# Patient Record
Sex: MALE | Employment: UNEMPLOYED | URBAN - METROPOLITAN AREA
[De-identification: names, ages, dates, MRNs, and addresses within clinical notes are randomized per-mention and may not be internally consistent; named-entity substitution may affect disease eponyms.]

---

## 2019-04-09 ENCOUNTER — HOSPITAL ENCOUNTER (INPATIENT)
Facility: HOSPITAL | Age: 24
LOS: 62 days | DRG: 885 | End: 2019-06-10
Attending: PSYCHIATRY & NEUROLOGY | Admitting: PSYCHIATRY & NEUROLOGY
Payer: MEDICARE

## 2019-04-09 DIAGNOSIS — Z72.0 TOBACCO USE: ICD-10-CM

## 2019-04-09 DIAGNOSIS — F25.0 SCHIZOAFFECTIVE DISORDER, BIPOLAR TYPE (HCC): Primary | Chronic | ICD-10-CM

## 2019-04-09 DIAGNOSIS — F20.1 DISORGANIZED SCHIZOPHRENIA (HCC): ICD-10-CM

## 2019-04-09 DIAGNOSIS — Z87.898 HISTORY OF SEIZURE: ICD-10-CM

## 2019-04-09 DIAGNOSIS — F09 COGNITIVE DISORDER: ICD-10-CM

## 2019-04-09 DIAGNOSIS — J45.909 UNCOMPLICATED ASTHMA, UNSPECIFIED ASTHMA SEVERITY, UNSPECIFIED WHETHER PERSISTENT: ICD-10-CM

## 2019-04-09 DIAGNOSIS — Z00.8 MEDICAL CLEARANCE FOR PSYCHIATRIC ADMISSION: Primary | ICD-10-CM

## 2019-04-09 DIAGNOSIS — I10 HYPERTENSION, UNSPECIFIED TYPE: ICD-10-CM

## 2019-04-09 DIAGNOSIS — K08.89 TOOTH PAIN: ICD-10-CM

## 2019-04-09 DIAGNOSIS — G93.9 BRAIN LESION: ICD-10-CM

## 2019-04-09 DIAGNOSIS — R52 PAIN: ICD-10-CM

## 2019-04-09 DIAGNOSIS — T78.40XA ALLERGY: ICD-10-CM

## 2019-04-09 DIAGNOSIS — G47.00 INSOMNIA, UNSPECIFIED TYPE: ICD-10-CM

## 2019-04-09 RX ORDER — HALOPERIDOL 5 MG/ML
5 INJECTION INTRAMUSCULAR EVERY 6 HOURS PRN
Status: CANCELLED | OUTPATIENT
Start: 2019-04-09

## 2019-04-09 RX ORDER — BENZTROPINE MESYLATE 1 MG/ML
1 INJECTION INTRAMUSCULAR; INTRAVENOUS EVERY 6 HOURS PRN
Status: DISCONTINUED | OUTPATIENT
Start: 2019-04-09 | End: 2019-06-10 | Stop reason: HOSPADM

## 2019-04-09 RX ORDER — LORAZEPAM 1 MG/1
1 TABLET ORAL EVERY 8 HOURS PRN
Status: DISCONTINUED | OUTPATIENT
Start: 2019-04-09 | End: 2019-06-10 | Stop reason: HOSPADM

## 2019-04-09 RX ORDER — TRAZODONE HYDROCHLORIDE 50 MG/1
50 TABLET ORAL
Status: CANCELLED | OUTPATIENT
Start: 2019-04-09

## 2019-04-09 RX ORDER — TRAZODONE HYDROCHLORIDE 50 MG/1
50 TABLET ORAL
Status: DISCONTINUED | OUTPATIENT
Start: 2019-04-09 | End: 2019-04-22

## 2019-04-09 RX ORDER — BENZTROPINE MESYLATE 1 MG/ML
1 INJECTION INTRAMUSCULAR; INTRAVENOUS
Status: CANCELLED | OUTPATIENT
Start: 2019-04-09

## 2019-04-09 RX ORDER — OLANZAPINE 10 MG/1
10 TABLET ORAL
Status: DISCONTINUED | OUTPATIENT
Start: 2019-04-09 | End: 2019-06-10 | Stop reason: HOSPADM

## 2019-04-09 RX ORDER — IBUPROFEN 600 MG/1
600 TABLET ORAL EVERY 8 HOURS PRN
Status: CANCELLED | OUTPATIENT
Start: 2019-04-09

## 2019-04-09 RX ORDER — HYDROXYZINE HYDROCHLORIDE 25 MG/1
25 TABLET, FILM COATED ORAL EVERY 8 HOURS PRN
COMMUNITY
Start: 2019-04-04 | End: 2019-06-10 | Stop reason: HOSPADM

## 2019-04-09 RX ORDER — RISPERIDONE 1 MG/1
1 TABLET, ORALLY DISINTEGRATING ORAL
Status: DISCONTINUED | OUTPATIENT
Start: 2019-04-09 | End: 2019-06-10 | Stop reason: HOSPADM

## 2019-04-09 RX ORDER — HYDROXYZINE 50 MG/1
50 TABLET, FILM COATED ORAL EVERY 4 HOURS PRN
Status: CANCELLED | OUTPATIENT
Start: 2019-04-09

## 2019-04-09 RX ORDER — MAGNESIUM HYDROXIDE/ALUMINUM HYDROXICE/SIMETHICONE 120; 1200; 1200 MG/30ML; MG/30ML; MG/30ML
30 SUSPENSION ORAL EVERY 4 HOURS PRN
Status: CANCELLED | OUTPATIENT
Start: 2019-04-09

## 2019-04-09 RX ORDER — ACETAMINOPHEN 325 MG/1
650 TABLET ORAL EVERY 6 HOURS PRN
Status: DISCONTINUED | OUTPATIENT
Start: 2019-04-09 | End: 2019-06-10 | Stop reason: HOSPADM

## 2019-04-09 RX ORDER — ACETAMINOPHEN 325 MG/1
650 TABLET ORAL EVERY 6 HOURS PRN
Status: CANCELLED | OUTPATIENT
Start: 2019-04-09

## 2019-04-09 RX ORDER — RISPERIDONE 1 MG/1
1 TABLET, ORALLY DISINTEGRATING ORAL
Status: CANCELLED | OUTPATIENT
Start: 2019-04-09

## 2019-04-09 RX ORDER — OLANZAPINE 10 MG/1
10 INJECTION, POWDER, LYOPHILIZED, FOR SOLUTION INTRAMUSCULAR
Status: DISCONTINUED | OUTPATIENT
Start: 2019-04-09 | End: 2019-06-10 | Stop reason: HOSPADM

## 2019-04-09 RX ORDER — OLANZAPINE 10 MG/1
5 INJECTION, POWDER, LYOPHILIZED, FOR SOLUTION INTRAMUSCULAR EVERY 6 HOURS PRN
Status: CANCELLED | OUTPATIENT
Start: 2019-04-09

## 2019-04-09 RX ORDER — ACETAMINOPHEN 325 MG/1
975 TABLET ORAL EVERY 6 HOURS PRN
Status: DISCONTINUED | OUTPATIENT
Start: 2019-04-09 | End: 2019-06-10 | Stop reason: HOSPADM

## 2019-04-09 RX ORDER — SERTRALINE HYDROCHLORIDE 100 MG/1
150 TABLET, FILM COATED ORAL
COMMUNITY
Start: 2019-04-05 | End: 2019-06-10 | Stop reason: HOSPADM

## 2019-04-09 RX ORDER — IBUPROFEN 400 MG/1
800 TABLET ORAL EVERY 8 HOURS PRN
Status: CANCELLED | OUTPATIENT
Start: 2019-04-09

## 2019-04-09 RX ORDER — MAGNESIUM HYDROXIDE/ALUMINUM HYDROXICE/SIMETHICONE 120; 1200; 1200 MG/30ML; MG/30ML; MG/30ML
30 SUSPENSION ORAL EVERY 4 HOURS PRN
Status: DISCONTINUED | OUTPATIENT
Start: 2019-04-09 | End: 2019-06-10 | Stop reason: HOSPADM

## 2019-04-09 RX ORDER — HALOPERIDOL 5 MG
5 TABLET ORAL EVERY 8 HOURS PRN
Status: DISCONTINUED | OUTPATIENT
Start: 2019-04-09 | End: 2019-06-10 | Stop reason: HOSPADM

## 2019-04-09 RX ORDER — LORAZEPAM 2 MG/ML
1 INJECTION INTRAMUSCULAR EVERY 6 HOURS PRN
Status: DISCONTINUED | OUTPATIENT
Start: 2019-04-09 | End: 2019-06-10 | Stop reason: HOSPADM

## 2019-04-09 RX ORDER — HYDROXYZINE HYDROCHLORIDE 25 MG/1
25 TABLET, FILM COATED ORAL EVERY 6 HOURS PRN
Status: DISCONTINUED | OUTPATIENT
Start: 2019-04-09 | End: 2019-06-10 | Stop reason: HOSPADM

## 2019-04-09 RX ORDER — HALOPERIDOL 5 MG/ML
5 INJECTION INTRAMUSCULAR EVERY 6 HOURS PRN
Status: DISCONTINUED | OUTPATIENT
Start: 2019-04-09 | End: 2019-06-10 | Stop reason: HOSPADM

## 2019-04-09 RX ORDER — BENZTROPINE MESYLATE 1 MG/1
1 TABLET ORAL EVERY 6 HOURS PRN
Status: DISCONTINUED | OUTPATIENT
Start: 2019-04-09 | End: 2019-06-10 | Stop reason: HOSPADM

## 2019-04-09 RX ORDER — LORAZEPAM 2 MG/ML
2 INJECTION INTRAMUSCULAR EVERY 4 HOURS PRN
Status: CANCELLED | OUTPATIENT
Start: 2019-04-09

## 2019-04-09 RX ORDER — ACETAMINOPHEN 325 MG/1
650 TABLET ORAL EVERY 4 HOURS PRN
Status: DISCONTINUED | OUTPATIENT
Start: 2019-04-09 | End: 2019-06-10 | Stop reason: HOSPADM

## 2019-04-10 PROBLEM — Z87.898 HISTORY OF SEIZURE: Status: ACTIVE | Noted: 2019-04-10

## 2019-04-10 PROBLEM — F20.9 SCHIZOPHRENIA (HCC): Status: ACTIVE | Noted: 2019-04-10

## 2019-04-10 PROBLEM — E22.1 HYPERPROLACTINEMIA (HCC): Status: ACTIVE | Noted: 2019-04-10

## 2019-04-10 PROBLEM — G93.9 BRAIN LESION: Status: ACTIVE | Noted: 2019-04-10

## 2019-04-10 LAB
ALBUMIN SERPL BCP-MCNC: 4 G/DL (ref 3–5.2)
ALP SERPL-CCNC: 78 U/L (ref 43–122)
ALT SERPL W P-5'-P-CCNC: 22 U/L (ref 9–52)
AMORPH PHOS CRY URNS QL MICRO: ABNORMAL /HPF
AMPHETAMINES SERPL QL SCN: NEGATIVE
ANION GAP SERPL CALCULATED.3IONS-SCNC: 7 MMOL/L (ref 5–14)
AST SERPL W P-5'-P-CCNC: 31 U/L (ref 17–59)
BACTERIA UR QL AUTO: ABNORMAL /HPF
BARBITURATES UR QL: NEGATIVE
BASOPHILS # BLD AUTO: 0 THOUSANDS/ΜL (ref 0–0.1)
BASOPHILS NFR BLD AUTO: 0 % (ref 0–1)
BENZODIAZ UR QL: NEGATIVE
BILIRUB SERPL-MCNC: 0.4 MG/DL
BILIRUB UR QL STRIP: NEGATIVE
BUN SERPL-MCNC: 11 MG/DL (ref 5–25)
CALCIUM SERPL-MCNC: 9.4 MG/DL (ref 8.4–10.2)
CHLORIDE SERPL-SCNC: 101 MMOL/L (ref 97–108)
CHOLEST SERPL-MCNC: 136 MG/DL
CLARITY UR: ABNORMAL
CO2 SERPL-SCNC: 27 MMOL/L (ref 22–30)
COCAINE UR QL: NEGATIVE
COLOR UR: YELLOW
CREAT SERPL-MCNC: 0.73 MG/DL (ref 0.7–1.5)
EOSINOPHIL # BLD AUTO: 0.3 THOUSAND/ΜL (ref 0–0.4)
EOSINOPHIL NFR BLD AUTO: 4 % (ref 0–6)
ERYTHROCYTE [DISTWIDTH] IN BLOOD BY AUTOMATED COUNT: 12.2 %
GFR SERPL CREATININE-BSD FRML MDRD: 132 ML/MIN/1.73SQ M
GLUCOSE P FAST SERPL-MCNC: 89 MG/DL (ref 70–99)
GLUCOSE SERPL-MCNC: 89 MG/DL (ref 70–99)
GLUCOSE UR STRIP-MCNC: NEGATIVE MG/DL
HCT VFR BLD AUTO: 43.7 % (ref 41–53)
HDLC SERPL-MCNC: 32 MG/DL (ref 40–59)
HGB BLD-MCNC: 14.9 G/DL (ref 13.5–17.5)
HGB UR QL STRIP.AUTO: NEGATIVE
KETONES UR STRIP-MCNC: NEGATIVE MG/DL
LDLC SERPL CALC-MCNC: 90 MG/DL
LEUKOCYTE ESTERASE UR QL STRIP: 500
LYMPHOCYTES # BLD AUTO: 2.2 THOUSANDS/ΜL (ref 0.5–4)
LYMPHOCYTES NFR BLD AUTO: 30 % (ref 25–45)
MAGNESIUM SERPL-MCNC: 2.5 MG/DL (ref 1.6–2.3)
MCH RBC QN AUTO: 32.1 PG (ref 26–34)
MCHC RBC AUTO-ENTMCNC: 34.1 G/DL (ref 31–36)
MCV RBC AUTO: 94 FL (ref 80–100)
METHADONE UR QL: NEGATIVE
MONOCYTES # BLD AUTO: 0.7 THOUSAND/ΜL (ref 0.2–0.9)
MONOCYTES NFR BLD AUTO: 10 % (ref 1–10)
MUCOUS THREADS UR QL AUTO: ABNORMAL
NEUTROPHILS # BLD AUTO: 4.1 THOUSANDS/ΜL (ref 1.8–7.8)
NEUTS SEG NFR BLD AUTO: 56 % (ref 45–65)
NITRITE UR QL STRIP: NEGATIVE
NON-SQ EPI CELLS URNS QL MICRO: ABNORMAL /HPF
NONHDLC SERPL-MCNC: 104 MG/DL
OPIATES UR QL SCN: NEGATIVE
PCP UR QL: NEGATIVE
PH UR STRIP.AUTO: 7 [PH]
PLATELET # BLD AUTO: 337 THOUSANDS/UL (ref 150–450)
PMV BLD AUTO: 7.3 FL (ref 8.9–12.7)
POTASSIUM SERPL-SCNC: 3.9 MMOL/L (ref 3.6–5)
PROT SERPL-MCNC: 6.7 G/DL (ref 5.9–8.4)
PROT UR STRIP-MCNC: NEGATIVE MG/DL
RBC # BLD AUTO: 4.65 MILLION/UL (ref 4.5–5.9)
RBC #/AREA URNS AUTO: ABNORMAL /HPF
SODIUM SERPL-SCNC: 135 MMOL/L (ref 137–147)
SP GR UR STRIP.AUTO: 1.01 (ref 1–1.04)
THC UR QL: NEGATIVE
TRIGL SERPL-MCNC: 71 MG/DL
TSH SERPL DL<=0.05 MIU/L-ACNC: 1.87 UIU/ML (ref 0.47–4.68)
UROBILINOGEN UA: NEGATIVE MG/DL
VALPROATE SERPL-MCNC: <10 UG/ML (ref 50–120)
WBC # BLD AUTO: 7.3 THOUSAND/UL (ref 4.5–11)
WBC #/AREA URNS AUTO: ABNORMAL /HPF

## 2019-04-10 PROCEDURE — 99253 IP/OBS CNSLTJ NEW/EST LOW 45: CPT | Performed by: INTERNAL MEDICINE

## 2019-04-10 PROCEDURE — 99222 1ST HOSP IP/OBS MODERATE 55: CPT | Performed by: PSYCHIATRY & NEUROLOGY

## 2019-04-10 PROCEDURE — 80061 LIPID PANEL: CPT | Performed by: PSYCHIATRY & NEUROLOGY

## 2019-04-10 PROCEDURE — 99254 IP/OBS CNSLTJ NEW/EST MOD 60: CPT | Performed by: PSYCHIATRY & NEUROLOGY

## 2019-04-10 PROCEDURE — 80053 COMPREHEN METABOLIC PANEL: CPT | Performed by: PSYCHIATRY & NEUROLOGY

## 2019-04-10 PROCEDURE — 80164 ASSAY DIPROPYLACETIC ACD TOT: CPT | Performed by: PHYSICIAN ASSISTANT

## 2019-04-10 PROCEDURE — 86592 SYPHILIS TEST NON-TREP QUAL: CPT | Performed by: PSYCHIATRY & NEUROLOGY

## 2019-04-10 PROCEDURE — 80307 DRUG TEST PRSMV CHEM ANLYZR: CPT | Performed by: PSYCHIATRY & NEUROLOGY

## 2019-04-10 PROCEDURE — 85025 COMPLETE CBC W/AUTO DIFF WBC: CPT | Performed by: PSYCHIATRY & NEUROLOGY

## 2019-04-10 PROCEDURE — 83735 ASSAY OF MAGNESIUM: CPT | Performed by: PSYCHIATRY & NEUROLOGY

## 2019-04-10 PROCEDURE — 81001 URINALYSIS AUTO W/SCOPE: CPT | Performed by: PSYCHIATRY & NEUROLOGY

## 2019-04-10 PROCEDURE — 84443 ASSAY THYROID STIM HORMONE: CPT | Performed by: PSYCHIATRY & NEUROLOGY

## 2019-04-10 PROCEDURE — 93005 ELECTROCARDIOGRAM TRACING: CPT

## 2019-04-10 RX ORDER — PALIPERIDONE 3 MG/1
3 TABLET, EXTENDED RELEASE ORAL DAILY
Status: DISCONTINUED | OUTPATIENT
Start: 2019-04-10 | End: 2019-04-10

## 2019-04-10 RX ORDER — DIVALPROEX SODIUM 500 MG/1
500 TABLET, DELAYED RELEASE ORAL 2 TIMES DAILY
Status: DISCONTINUED | OUTPATIENT
Start: 2019-04-10 | End: 2019-04-15

## 2019-04-10 RX ORDER — NICOTINE 21 MG/24HR
14 PATCH, TRANSDERMAL 24 HOURS TRANSDERMAL DAILY
Status: DISCONTINUED | OUTPATIENT
Start: 2019-04-10 | End: 2019-06-10 | Stop reason: HOSPADM

## 2019-04-10 RX ORDER — PALIPERIDONE 6 MG/1
6 TABLET, EXTENDED RELEASE ORAL DAILY
Status: DISCONTINUED | OUTPATIENT
Start: 2019-04-11 | End: 2019-04-22

## 2019-04-10 RX ADMIN — SERTRALINE HYDROCHLORIDE 100 MG: 50 TABLET ORAL at 12:58

## 2019-04-10 RX ADMIN — PALIPERIDONE 3 MG: 3 TABLET, EXTENDED RELEASE ORAL at 12:58

## 2019-04-10 RX ADMIN — NICOTINE 14 MG: 14 PATCH, EXTENDED RELEASE TRANSDERMAL at 08:30

## 2019-04-10 RX ADMIN — DIVALPROEX SODIUM 500 MG: 500 TABLET, DELAYED RELEASE ORAL at 12:58

## 2019-04-10 RX ADMIN — DIVALPROEX SODIUM 500 MG: 500 TABLET, DELAYED RELEASE ORAL at 17:16

## 2019-04-11 LAB
ATRIAL RATE: 61 BPM
P AXIS: 7 DEGREES
PR INTERVAL: 136 MS
QRS AXIS: 68 DEGREES
QRSD INTERVAL: 82 MS
QT INTERVAL: 396 MS
QTC INTERVAL: 398 MS
RPR SER QL: NORMAL
T WAVE AXIS: 37 DEGREES
VENTRICULAR RATE: 61 BPM

## 2019-04-11 PROCEDURE — 93010 ELECTROCARDIOGRAM REPORT: CPT | Performed by: INTERNAL MEDICINE

## 2019-04-11 PROCEDURE — 99231 SBSQ HOSP IP/OBS SF/LOW 25: CPT | Performed by: PSYCHIATRY & NEUROLOGY

## 2019-04-11 RX ADMIN — ACETAMINOPHEN 650 MG: 325 TABLET ORAL at 09:59

## 2019-04-11 RX ADMIN — NICOTINE 14 MG: 14 PATCH, EXTENDED RELEASE TRANSDERMAL at 09:03

## 2019-04-11 RX ADMIN — HYDROXYZINE HYDROCHLORIDE 25 MG: 25 TABLET ORAL at 14:02

## 2019-04-11 RX ADMIN — DIVALPROEX SODIUM 500 MG: 500 TABLET, DELAYED RELEASE ORAL at 09:02

## 2019-04-11 RX ADMIN — DIVALPROEX SODIUM 500 MG: 500 TABLET, DELAYED RELEASE ORAL at 17:56

## 2019-04-11 RX ADMIN — LORAZEPAM 1 MG: 1 TABLET ORAL at 18:14

## 2019-04-11 RX ADMIN — PALIPERIDONE 6 MG: 6 TABLET, EXTENDED RELEASE ORAL at 09:02

## 2019-04-12 PROBLEM — F43.10 PTSD (POST-TRAUMATIC STRESS DISORDER): Status: ACTIVE | Noted: 2019-02-16

## 2019-04-12 PROBLEM — F19.10 POLYSUBSTANCE ABUSE (HCC): Status: ACTIVE | Noted: 2019-02-16

## 2019-04-12 PROBLEM — R56.9 SEIZURE (HCC): Status: ACTIVE | Noted: 2019-02-16

## 2019-04-12 PROBLEM — Z72.0 TOBACCO USE: Status: ACTIVE | Noted: 2019-02-16

## 2019-04-12 PROBLEM — F20.9 SCHIZOPHRENIA (HCC): Chronic | Status: ACTIVE | Noted: 2019-04-10

## 2019-04-12 PROCEDURE — 99232 SBSQ HOSP IP/OBS MODERATE 35: CPT | Performed by: PSYCHIATRY & NEUROLOGY

## 2019-04-12 RX ORDER — DIPHENHYDRAMINE HCL 25 MG
12.5 TABLET ORAL 2 TIMES DAILY
Status: DISCONTINUED | OUTPATIENT
Start: 2019-04-12 | End: 2019-04-15

## 2019-04-12 RX ORDER — AMMONIUM LACTATE 12 G/100G
LOTION TOPICAL 2 TIMES DAILY
Status: DISCONTINUED | OUTPATIENT
Start: 2019-04-12 | End: 2019-06-10 | Stop reason: HOSPADM

## 2019-04-12 RX ADMIN — DIPHENHYDRAMINE HCL 12.5 MG: 25 TABLET ORAL at 15:19

## 2019-04-12 RX ADMIN — ACETAMINOPHEN 975 MG: 325 TABLET ORAL at 19:01

## 2019-04-12 RX ADMIN — DIVALPROEX SODIUM 500 MG: 500 TABLET, DELAYED RELEASE ORAL at 17:49

## 2019-04-12 RX ADMIN — PALIPERIDONE 6 MG: 6 TABLET, EXTENDED RELEASE ORAL at 08:45

## 2019-04-12 RX ADMIN — NICOTINE 14 MG: 14 PATCH, EXTENDED RELEASE TRANSDERMAL at 08:46

## 2019-04-12 RX ADMIN — HYDROXYZINE HYDROCHLORIDE 25 MG: 25 TABLET ORAL at 11:54

## 2019-04-12 RX ADMIN — DIVALPROEX SODIUM 500 MG: 500 TABLET, DELAYED RELEASE ORAL at 08:45

## 2019-04-13 PROBLEM — F03.90 MAJOR NEUROCOGNITIVE DISORDER (HCC): Chronic | Status: ACTIVE | Noted: 2019-04-13

## 2019-04-13 PROCEDURE — 99232 SBSQ HOSP IP/OBS MODERATE 35: CPT | Performed by: PSYCHIATRY & NEUROLOGY

## 2019-04-13 RX ADMIN — PALIPERIDONE 6 MG: 6 TABLET, EXTENDED RELEASE ORAL at 08:46

## 2019-04-13 RX ADMIN — DIPHENHYDRAMINE HCL 12.5 MG: 25 TABLET ORAL at 18:41

## 2019-04-13 RX ADMIN — ACETAMINOPHEN 650 MG: 325 TABLET ORAL at 11:02

## 2019-04-13 RX ADMIN — Medication: at 08:50

## 2019-04-13 RX ADMIN — DIVALPROEX SODIUM 500 MG: 500 TABLET, DELAYED RELEASE ORAL at 08:46

## 2019-04-13 RX ADMIN — DIPHENHYDRAMINE HCL 12.5 MG: 25 TABLET ORAL at 08:45

## 2019-04-13 RX ADMIN — OLANZAPINE 10 MG: 10 TABLET, FILM COATED ORAL at 17:56

## 2019-04-13 RX ADMIN — DIVALPROEX SODIUM 500 MG: 500 TABLET, DELAYED RELEASE ORAL at 18:41

## 2019-04-13 RX ADMIN — Medication 1 APPLICATION: at 18:51

## 2019-04-14 PROCEDURE — 99232 SBSQ HOSP IP/OBS MODERATE 35: CPT | Performed by: PSYCHIATRY & NEUROLOGY

## 2019-04-14 RX ADMIN — Medication: at 17:18

## 2019-04-14 RX ADMIN — DIVALPROEX SODIUM 500 MG: 500 TABLET, DELAYED RELEASE ORAL at 08:30

## 2019-04-14 RX ADMIN — DIPHENHYDRAMINE HCL 12.5 MG: 25 TABLET ORAL at 08:30

## 2019-04-14 RX ADMIN — DIVALPROEX SODIUM 500 MG: 500 TABLET, DELAYED RELEASE ORAL at 17:19

## 2019-04-14 RX ADMIN — Medication: at 08:34

## 2019-04-14 RX ADMIN — LORAZEPAM 1 MG: 1 TABLET ORAL at 16:33

## 2019-04-14 RX ADMIN — DIPHENHYDRAMINE HCL 12.5 MG: 25 TABLET ORAL at 17:18

## 2019-04-14 RX ADMIN — PALIPERIDONE 6 MG: 6 TABLET, EXTENDED RELEASE ORAL at 08:30

## 2019-04-15 LAB
EST. AVERAGE GLUCOSE BLD GHB EST-MCNC: 94 MG/DL
HBA1C MFR BLD: 4.9 % (ref 4.2–6.3)
VALPROATE SERPL-MCNC: 57.6 UG/ML (ref 50–120)

## 2019-04-15 PROCEDURE — 80164 ASSAY DIPROPYLACETIC ACD TOT: CPT | Performed by: PSYCHIATRY & NEUROLOGY

## 2019-04-15 PROCEDURE — 99232 SBSQ HOSP IP/OBS MODERATE 35: CPT | Performed by: PSYCHIATRY & NEUROLOGY

## 2019-04-15 PROCEDURE — 83036 HEMOGLOBIN GLYCOSYLATED A1C: CPT | Performed by: PSYCHIATRY & NEUROLOGY

## 2019-04-15 RX ORDER — DIVALPROEX SODIUM 500 MG/1
500 TABLET, DELAYED RELEASE ORAL ONCE
Status: COMPLETED | OUTPATIENT
Start: 2019-04-15 | End: 2019-04-15

## 2019-04-15 RX ORDER — DIVALPROEX SODIUM 500 MG/1
1000 TABLET, DELAYED RELEASE ORAL
Status: DISCONTINUED | OUTPATIENT
Start: 2019-04-15 | End: 2019-04-15

## 2019-04-15 RX ORDER — DIPHENHYDRAMINE HCL 25 MG
25 TABLET ORAL 2 TIMES DAILY
Status: DISCONTINUED | OUTPATIENT
Start: 2019-04-16 | End: 2019-05-07

## 2019-04-15 RX ORDER — DIVALPROEX SODIUM 500 MG/1
500 TABLET, DELAYED RELEASE ORAL DAILY
Status: DISCONTINUED | OUTPATIENT
Start: 2019-04-16 | End: 2019-04-25

## 2019-04-15 RX ORDER — DIVALPROEX SODIUM 500 MG/1
1000 TABLET, DELAYED RELEASE ORAL
Status: DISCONTINUED | OUTPATIENT
Start: 2019-04-16 | End: 2019-04-24

## 2019-04-15 RX ORDER — CLONIDINE HYDROCHLORIDE 0.1 MG/1
0.1 TABLET ORAL 2 TIMES DAILY
Status: DISCONTINUED | OUTPATIENT
Start: 2019-04-15 | End: 2019-04-24

## 2019-04-15 RX ADMIN — NICOTINE 14 MG: 14 PATCH, EXTENDED RELEASE TRANSDERMAL at 10:31

## 2019-04-15 RX ADMIN — DIVALPROEX SODIUM 500 MG: 500 TABLET, DELAYED RELEASE ORAL at 17:24

## 2019-04-15 RX ADMIN — Medication: at 21:54

## 2019-04-15 RX ADMIN — CLONIDINE HYDROCHLORIDE 0.1 MG: 0.1 TABLET ORAL at 18:12

## 2019-04-15 RX ADMIN — PALIPERIDONE 6 MG: 6 TABLET, EXTENDED RELEASE ORAL at 10:29

## 2019-04-15 RX ADMIN — DIVALPROEX SODIUM 500 MG: 500 TABLET, DELAYED RELEASE ORAL at 10:30

## 2019-04-15 RX ADMIN — DIPHENHYDRAMINE HCL 12.5 MG: 25 TABLET ORAL at 10:30

## 2019-04-15 RX ADMIN — Medication: at 10:42

## 2019-04-15 RX ADMIN — DIPHENHYDRAMINE HCL 12.5 MG: 25 TABLET ORAL at 17:24

## 2019-04-15 RX ADMIN — HYDROXYZINE HYDROCHLORIDE 25 MG: 25 TABLET ORAL at 21:44

## 2019-04-15 RX ADMIN — ACETAMINOPHEN 650 MG: 325 TABLET ORAL at 10:41

## 2019-04-15 RX ADMIN — DIVALPROEX SODIUM 500 MG: 500 TABLET, DELAYED RELEASE ORAL at 21:14

## 2019-04-16 PROCEDURE — 99231 SBSQ HOSP IP/OBS SF/LOW 25: CPT | Performed by: PSYCHIATRY & NEUROLOGY

## 2019-04-16 RX ADMIN — DIPHENHYDRAMINE HCL 25 MG: 25 TABLET ORAL at 08:06

## 2019-04-16 RX ADMIN — PALIPERIDONE 6 MG: 6 TABLET, EXTENDED RELEASE ORAL at 08:02

## 2019-04-16 RX ADMIN — NICOTINE 14 MG: 14 PATCH, EXTENDED RELEASE TRANSDERMAL at 08:05

## 2019-04-16 RX ADMIN — DIVALPROEX SODIUM 1000 MG: 500 TABLET, DELAYED RELEASE ORAL at 21:26

## 2019-04-16 RX ADMIN — ACETAMINOPHEN 650 MG: 325 TABLET ORAL at 11:55

## 2019-04-16 RX ADMIN — DIVALPROEX SODIUM 500 MG: 500 TABLET, DELAYED RELEASE ORAL at 08:07

## 2019-04-16 RX ADMIN — Medication 1 APPLICATION: at 08:02

## 2019-04-16 RX ADMIN — DIPHENHYDRAMINE HCL 25 MG: 25 TABLET ORAL at 17:30

## 2019-04-16 RX ADMIN — CLONIDINE HYDROCHLORIDE 0.1 MG: 0.1 TABLET ORAL at 08:02

## 2019-04-16 RX ADMIN — Medication: at 22:16

## 2019-04-16 RX ADMIN — CLONIDINE HYDROCHLORIDE 0.1 MG: 0.1 TABLET ORAL at 17:41

## 2019-04-17 PROBLEM — F09 COGNITIVE DISORDER: Chronic | Status: ACTIVE | Noted: 2019-04-17

## 2019-04-17 PROBLEM — F09 PSYCHOSIS, ORGANIC: Status: ACTIVE | Noted: 2019-04-10

## 2019-04-17 PROCEDURE — 99231 SBSQ HOSP IP/OBS SF/LOW 25: CPT | Performed by: PSYCHIATRY & NEUROLOGY

## 2019-04-17 RX ADMIN — HYDROXYZINE HYDROCHLORIDE 25 MG: 25 TABLET ORAL at 15:49

## 2019-04-17 RX ADMIN — PALIPERIDONE 6 MG: 6 TABLET, EXTENDED RELEASE ORAL at 08:42

## 2019-04-17 RX ADMIN — DIPHENHYDRAMINE HCL 25 MG: 25 TABLET ORAL at 08:42

## 2019-04-17 RX ADMIN — CLONIDINE HYDROCHLORIDE 0.1 MG: 0.1 TABLET ORAL at 17:09

## 2019-04-17 RX ADMIN — DIVALPROEX SODIUM 1000 MG: 500 TABLET, DELAYED RELEASE ORAL at 21:08

## 2019-04-17 RX ADMIN — DIPHENHYDRAMINE HCL 25 MG: 25 TABLET ORAL at 17:09

## 2019-04-17 RX ADMIN — CLONIDINE HYDROCHLORIDE 0.1 MG: 0.1 TABLET ORAL at 08:42

## 2019-04-17 RX ADMIN — DIVALPROEX SODIUM 500 MG: 500 TABLET, DELAYED RELEASE ORAL at 08:42

## 2019-04-18 ENCOUNTER — APPOINTMENT (INPATIENT)
Dept: RADIOLOGY | Facility: HOSPITAL | Age: 24
DRG: 885 | End: 2019-04-18
Attending: RADIOLOGY
Payer: MEDICARE

## 2019-04-18 PROBLEM — F25.0 SCHIZOAFFECTIVE DISORDER, BIPOLAR TYPE (HCC): Status: ACTIVE | Noted: 2019-04-18

## 2019-04-18 LAB
BACTERIA UR QL AUTO: ABNORMAL /HPF
BILIRUB UR QL STRIP: NEGATIVE
CLARITY UR: CLEAR
COLOR UR: YELLOW
GLUCOSE UR STRIP-MCNC: NEGATIVE MG/DL
HGB UR QL STRIP.AUTO: NEGATIVE
KETONES UR STRIP-MCNC: ABNORMAL MG/DL
LEUKOCYTE ESTERASE UR QL STRIP: NEGATIVE
NITRITE UR QL STRIP: NEGATIVE
NON-SQ EPI CELLS URNS QL MICRO: ABNORMAL /HPF
PH UR STRIP.AUTO: 7 [PH]
PROT UR STRIP-MCNC: NEGATIVE MG/DL
RBC #/AREA URNS AUTO: ABNORMAL /HPF
SP GR UR STRIP.AUTO: 1.01 (ref 1–1.04)
UROBILINOGEN UA: NEGATIVE MG/DL
WBC #/AREA URNS AUTO: ABNORMAL /HPF

## 2019-04-18 PROCEDURE — 99232 SBSQ HOSP IP/OBS MODERATE 35: CPT | Performed by: PSYCHIATRY & NEUROLOGY

## 2019-04-18 PROCEDURE — 81001 URINALYSIS AUTO W/SCOPE: CPT | Performed by: PSYCHIATRY & NEUROLOGY

## 2019-04-18 RX ADMIN — NICOTINE 14 MG: 14 PATCH, EXTENDED RELEASE TRANSDERMAL at 08:26

## 2019-04-18 RX ADMIN — CLONIDINE HYDROCHLORIDE 0.1 MG: 0.1 TABLET ORAL at 18:01

## 2019-04-18 RX ADMIN — ACETAMINOPHEN 650 MG: 325 TABLET ORAL at 12:35

## 2019-04-18 RX ADMIN — DIPHENHYDRAMINE HCL 25 MG: 25 TABLET ORAL at 17:50

## 2019-04-18 RX ADMIN — DIVALPROEX SODIUM 500 MG: 500 TABLET, DELAYED RELEASE ORAL at 08:28

## 2019-04-18 RX ADMIN — Medication 1 APPLICATION: at 21:00

## 2019-04-18 RX ADMIN — ACETAMINOPHEN 975 MG: 325 TABLET ORAL at 23:17

## 2019-04-18 RX ADMIN — DIPHENHYDRAMINE HCL 25 MG: 25 TABLET ORAL at 08:28

## 2019-04-18 RX ADMIN — CLONIDINE HYDROCHLORIDE 0.1 MG: 0.1 TABLET ORAL at 08:28

## 2019-04-18 RX ADMIN — PALIPERIDONE 6 MG: 6 TABLET, EXTENDED RELEASE ORAL at 08:28

## 2019-04-18 RX ADMIN — HYDROXYZINE HYDROCHLORIDE 25 MG: 25 TABLET ORAL at 11:55

## 2019-04-18 RX ADMIN — DIVALPROEX SODIUM 1000 MG: 500 TABLET, DELAYED RELEASE ORAL at 21:21

## 2019-04-18 RX ADMIN — HYDROXYZINE HYDROCHLORIDE 25 MG: 25 TABLET ORAL at 23:17

## 2019-04-19 PROCEDURE — 99232 SBSQ HOSP IP/OBS MODERATE 35: CPT | Performed by: PSYCHIATRY & NEUROLOGY

## 2019-04-19 RX ADMIN — CLONIDINE HYDROCHLORIDE 0.1 MG: 0.1 TABLET ORAL at 09:49

## 2019-04-19 RX ADMIN — Medication 1 APPLICATION: at 17:36

## 2019-04-19 RX ADMIN — TRAZODONE HYDROCHLORIDE 50 MG: 50 TABLET ORAL at 21:19

## 2019-04-19 RX ADMIN — DIPHENHYDRAMINE HCL 25 MG: 25 TABLET ORAL at 09:49

## 2019-04-19 RX ADMIN — DIPHENHYDRAMINE HCL 25 MG: 25 TABLET ORAL at 17:36

## 2019-04-19 RX ADMIN — PALIPERIDONE 6 MG: 6 TABLET, EXTENDED RELEASE ORAL at 09:49

## 2019-04-19 RX ADMIN — DIVALPROEX SODIUM 1000 MG: 500 TABLET, DELAYED RELEASE ORAL at 21:08

## 2019-04-19 RX ADMIN — CLONIDINE HYDROCHLORIDE 0.1 MG: 0.1 TABLET ORAL at 17:36

## 2019-04-19 RX ADMIN — DIVALPROEX SODIUM 500 MG: 500 TABLET, DELAYED RELEASE ORAL at 09:49

## 2019-04-20 PROCEDURE — 99231 SBSQ HOSP IP/OBS SF/LOW 25: CPT | Performed by: PSYCHIATRY & NEUROLOGY

## 2019-04-20 RX ADMIN — ACETAMINOPHEN 650 MG: 325 TABLET ORAL at 13:20

## 2019-04-20 RX ADMIN — CLONIDINE HYDROCHLORIDE 0.1 MG: 0.1 TABLET ORAL at 17:15

## 2019-04-20 RX ADMIN — TRAZODONE HYDROCHLORIDE 50 MG: 50 TABLET ORAL at 21:09

## 2019-04-20 RX ADMIN — DIPHENHYDRAMINE HCL 25 MG: 25 TABLET ORAL at 08:45

## 2019-04-20 RX ADMIN — DIVALPROEX SODIUM 1000 MG: 500 TABLET, DELAYED RELEASE ORAL at 21:10

## 2019-04-20 RX ADMIN — CLONIDINE HYDROCHLORIDE 0.1 MG: 0.1 TABLET ORAL at 08:45

## 2019-04-20 RX ADMIN — NICOTINE 14 MG: 14 PATCH, EXTENDED RELEASE TRANSDERMAL at 08:45

## 2019-04-20 RX ADMIN — PALIPERIDONE 6 MG: 6 TABLET, EXTENDED RELEASE ORAL at 08:47

## 2019-04-20 RX ADMIN — DIVALPROEX SODIUM 500 MG: 500 TABLET, DELAYED RELEASE ORAL at 08:45

## 2019-04-20 RX ADMIN — Medication 1 APPLICATION: at 09:36

## 2019-04-20 RX ADMIN — Medication: at 17:16

## 2019-04-20 RX ADMIN — DIPHENHYDRAMINE HCL 25 MG: 25 TABLET ORAL at 17:15

## 2019-04-20 RX ADMIN — HYDROXYZINE HYDROCHLORIDE 25 MG: 25 TABLET ORAL at 13:20

## 2019-04-21 PROCEDURE — 99231 SBSQ HOSP IP/OBS SF/LOW 25: CPT | Performed by: PSYCHIATRY & NEUROLOGY

## 2019-04-21 RX ADMIN — DIPHENHYDRAMINE HCL 25 MG: 25 TABLET ORAL at 17:36

## 2019-04-21 RX ADMIN — DIVALPROEX SODIUM 500 MG: 500 TABLET, DELAYED RELEASE ORAL at 08:54

## 2019-04-21 RX ADMIN — NICOTINE 14 MG: 14 PATCH, EXTENDED RELEASE TRANSDERMAL at 08:54

## 2019-04-21 RX ADMIN — DIVALPROEX SODIUM 1000 MG: 500 TABLET, DELAYED RELEASE ORAL at 21:12

## 2019-04-21 RX ADMIN — DIPHENHYDRAMINE HCL 25 MG: 25 TABLET ORAL at 08:54

## 2019-04-21 RX ADMIN — ACETAMINOPHEN 650 MG: 325 TABLET ORAL at 21:12

## 2019-04-21 RX ADMIN — PALIPERIDONE 6 MG: 6 TABLET, EXTENDED RELEASE ORAL at 08:57

## 2019-04-21 RX ADMIN — CLONIDINE HYDROCHLORIDE 0.1 MG: 0.1 TABLET ORAL at 08:54

## 2019-04-21 RX ADMIN — TRAZODONE HYDROCHLORIDE 50 MG: 50 TABLET ORAL at 21:12

## 2019-04-21 RX ADMIN — CLONIDINE HYDROCHLORIDE 0.1 MG: 0.1 TABLET ORAL at 17:41

## 2019-04-21 RX ADMIN — Medication 1 APPLICATION: at 09:07

## 2019-04-22 LAB — VALPROATE SERPL-MCNC: 66.9 UG/ML (ref 50–120)

## 2019-04-22 PROCEDURE — 99232 SBSQ HOSP IP/OBS MODERATE 35: CPT | Performed by: PSYCHIATRY & NEUROLOGY

## 2019-04-22 PROCEDURE — 80164 ASSAY DIPROPYLACETIC ACD TOT: CPT | Performed by: PSYCHIATRY & NEUROLOGY

## 2019-04-22 RX ORDER — TRAZODONE HYDROCHLORIDE 100 MG/1
100 TABLET ORAL
Status: DISCONTINUED | OUTPATIENT
Start: 2019-04-22 | End: 2019-04-22

## 2019-04-22 RX ORDER — PALIPERIDONE 6 MG/1
6 TABLET, EXTENDED RELEASE ORAL DAILY
Status: DISCONTINUED | OUTPATIENT
Start: 2019-04-23 | End: 2019-04-30

## 2019-04-22 RX ORDER — QUETIAPINE FUMARATE 100 MG/1
100 TABLET, FILM COATED ORAL
Status: DISCONTINUED | OUTPATIENT
Start: 2019-04-22 | End: 2019-04-30

## 2019-04-22 RX ORDER — TRAZODONE HYDROCHLORIDE 100 MG/1
200 TABLET ORAL
Status: DISCONTINUED | OUTPATIENT
Start: 2019-04-22 | End: 2019-04-22

## 2019-04-22 RX ORDER — TRAZODONE HYDROCHLORIDE 100 MG/1
100 TABLET ORAL
Status: DISCONTINUED | OUTPATIENT
Start: 2019-04-22 | End: 2019-04-24

## 2019-04-22 RX ADMIN — CLONIDINE HYDROCHLORIDE 0.1 MG: 0.1 TABLET ORAL at 08:35

## 2019-04-22 RX ADMIN — DIVALPROEX SODIUM 1000 MG: 500 TABLET, DELAYED RELEASE ORAL at 21:46

## 2019-04-22 RX ADMIN — NICOTINE 14 MG: 14 PATCH, EXTENDED RELEASE TRANSDERMAL at 08:37

## 2019-04-22 RX ADMIN — Medication: at 08:40

## 2019-04-22 RX ADMIN — TRAZODONE HYDROCHLORIDE 100 MG: 100 TABLET ORAL at 21:51

## 2019-04-22 RX ADMIN — PALIPERIDONE 6 MG: 6 TABLET, EXTENDED RELEASE ORAL at 08:40

## 2019-04-22 RX ADMIN — DIPHENHYDRAMINE HCL 25 MG: 25 TABLET ORAL at 17:55

## 2019-04-22 RX ADMIN — DIPHENHYDRAMINE HCL 25 MG: 25 TABLET ORAL at 08:35

## 2019-04-22 RX ADMIN — DIVALPROEX SODIUM 500 MG: 500 TABLET, DELAYED RELEASE ORAL at 08:35

## 2019-04-22 RX ADMIN — CLONIDINE HYDROCHLORIDE 0.1 MG: 0.1 TABLET ORAL at 17:55

## 2019-04-22 RX ADMIN — QUETIAPINE FUMARATE 100 MG: 100 TABLET ORAL at 23:00

## 2019-04-22 RX ADMIN — Medication: at 18:22

## 2019-04-23 PROCEDURE — 99232 SBSQ HOSP IP/OBS MODERATE 35: CPT | Performed by: PSYCHIATRY & NEUROLOGY

## 2019-04-23 RX ADMIN — QUETIAPINE FUMARATE 100 MG: 100 TABLET ORAL at 21:18

## 2019-04-23 RX ADMIN — DIPHENHYDRAMINE HCL 25 MG: 25 TABLET ORAL at 17:24

## 2019-04-23 RX ADMIN — DIVALPROEX SODIUM 1000 MG: 500 TABLET, DELAYED RELEASE ORAL at 21:18

## 2019-04-23 RX ADMIN — DIPHENHYDRAMINE HCL 25 MG: 25 TABLET ORAL at 08:32

## 2019-04-23 RX ADMIN — CLONIDINE HYDROCHLORIDE 0.1 MG: 0.1 TABLET ORAL at 08:31

## 2019-04-23 RX ADMIN — Medication: at 17:24

## 2019-04-23 RX ADMIN — DIVALPROEX SODIUM 500 MG: 500 TABLET, DELAYED RELEASE ORAL at 08:32

## 2019-04-23 RX ADMIN — PALIPERIDONE 6 MG: 6 TABLET, FILM COATED, EXTENDED RELEASE ORAL at 08:32

## 2019-04-23 RX ADMIN — HYDROXYZINE HYDROCHLORIDE 25 MG: 25 TABLET ORAL at 12:35

## 2019-04-23 RX ADMIN — NICOTINE 14 MG: 14 PATCH, EXTENDED RELEASE TRANSDERMAL at 08:34

## 2019-04-23 RX ADMIN — ACETAMINOPHEN 650 MG: 325 TABLET ORAL at 14:37

## 2019-04-23 RX ADMIN — CLONIDINE HYDROCHLORIDE 0.1 MG: 0.1 TABLET ORAL at 17:24

## 2019-04-24 PROCEDURE — 99232 SBSQ HOSP IP/OBS MODERATE 35: CPT | Performed by: PSYCHIATRY & NEUROLOGY

## 2019-04-24 RX ORDER — TRAZODONE HYDROCHLORIDE 50 MG/1
50 TABLET ORAL
Status: DISCONTINUED | OUTPATIENT
Start: 2019-04-24 | End: 2019-05-07

## 2019-04-24 RX ORDER — CLONIDINE HYDROCHLORIDE 0.1 MG/1
0.05 TABLET ORAL 2 TIMES DAILY
Status: DISCONTINUED | OUTPATIENT
Start: 2019-04-24 | End: 2019-04-26

## 2019-04-24 RX ORDER — DIVALPROEX SODIUM 500 MG/1
1500 TABLET, DELAYED RELEASE ORAL
Status: DISCONTINUED | OUTPATIENT
Start: 2019-04-24 | End: 2019-04-25

## 2019-04-24 RX ADMIN — DIVALPROEX SODIUM 500 MG: 500 TABLET, DELAYED RELEASE ORAL at 08:01

## 2019-04-24 RX ADMIN — Medication: at 10:27

## 2019-04-24 RX ADMIN — HYDROXYZINE HYDROCHLORIDE 25 MG: 25 TABLET ORAL at 14:13

## 2019-04-24 RX ADMIN — MULTIPLE VITAMINS W/ MINERALS TAB 1 TABLET: TAB ORAL at 12:59

## 2019-04-24 RX ADMIN — Medication 1 APPLICATION: at 18:05

## 2019-04-24 RX ADMIN — TRAZODONE HYDROCHLORIDE 100 MG: 100 TABLET ORAL at 02:33

## 2019-04-24 RX ADMIN — ACETAMINOPHEN 650 MG: 325 TABLET ORAL at 15:37

## 2019-04-24 RX ADMIN — PALIPERIDONE 6 MG: 6 TABLET, FILM COATED, EXTENDED RELEASE ORAL at 08:01

## 2019-04-24 RX ADMIN — DIVALPROEX SODIUM 1500 MG: 500 TABLET, DELAYED RELEASE ORAL at 21:48

## 2019-04-24 RX ADMIN — CLONIDINE HYDROCHLORIDE 0.1 MG: 0.1 TABLET ORAL at 08:02

## 2019-04-24 RX ADMIN — NICOTINE POLACRILEX 2 MG: 2 GUM, CHEWING ORAL at 08:40

## 2019-04-24 RX ADMIN — NICOTINE 14 MG: 14 PATCH, EXTENDED RELEASE TRANSDERMAL at 10:27

## 2019-04-24 RX ADMIN — DIPHENHYDRAMINE HCL 25 MG: 25 TABLET ORAL at 17:42

## 2019-04-24 RX ADMIN — CLONIDINE HYDROCHLORIDE 0.05 MG: 0.1 TABLET ORAL at 17:40

## 2019-04-24 RX ADMIN — QUETIAPINE FUMARATE 100 MG: 100 TABLET ORAL at 21:48

## 2019-04-24 RX ADMIN — DIPHENHYDRAMINE HCL 25 MG: 25 TABLET ORAL at 08:02

## 2019-04-25 PROCEDURE — 99232 SBSQ HOSP IP/OBS MODERATE 35: CPT | Performed by: PSYCHIATRY & NEUROLOGY

## 2019-04-25 RX ORDER — DIVALPROEX SODIUM 500 MG/1
1500 TABLET, EXTENDED RELEASE ORAL
Status: DISCONTINUED | OUTPATIENT
Start: 2019-04-25 | End: 2019-04-29

## 2019-04-25 RX ADMIN — PALIPERIDONE 6 MG: 6 TABLET, FILM COATED, EXTENDED RELEASE ORAL at 08:44

## 2019-04-25 RX ADMIN — CLONIDINE HYDROCHLORIDE 0.05 MG: 0.1 TABLET ORAL at 08:43

## 2019-04-25 RX ADMIN — DIVALPROEX SODIUM 1500 MG: 500 TABLET, EXTENDED RELEASE ORAL at 21:30

## 2019-04-25 RX ADMIN — Medication 1 APPLICATION: at 08:51

## 2019-04-25 RX ADMIN — DIPHENHYDRAMINE HCL 25 MG: 25 TABLET ORAL at 08:43

## 2019-04-25 RX ADMIN — QUETIAPINE FUMARATE 100 MG: 100 TABLET ORAL at 21:30

## 2019-04-25 RX ADMIN — MULTIPLE VITAMINS W/ MINERALS TAB 1 TABLET: TAB ORAL at 08:43

## 2019-04-25 RX ADMIN — DIVALPROEX SODIUM 500 MG: 500 TABLET, DELAYED RELEASE ORAL at 08:44

## 2019-04-25 RX ADMIN — CLONIDINE HYDROCHLORIDE 0.05 MG: 0.1 TABLET ORAL at 17:39

## 2019-04-25 RX ADMIN — Medication 1 APPLICATION: at 17:39

## 2019-04-25 RX ADMIN — DIPHENHYDRAMINE HCL 25 MG: 25 TABLET ORAL at 17:39

## 2019-04-26 PROCEDURE — 99232 SBSQ HOSP IP/OBS MODERATE 35: CPT | Performed by: PSYCHIATRY & NEUROLOGY

## 2019-04-26 RX ORDER — CLONIDINE HYDROCHLORIDE 0.1 MG/1
0.1 TABLET ORAL 2 TIMES DAILY
Status: DISCONTINUED | OUTPATIENT
Start: 2019-04-26 | End: 2019-06-10 | Stop reason: HOSPADM

## 2019-04-26 RX ORDER — ECHINACEA PURPUREA EXTRACT 125 MG
1 TABLET ORAL EVERY 2 HOUR PRN
Status: DISCONTINUED | OUTPATIENT
Start: 2019-04-26 | End: 2019-06-10 | Stop reason: HOSPADM

## 2019-04-26 RX ADMIN — PALIPERIDONE 6 MG: 6 TABLET, FILM COATED, EXTENDED RELEASE ORAL at 09:12

## 2019-04-26 RX ADMIN — CLONIDINE HYDROCHLORIDE 0.05 MG: 0.1 TABLET ORAL at 09:12

## 2019-04-26 RX ADMIN — CLONIDINE HYDROCHLORIDE 0.1 MG: 0.1 TABLET ORAL at 18:18

## 2019-04-26 RX ADMIN — DIVALPROEX SODIUM 1500 MG: 500 TABLET, EXTENDED RELEASE ORAL at 21:13

## 2019-04-26 RX ADMIN — DIPHENHYDRAMINE HCL 25 MG: 25 TABLET ORAL at 09:12

## 2019-04-26 RX ADMIN — NICOTINE 14 MG: 14 PATCH, EXTENDED RELEASE TRANSDERMAL at 09:13

## 2019-04-26 RX ADMIN — QUETIAPINE FUMARATE 100 MG: 100 TABLET ORAL at 21:13

## 2019-04-26 RX ADMIN — DIPHENHYDRAMINE HCL 25 MG: 25 TABLET ORAL at 18:18

## 2019-04-26 RX ADMIN — MULTIPLE VITAMINS W/ MINERALS TAB 1 TABLET: TAB ORAL at 09:12

## 2019-04-27 PROCEDURE — 99232 SBSQ HOSP IP/OBS MODERATE 35: CPT | Performed by: NURSE PRACTITIONER

## 2019-04-27 RX ADMIN — DIPHENHYDRAMINE HCL 25 MG: 25 TABLET ORAL at 09:13

## 2019-04-27 RX ADMIN — QUETIAPINE FUMARATE 100 MG: 100 TABLET ORAL at 21:01

## 2019-04-27 RX ADMIN — MULTIPLE VITAMINS W/ MINERALS TAB 1 TABLET: TAB ORAL at 09:13

## 2019-04-27 RX ADMIN — NICOTINE 14 MG: 14 PATCH, EXTENDED RELEASE TRANSDERMAL at 09:18

## 2019-04-27 RX ADMIN — PALIPERIDONE 6 MG: 6 TABLET, FILM COATED, EXTENDED RELEASE ORAL at 09:13

## 2019-04-27 RX ADMIN — CLONIDINE HYDROCHLORIDE 0.1 MG: 0.1 TABLET ORAL at 09:13

## 2019-04-27 RX ADMIN — DIPHENHYDRAMINE HCL 25 MG: 25 TABLET ORAL at 17:41

## 2019-04-27 RX ADMIN — CLONIDINE HYDROCHLORIDE 0.1 MG: 0.1 TABLET ORAL at 17:41

## 2019-04-27 RX ADMIN — DIVALPROEX SODIUM 1500 MG: 500 TABLET, EXTENDED RELEASE ORAL at 21:01

## 2019-04-27 RX ADMIN — HYDROXYZINE HYDROCHLORIDE 25 MG: 25 TABLET ORAL at 13:57

## 2019-04-28 PROCEDURE — 99232 SBSQ HOSP IP/OBS MODERATE 35: CPT | Performed by: NURSE PRACTITIONER

## 2019-04-28 RX ADMIN — MULTIPLE VITAMINS W/ MINERALS TAB 1 TABLET: TAB ORAL at 09:04

## 2019-04-28 RX ADMIN — DIPHENHYDRAMINE HCL 25 MG: 25 TABLET ORAL at 17:22

## 2019-04-28 RX ADMIN — QUETIAPINE FUMARATE 100 MG: 100 TABLET ORAL at 21:46

## 2019-04-28 RX ADMIN — DIVALPROEX SODIUM 1500 MG: 500 TABLET, EXTENDED RELEASE ORAL at 21:46

## 2019-04-28 RX ADMIN — PALIPERIDONE 6 MG: 6 TABLET, FILM COATED, EXTENDED RELEASE ORAL at 09:04

## 2019-04-28 RX ADMIN — CLONIDINE HYDROCHLORIDE 0.1 MG: 0.1 TABLET ORAL at 09:04

## 2019-04-28 RX ADMIN — DIPHENHYDRAMINE HCL 25 MG: 25 TABLET ORAL at 09:04

## 2019-04-29 LAB — VALPROATE SERPL-MCNC: 63.6 UG/ML (ref 50–120)

## 2019-04-29 PROCEDURE — 80164 ASSAY DIPROPYLACETIC ACD TOT: CPT | Performed by: PSYCHIATRY & NEUROLOGY

## 2019-04-29 PROCEDURE — 99255 IP/OBS CONSLTJ NEW/EST HI 80: CPT | Performed by: PHYSICIAN ASSISTANT

## 2019-04-29 PROCEDURE — 99232 SBSQ HOSP IP/OBS MODERATE 35: CPT | Performed by: PSYCHIATRY & NEUROLOGY

## 2019-04-29 RX ORDER — DIVALPROEX SODIUM 500 MG/1
2000 TABLET, EXTENDED RELEASE ORAL
Status: DISCONTINUED | OUTPATIENT
Start: 2019-04-29 | End: 2019-05-20

## 2019-04-29 RX ADMIN — Medication 1 APPLICATION: at 17:31

## 2019-04-29 RX ADMIN — ACETAMINOPHEN 650 MG: 325 TABLET ORAL at 15:28

## 2019-04-29 RX ADMIN — DIPHENHYDRAMINE HCL 25 MG: 25 TABLET ORAL at 08:40

## 2019-04-29 RX ADMIN — MULTIPLE VITAMINS W/ MINERALS TAB 1 TABLET: TAB ORAL at 08:36

## 2019-04-29 RX ADMIN — PALIPERIDONE 6 MG: 6 TABLET, FILM COATED, EXTENDED RELEASE ORAL at 08:36

## 2019-04-29 RX ADMIN — DIVALPROEX SODIUM 2000 MG: 500 TABLET, EXTENDED RELEASE ORAL at 21:15

## 2019-04-29 RX ADMIN — CLONIDINE HYDROCHLORIDE 0.1 MG: 0.1 TABLET ORAL at 08:36

## 2019-04-29 RX ADMIN — QUETIAPINE FUMARATE 100 MG: 100 TABLET ORAL at 21:15

## 2019-04-29 RX ADMIN — CLONIDINE HYDROCHLORIDE 0.1 MG: 0.1 TABLET ORAL at 17:39

## 2019-04-29 RX ADMIN — DIPHENHYDRAMINE HCL 25 MG: 25 TABLET ORAL at 17:28

## 2019-04-29 RX ADMIN — NICOTINE 14 MG: 14 PATCH, EXTENDED RELEASE TRANSDERMAL at 08:38

## 2019-04-30 PROCEDURE — 99232 SBSQ HOSP IP/OBS MODERATE 35: CPT | Performed by: PSYCHIATRY & NEUROLOGY

## 2019-04-30 RX ORDER — QUETIAPINE FUMARATE 50 MG/1
50 TABLET, FILM COATED ORAL
Status: DISCONTINUED | OUTPATIENT
Start: 2019-04-30 | End: 2019-05-07

## 2019-04-30 RX ORDER — PALIPERIDONE 3 MG/1
3 TABLET, EXTENDED RELEASE ORAL DAILY
Status: DISCONTINUED | OUTPATIENT
Start: 2019-05-01 | End: 2019-05-01

## 2019-04-30 RX ADMIN — Medication 1 APPLICATION: at 21:45

## 2019-04-30 RX ADMIN — CLONIDINE HYDROCHLORIDE 0.1 MG: 0.1 TABLET ORAL at 18:05

## 2019-04-30 RX ADMIN — Medication: at 09:25

## 2019-04-30 RX ADMIN — ACETAMINOPHEN 650 MG: 325 TABLET ORAL at 14:35

## 2019-04-30 RX ADMIN — MULTIPLE VITAMINS W/ MINERALS TAB 1 TABLET: TAB ORAL at 09:21

## 2019-04-30 RX ADMIN — DIPHENHYDRAMINE HCL 25 MG: 25 TABLET ORAL at 09:21

## 2019-04-30 RX ADMIN — SALINE NASAL SPRAY 1 SPRAY: 1.5 SOLUTION NASAL at 09:54

## 2019-04-30 RX ADMIN — SALINE NASAL SPRAY 1 SPRAY: 1.5 SOLUTION NASAL at 14:37

## 2019-04-30 RX ADMIN — CLONIDINE HYDROCHLORIDE 0.1 MG: 0.1 TABLET ORAL at 09:21

## 2019-04-30 RX ADMIN — NICOTINE POLACRILEX 2 MG: 2 GUM, CHEWING ORAL at 18:07

## 2019-04-30 RX ADMIN — QUETIAPINE FUMARATE 50 MG: 50 TABLET ORAL at 21:44

## 2019-04-30 RX ADMIN — DIVALPROEX SODIUM 2000 MG: 500 TABLET, EXTENDED RELEASE ORAL at 21:44

## 2019-04-30 RX ADMIN — PALIPERIDONE 6 MG: 6 TABLET, FILM COATED, EXTENDED RELEASE ORAL at 09:22

## 2019-04-30 RX ADMIN — NICOTINE POLACRILEX 2 MG: 2 GUM, CHEWING ORAL at 20:31

## 2019-04-30 RX ADMIN — DIPHENHYDRAMINE HCL 25 MG: 25 TABLET ORAL at 17:50

## 2019-05-01 PROCEDURE — 99232 SBSQ HOSP IP/OBS MODERATE 35: CPT | Performed by: PSYCHIATRY & NEUROLOGY

## 2019-05-01 RX ADMIN — PALIPERIDONE 3 MG: 3 TABLET, EXTENDED RELEASE ORAL at 08:41

## 2019-05-01 RX ADMIN — DIPHENHYDRAMINE HCL 25 MG: 25 TABLET ORAL at 17:51

## 2019-05-01 RX ADMIN — QUETIAPINE FUMARATE 50 MG: 50 TABLET ORAL at 21:18

## 2019-05-01 RX ADMIN — PALIPERIDONE PALMITATE 156 MG: 156 INJECTION INTRAMUSCULAR at 11:35

## 2019-05-01 RX ADMIN — DIVALPROEX SODIUM 2000 MG: 500 TABLET, EXTENDED RELEASE ORAL at 21:18

## 2019-05-01 RX ADMIN — CLONIDINE HYDROCHLORIDE 0.1 MG: 0.1 TABLET ORAL at 08:41

## 2019-05-01 RX ADMIN — CLONIDINE HYDROCHLORIDE 0.1 MG: 0.1 TABLET ORAL at 17:51

## 2019-05-01 RX ADMIN — DIPHENHYDRAMINE HCL 25 MG: 25 TABLET ORAL at 08:41

## 2019-05-01 RX ADMIN — NICOTINE POLACRILEX 2 MG: 2 GUM, CHEWING ORAL at 14:38

## 2019-05-01 RX ADMIN — Medication 1 APPLICATION: at 21:20

## 2019-05-01 RX ADMIN — MULTIPLE VITAMINS W/ MINERALS TAB 1 TABLET: TAB ORAL at 08:41

## 2019-05-02 PROCEDURE — 99232 SBSQ HOSP IP/OBS MODERATE 35: CPT | Performed by: PSYCHIATRY & NEUROLOGY

## 2019-05-02 RX ORDER — NAPROXEN 500 MG/1
500 TABLET ORAL EVERY 12 HOURS SCHEDULED
Status: COMPLETED | OUTPATIENT
Start: 2019-05-02 | End: 2019-05-05

## 2019-05-02 RX ORDER — CHLORHEXIDINE GLUCONATE 0.12 MG/ML
15 RINSE ORAL EVERY 12 HOURS SCHEDULED
Status: DISCONTINUED | OUTPATIENT
Start: 2019-05-02 | End: 2019-06-10 | Stop reason: HOSPADM

## 2019-05-02 RX ADMIN — DIVALPROEX SODIUM 2000 MG: 500 TABLET, EXTENDED RELEASE ORAL at 21:24

## 2019-05-02 RX ADMIN — ACETAMINOPHEN 975 MG: 325 TABLET ORAL at 10:14

## 2019-05-02 RX ADMIN — NICOTINE 14 MG: 14 PATCH, EXTENDED RELEASE TRANSDERMAL at 08:37

## 2019-05-02 RX ADMIN — MULTIPLE VITAMINS W/ MINERALS TAB 1 TABLET: TAB ORAL at 08:36

## 2019-05-02 RX ADMIN — QUETIAPINE FUMARATE 50 MG: 50 TABLET ORAL at 21:26

## 2019-05-02 RX ADMIN — CLONIDINE HYDROCHLORIDE 0.1 MG: 0.1 TABLET ORAL at 08:36

## 2019-05-02 RX ADMIN — DIPHENHYDRAMINE HCL 25 MG: 25 TABLET ORAL at 08:36

## 2019-05-02 RX ADMIN — CHLORHEXIDINE GLUCONATE 0.12% ORAL RINSE 15 ML: 1.2 LIQUID ORAL at 22:00

## 2019-05-02 RX ADMIN — ACETAMINOPHEN 975 MG: 325 TABLET ORAL at 16:53

## 2019-05-02 RX ADMIN — Medication 1 APPLICATION: at 17:36

## 2019-05-02 RX ADMIN — CLONIDINE HYDROCHLORIDE 0.1 MG: 0.1 TABLET ORAL at 17:38

## 2019-05-02 RX ADMIN — NAPROXEN 500 MG: 500 TABLET ORAL at 21:26

## 2019-05-02 RX ADMIN — DIPHENHYDRAMINE HCL 25 MG: 25 TABLET ORAL at 17:38

## 2019-05-03 LAB — VALPROATE SERPL-MCNC: 71.7 UG/ML (ref 50–120)

## 2019-05-03 PROCEDURE — 80164 ASSAY DIPROPYLACETIC ACD TOT: CPT | Performed by: PSYCHIATRY & NEUROLOGY

## 2019-05-03 PROCEDURE — 99232 SBSQ HOSP IP/OBS MODERATE 35: CPT | Performed by: PSYCHIATRY & NEUROLOGY

## 2019-05-03 RX ADMIN — CHLORHEXIDINE GLUCONATE 0.12% ORAL RINSE 15 ML: 1.2 LIQUID ORAL at 21:46

## 2019-05-03 RX ADMIN — DIPHENHYDRAMINE HCL 25 MG: 25 TABLET ORAL at 18:36

## 2019-05-03 RX ADMIN — HYDROXYZINE HYDROCHLORIDE 25 MG: 25 TABLET ORAL at 19:27

## 2019-05-03 RX ADMIN — CLONIDINE HYDROCHLORIDE 0.1 MG: 0.1 TABLET ORAL at 18:35

## 2019-05-03 RX ADMIN — Medication: at 10:24

## 2019-05-03 RX ADMIN — NAPROXEN 500 MG: 500 TABLET ORAL at 21:40

## 2019-05-03 RX ADMIN — CHLORHEXIDINE GLUCONATE 0.12% ORAL RINSE 15 ML: 1.2 LIQUID ORAL at 10:24

## 2019-05-03 RX ADMIN — ACETAMINOPHEN 650 MG: 325 TABLET ORAL at 13:52

## 2019-05-03 RX ADMIN — Medication 1 APPLICATION: at 18:36

## 2019-05-03 RX ADMIN — NICOTINE POLACRILEX 2 MG: 2 GUM, CHEWING ORAL at 10:32

## 2019-05-03 RX ADMIN — CLONIDINE HYDROCHLORIDE 0.1 MG: 0.1 TABLET ORAL at 10:15

## 2019-05-03 RX ADMIN — DIVALPROEX SODIUM 2000 MG: 500 TABLET, EXTENDED RELEASE ORAL at 21:40

## 2019-05-03 RX ADMIN — NAPROXEN 500 MG: 500 TABLET ORAL at 10:15

## 2019-05-03 RX ADMIN — MULTIPLE VITAMINS W/ MINERALS TAB 1 TABLET: TAB ORAL at 10:15

## 2019-05-03 RX ADMIN — DIPHENHYDRAMINE HCL 25 MG: 25 TABLET ORAL at 10:15

## 2019-05-03 RX ADMIN — QUETIAPINE FUMARATE 50 MG: 50 TABLET ORAL at 21:41

## 2019-05-04 PROCEDURE — 99232 SBSQ HOSP IP/OBS MODERATE 35: CPT | Performed by: PSYCHIATRY & NEUROLOGY

## 2019-05-04 RX ADMIN — NAPROXEN 500 MG: 500 TABLET ORAL at 21:27

## 2019-05-04 RX ADMIN — QUETIAPINE FUMARATE 50 MG: 50 TABLET ORAL at 21:27

## 2019-05-04 RX ADMIN — NAPROXEN 500 MG: 500 TABLET ORAL at 09:35

## 2019-05-04 RX ADMIN — DIVALPROEX SODIUM 2000 MG: 500 TABLET, EXTENDED RELEASE ORAL at 21:27

## 2019-05-04 RX ADMIN — MULTIPLE VITAMINS W/ MINERALS TAB 1 TABLET: TAB ORAL at 09:36

## 2019-05-04 RX ADMIN — CLONIDINE HYDROCHLORIDE 0.1 MG: 0.1 TABLET ORAL at 09:35

## 2019-05-04 RX ADMIN — DIPHENHYDRAMINE HCL 25 MG: 25 TABLET ORAL at 09:35

## 2019-05-04 RX ADMIN — DIPHENHYDRAMINE HCL 25 MG: 25 TABLET ORAL at 17:34

## 2019-05-04 RX ADMIN — CLONIDINE HYDROCHLORIDE 0.1 MG: 0.1 TABLET ORAL at 17:34

## 2019-05-05 PROCEDURE — 99232 SBSQ HOSP IP/OBS MODERATE 35: CPT | Performed by: PSYCHIATRY & NEUROLOGY

## 2019-05-05 RX ADMIN — CLONIDINE HYDROCHLORIDE 0.1 MG: 0.1 TABLET ORAL at 17:21

## 2019-05-05 RX ADMIN — NAPROXEN 500 MG: 500 TABLET ORAL at 09:28

## 2019-05-05 RX ADMIN — DIPHENHYDRAMINE HCL 25 MG: 25 TABLET ORAL at 09:28

## 2019-05-05 RX ADMIN — DIPHENHYDRAMINE HCL 25 MG: 25 TABLET ORAL at 17:21

## 2019-05-05 RX ADMIN — CLONIDINE HYDROCHLORIDE 0.1 MG: 0.1 TABLET ORAL at 09:28

## 2019-05-05 RX ADMIN — HYDROXYZINE HYDROCHLORIDE 25 MG: 25 TABLET ORAL at 16:37

## 2019-05-05 RX ADMIN — DIVALPROEX SODIUM 2000 MG: 500 TABLET, EXTENDED RELEASE ORAL at 21:13

## 2019-05-05 RX ADMIN — MULTIPLE VITAMINS W/ MINERALS TAB 1 TABLET: TAB ORAL at 09:28

## 2019-05-05 RX ADMIN — QUETIAPINE FUMARATE 50 MG: 50 TABLET ORAL at 21:13

## 2019-05-06 PROCEDURE — 99232 SBSQ HOSP IP/OBS MODERATE 35: CPT | Performed by: STUDENT IN AN ORGANIZED HEALTH CARE EDUCATION/TRAINING PROGRAM

## 2019-05-06 RX ADMIN — DIVALPROEX SODIUM 2000 MG: 500 TABLET, EXTENDED RELEASE ORAL at 21:49

## 2019-05-06 RX ADMIN — Medication 1 APPLICATION: at 19:47

## 2019-05-06 RX ADMIN — TRAZODONE HYDROCHLORIDE 50 MG: 50 TABLET ORAL at 21:50

## 2019-05-06 RX ADMIN — HYDROXYZINE HYDROCHLORIDE 25 MG: 25 TABLET ORAL at 13:59

## 2019-05-06 RX ADMIN — CLONIDINE HYDROCHLORIDE 0.1 MG: 0.1 TABLET ORAL at 17:29

## 2019-05-06 RX ADMIN — DIPHENHYDRAMINE HCL 25 MG: 25 TABLET ORAL at 17:29

## 2019-05-06 RX ADMIN — Medication: at 10:21

## 2019-05-06 RX ADMIN — QUETIAPINE FUMARATE 50 MG: 50 TABLET ORAL at 21:49

## 2019-05-06 RX ADMIN — DIPHENHYDRAMINE HCL 25 MG: 25 TABLET ORAL at 10:16

## 2019-05-06 RX ADMIN — CHLORHEXIDINE GLUCONATE 0.12% ORAL RINSE 15 ML: 1.2 LIQUID ORAL at 10:18

## 2019-05-06 RX ADMIN — MULTIPLE VITAMINS W/ MINERALS TAB 1 TABLET: TAB ORAL at 10:15

## 2019-05-06 RX ADMIN — NICOTINE 14 MG: 14 PATCH, EXTENDED RELEASE TRANSDERMAL at 10:18

## 2019-05-06 RX ADMIN — CLONIDINE HYDROCHLORIDE 0.1 MG: 0.1 TABLET ORAL at 10:27

## 2019-05-07 PROCEDURE — 99232 SBSQ HOSP IP/OBS MODERATE 35: CPT | Performed by: STUDENT IN AN ORGANIZED HEALTH CARE EDUCATION/TRAINING PROGRAM

## 2019-05-07 RX ORDER — TRAZODONE HYDROCHLORIDE 100 MG/1
100 TABLET ORAL
Status: DISCONTINUED | OUTPATIENT
Start: 2019-05-07 | End: 2019-06-10 | Stop reason: HOSPADM

## 2019-05-07 RX ADMIN — DIVALPROEX SODIUM 2000 MG: 500 TABLET, EXTENDED RELEASE ORAL at 21:20

## 2019-05-07 RX ADMIN — MULTIPLE VITAMINS W/ MINERALS TAB 1 TABLET: TAB ORAL at 08:31

## 2019-05-07 RX ADMIN — CLONIDINE HYDROCHLORIDE 0.1 MG: 0.1 TABLET ORAL at 08:30

## 2019-05-07 RX ADMIN — SALINE NASAL SPRAY 1 SPRAY: 1.5 SOLUTION NASAL at 18:26

## 2019-05-07 RX ADMIN — TUBERCULIN PURIFIED PROTEIN DERIVATIVE 5 UNITS: 5 INJECTION, SOLUTION INTRADERMAL at 15:30

## 2019-05-07 RX ADMIN — CHLORHEXIDINE GLUCONATE 0.12% ORAL RINSE 15 ML: 1.2 LIQUID ORAL at 21:20

## 2019-05-07 RX ADMIN — CLONIDINE HYDROCHLORIDE 0.1 MG: 0.1 TABLET ORAL at 17:17

## 2019-05-07 RX ADMIN — ACETAMINOPHEN 650 MG: 325 TABLET ORAL at 18:14

## 2019-05-07 RX ADMIN — Medication: at 10:31

## 2019-05-07 RX ADMIN — HYDROXYZINE HYDROCHLORIDE 25 MG: 25 TABLET ORAL at 14:10

## 2019-05-07 RX ADMIN — DIPHENHYDRAMINE HCL 25 MG: 25 TABLET ORAL at 08:30

## 2019-05-07 RX ADMIN — NICOTINE POLACRILEX 2 MG: 2 GUM, CHEWING ORAL at 21:29

## 2019-05-07 RX ADMIN — TRAZODONE HYDROCHLORIDE 100 MG: 100 TABLET, FILM COATED ORAL at 21:20

## 2019-05-07 RX ADMIN — CHLORHEXIDINE GLUCONATE 0.12% ORAL RINSE 15 ML: 1.2 LIQUID ORAL at 08:31

## 2019-05-07 RX ADMIN — NICOTINE 14 MG: 14 PATCH, EXTENDED RELEASE TRANSDERMAL at 08:36

## 2019-05-08 PROCEDURE — 99232 SBSQ HOSP IP/OBS MODERATE 35: CPT | Performed by: STUDENT IN AN ORGANIZED HEALTH CARE EDUCATION/TRAINING PROGRAM

## 2019-05-08 RX ADMIN — CHLORHEXIDINE GLUCONATE 0.12% ORAL RINSE 15 ML: 1.2 LIQUID ORAL at 10:02

## 2019-05-08 RX ADMIN — Medication: at 12:04

## 2019-05-08 RX ADMIN — ACETAMINOPHEN 650 MG: 325 TABLET ORAL at 13:08

## 2019-05-08 RX ADMIN — HYDROXYZINE HYDROCHLORIDE 25 MG: 25 TABLET ORAL at 15:37

## 2019-05-08 RX ADMIN — TRAZODONE HYDROCHLORIDE 100 MG: 100 TABLET, FILM COATED ORAL at 21:05

## 2019-05-08 RX ADMIN — NICOTINE 14 MG: 14 PATCH, EXTENDED RELEASE TRANSDERMAL at 10:02

## 2019-05-08 RX ADMIN — CLONIDINE HYDROCHLORIDE 0.1 MG: 0.1 TABLET ORAL at 18:42

## 2019-05-08 RX ADMIN — CLONIDINE HYDROCHLORIDE 0.1 MG: 0.1 TABLET ORAL at 10:02

## 2019-05-08 RX ADMIN — ACETAMINOPHEN 650 MG: 325 TABLET ORAL at 22:59

## 2019-05-08 RX ADMIN — MULTIPLE VITAMINS W/ MINERALS TAB 1 TABLET: TAB ORAL at 10:02

## 2019-05-08 RX ADMIN — DIVALPROEX SODIUM 2000 MG: 500 TABLET, EXTENDED RELEASE ORAL at 21:05

## 2019-05-08 RX ADMIN — CHLORHEXIDINE GLUCONATE 0.12% ORAL RINSE 15 ML: 1.2 LIQUID ORAL at 21:05

## 2019-05-09 PROCEDURE — 99232 SBSQ HOSP IP/OBS MODERATE 35: CPT | Performed by: NURSE PRACTITIONER

## 2019-05-09 RX ADMIN — CLONIDINE HYDROCHLORIDE 0.1 MG: 0.1 TABLET ORAL at 17:25

## 2019-05-09 RX ADMIN — ACETAMINOPHEN 650 MG: 325 TABLET ORAL at 17:24

## 2019-05-09 RX ADMIN — ALUMINUM HYDROXIDE, MAGNESIUM HYDROXIDE, AND SIMETHICONE 30 ML: 200; 200; 20 SUSPENSION ORAL at 09:43

## 2019-05-09 RX ADMIN — Medication 1 APPLICATION: at 09:37

## 2019-05-09 RX ADMIN — TRAZODONE HYDROCHLORIDE 100 MG: 100 TABLET, FILM COATED ORAL at 21:25

## 2019-05-09 RX ADMIN — DIVALPROEX SODIUM 2000 MG: 500 TABLET, EXTENDED RELEASE ORAL at 21:25

## 2019-05-09 RX ADMIN — CHLORHEXIDINE GLUCONATE 0.12% ORAL RINSE 15 ML: 1.2 LIQUID ORAL at 21:25

## 2019-05-09 RX ADMIN — NICOTINE 14 MG: 14 PATCH, EXTENDED RELEASE TRANSDERMAL at 08:53

## 2019-05-09 RX ADMIN — MULTIPLE VITAMINS W/ MINERALS TAB 1 TABLET: TAB ORAL at 08:46

## 2019-05-09 RX ADMIN — CHLORHEXIDINE GLUCONATE 0.12% ORAL RINSE 15 ML: 1.2 LIQUID ORAL at 09:37

## 2019-05-09 RX ADMIN — Medication 1 APPLICATION: at 17:25

## 2019-05-09 RX ADMIN — CLONIDINE HYDROCHLORIDE 0.1 MG: 0.1 TABLET ORAL at 08:46

## 2019-05-10 PROCEDURE — 99232 SBSQ HOSP IP/OBS MODERATE 35: CPT | Performed by: NURSE PRACTITIONER

## 2019-05-10 RX ORDER — RISPERIDONE 1 MG/1
1 TABLET, FILM COATED ORAL
Status: DISCONTINUED | OUTPATIENT
Start: 2019-05-10 | End: 2019-05-28

## 2019-05-10 RX ORDER — DIVALPROEX SODIUM 500 MG/1
500 TABLET, EXTENDED RELEASE ORAL DAILY
Status: DISCONTINUED | OUTPATIENT
Start: 2019-05-10 | End: 2019-05-20

## 2019-05-10 RX ADMIN — TRAZODONE HYDROCHLORIDE 100 MG: 100 TABLET, FILM COATED ORAL at 21:16

## 2019-05-10 RX ADMIN — DIVALPROEX SODIUM 500 MG: 500 TABLET, EXTENDED RELEASE ORAL at 16:49

## 2019-05-10 RX ADMIN — DIVALPROEX SODIUM 2000 MG: 500 TABLET, EXTENDED RELEASE ORAL at 21:16

## 2019-05-10 RX ADMIN — CLONIDINE HYDROCHLORIDE 0.1 MG: 0.1 TABLET ORAL at 18:01

## 2019-05-10 RX ADMIN — RISPERIDONE 1 MG: 1 TABLET ORAL at 21:16

## 2019-05-10 RX ADMIN — CHLORHEXIDINE GLUCONATE 0.12% ORAL RINSE 15 ML: 1.2 LIQUID ORAL at 10:21

## 2019-05-10 RX ADMIN — CHLORHEXIDINE GLUCONATE 0.12% ORAL RINSE 15 ML: 1.2 LIQUID ORAL at 21:22

## 2019-05-10 RX ADMIN — CLONIDINE HYDROCHLORIDE 0.1 MG: 0.1 TABLET ORAL at 10:21

## 2019-05-10 RX ADMIN — ACETAMINOPHEN 975 MG: 325 TABLET ORAL at 20:03

## 2019-05-10 RX ADMIN — Medication: at 10:21

## 2019-05-10 RX ADMIN — Medication: at 18:01

## 2019-05-10 RX ADMIN — NICOTINE 14 MG: 14 PATCH, EXTENDED RELEASE TRANSDERMAL at 10:23

## 2019-05-10 RX ADMIN — MULTIPLE VITAMINS W/ MINERALS TAB 1 TABLET: TAB ORAL at 10:21

## 2019-05-11 PROCEDURE — 99231 SBSQ HOSP IP/OBS SF/LOW 25: CPT | Performed by: PHYSICIAN ASSISTANT

## 2019-05-11 RX ORDER — ALBUTEROL SULFATE 90 UG/1
2 AEROSOL, METERED RESPIRATORY (INHALATION) EVERY 4 HOURS PRN
Status: DISCONTINUED | OUTPATIENT
Start: 2019-05-11 | End: 2019-05-11

## 2019-05-11 RX ADMIN — TRAZODONE HYDROCHLORIDE 100 MG: 100 TABLET, FILM COATED ORAL at 21:16

## 2019-05-11 RX ADMIN — LORAZEPAM 1 MG: 1 TABLET ORAL at 11:16

## 2019-05-11 RX ADMIN — CLONIDINE HYDROCHLORIDE 0.1 MG: 0.1 TABLET ORAL at 09:18

## 2019-05-11 RX ADMIN — CHLORHEXIDINE GLUCONATE 0.12% ORAL RINSE 15 ML: 1.2 LIQUID ORAL at 21:17

## 2019-05-11 RX ADMIN — CLONIDINE HYDROCHLORIDE 0.1 MG: 0.1 TABLET ORAL at 17:33

## 2019-05-11 RX ADMIN — RISPERIDONE 1 MG: 1 TABLET ORAL at 21:16

## 2019-05-11 RX ADMIN — DIVALPROEX SODIUM 2000 MG: 500 TABLET, EXTENDED RELEASE ORAL at 21:15

## 2019-05-11 RX ADMIN — MULTIPLE VITAMINS W/ MINERALS TAB 1 TABLET: TAB ORAL at 09:18

## 2019-05-11 RX ADMIN — DIVALPROEX SODIUM 500 MG: 500 TABLET, EXTENDED RELEASE ORAL at 09:19

## 2019-05-12 PROCEDURE — 99231 SBSQ HOSP IP/OBS SF/LOW 25: CPT | Performed by: PSYCHIATRY & NEUROLOGY

## 2019-05-12 RX ADMIN — CLONIDINE HYDROCHLORIDE 0.1 MG: 0.1 TABLET ORAL at 08:58

## 2019-05-12 RX ADMIN — TRAZODONE HYDROCHLORIDE 100 MG: 100 TABLET, FILM COATED ORAL at 21:12

## 2019-05-12 RX ADMIN — MULTIPLE VITAMINS W/ MINERALS TAB 1 TABLET: TAB ORAL at 08:58

## 2019-05-12 RX ADMIN — DIVALPROEX SODIUM 500 MG: 500 TABLET, EXTENDED RELEASE ORAL at 08:58

## 2019-05-12 RX ADMIN — RISPERIDONE 1 MG: 1 TABLET ORAL at 21:12

## 2019-05-12 RX ADMIN — DIVALPROEX SODIUM 2000 MG: 500 TABLET, EXTENDED RELEASE ORAL at 21:12

## 2019-05-12 RX ADMIN — ALUMINUM HYDROXIDE, MAGNESIUM HYDROXIDE, AND SIMETHICONE 30 ML: 200; 200; 20 SUSPENSION ORAL at 18:08

## 2019-05-12 RX ADMIN — CLONIDINE HYDROCHLORIDE 0.1 MG: 0.1 TABLET ORAL at 18:01

## 2019-05-12 RX ADMIN — ACETAMINOPHEN 975 MG: 325 TABLET ORAL at 12:48

## 2019-05-13 PROCEDURE — 99232 SBSQ HOSP IP/OBS MODERATE 35: CPT | Performed by: NURSE PRACTITIONER

## 2019-05-13 RX ORDER — ALBUTEROL SULFATE 90 UG/1
2 AEROSOL, METERED RESPIRATORY (INHALATION) EVERY 4 HOURS PRN
Status: DISCONTINUED | OUTPATIENT
Start: 2019-05-13 | End: 2019-06-10 | Stop reason: HOSPADM

## 2019-05-13 RX ADMIN — CHLORHEXIDINE GLUCONATE 0.12% ORAL RINSE 15 ML: 1.2 LIQUID ORAL at 08:26

## 2019-05-13 RX ADMIN — CHLORHEXIDINE GLUCONATE 0.12% ORAL RINSE 15 ML: 1.2 LIQUID ORAL at 21:21

## 2019-05-13 RX ADMIN — ALBUTEROL SULFATE 2 PUFF: 90 AEROSOL, METERED RESPIRATORY (INHALATION) at 23:29

## 2019-05-13 RX ADMIN — CLONIDINE HYDROCHLORIDE 0.1 MG: 0.1 TABLET ORAL at 08:27

## 2019-05-13 RX ADMIN — ALBUTEROL SULFATE 2 PUFF: 90 AEROSOL, METERED RESPIRATORY (INHALATION) at 17:25

## 2019-05-13 RX ADMIN — Medication 1 APPLICATION: at 08:27

## 2019-05-13 RX ADMIN — NICOTINE 14 MG: 14 PATCH, EXTENDED RELEASE TRANSDERMAL at 08:26

## 2019-05-13 RX ADMIN — Medication: at 17:20

## 2019-05-13 RX ADMIN — DIVALPROEX SODIUM 500 MG: 500 TABLET, EXTENDED RELEASE ORAL at 08:27

## 2019-05-13 RX ADMIN — ACETAMINOPHEN 975 MG: 325 TABLET ORAL at 18:09

## 2019-05-13 RX ADMIN — MULTIPLE VITAMINS W/ MINERALS TAB 1 TABLET: TAB ORAL at 08:27

## 2019-05-13 RX ADMIN — DIVALPROEX SODIUM 2000 MG: 500 TABLET, EXTENDED RELEASE ORAL at 21:21

## 2019-05-13 RX ADMIN — TRAZODONE HYDROCHLORIDE 100 MG: 100 TABLET, FILM COATED ORAL at 21:21

## 2019-05-13 RX ADMIN — RISPERIDONE 1 MG: 1 TABLET ORAL at 21:21

## 2019-05-13 RX ADMIN — CLONIDINE HYDROCHLORIDE 0.1 MG: 0.1 TABLET ORAL at 17:19

## 2019-05-14 LAB — VALPROATE SERPL-MCNC: 108.9 UG/ML (ref 50–120)

## 2019-05-14 PROCEDURE — 99231 SBSQ HOSP IP/OBS SF/LOW 25: CPT | Performed by: NURSE PRACTITIONER

## 2019-05-14 PROCEDURE — 80164 ASSAY DIPROPYLACETIC ACD TOT: CPT | Performed by: NURSE PRACTITIONER

## 2019-05-14 RX ADMIN — DIVALPROEX SODIUM 500 MG: 500 TABLET, EXTENDED RELEASE ORAL at 08:42

## 2019-05-14 RX ADMIN — RISPERIDONE 1 MG: 1 TABLET ORAL at 21:28

## 2019-05-14 RX ADMIN — DIVALPROEX SODIUM 2000 MG: 500 TABLET, EXTENDED RELEASE ORAL at 21:27

## 2019-05-14 RX ADMIN — ALBUTEROL SULFATE 2 PUFF: 90 AEROSOL, METERED RESPIRATORY (INHALATION) at 17:05

## 2019-05-14 RX ADMIN — TRAZODONE HYDROCHLORIDE 100 MG: 100 TABLET, FILM COATED ORAL at 21:27

## 2019-05-14 RX ADMIN — ALBUTEROL SULFATE 2 PUFF: 90 AEROSOL, METERED RESPIRATORY (INHALATION) at 22:51

## 2019-05-14 RX ADMIN — NICOTINE 14 MG: 14 PATCH, EXTENDED RELEASE TRANSDERMAL at 08:42

## 2019-05-14 RX ADMIN — Medication 1 APPLICATION: at 17:05

## 2019-05-14 RX ADMIN — CHLORHEXIDINE GLUCONATE 0.12% ORAL RINSE 15 ML: 1.2 LIQUID ORAL at 08:41

## 2019-05-14 RX ADMIN — MULTIPLE VITAMINS W/ MINERALS TAB 1 TABLET: TAB ORAL at 08:41

## 2019-05-14 RX ADMIN — NICOTINE POLACRILEX 2 MG: 2 GUM, CHEWING ORAL at 09:08

## 2019-05-14 RX ADMIN — CHLORHEXIDINE GLUCONATE 0.12% ORAL RINSE 15 ML: 1.2 LIQUID ORAL at 21:29

## 2019-05-14 RX ADMIN — ALBUTEROL SULFATE 2 PUFF: 90 AEROSOL, METERED RESPIRATORY (INHALATION) at 09:00

## 2019-05-14 RX ADMIN — ALBUTEROL SULFATE 2 PUFF: 90 AEROSOL, METERED RESPIRATORY (INHALATION) at 06:21

## 2019-05-14 RX ADMIN — ALUMINUM HYDROXIDE, MAGNESIUM HYDROXIDE, AND SIMETHICONE 30 ML: 200; 200; 20 SUSPENSION ORAL at 09:59

## 2019-05-14 RX ADMIN — CLONIDINE HYDROCHLORIDE 0.1 MG: 0.1 TABLET ORAL at 08:42

## 2019-05-14 RX ADMIN — Medication 1 APPLICATION: at 08:41

## 2019-05-14 RX ADMIN — CLONIDINE HYDROCHLORIDE 0.1 MG: 0.1 TABLET ORAL at 17:03

## 2019-05-15 PROCEDURE — 99232 SBSQ HOSP IP/OBS MODERATE 35: CPT | Performed by: NURSE PRACTITIONER

## 2019-05-15 RX ORDER — LANOLIN ALCOHOL/MO/W.PET/CERES
3 CREAM (GRAM) TOPICAL
Status: DISCONTINUED | OUTPATIENT
Start: 2019-05-15 | End: 2019-05-20

## 2019-05-15 RX ORDER — CHLORAL HYDRATE 500 MG
1000 CAPSULE ORAL DAILY
Status: DISCONTINUED | OUTPATIENT
Start: 2019-05-15 | End: 2019-06-10 | Stop reason: HOSPADM

## 2019-05-15 RX ADMIN — NICOTINE 14 MG: 14 PATCH, EXTENDED RELEASE TRANSDERMAL at 08:31

## 2019-05-15 RX ADMIN — RISPERIDONE 1 MG: 1 TABLET ORAL at 21:08

## 2019-05-15 RX ADMIN — CLONIDINE HYDROCHLORIDE 0.1 MG: 0.1 TABLET ORAL at 08:17

## 2019-05-15 RX ADMIN — ACETAMINOPHEN 975 MG: 325 TABLET ORAL at 08:53

## 2019-05-15 RX ADMIN — DIVALPROEX SODIUM 500 MG: 500 TABLET, EXTENDED RELEASE ORAL at 08:19

## 2019-05-15 RX ADMIN — TRAZODONE HYDROCHLORIDE 100 MG: 100 TABLET, FILM COATED ORAL at 21:08

## 2019-05-15 RX ADMIN — ACETAMINOPHEN 650 MG: 325 TABLET ORAL at 22:32

## 2019-05-15 RX ADMIN — CLONIDINE HYDROCHLORIDE 0.1 MG: 0.1 TABLET ORAL at 17:09

## 2019-05-15 RX ADMIN — MULTIPLE VITAMINS W/ MINERALS TAB 1 TABLET: TAB ORAL at 08:19

## 2019-05-15 RX ADMIN — Medication 1000 MG: at 11:29

## 2019-05-15 RX ADMIN — ALBUTEROL SULFATE 2 PUFF: 90 AEROSOL, METERED RESPIRATORY (INHALATION) at 12:19

## 2019-05-15 RX ADMIN — DIVALPROEX SODIUM 2000 MG: 500 TABLET, EXTENDED RELEASE ORAL at 21:08

## 2019-05-15 RX ADMIN — CHLORHEXIDINE GLUCONATE 0.12% ORAL RINSE 15 ML: 1.2 LIQUID ORAL at 21:19

## 2019-05-15 RX ADMIN — MELATONIN TAB 3 MG 3 MG: 3 TAB at 21:08

## 2019-05-16 PROCEDURE — 99231 SBSQ HOSP IP/OBS SF/LOW 25: CPT | Performed by: NURSE PRACTITIONER

## 2019-05-16 RX ADMIN — RISPERIDONE 1 MG: 1 TABLET ORAL at 21:10

## 2019-05-16 RX ADMIN — DIVALPROEX SODIUM 500 MG: 500 TABLET, EXTENDED RELEASE ORAL at 08:54

## 2019-05-16 RX ADMIN — ACETAMINOPHEN 650 MG: 325 TABLET ORAL at 15:47

## 2019-05-16 RX ADMIN — Medication 1000 MG: at 08:54

## 2019-05-16 RX ADMIN — Medication: at 19:00

## 2019-05-16 RX ADMIN — MULTIPLE VITAMINS W/ MINERALS TAB 1 TABLET: TAB ORAL at 08:54

## 2019-05-16 RX ADMIN — MELATONIN TAB 3 MG 3 MG: 3 TAB at 21:10

## 2019-05-16 RX ADMIN — LORAZEPAM 1 MG: 1 TABLET ORAL at 15:47

## 2019-05-16 RX ADMIN — TRAZODONE HYDROCHLORIDE 100 MG: 100 TABLET, FILM COATED ORAL at 21:11

## 2019-05-16 RX ADMIN — CLONIDINE HYDROCHLORIDE 0.1 MG: 0.1 TABLET ORAL at 08:54

## 2019-05-16 RX ADMIN — NICOTINE POLACRILEX 2 MG: 2 GUM, CHEWING ORAL at 13:24

## 2019-05-16 RX ADMIN — NICOTINE 14 MG: 14 PATCH, EXTENDED RELEASE TRANSDERMAL at 08:56

## 2019-05-16 RX ADMIN — CHLORHEXIDINE GLUCONATE 0.12% ORAL RINSE 15 ML: 1.2 LIQUID ORAL at 21:34

## 2019-05-16 RX ADMIN — DIVALPROEX SODIUM 2000 MG: 500 TABLET, EXTENDED RELEASE ORAL at 21:11

## 2019-05-16 RX ADMIN — CLONIDINE HYDROCHLORIDE 0.1 MG: 0.1 TABLET ORAL at 17:11

## 2019-05-17 ENCOUNTER — APPOINTMENT (INPATIENT)
Dept: MRI IMAGING | Facility: HOSPITAL | Age: 24
DRG: 885 | End: 2019-05-17
Payer: MEDICARE

## 2019-05-17 PROCEDURE — 70553 MRI BRAIN STEM W/O & W/DYE: CPT

## 2019-05-17 PROCEDURE — A9585 GADOBUTROL INJECTION: HCPCS | Performed by: STUDENT IN AN ORGANIZED HEALTH CARE EDUCATION/TRAINING PROGRAM

## 2019-05-17 PROCEDURE — 99231 SBSQ HOSP IP/OBS SF/LOW 25: CPT | Performed by: STUDENT IN AN ORGANIZED HEALTH CARE EDUCATION/TRAINING PROGRAM

## 2019-05-17 PROCEDURE — 99448 NTRPROF PH1/NTRNET/EHR 21-30: CPT | Performed by: NEUROLOGICAL SURGERY

## 2019-05-17 RX ADMIN — ALBUTEROL SULFATE 2 PUFF: 90 AEROSOL, METERED RESPIRATORY (INHALATION) at 23:20

## 2019-05-17 RX ADMIN — RISPERIDONE 1 MG: 1 TABLET ORAL at 21:35

## 2019-05-17 RX ADMIN — Medication: at 17:37

## 2019-05-17 RX ADMIN — Medication 1000 MG: at 08:55

## 2019-05-17 RX ADMIN — DIVALPROEX SODIUM 500 MG: 500 TABLET, EXTENDED RELEASE ORAL at 08:55

## 2019-05-17 RX ADMIN — NICOTINE 14 MG: 14 PATCH, EXTENDED RELEASE TRANSDERMAL at 08:59

## 2019-05-17 RX ADMIN — GADOBUTROL 10 ML: 604.72 INJECTION INTRAVENOUS at 13:37

## 2019-05-17 RX ADMIN — ACETAMINOPHEN 650 MG: 325 TABLET ORAL at 14:42

## 2019-05-17 RX ADMIN — CHLORHEXIDINE GLUCONATE 0.12% ORAL RINSE 15 ML: 1.2 LIQUID ORAL at 21:38

## 2019-05-17 RX ADMIN — CLONIDINE HYDROCHLORIDE 0.1 MG: 0.1 TABLET ORAL at 17:37

## 2019-05-17 RX ADMIN — TRAZODONE HYDROCHLORIDE 100 MG: 100 TABLET, FILM COATED ORAL at 21:35

## 2019-05-17 RX ADMIN — DIVALPROEX SODIUM 2000 MG: 500 TABLET, EXTENDED RELEASE ORAL at 21:35

## 2019-05-17 RX ADMIN — MULTIPLE VITAMINS W/ MINERALS TAB 1 TABLET: TAB ORAL at 08:55

## 2019-05-17 RX ADMIN — MELATONIN TAB 3 MG 3 MG: 3 TAB at 21:35

## 2019-05-17 RX ADMIN — ALBUTEROL SULFATE 2 PUFF: 90 AEROSOL, METERED RESPIRATORY (INHALATION) at 14:15

## 2019-05-17 RX ADMIN — CLONIDINE HYDROCHLORIDE 0.1 MG: 0.1 TABLET ORAL at 08:55

## 2019-05-18 PROCEDURE — 99231 SBSQ HOSP IP/OBS SF/LOW 25: CPT | Performed by: PSYCHIATRY & NEUROLOGY

## 2019-05-18 RX ADMIN — MULTIPLE VITAMINS W/ MINERALS TAB 1 TABLET: TAB ORAL at 08:24

## 2019-05-18 RX ADMIN — CLONIDINE HYDROCHLORIDE 0.1 MG: 0.1 TABLET ORAL at 18:11

## 2019-05-18 RX ADMIN — CHLORHEXIDINE GLUCONATE 0.12% ORAL RINSE 15 ML: 1.2 LIQUID ORAL at 21:29

## 2019-05-18 RX ADMIN — CHLORHEXIDINE GLUCONATE 0.12% ORAL RINSE 15 ML: 1.2 LIQUID ORAL at 10:37

## 2019-05-18 RX ADMIN — DIVALPROEX SODIUM 500 MG: 500 TABLET, EXTENDED RELEASE ORAL at 08:24

## 2019-05-18 RX ADMIN — Medication 1 APPLICATION: at 18:48

## 2019-05-18 RX ADMIN — CLONIDINE HYDROCHLORIDE 0.1 MG: 0.1 TABLET ORAL at 08:24

## 2019-05-18 RX ADMIN — RISPERIDONE 1 MG: 1 TABLET ORAL at 21:29

## 2019-05-18 RX ADMIN — Medication 1000 MG: at 08:24

## 2019-05-18 RX ADMIN — TRAZODONE HYDROCHLORIDE 100 MG: 100 TABLET, FILM COATED ORAL at 21:29

## 2019-05-18 RX ADMIN — DIVALPROEX SODIUM 2000 MG: 500 TABLET, EXTENDED RELEASE ORAL at 21:29

## 2019-05-18 RX ADMIN — HYDROXYZINE HYDROCHLORIDE 25 MG: 25 TABLET ORAL at 11:53

## 2019-05-18 RX ADMIN — MELATONIN TAB 3 MG 3 MG: 3 TAB at 21:29

## 2019-05-19 PROCEDURE — 99231 SBSQ HOSP IP/OBS SF/LOW 25: CPT | Performed by: PSYCHIATRY & NEUROLOGY

## 2019-05-19 RX ADMIN — HYDROXYZINE HYDROCHLORIDE 25 MG: 25 TABLET ORAL at 23:41

## 2019-05-19 RX ADMIN — MELATONIN TAB 3 MG 3 MG: 3 TAB at 21:28

## 2019-05-19 RX ADMIN — MULTIPLE VITAMINS W/ MINERALS TAB 1 TABLET: TAB ORAL at 08:55

## 2019-05-19 RX ADMIN — CLONIDINE HYDROCHLORIDE 0.1 MG: 0.1 TABLET ORAL at 08:55

## 2019-05-19 RX ADMIN — DIVALPROEX SODIUM 500 MG: 500 TABLET, EXTENDED RELEASE ORAL at 08:55

## 2019-05-19 RX ADMIN — RISPERIDONE 1 MG: 1 TABLET ORAL at 21:28

## 2019-05-19 RX ADMIN — TRAZODONE HYDROCHLORIDE 100 MG: 100 TABLET, FILM COATED ORAL at 21:39

## 2019-05-19 RX ADMIN — Medication 1000 MG: at 08:54

## 2019-05-19 RX ADMIN — CLONIDINE HYDROCHLORIDE 0.1 MG: 0.1 TABLET ORAL at 17:51

## 2019-05-19 RX ADMIN — DIVALPROEX SODIUM 2000 MG: 500 TABLET, EXTENDED RELEASE ORAL at 21:28

## 2019-05-19 RX ADMIN — ACETAMINOPHEN 975 MG: 325 TABLET ORAL at 11:06

## 2019-05-19 RX ADMIN — CHLORHEXIDINE GLUCONATE 0.12% ORAL RINSE 15 ML: 1.2 LIQUID ORAL at 21:28

## 2019-05-20 PROCEDURE — 99232 SBSQ HOSP IP/OBS MODERATE 35: CPT | Performed by: PSYCHIATRY & NEUROLOGY

## 2019-05-20 RX ORDER — VALPROIC ACID 250 MG/5ML
1750 SOLUTION ORAL
Status: DISCONTINUED | OUTPATIENT
Start: 2019-05-20 | End: 2019-06-10 | Stop reason: HOSPADM

## 2019-05-20 RX ADMIN — Medication 1000 MG: at 08:09

## 2019-05-20 RX ADMIN — CHLORHEXIDINE GLUCONATE 0.12% ORAL RINSE 15 ML: 1.2 LIQUID ORAL at 21:34

## 2019-05-20 RX ADMIN — ACETAMINOPHEN 650 MG: 325 TABLET ORAL at 13:46

## 2019-05-20 RX ADMIN — Medication: at 17:09

## 2019-05-20 RX ADMIN — NICOTINE POLACRILEX 2 MG: 2 GUM, CHEWING ORAL at 20:20

## 2019-05-20 RX ADMIN — HYDROXYZINE HYDROCHLORIDE 25 MG: 25 TABLET ORAL at 15:48

## 2019-05-20 RX ADMIN — MAGNESIUM HYDROXIDE 30 ML: 400 SUSPENSION ORAL at 13:14

## 2019-05-20 RX ADMIN — RISPERIDONE 1 MG: 1 TABLET ORAL at 21:33

## 2019-05-20 RX ADMIN — ACETAMINOPHEN 975 MG: 325 TABLET ORAL at 22:52

## 2019-05-20 RX ADMIN — HYDROXYZINE HYDROCHLORIDE 25 MG: 25 TABLET ORAL at 22:51

## 2019-05-20 RX ADMIN — CLONIDINE HYDROCHLORIDE 0.1 MG: 0.1 TABLET ORAL at 08:09

## 2019-05-20 RX ADMIN — DIVALPROEX SODIUM 500 MG: 500 TABLET, EXTENDED RELEASE ORAL at 08:09

## 2019-05-20 RX ADMIN — NICOTINE 14 MG: 14 PATCH, EXTENDED RELEASE TRANSDERMAL at 08:08

## 2019-05-20 RX ADMIN — CLONIDINE HYDROCHLORIDE 0.1 MG: 0.1 TABLET ORAL at 17:07

## 2019-05-20 RX ADMIN — TRAZODONE HYDROCHLORIDE 100 MG: 100 TABLET, FILM COATED ORAL at 21:33

## 2019-05-20 RX ADMIN — CHLORHEXIDINE GLUCONATE 0.12% ORAL RINSE 15 ML: 1.2 LIQUID ORAL at 08:09

## 2019-05-20 RX ADMIN — Medication: at 08:12

## 2019-05-20 RX ADMIN — MULTIPLE VITAMINS W/ MINERALS TAB 1 TABLET: TAB ORAL at 08:09

## 2019-05-20 RX ADMIN — VALPROIC ACID 1750 MG: 250 SOLUTION ORAL at 21:28

## 2019-05-21 ENCOUNTER — APPOINTMENT (INPATIENT)
Dept: CT IMAGING | Facility: HOSPITAL | Age: 24
DRG: 885 | End: 2019-05-21
Payer: MEDICARE

## 2019-05-21 LAB
BACTERIA UR QL AUTO: NORMAL /HPF
BILIRUB UR QL STRIP: NEGATIVE
CLARITY UR: CLEAR
COLOR UR: NORMAL
GLUCOSE UR STRIP-MCNC: NEGATIVE MG/DL
HAV IGM SER QL: NORMAL
HBV CORE IGM SER QL: NORMAL
HBV SURFACE AG SER QL: NORMAL
HCV AB SER QL: NORMAL
HGB UR QL STRIP.AUTO: NEGATIVE
HIV 1+2 AB+HIV1 P24 AG SERPL QL IA: NORMAL
HIV1 P24 AG SER QL: NORMAL
KETONES UR STRIP-MCNC: NEGATIVE MG/DL
LEUKOCYTE ESTERASE UR QL STRIP: NEGATIVE
NITRITE UR QL STRIP: NEGATIVE
NON-SQ EPI CELLS URNS QL MICRO: NORMAL /HPF
PH UR STRIP.AUTO: 7 [PH]
PROT UR STRIP-MCNC: NEGATIVE MG/DL
RBC #/AREA URNS AUTO: NORMAL /HPF
SP GR UR STRIP.AUTO: 1.01 (ref 1–1.04)
UROBILINOGEN UA: NEGATIVE MG/DL
WBC #/AREA URNS AUTO: NORMAL /HPF

## 2019-05-21 PROCEDURE — 74177 CT ABD & PELVIS W/CONTRAST: CPT

## 2019-05-21 PROCEDURE — 80074 ACUTE HEPATITIS PANEL: CPT | Performed by: PSYCHIATRY & NEUROLOGY

## 2019-05-21 PROCEDURE — 71260 CT THORAX DX C+: CPT

## 2019-05-21 PROCEDURE — 87806 HIV AG W/HIV1&2 ANTB W/OPTIC: CPT | Performed by: PSYCHIATRY & NEUROLOGY

## 2019-05-21 PROCEDURE — 99232 SBSQ HOSP IP/OBS MODERATE 35: CPT | Performed by: PSYCHIATRY & NEUROLOGY

## 2019-05-21 PROCEDURE — 81001 URINALYSIS AUTO W/SCOPE: CPT | Performed by: PSYCHIATRY & NEUROLOGY

## 2019-05-21 RX ADMIN — IOHEXOL 100 ML: 350 INJECTION, SOLUTION INTRAVENOUS at 11:00

## 2019-05-21 RX ADMIN — CLONIDINE HYDROCHLORIDE 0.1 MG: 0.1 TABLET ORAL at 17:39

## 2019-05-21 RX ADMIN — CLONIDINE HYDROCHLORIDE 0.1 MG: 0.1 TABLET ORAL at 09:35

## 2019-05-21 RX ADMIN — Medication 1000 MG: at 09:35

## 2019-05-21 RX ADMIN — Medication: at 17:39

## 2019-05-21 RX ADMIN — RISPERIDONE 1 MG: 1 TABLET ORAL at 21:58

## 2019-05-21 RX ADMIN — TRAZODONE HYDROCHLORIDE 100 MG: 100 TABLET, FILM COATED ORAL at 21:58

## 2019-05-21 RX ADMIN — VALPROIC ACID 1750 MG: 250 SOLUTION ORAL at 22:00

## 2019-05-21 RX ADMIN — LORAZEPAM 1 MG: 1 TABLET ORAL at 13:39

## 2019-05-21 RX ADMIN — NICOTINE 14 MG: 14 PATCH, EXTENDED RELEASE TRANSDERMAL at 09:59

## 2019-05-21 RX ADMIN — MAGNESIUM HYDROXIDE 30 ML: 400 SUSPENSION ORAL at 16:23

## 2019-05-21 RX ADMIN — NICOTINE POLACRILEX 2 MG: 2 GUM, CHEWING ORAL at 18:00

## 2019-05-21 RX ADMIN — CHLORHEXIDINE GLUCONATE 0.12% ORAL RINSE 15 ML: 1.2 LIQUID ORAL at 22:01

## 2019-05-21 RX ADMIN — ACETAMINOPHEN 975 MG: 325 TABLET ORAL at 16:22

## 2019-05-21 RX ADMIN — MULTIPLE VITAMINS W/ MINERALS TAB 1 TABLET: TAB ORAL at 09:35

## 2019-05-22 PROCEDURE — 99232 SBSQ HOSP IP/OBS MODERATE 35: CPT | Performed by: PSYCHIATRY & NEUROLOGY

## 2019-05-22 RX ORDER — DOCUSATE SODIUM 100 MG/1
100 CAPSULE, LIQUID FILLED ORAL 2 TIMES DAILY
Status: DISCONTINUED | OUTPATIENT
Start: 2019-05-23 | End: 2019-05-26

## 2019-05-22 RX ADMIN — TRAZODONE HYDROCHLORIDE 100 MG: 100 TABLET, FILM COATED ORAL at 21:12

## 2019-05-22 RX ADMIN — CHLORHEXIDINE GLUCONATE 0.12% ORAL RINSE 15 ML: 1.2 LIQUID ORAL at 21:14

## 2019-05-22 RX ADMIN — LORAZEPAM 1 MG: 1 TABLET ORAL at 11:09

## 2019-05-22 RX ADMIN — PALIPERIDONE PALMITATE 156 MG: 156 INJECTION INTRAMUSCULAR at 14:03

## 2019-05-22 RX ADMIN — MULTIPLE VITAMINS W/ MINERALS TAB 1 TABLET: TAB ORAL at 08:31

## 2019-05-22 RX ADMIN — VALPROIC ACID 1750 MG: 250 SOLUTION ORAL at 21:11

## 2019-05-22 RX ADMIN — CLONIDINE HYDROCHLORIDE 0.1 MG: 0.1 TABLET ORAL at 08:32

## 2019-05-22 RX ADMIN — Medication 1000 MG: at 08:31

## 2019-05-22 RX ADMIN — CLONIDINE HYDROCHLORIDE 0.1 MG: 0.1 TABLET ORAL at 17:24

## 2019-05-22 RX ADMIN — Medication: at 17:25

## 2019-05-22 RX ADMIN — RISPERIDONE 1 MG: 1 TABLET ORAL at 21:12

## 2019-05-23 PROCEDURE — 99232 SBSQ HOSP IP/OBS MODERATE 35: CPT | Performed by: PSYCHIATRY & NEUROLOGY

## 2019-05-23 RX ADMIN — CHLORHEXIDINE GLUCONATE 0.12% ORAL RINSE 15 ML: 1.2 LIQUID ORAL at 21:10

## 2019-05-23 RX ADMIN — VALPROIC ACID 1750 MG: 250 SOLUTION ORAL at 21:08

## 2019-05-23 RX ADMIN — Medication 1 TABLET: at 10:24

## 2019-05-23 RX ADMIN — Medication 1000 MG: at 09:19

## 2019-05-23 RX ADMIN — CHLORHEXIDINE GLUCONATE 0.12% ORAL RINSE 15 ML: 1.2 LIQUID ORAL at 09:19

## 2019-05-23 RX ADMIN — CLONIDINE HYDROCHLORIDE 0.1 MG: 0.1 TABLET ORAL at 10:25

## 2019-05-23 RX ADMIN — DOCUSATE SODIUM 100 MG: 100 CAPSULE, LIQUID FILLED ORAL at 09:19

## 2019-05-23 RX ADMIN — Medication: at 10:26

## 2019-05-23 RX ADMIN — CLONIDINE HYDROCHLORIDE 0.1 MG: 0.1 TABLET ORAL at 17:26

## 2019-05-23 RX ADMIN — TRAZODONE HYDROCHLORIDE 100 MG: 100 TABLET, FILM COATED ORAL at 21:08

## 2019-05-23 RX ADMIN — DOCUSATE SODIUM 100 MG: 100 CAPSULE, LIQUID FILLED ORAL at 17:26

## 2019-05-23 RX ADMIN — RISPERIDONE 1 MG: 1 TABLET ORAL at 21:07

## 2019-05-24 PROBLEM — F25.0 SCHIZOAFFECTIVE DISORDER, BIPOLAR TYPE (HCC): Chronic | Status: ACTIVE | Noted: 2019-04-18

## 2019-05-24 PROCEDURE — 99231 SBSQ HOSP IP/OBS SF/LOW 25: CPT | Performed by: PSYCHIATRY & NEUROLOGY

## 2019-05-24 RX ADMIN — CLONIDINE HYDROCHLORIDE 0.1 MG: 0.1 TABLET ORAL at 17:07

## 2019-05-24 RX ADMIN — TRAZODONE HYDROCHLORIDE 100 MG: 100 TABLET, FILM COATED ORAL at 21:02

## 2019-05-24 RX ADMIN — HYDROXYZINE HYDROCHLORIDE 25 MG: 25 TABLET ORAL at 10:24

## 2019-05-24 RX ADMIN — DOCUSATE SODIUM 100 MG: 100 CAPSULE, LIQUID FILLED ORAL at 17:07

## 2019-05-24 RX ADMIN — Medication 1 TABLET: at 08:31

## 2019-05-24 RX ADMIN — CHLORHEXIDINE GLUCONATE 0.12% ORAL RINSE 15 ML: 1.2 LIQUID ORAL at 08:31

## 2019-05-24 RX ADMIN — CLONIDINE HYDROCHLORIDE 0.1 MG: 0.1 TABLET ORAL at 08:31

## 2019-05-24 RX ADMIN — LORAZEPAM 1 MG: 1 TABLET ORAL at 16:41

## 2019-05-24 RX ADMIN — ACETAMINOPHEN 975 MG: 325 TABLET ORAL at 13:26

## 2019-05-24 RX ADMIN — RISPERIDONE 1 MG: 1 TABLET ORAL at 21:02

## 2019-05-24 RX ADMIN — Medication 1000 MG: at 08:32

## 2019-05-24 RX ADMIN — DOCUSATE SODIUM 100 MG: 100 CAPSULE, LIQUID FILLED ORAL at 08:31

## 2019-05-24 RX ADMIN — NICOTINE 14 MG: 14 PATCH, EXTENDED RELEASE TRANSDERMAL at 08:31

## 2019-05-24 RX ADMIN — VALPROIC ACID 1750 MG: 250 SOLUTION ORAL at 21:02

## 2019-05-24 RX ADMIN — CHLORHEXIDINE GLUCONATE 0.12% ORAL RINSE 15 ML: 1.2 LIQUID ORAL at 21:03

## 2019-05-25 PROCEDURE — 99232 SBSQ HOSP IP/OBS MODERATE 35: CPT | Performed by: PSYCHIATRY & NEUROLOGY

## 2019-05-25 RX ADMIN — RISPERIDONE 1 MG: 1 TABLET ORAL at 21:16

## 2019-05-25 RX ADMIN — ACETAMINOPHEN 650 MG: 325 TABLET ORAL at 18:10

## 2019-05-25 RX ADMIN — LORAZEPAM 1 MG: 1 TABLET ORAL at 09:04

## 2019-05-25 RX ADMIN — CLONIDINE HYDROCHLORIDE 0.1 MG: 0.1 TABLET ORAL at 09:04

## 2019-05-25 RX ADMIN — SALINE NASAL SPRAY 1 SPRAY: 1.5 SOLUTION NASAL at 16:10

## 2019-05-25 RX ADMIN — Medication 1000 MG: at 09:04

## 2019-05-25 RX ADMIN — DOCUSATE SODIUM 100 MG: 100 CAPSULE, LIQUID FILLED ORAL at 09:04

## 2019-05-25 RX ADMIN — MAGNESIUM HYDROXIDE 30 ML: 400 SUSPENSION ORAL at 20:13

## 2019-05-25 RX ADMIN — VALPROIC ACID 1750 MG: 250 SOLUTION ORAL at 21:16

## 2019-05-25 RX ADMIN — HYDROXYZINE HYDROCHLORIDE 25 MG: 25 TABLET ORAL at 19:31

## 2019-05-25 RX ADMIN — ACETAMINOPHEN 975 MG: 325 TABLET ORAL at 10:04

## 2019-05-25 RX ADMIN — Medication 1 TABLET: at 10:04

## 2019-05-25 RX ADMIN — DOCUSATE SODIUM 100 MG: 100 CAPSULE, LIQUID FILLED ORAL at 17:05

## 2019-05-25 RX ADMIN — TRAZODONE HYDROCHLORIDE 100 MG: 100 TABLET, FILM COATED ORAL at 21:16

## 2019-05-26 PROCEDURE — 99232 SBSQ HOSP IP/OBS MODERATE 35: CPT | Performed by: PSYCHIATRY & NEUROLOGY

## 2019-05-26 RX ORDER — LOPERAMIDE HYDROCHLORIDE 2 MG/1
2 CAPSULE ORAL EVERY 4 HOURS PRN
Status: DISCONTINUED | OUTPATIENT
Start: 2019-05-26 | End: 2019-06-10 | Stop reason: HOSPADM

## 2019-05-26 RX ADMIN — VALPROIC ACID 1750 MG: 250 SOLUTION ORAL at 21:08

## 2019-05-26 RX ADMIN — RISPERIDONE 1 MG: 1 TABLET, ORALLY DISINTEGRATING ORAL at 16:32

## 2019-05-26 RX ADMIN — CHLORHEXIDINE GLUCONATE 0.12% ORAL RINSE 15 ML: 1.2 LIQUID ORAL at 21:10

## 2019-05-26 RX ADMIN — LOPERAMIDE HYDROCHLORIDE 2 MG: 2 CAPSULE ORAL at 12:39

## 2019-05-26 RX ADMIN — DOCUSATE SODIUM 100 MG: 100 CAPSULE, LIQUID FILLED ORAL at 09:07

## 2019-05-26 RX ADMIN — TRAZODONE HYDROCHLORIDE 100 MG: 100 TABLET, FILM COATED ORAL at 21:08

## 2019-05-26 RX ADMIN — CLONIDINE HYDROCHLORIDE 0.1 MG: 0.1 TABLET ORAL at 17:08

## 2019-05-26 RX ADMIN — ACETAMINOPHEN 975 MG: 325 TABLET ORAL at 19:42

## 2019-05-26 RX ADMIN — GLYCERIN, PETROLATUM, PHENYLEPHRINE HCL, PRAMOXINE HCL 1 APPLICATION: 144; 2.5; 10; 15 CREAM TOPICAL at 18:58

## 2019-05-26 RX ADMIN — RISPERIDONE 1 MG: 1 TABLET ORAL at 21:08

## 2019-05-26 RX ADMIN — HYDROXYZINE HYDROCHLORIDE 25 MG: 25 TABLET ORAL at 16:29

## 2019-05-26 RX ADMIN — NICOTINE POLACRILEX 2 MG: 2 GUM, CHEWING ORAL at 18:53

## 2019-05-26 RX ADMIN — Medication 1000 MG: at 09:07

## 2019-05-26 RX ADMIN — Medication 1 TABLET: at 09:00

## 2019-05-26 RX ADMIN — CLONIDINE HYDROCHLORIDE 0.1 MG: 0.1 TABLET ORAL at 09:07

## 2019-05-27 PROCEDURE — 99232 SBSQ HOSP IP/OBS MODERATE 35: CPT | Performed by: PSYCHIATRY & NEUROLOGY

## 2019-05-27 RX ADMIN — Medication 1 TABLET: at 08:36

## 2019-05-27 RX ADMIN — CLONIDINE HYDROCHLORIDE 0.1 MG: 0.1 TABLET ORAL at 17:35

## 2019-05-27 RX ADMIN — VALPROIC ACID 1750 MG: 250 SOLUTION ORAL at 21:55

## 2019-05-27 RX ADMIN — RISPERIDONE 1 MG: 1 TABLET ORAL at 21:55

## 2019-05-27 RX ADMIN — TRAZODONE HYDROCHLORIDE 100 MG: 100 TABLET, FILM COATED ORAL at 21:55

## 2019-05-27 RX ADMIN — Medication 1000 MG: at 08:34

## 2019-05-27 RX ADMIN — ACETAMINOPHEN 975 MG: 325 TABLET ORAL at 16:07

## 2019-05-27 RX ADMIN — CLONIDINE HYDROCHLORIDE 0.1 MG: 0.1 TABLET ORAL at 08:34

## 2019-05-27 RX ADMIN — CHLORHEXIDINE GLUCONATE 0.12% ORAL RINSE 15 ML: 1.2 LIQUID ORAL at 21:58

## 2019-05-27 RX ADMIN — HYDROXYZINE HYDROCHLORIDE 25 MG: 25 TABLET ORAL at 14:43

## 2019-05-28 PROCEDURE — 99232 SBSQ HOSP IP/OBS MODERATE 35: CPT | Performed by: PSYCHIATRY & NEUROLOGY

## 2019-05-28 RX ORDER — RISPERIDONE 2 MG/1
2 TABLET, FILM COATED ORAL
Status: DISCONTINUED | OUTPATIENT
Start: 2019-05-28 | End: 2019-06-06

## 2019-05-28 RX ADMIN — HYDROXYZINE HYDROCHLORIDE 25 MG: 25 TABLET ORAL at 19:21

## 2019-05-28 RX ADMIN — Medication: at 09:13

## 2019-05-28 RX ADMIN — CLONIDINE HYDROCHLORIDE 0.1 MG: 0.1 TABLET ORAL at 17:35

## 2019-05-28 RX ADMIN — CHLORHEXIDINE GLUCONATE 0.12% ORAL RINSE 15 ML: 1.2 LIQUID ORAL at 09:13

## 2019-05-28 RX ADMIN — Medication 1 TABLET: at 09:13

## 2019-05-28 RX ADMIN — NICOTINE 14 MG: 14 PATCH, EXTENDED RELEASE TRANSDERMAL at 09:13

## 2019-05-28 RX ADMIN — VALPROIC ACID 1750 MG: 250 SOLUTION ORAL at 21:14

## 2019-05-28 RX ADMIN — Medication 1000 MG: at 09:13

## 2019-05-28 RX ADMIN — CLONIDINE HYDROCHLORIDE 0.1 MG: 0.1 TABLET ORAL at 09:13

## 2019-05-28 RX ADMIN — ACETAMINOPHEN 975 MG: 325 TABLET ORAL at 19:21

## 2019-05-28 RX ADMIN — GLYCERIN, PETROLATUM, PHENYLEPHRINE HCL, PRAMOXINE HCL 1 APPLICATION: 144; 2.5; 10; 15 CREAM TOPICAL at 09:22

## 2019-05-28 RX ADMIN — RISPERIDONE 2 MG: 2 TABLET ORAL at 21:13

## 2019-05-28 RX ADMIN — TRAZODONE HYDROCHLORIDE 100 MG: 100 TABLET, FILM COATED ORAL at 21:13

## 2019-05-28 RX ADMIN — HYDROXYZINE HYDROCHLORIDE 25 MG: 25 TABLET ORAL at 12:22

## 2019-05-29 PROCEDURE — 99232 SBSQ HOSP IP/OBS MODERATE 35: CPT | Performed by: PSYCHIATRY & NEUROLOGY

## 2019-05-29 RX ADMIN — RISPERIDONE 2 MG: 2 TABLET ORAL at 21:43

## 2019-05-29 RX ADMIN — VALPROIC ACID 1750 MG: 250 SOLUTION ORAL at 21:41

## 2019-05-29 RX ADMIN — CLONIDINE HYDROCHLORIDE 0.1 MG: 0.1 TABLET ORAL at 09:11

## 2019-05-29 RX ADMIN — CHLORHEXIDINE GLUCONATE 0.12% ORAL RINSE 15 ML: 1.2 LIQUID ORAL at 09:11

## 2019-05-29 RX ADMIN — SALINE NASAL SPRAY 1 SPRAY: 1.5 SOLUTION NASAL at 20:00

## 2019-05-29 RX ADMIN — HYDROXYZINE HYDROCHLORIDE 25 MG: 25 TABLET ORAL at 20:00

## 2019-05-29 RX ADMIN — TRAZODONE HYDROCHLORIDE 100 MG: 100 TABLET, FILM COATED ORAL at 21:43

## 2019-05-29 RX ADMIN — NICOTINE POLACRILEX 2 MG: 2 GUM, CHEWING ORAL at 19:52

## 2019-05-29 RX ADMIN — Medication 1 TABLET: at 09:11

## 2019-05-29 RX ADMIN — CLONIDINE HYDROCHLORIDE 0.1 MG: 0.1 TABLET ORAL at 17:18

## 2019-05-29 RX ADMIN — Medication 1000 MG: at 09:11

## 2019-05-30 PROCEDURE — 99232 SBSQ HOSP IP/OBS MODERATE 35: CPT | Performed by: PSYCHIATRY & NEUROLOGY

## 2019-05-30 RX ORDER — LORATADINE 10 MG/1
10 TABLET ORAL DAILY PRN
Status: DISCONTINUED | OUTPATIENT
Start: 2019-05-30 | End: 2019-06-10 | Stop reason: HOSPADM

## 2019-05-30 RX ADMIN — ACETAMINOPHEN 650 MG: 325 TABLET ORAL at 13:43

## 2019-05-30 RX ADMIN — RISPERIDONE 2 MG: 2 TABLET ORAL at 21:30

## 2019-05-30 RX ADMIN — TRAZODONE HYDROCHLORIDE 100 MG: 100 TABLET, FILM COATED ORAL at 21:29

## 2019-05-30 RX ADMIN — LORATADINE 10 MG: 10 TABLET ORAL at 09:41

## 2019-05-30 RX ADMIN — CLONIDINE HYDROCHLORIDE 0.1 MG: 0.1 TABLET ORAL at 08:29

## 2019-05-30 RX ADMIN — Medication: at 08:29

## 2019-05-30 RX ADMIN — CLONIDINE HYDROCHLORIDE 0.1 MG: 0.1 TABLET ORAL at 17:16

## 2019-05-30 RX ADMIN — ACETAMINOPHEN 650 MG: 325 TABLET ORAL at 20:30

## 2019-05-30 RX ADMIN — VALPROIC ACID 1750 MG: 250 SOLUTION ORAL at 21:30

## 2019-05-30 RX ADMIN — NICOTINE 14 MG: 14 PATCH, EXTENDED RELEASE TRANSDERMAL at 08:29

## 2019-05-30 RX ADMIN — Medication 1000 MG: at 08:29

## 2019-05-30 RX ADMIN — MAGNESIUM HYDROXIDE 30 ML: 400 SUSPENSION ORAL at 11:07

## 2019-05-30 RX ADMIN — SALINE NASAL SPRAY 1 SPRAY: 1.5 SOLUTION NASAL at 22:42

## 2019-05-30 RX ADMIN — SALINE NASAL SPRAY 1 SPRAY: 1.5 SOLUTION NASAL at 09:41

## 2019-05-30 RX ADMIN — GLYCERIN, PETROLATUM, PHENYLEPHRINE HCL, PRAMOXINE HCL: 144; 2.5; 10; 15 CREAM TOPICAL at 18:14

## 2019-05-30 RX ADMIN — NICOTINE POLACRILEX 2 MG: 2 GUM, CHEWING ORAL at 14:34

## 2019-05-30 RX ADMIN — Medication 1 TABLET: at 08:29

## 2019-05-30 RX ADMIN — CHLORHEXIDINE GLUCONATE 0.12% ORAL RINSE 15 ML: 1.2 LIQUID ORAL at 08:29

## 2019-05-31 LAB — VALPROATE SERPL-MCNC: 63.1 UG/ML (ref 50–120)

## 2019-05-31 PROCEDURE — 99232 SBSQ HOSP IP/OBS MODERATE 35: CPT | Performed by: PSYCHIATRY & NEUROLOGY

## 2019-05-31 PROCEDURE — 80164 ASSAY DIPROPYLACETIC ACD TOT: CPT | Performed by: PSYCHIATRY & NEUROLOGY

## 2019-05-31 RX ADMIN — Medication 1 TABLET: at 08:20

## 2019-05-31 RX ADMIN — RISPERIDONE 2 MG: 2 TABLET ORAL at 21:20

## 2019-05-31 RX ADMIN — ACETAMINOPHEN 975 MG: 325 TABLET ORAL at 17:12

## 2019-05-31 RX ADMIN — LORAZEPAM 1 MG: 1 TABLET ORAL at 12:47

## 2019-05-31 RX ADMIN — VALPROIC ACID 1750 MG: 250 SOLUTION ORAL at 21:41

## 2019-05-31 RX ADMIN — CHLORHEXIDINE GLUCONATE 0.12% ORAL RINSE 15 ML: 1.2 LIQUID ORAL at 08:20

## 2019-05-31 RX ADMIN — NICOTINE 14 MG: 14 PATCH, EXTENDED RELEASE TRANSDERMAL at 08:20

## 2019-05-31 RX ADMIN — GLYCERIN, PETROLATUM, PHENYLEPHRINE HCL, PRAMOXINE HCL: 144; 2.5; 10; 15 CREAM TOPICAL at 19:26

## 2019-05-31 RX ADMIN — CLONIDINE HYDROCHLORIDE 0.1 MG: 0.1 TABLET ORAL at 08:20

## 2019-05-31 RX ADMIN — Medication: at 08:20

## 2019-05-31 RX ADMIN — Medication 1000 MG: at 08:20

## 2019-05-31 RX ADMIN — CLONIDINE HYDROCHLORIDE 0.1 MG: 0.1 TABLET ORAL at 17:11

## 2019-05-31 RX ADMIN — TRAZODONE HYDROCHLORIDE 100 MG: 100 TABLET, FILM COATED ORAL at 21:20

## 2019-06-01 PROCEDURE — 99232 SBSQ HOSP IP/OBS MODERATE 35: CPT | Performed by: PSYCHIATRY & NEUROLOGY

## 2019-06-01 RX ADMIN — GLYCERIN, PETROLATUM, PHENYLEPHRINE HCL, PRAMOXINE HCL: 144; 2.5; 10; 15 CREAM TOPICAL at 16:27

## 2019-06-01 RX ADMIN — RISPERIDONE 2 MG: 2 TABLET ORAL at 21:11

## 2019-06-01 RX ADMIN — ACETAMINOPHEN 650 MG: 325 TABLET ORAL at 09:29

## 2019-06-01 RX ADMIN — Medication 1 TABLET: at 09:11

## 2019-06-01 RX ADMIN — Medication 1000 MG: at 09:05

## 2019-06-01 RX ADMIN — NICOTINE POLACRILEX 2 MG: 2 GUM, CHEWING ORAL at 15:20

## 2019-06-01 RX ADMIN — CLONIDINE HYDROCHLORIDE 0.1 MG: 0.1 TABLET ORAL at 17:03

## 2019-06-01 RX ADMIN — HYDROXYZINE HYDROCHLORIDE 25 MG: 25 TABLET ORAL at 16:06

## 2019-06-01 RX ADMIN — TRAZODONE HYDROCHLORIDE 100 MG: 100 TABLET, FILM COATED ORAL at 21:08

## 2019-06-01 RX ADMIN — Medication: at 17:03

## 2019-06-01 RX ADMIN — CLONIDINE HYDROCHLORIDE 0.1 MG: 0.1 TABLET ORAL at 09:05

## 2019-06-01 RX ADMIN — LORAZEPAM 1 MG: 1 TABLET ORAL at 10:43

## 2019-06-01 RX ADMIN — VALPROIC ACID 1750 MG: 250 SOLUTION ORAL at 21:12

## 2019-06-02 PROCEDURE — 99231 SBSQ HOSP IP/OBS SF/LOW 25: CPT | Performed by: PSYCHIATRY & NEUROLOGY

## 2019-06-02 RX ADMIN — HYDROXYZINE HYDROCHLORIDE 25 MG: 25 TABLET ORAL at 09:09

## 2019-06-02 RX ADMIN — CLONIDINE HYDROCHLORIDE 0.1 MG: 0.1 TABLET ORAL at 09:09

## 2019-06-02 RX ADMIN — VALPROIC ACID 1750 MG: 250 SOLUTION ORAL at 21:19

## 2019-06-02 RX ADMIN — CLONIDINE HYDROCHLORIDE 0.1 MG: 0.1 TABLET ORAL at 17:06

## 2019-06-02 RX ADMIN — RISPERIDONE 2 MG: 2 TABLET ORAL at 21:18

## 2019-06-02 RX ADMIN — Medication 1 TABLET: at 09:10

## 2019-06-02 RX ADMIN — Medication 1000 MG: at 09:09

## 2019-06-02 RX ADMIN — TRAZODONE HYDROCHLORIDE 100 MG: 100 TABLET, FILM COATED ORAL at 21:18

## 2019-06-03 PROCEDURE — 99232 SBSQ HOSP IP/OBS MODERATE 35: CPT | Performed by: PSYCHIATRY & NEUROLOGY

## 2019-06-03 RX ADMIN — MAGNESIUM HYDROXIDE 30 ML: 400 SUSPENSION ORAL at 13:32

## 2019-06-03 RX ADMIN — CLONIDINE HYDROCHLORIDE 0.1 MG: 0.1 TABLET ORAL at 17:01

## 2019-06-03 RX ADMIN — ACETAMINOPHEN 650 MG: 325 TABLET ORAL at 16:19

## 2019-06-03 RX ADMIN — CLONIDINE HYDROCHLORIDE 0.1 MG: 0.1 TABLET ORAL at 08:25

## 2019-06-03 RX ADMIN — Medication 1000 MG: at 08:24

## 2019-06-03 RX ADMIN — LORATADINE 10 MG: 10 TABLET ORAL at 16:19

## 2019-06-03 RX ADMIN — CHLORHEXIDINE GLUCONATE 0.12% ORAL RINSE 15 ML: 1.2 LIQUID ORAL at 08:24

## 2019-06-03 RX ADMIN — VALPROIC ACID 1750 MG: 250 SOLUTION ORAL at 21:11

## 2019-06-03 RX ADMIN — RISPERIDONE 2 MG: 2 TABLET ORAL at 21:11

## 2019-06-03 RX ADMIN — NICOTINE POLACRILEX 2 MG: 2 GUM, CHEWING ORAL at 12:04

## 2019-06-03 RX ADMIN — HYDROXYZINE HYDROCHLORIDE 25 MG: 25 TABLET ORAL at 11:26

## 2019-06-03 RX ADMIN — NICOTINE 14 MG: 14 PATCH, EXTENDED RELEASE TRANSDERMAL at 08:24

## 2019-06-03 RX ADMIN — Medication 1 TABLET: at 08:25

## 2019-06-03 RX ADMIN — ACETAMINOPHEN 650 MG: 325 TABLET ORAL at 11:25

## 2019-06-03 RX ADMIN — TRAZODONE HYDROCHLORIDE 100 MG: 100 TABLET, FILM COATED ORAL at 21:11

## 2019-06-04 LAB
ALBUMIN SERPL BCP-MCNC: 4 G/DL (ref 3–5.2)
ALP SERPL-CCNC: 74 U/L (ref 43–122)
ALT SERPL W P-5'-P-CCNC: 18 U/L (ref 9–52)
ANION GAP SERPL CALCULATED.3IONS-SCNC: 11 MMOL/L (ref 5–14)
AST SERPL W P-5'-P-CCNC: 23 U/L (ref 17–59)
BILIRUB SERPL-MCNC: 0.2 MG/DL
BUN SERPL-MCNC: 14 MG/DL (ref 5–25)
CALCIUM SERPL-MCNC: 8.7 MG/DL (ref 8.4–10.2)
CHLORIDE SERPL-SCNC: 104 MMOL/L (ref 97–108)
CO2 SERPL-SCNC: 23 MMOL/L (ref 22–30)
CREAT SERPL-MCNC: 0.8 MG/DL (ref 0.7–1.5)
EOSINOPHIL # BLD AUTO: 0.86 THOUSAND/UL (ref 0–0.4)
EOSINOPHIL NFR BLD MANUAL: 11 % (ref 0–6)
ERYTHROCYTE [DISTWIDTH] IN BLOOD BY AUTOMATED COUNT: 12.9 %
GFR SERPL CREATININE-BSD FRML MDRD: 127 ML/MIN/1.73SQ M
GLUCOSE P FAST SERPL-MCNC: 84 MG/DL (ref 70–99)
GLUCOSE SERPL-MCNC: 84 MG/DL (ref 70–99)
HCT VFR BLD AUTO: 46.1 % (ref 41–53)
HGB BLD-MCNC: 15.5 G/DL (ref 13.5–17.5)
LYMPHOCYTES # BLD AUTO: 3.43 THOUSAND/UL (ref 0.5–4)
LYMPHOCYTES # BLD AUTO: 44 % (ref 25–45)
MCH RBC QN AUTO: 31.3 PG (ref 26–34)
MCHC RBC AUTO-ENTMCNC: 33.6 G/DL (ref 31–36)
MCV RBC AUTO: 93 FL (ref 80–100)
MONOCYTES # BLD AUTO: 0.62 THOUSAND/UL (ref 0.2–0.9)
MONOCYTES NFR BLD AUTO: 8 % (ref 1–10)
NEUTS SEG # BLD: 2.89 THOUSAND/UL (ref 1.8–7.8)
NEUTS SEG NFR BLD AUTO: 37 %
PLATELET # BLD AUTO: 243 THOUSANDS/UL (ref 150–450)
PLATELET BLD QL SMEAR: ADEQUATE
PMV BLD AUTO: 9.2 FL (ref 8.9–12.7)
POTASSIUM SERPL-SCNC: 4.2 MMOL/L (ref 3.6–5)
PROT SERPL-MCNC: 6.8 G/DL (ref 5.9–8.4)
RBC # BLD AUTO: 4.96 MILLION/UL (ref 4.5–5.9)
RBC MORPH BLD: NORMAL
SODIUM SERPL-SCNC: 138 MMOL/L (ref 137–147)
TOTAL CELLS COUNTED SPEC: 100
WBC # BLD AUTO: 7.8 THOUSAND/UL (ref 4.5–11)

## 2019-06-04 PROCEDURE — 85027 COMPLETE CBC AUTOMATED: CPT | Performed by: PSYCHIATRY & NEUROLOGY

## 2019-06-04 PROCEDURE — 85007 BL SMEAR W/DIFF WBC COUNT: CPT | Performed by: PSYCHIATRY & NEUROLOGY

## 2019-06-04 PROCEDURE — 80053 COMPREHEN METABOLIC PANEL: CPT | Performed by: PSYCHIATRY & NEUROLOGY

## 2019-06-04 PROCEDURE — 99232 SBSQ HOSP IP/OBS MODERATE 35: CPT | Performed by: PSYCHIATRY & NEUROLOGY

## 2019-06-04 RX ORDER — TOPIRAMATE 25 MG/1
12.5 TABLET ORAL
Status: DISCONTINUED | OUTPATIENT
Start: 2019-06-04 | End: 2019-06-05

## 2019-06-04 RX ADMIN — NICOTINE POLACRILEX 2 MG: 2 GUM, CHEWING ORAL at 15:07

## 2019-06-04 RX ADMIN — Medication 1 APPLICATION: at 17:25

## 2019-06-04 RX ADMIN — Medication 1000 MG: at 08:34

## 2019-06-04 RX ADMIN — ACETAMINOPHEN 650 MG: 325 TABLET ORAL at 13:02

## 2019-06-04 RX ADMIN — Medication 1 TABLET: at 08:35

## 2019-06-04 RX ADMIN — CLONIDINE HYDROCHLORIDE 0.1 MG: 0.1 TABLET ORAL at 08:34

## 2019-06-04 RX ADMIN — CHLORHEXIDINE GLUCONATE 0.12% ORAL RINSE 15 ML: 1.2 LIQUID ORAL at 10:47

## 2019-06-04 RX ADMIN — VALPROIC ACID 1750 MG: 250 SOLUTION ORAL at 21:02

## 2019-06-04 RX ADMIN — TOPIRAMATE 12.5 MG: 25 TABLET, FILM COATED ORAL at 21:02

## 2019-06-04 RX ADMIN — RISPERIDONE 2 MG: 2 TABLET ORAL at 21:02

## 2019-06-04 RX ADMIN — LORAZEPAM 1 MG: 1 TABLET ORAL at 13:25

## 2019-06-04 RX ADMIN — CLONIDINE HYDROCHLORIDE 0.1 MG: 0.1 TABLET ORAL at 17:15

## 2019-06-04 RX ADMIN — GLYCERIN, PETROLATUM, PHENYLEPHRINE HCL, PRAMOXINE HCL 1 APPLICATION: 144; 2.5; 10; 15 CREAM TOPICAL at 18:28

## 2019-06-04 RX ADMIN — TRAZODONE HYDROCHLORIDE 100 MG: 100 TABLET, FILM COATED ORAL at 21:02

## 2019-06-04 RX ADMIN — CHLORHEXIDINE GLUCONATE 0.12% ORAL RINSE 15 ML: 1.2 LIQUID ORAL at 21:36

## 2019-06-04 RX ADMIN — ACETAMINOPHEN 650 MG: 325 TABLET ORAL at 21:34

## 2019-06-05 PROCEDURE — 99232 SBSQ HOSP IP/OBS MODERATE 35: CPT | Performed by: PSYCHIATRY & NEUROLOGY

## 2019-06-05 RX ORDER — TOPIRAMATE 25 MG/1
25 TABLET ORAL
Status: DISCONTINUED | OUTPATIENT
Start: 2019-06-05 | End: 2019-06-10 | Stop reason: HOSPADM

## 2019-06-05 RX ADMIN — CHLORHEXIDINE GLUCONATE 0.12% ORAL RINSE 15 ML: 1.2 LIQUID ORAL at 08:08

## 2019-06-05 RX ADMIN — NICOTINE 14 MG: 14 PATCH, EXTENDED RELEASE TRANSDERMAL at 08:08

## 2019-06-05 RX ADMIN — TOPIRAMATE 25 MG: 25 TABLET, FILM COATED ORAL at 21:38

## 2019-06-05 RX ADMIN — CLONIDINE HYDROCHLORIDE 0.1 MG: 0.1 TABLET ORAL at 08:08

## 2019-06-05 RX ADMIN — Medication 1000 MG: at 08:08

## 2019-06-05 RX ADMIN — Medication 1 TABLET: at 08:08

## 2019-06-05 RX ADMIN — RISPERIDONE 2 MG: 2 TABLET ORAL at 21:38

## 2019-06-05 RX ADMIN — Medication: at 08:08

## 2019-06-05 RX ADMIN — TRAZODONE HYDROCHLORIDE 100 MG: 100 TABLET, FILM COATED ORAL at 21:38

## 2019-06-05 RX ADMIN — LORAZEPAM 1 MG: 1 TABLET ORAL at 13:52

## 2019-06-05 RX ADMIN — ACETAMINOPHEN 650 MG: 325 TABLET ORAL at 11:33

## 2019-06-05 RX ADMIN — VALPROIC ACID 1750 MG: 250 SOLUTION ORAL at 21:39

## 2019-06-05 RX ADMIN — CLONIDINE HYDROCHLORIDE 0.1 MG: 0.1 TABLET ORAL at 17:09

## 2019-06-06 LAB — AMMONIA PLAS-SCNC: 26 UMOL/L (ref 9–33)

## 2019-06-06 PROCEDURE — 99232 SBSQ HOSP IP/OBS MODERATE 35: CPT | Performed by: PSYCHIATRY & NEUROLOGY

## 2019-06-06 PROCEDURE — 82140 ASSAY OF AMMONIA: CPT | Performed by: PSYCHIATRY & NEUROLOGY

## 2019-06-06 RX ADMIN — Medication 1 TABLET: at 09:04

## 2019-06-06 RX ADMIN — ACETAMINOPHEN 975 MG: 325 TABLET ORAL at 16:21

## 2019-06-06 RX ADMIN — VALPROIC ACID 1750 MG: 250 SOLUTION ORAL at 21:12

## 2019-06-06 RX ADMIN — CLONIDINE HYDROCHLORIDE 0.1 MG: 0.1 TABLET ORAL at 09:04

## 2019-06-06 RX ADMIN — CLONIDINE HYDROCHLORIDE 0.1 MG: 0.1 TABLET ORAL at 17:28

## 2019-06-06 RX ADMIN — CHLORHEXIDINE GLUCONATE 0.12% ORAL RINSE 15 ML: 1.2 LIQUID ORAL at 20:57

## 2019-06-06 RX ADMIN — TRAZODONE HYDROCHLORIDE 100 MG: 100 TABLET, FILM COATED ORAL at 21:12

## 2019-06-06 RX ADMIN — TOPIRAMATE 25 MG: 25 TABLET, FILM COATED ORAL at 21:12

## 2019-06-06 RX ADMIN — RISPERIDONE 3 MG: 2 TABLET ORAL at 21:15

## 2019-06-06 RX ADMIN — ALBUTEROL SULFATE 2 PUFF: 90 AEROSOL, METERED RESPIRATORY (INHALATION) at 16:41

## 2019-06-06 RX ADMIN — Medication 1000 MG: at 09:03

## 2019-06-06 RX ADMIN — Medication: at 17:28

## 2019-06-06 RX ADMIN — LORAZEPAM 1 MG: 1 TABLET ORAL at 00:41

## 2019-06-06 RX ADMIN — LORAZEPAM 1 MG: 1 TABLET ORAL at 13:48

## 2019-06-07 PROCEDURE — 99232 SBSQ HOSP IP/OBS MODERATE 35: CPT | Performed by: PSYCHIATRY & NEUROLOGY

## 2019-06-07 RX ADMIN — RISPERIDONE 3 MG: 2 TABLET ORAL at 21:19

## 2019-06-07 RX ADMIN — Medication 1000 MG: at 08:14

## 2019-06-07 RX ADMIN — VALPROIC ACID 1750 MG: 250 SOLUTION ORAL at 21:19

## 2019-06-07 RX ADMIN — ACETAMINOPHEN 650 MG: 325 TABLET ORAL at 17:54

## 2019-06-07 RX ADMIN — Medication 1 TABLET: at 08:16

## 2019-06-07 RX ADMIN — NICOTINE POLACRILEX 2 MG: 2 GUM, CHEWING ORAL at 17:55

## 2019-06-07 RX ADMIN — CLONIDINE HYDROCHLORIDE 0.1 MG: 0.1 TABLET ORAL at 08:14

## 2019-06-07 RX ADMIN — TOPIRAMATE 25 MG: 25 TABLET, FILM COATED ORAL at 21:20

## 2019-06-07 RX ADMIN — HALOPERIDOL 5 MG: 5 TABLET ORAL at 10:56

## 2019-06-07 RX ADMIN — BENZTROPINE MESYLATE 1 MG: 1 TABLET ORAL at 18:00

## 2019-06-07 RX ADMIN — TRAZODONE HYDROCHLORIDE 100 MG: 100 TABLET, FILM COATED ORAL at 21:19

## 2019-06-07 RX ADMIN — MAGNESIUM HYDROXIDE 30 ML: 400 SUSPENSION ORAL at 17:55

## 2019-06-07 RX ADMIN — CLONIDINE HYDROCHLORIDE 0.1 MG: 0.1 TABLET ORAL at 17:21

## 2019-06-07 RX ADMIN — NICOTINE POLACRILEX 2 MG: 2 GUM, CHEWING ORAL at 23:43

## 2019-06-07 RX ADMIN — ALBUTEROL SULFATE 2 PUFF: 90 AEROSOL, METERED RESPIRATORY (INHALATION) at 09:53

## 2019-06-08 PROCEDURE — 99232 SBSQ HOSP IP/OBS MODERATE 35: CPT | Performed by: PHYSICIAN ASSISTANT

## 2019-06-08 RX ADMIN — BENZTROPINE MESYLATE 1 MG: 1 TABLET ORAL at 16:12

## 2019-06-08 RX ADMIN — HALOPERIDOL 5 MG: 5 TABLET ORAL at 16:12

## 2019-06-08 RX ADMIN — Medication 1000 MG: at 08:56

## 2019-06-08 RX ADMIN — HYDROXYZINE HYDROCHLORIDE 25 MG: 25 TABLET ORAL at 10:31

## 2019-06-08 RX ADMIN — TOPIRAMATE 25 MG: 25 TABLET, FILM COATED ORAL at 21:02

## 2019-06-08 RX ADMIN — CLONIDINE HYDROCHLORIDE 0.1 MG: 0.1 TABLET ORAL at 17:39

## 2019-06-08 RX ADMIN — VALPROIC ACID 1750 MG: 250 SOLUTION ORAL at 21:03

## 2019-06-08 RX ADMIN — RISPERIDONE 3 MG: 2 TABLET ORAL at 21:02

## 2019-06-08 RX ADMIN — TRAZODONE HYDROCHLORIDE 100 MG: 100 TABLET, FILM COATED ORAL at 21:02

## 2019-06-08 RX ADMIN — CLONIDINE HYDROCHLORIDE 0.1 MG: 0.1 TABLET ORAL at 08:56

## 2019-06-08 RX ADMIN — Medication 1 TABLET: at 08:59

## 2019-06-08 RX ADMIN — CHLORHEXIDINE GLUCONATE 0.12% ORAL RINSE 15 ML: 1.2 LIQUID ORAL at 09:03

## 2019-06-09 PROCEDURE — 99232 SBSQ HOSP IP/OBS MODERATE 35: CPT | Performed by: PHYSICIAN ASSISTANT

## 2019-06-09 RX ADMIN — CLONIDINE HYDROCHLORIDE 0.1 MG: 0.1 TABLET ORAL at 09:13

## 2019-06-09 RX ADMIN — CHLORHEXIDINE GLUCONATE 0.12% ORAL RINSE 15 ML: 1.2 LIQUID ORAL at 09:15

## 2019-06-09 RX ADMIN — ACETAMINOPHEN 650 MG: 325 TABLET ORAL at 14:10

## 2019-06-09 RX ADMIN — TOPIRAMATE 25 MG: 25 TABLET, FILM COATED ORAL at 21:21

## 2019-06-09 RX ADMIN — CLONIDINE HYDROCHLORIDE 0.1 MG: 0.1 TABLET ORAL at 17:47

## 2019-06-09 RX ADMIN — Medication 1000 MG: at 09:14

## 2019-06-09 RX ADMIN — RISPERIDONE 3 MG: 2 TABLET ORAL at 21:21

## 2019-06-09 RX ADMIN — ALUMINUM HYDROXIDE, MAGNESIUM HYDROXIDE, AND SIMETHICONE 30 ML: 200; 200; 20 SUSPENSION ORAL at 21:56

## 2019-06-09 RX ADMIN — LORAZEPAM 1 MG: 1 TABLET ORAL at 12:57

## 2019-06-09 RX ADMIN — VALPROIC ACID 1750 MG: 250 SOLUTION ORAL at 21:21

## 2019-06-09 RX ADMIN — TRAZODONE HYDROCHLORIDE 100 MG: 100 TABLET, FILM COATED ORAL at 21:21

## 2019-06-09 RX ADMIN — Medication 1 TABLET: at 09:16

## 2019-06-10 VITALS
BODY MASS INDEX: 32.5 KG/M2 | SYSTOLIC BLOOD PRESSURE: 120 MMHG | HEIGHT: 73 IN | RESPIRATION RATE: 16 BRPM | DIASTOLIC BLOOD PRESSURE: 60 MMHG | WEIGHT: 245.2 LBS | HEART RATE: 56 BPM | OXYGEN SATURATION: 98 % | TEMPERATURE: 98 F

## 2019-06-10 PROCEDURE — 99239 HOSP IP/OBS DSCHRG MGMT >30: CPT | Performed by: PSYCHIATRY & NEUROLOGY

## 2019-06-10 RX ORDER — TOPIRAMATE 25 MG/1
25 TABLET ORAL
Qty: 30 TABLET | Refills: 0 | Status: SHIPPED | OUTPATIENT
Start: 2019-06-10 | End: 2019-07-10

## 2019-06-10 RX ORDER — CHLORAL HYDRATE 500 MG
1000 CAPSULE ORAL DAILY
Qty: 30 CAPSULE | Refills: 0 | Status: SHIPPED | OUTPATIENT
Start: 2019-06-10 | End: 2019-07-10

## 2019-06-10 RX ORDER — ALBUTEROL SULFATE 90 UG/1
2 AEROSOL, METERED RESPIRATORY (INHALATION) EVERY 4 HOURS PRN
Qty: 1 INHALER | Refills: 0 | Status: SHIPPED | OUTPATIENT
Start: 2019-06-10 | End: 2019-07-10

## 2019-06-10 RX ORDER — AMMONIUM LACTATE 12 G/100G
LOTION TOPICAL 2 TIMES DAILY
Qty: 400 G | Refills: 0 | Status: SHIPPED | OUTPATIENT
Start: 2019-06-10

## 2019-06-10 RX ORDER — ECHINACEA PURPUREA EXTRACT 125 MG
1 TABLET ORAL EVERY 2 HOUR PRN
Qty: 30 ML | Refills: 0 | Status: SHIPPED | OUTPATIENT
Start: 2019-06-10 | End: 2019-07-10

## 2019-06-10 RX ORDER — NICOTINE 21 MG/24HR
1 PATCH, TRANSDERMAL 24 HOURS TRANSDERMAL DAILY
Qty: 28 PATCH | Refills: 0 | Status: SHIPPED | OUTPATIENT
Start: 2019-06-10

## 2019-06-10 RX ORDER — VALPROIC ACID 250 MG/5ML
1750 SOLUTION ORAL
Qty: 1050 ML | Refills: 0 | Status: SHIPPED | OUTPATIENT
Start: 2019-06-10 | End: 2019-07-10

## 2019-06-10 RX ORDER — CLONIDINE HYDROCHLORIDE 0.1 MG/1
0.1 TABLET ORAL 2 TIMES DAILY
Qty: 60 TABLET | Refills: 0 | Status: SHIPPED | OUTPATIENT
Start: 2019-06-10 | End: 2019-07-10

## 2019-06-10 RX ORDER — TRAZODONE HYDROCHLORIDE 100 MG/1
100 TABLET ORAL
Qty: 30 TABLET | Refills: 0 | Status: SHIPPED | OUTPATIENT
Start: 2019-06-10 | End: 2019-07-10

## 2019-06-10 RX ORDER — RISPERIDONE 3 MG/1
3 TABLET, FILM COATED ORAL
Qty: 30 TABLET | Refills: 0 | Status: SHIPPED | OUTPATIENT
Start: 2019-06-10 | End: 2019-07-10

## 2019-06-10 RX ORDER — CHLORHEXIDINE GLUCONATE 0.12 MG/ML
15 RINSE ORAL EVERY 12 HOURS SCHEDULED
Qty: 120 ML | Refills: 0 | Status: SHIPPED | OUTPATIENT
Start: 2019-06-10 | End: 2019-06-14

## 2019-06-10 RX ADMIN — Medication 1000 MG: at 08:21

## 2019-06-10 RX ADMIN — NICOTINE POLACRILEX 2 MG: 2 GUM, CHEWING ORAL at 08:55

## 2019-06-10 RX ADMIN — CLONIDINE HYDROCHLORIDE 0.1 MG: 0.1 TABLET ORAL at 08:20

## 2019-06-10 RX ADMIN — Medication 1 TABLET: at 08:21

## 2022-05-25 ENCOUNTER — HOSPITAL ENCOUNTER (INPATIENT)
Facility: HOSPITAL | Age: 27
LOS: 37 days | Discharge: HOME | DRG: 885 | End: 2022-07-01
Attending: PSYCHIATRY & NEUROLOGY | Admitting: PSYCHIATRY & NEUROLOGY
Payer: COMMERCIAL

## 2022-05-25 DIAGNOSIS — E66.9 OBESITY (BMI 35.0-39.9 WITHOUT COMORBIDITY): ICD-10-CM

## 2022-05-25 DIAGNOSIS — G40.909 SEIZURE DISORDER (HCC): ICD-10-CM

## 2022-05-25 DIAGNOSIS — I10 HYPERTENSION: ICD-10-CM

## 2022-05-25 DIAGNOSIS — L98.9 SKIN LESION OF FOOT: ICD-10-CM

## 2022-05-25 DIAGNOSIS — L85.3 DRY SKIN: ICD-10-CM

## 2022-05-25 DIAGNOSIS — Z00.8 MEDICAL CLEARANCE FOR PSYCHIATRIC ADMISSION: Primary | ICD-10-CM

## 2022-05-25 DIAGNOSIS — M79.606 LEG PAIN: ICD-10-CM

## 2022-05-25 DIAGNOSIS — K59.00 CONSTIPATION: ICD-10-CM

## 2022-05-25 DIAGNOSIS — H04.123 DRY EYES: ICD-10-CM

## 2022-05-25 DIAGNOSIS — F20.9 SCHIZOPHRENIA, UNSPECIFIED TYPE (HCC): Chronic | ICD-10-CM

## 2022-05-25 PROBLEM — Z91.81 HISTORY OF FALL: Status: ACTIVE | Noted: 2022-05-25

## 2022-05-25 PROBLEM — Z98.890 HISTORY OF CARDIAC RADIOFREQUENCY ABLATION: Status: ACTIVE | Noted: 2022-05-25

## 2022-05-25 PROBLEM — S06.9X9A TRAUMATIC BRAIN INJURY: Status: ACTIVE | Noted: 2022-05-25

## 2022-05-25 PROBLEM — G93.9 BRAIN LESION: Status: ACTIVE | Noted: 2022-05-25

## 2022-05-25 PROBLEM — F09 PSYCHOSIS, ORGANIC: Status: ACTIVE | Noted: 2022-05-25

## 2022-05-25 PROBLEM — S06.9XAA TRAUMATIC BRAIN INJURY: Status: ACTIVE | Noted: 2022-05-25

## 2022-05-25 PROBLEM — S06.9X9A TRAUMATIC BRAIN INJURY (HCC): Status: ACTIVE | Noted: 2022-05-25

## 2022-05-25 LAB
ATRIAL RATE: 101 BPM
ATRIAL RATE: 101 BPM
P AXIS: 23 DEGREES
P AXIS: 23 DEGREES
PR INTERVAL: 152 MS
PR INTERVAL: 152 MS
QRS AXIS: 17 DEGREES
QRS AXIS: 17 DEGREES
QRSD INTERVAL: 96 MS
QRSD INTERVAL: 96 MS
QT INTERVAL: 344 MS
QT INTERVAL: 344 MS
QTC INTERVAL: 446 MS
QTC INTERVAL: 446 MS
T WAVE AXIS: 15 DEGREES
T WAVE AXIS: 15 DEGREES
VENTRICULAR RATE: 101 BPM
VENTRICULAR RATE: 101 BPM

## 2022-05-25 PROCEDURE — 93005 ELECTROCARDIOGRAM TRACING: CPT

## 2022-05-25 PROCEDURE — 93010 ELECTROCARDIOGRAM REPORT: CPT | Performed by: INTERNAL MEDICINE

## 2022-05-25 PROCEDURE — 99222 1ST HOSP IP/OBS MODERATE 55: CPT

## 2022-05-25 RX ORDER — MAGNESIUM HYDROXIDE/ALUMINUM HYDROXICE/SIMETHICONE 120; 1200; 1200 MG/30ML; MG/30ML; MG/30ML
30 SUSPENSION ORAL EVERY 4 HOURS PRN
Status: DISCONTINUED | OUTPATIENT
Start: 2022-05-25 | End: 2022-07-01 | Stop reason: HOSPADM

## 2022-05-25 RX ORDER — HYDROXYZINE 50 MG/1
50 TABLET, FILM COATED ORAL
Status: DISCONTINUED | OUTPATIENT
Start: 2022-05-25 | End: 2022-07-01 | Stop reason: HOSPADM

## 2022-05-25 RX ORDER — HYDROXYZINE 50 MG/1
100 TABLET, FILM COATED ORAL
Status: DISCONTINUED | OUTPATIENT
Start: 2022-05-25 | End: 2022-07-01 | Stop reason: HOSPADM

## 2022-05-25 RX ORDER — MINERAL OIL AND PETROLATUM 150; 830 MG/G; MG/G
1 OINTMENT OPHTHALMIC
Status: DISCONTINUED | OUTPATIENT
Start: 2022-05-25 | End: 2022-07-01 | Stop reason: HOSPADM

## 2022-05-25 RX ORDER — TRAZODONE HYDROCHLORIDE 50 MG/1
50 TABLET ORAL
Status: DISCONTINUED | OUTPATIENT
Start: 2022-05-25 | End: 2022-07-01 | Stop reason: HOSPADM

## 2022-05-25 RX ORDER — METOPROLOL SUCCINATE 50 MG/1
50 TABLET, EXTENDED RELEASE ORAL DAILY
Status: DISCONTINUED | OUTPATIENT
Start: 2022-05-26 | End: 2022-07-01 | Stop reason: HOSPADM

## 2022-05-25 RX ORDER — METOPROLOL TARTRATE 50 MG/1
50 TABLET, FILM COATED ORAL EVERY 12 HOURS SCHEDULED
COMMUNITY
End: 2022-07-01

## 2022-05-25 RX ORDER — ASPIRIN 81 MG/1
81 TABLET ORAL DAILY
COMMUNITY
End: 2022-07-01

## 2022-05-25 RX ORDER — OLANZAPINE 5 MG/1
5 TABLET ORAL
Status: DISCONTINUED | OUTPATIENT
Start: 2022-05-25 | End: 2022-07-01 | Stop reason: HOSPADM

## 2022-05-25 RX ORDER — ACETAMINOPHEN 325 MG/1
975 TABLET ORAL EVERY 6 HOURS PRN
Status: DISCONTINUED | OUTPATIENT
Start: 2022-05-25 | End: 2022-07-01 | Stop reason: HOSPADM

## 2022-05-25 RX ORDER — HYDROXYZINE HYDROCHLORIDE 25 MG/1
25 TABLET, FILM COATED ORAL
Status: DISCONTINUED | OUTPATIENT
Start: 2022-05-25 | End: 2022-07-01 | Stop reason: HOSPADM

## 2022-05-25 RX ORDER — LORAZEPAM 2 MG/ML
2 INJECTION INTRAMUSCULAR EVERY 6 HOURS PRN
Status: DISCONTINUED | OUTPATIENT
Start: 2022-05-25 | End: 2022-07-01 | Stop reason: HOSPADM

## 2022-05-25 RX ORDER — AMOXICILLIN 250 MG
1 CAPSULE ORAL DAILY PRN
Status: DISCONTINUED | OUTPATIENT
Start: 2022-05-25 | End: 2022-07-01 | Stop reason: HOSPADM

## 2022-05-25 RX ORDER — BENZTROPINE MESYLATE 1 MG/1
1 TABLET ORAL
Status: DISCONTINUED | OUTPATIENT
Start: 2022-05-25 | End: 2022-07-01 | Stop reason: HOSPADM

## 2022-05-25 RX ORDER — OLANZAPINE 10 MG/1
5 INJECTION, POWDER, LYOPHILIZED, FOR SOLUTION INTRAMUSCULAR
Status: DISCONTINUED | OUTPATIENT
Start: 2022-05-25 | End: 2022-07-01 | Stop reason: HOSPADM

## 2022-05-25 RX ORDER — OLANZAPINE 2.5 MG/1
2.5 TABLET ORAL
Status: DISCONTINUED | OUTPATIENT
Start: 2022-05-25 | End: 2022-07-01 | Stop reason: HOSPADM

## 2022-05-25 RX ORDER — LAMOTRIGINE 25 MG/1
25 TABLET ORAL 2 TIMES DAILY
Status: COMPLETED | OUTPATIENT
Start: 2022-05-25 | End: 2022-05-26

## 2022-05-25 RX ORDER — DIPHENHYDRAMINE HYDROCHLORIDE 50 MG/ML
50 INJECTION INTRAMUSCULAR; INTRAVENOUS EVERY 6 HOURS PRN
Status: DISCONTINUED | OUTPATIENT
Start: 2022-05-25 | End: 2022-07-01 | Stop reason: HOSPADM

## 2022-05-25 RX ORDER — ACETAMINOPHEN 325 MG/1
650 TABLET ORAL EVERY 6 HOURS PRN
Status: DISCONTINUED | OUTPATIENT
Start: 2022-05-25 | End: 2022-07-01 | Stop reason: HOSPADM

## 2022-05-25 RX ORDER — CLOZAPINE 200 MG/1
200 TABLET ORAL DAILY
COMMUNITY
End: 2022-07-01

## 2022-05-25 RX ORDER — AMOXICILLIN 250 MG
1 CAPSULE ORAL DAILY PRN
Status: DISCONTINUED | OUTPATIENT
Start: 2022-05-25 | End: 2022-05-25

## 2022-05-25 RX ORDER — CLOZAPINE 200 MG/1
200 TABLET ORAL 2 TIMES DAILY
Status: DISCONTINUED | OUTPATIENT
Start: 2022-05-25 | End: 2022-07-01 | Stop reason: HOSPADM

## 2022-05-25 RX ORDER — OLANZAPINE 10 MG/1
10 INJECTION, POWDER, LYOPHILIZED, FOR SOLUTION INTRAMUSCULAR
Status: DISCONTINUED | OUTPATIENT
Start: 2022-05-25 | End: 2022-07-01 | Stop reason: HOSPADM

## 2022-05-25 RX ORDER — LAMOTRIGINE 25 MG/1
25 TABLET ORAL DAILY
Status: COMPLETED | OUTPATIENT
Start: 2022-05-27 | End: 2022-06-02

## 2022-05-25 RX ORDER — SENNA PLUS 8.6 MG/1
1 TABLET ORAL DAILY
Status: ON HOLD | COMMUNITY
End: 2022-06-24 | Stop reason: SDUPTHER

## 2022-05-25 RX ORDER — POLYETHYLENE GLYCOL 3350 17 G/17G
17 POWDER, FOR SOLUTION ORAL DAILY PRN
Status: DISCONTINUED | OUTPATIENT
Start: 2022-05-25 | End: 2022-07-01 | Stop reason: HOSPADM

## 2022-05-25 RX ORDER — LANOLIN ALCOHOL/MO/W.PET/CERES
9 CREAM (GRAM) TOPICAL
Status: DISCONTINUED | OUTPATIENT
Start: 2022-05-25 | End: 2022-05-27

## 2022-05-25 RX ORDER — ASPIRIN 81 MG/1
81 TABLET, CHEWABLE ORAL DAILY
Status: DISCONTINUED | OUTPATIENT
Start: 2022-05-26 | End: 2022-07-01 | Stop reason: HOSPADM

## 2022-05-25 RX ORDER — BENZTROPINE MESYLATE 1 MG/ML
1 INJECTION INTRAMUSCULAR; INTRAVENOUS
Status: DISCONTINUED | OUTPATIENT
Start: 2022-05-25 | End: 2022-07-01 | Stop reason: HOSPADM

## 2022-05-25 RX ORDER — PROPRANOLOL HYDROCHLORIDE 10 MG/1
10 TABLET ORAL EVERY 8 HOURS PRN
Status: DISCONTINUED | OUTPATIENT
Start: 2022-05-25 | End: 2022-07-01 | Stop reason: HOSPADM

## 2022-05-25 RX ORDER — LEVETIRACETAM 500 MG/1
500 TABLET ORAL EVERY 12 HOURS SCHEDULED
Status: ON HOLD | COMMUNITY
End: 2022-06-24 | Stop reason: SDUPTHER

## 2022-05-25 RX ORDER — ACETAMINOPHEN 325 MG/1
650 TABLET ORAL EVERY 4 HOURS PRN
Status: DISCONTINUED | OUTPATIENT
Start: 2022-05-25 | End: 2022-07-01 | Stop reason: HOSPADM

## 2022-05-25 RX ORDER — LEVETIRACETAM 500 MG/1
500 TABLET ORAL EVERY 12 HOURS SCHEDULED
Status: DISCONTINUED | OUTPATIENT
Start: 2022-05-25 | End: 2022-07-01 | Stop reason: HOSPADM

## 2022-05-25 RX ORDER — OLANZAPINE 10 MG/1
10 TABLET ORAL
Status: DISCONTINUED | OUTPATIENT
Start: 2022-05-25 | End: 2022-07-01 | Stop reason: HOSPADM

## 2022-05-25 RX ADMIN — NICOTINE POLACRILEX 2 MG: 2 GUM, CHEWING ORAL at 17:28

## 2022-05-25 RX ADMIN — LAMOTRIGINE 25 MG: 25 TABLET ORAL at 17:15

## 2022-05-25 RX ADMIN — MELATONIN TAB 3 MG 9 MG: 3 TAB at 21:09

## 2022-05-25 RX ADMIN — NICOTINE POLACRILEX 2 MG: 2 GUM, CHEWING ORAL at 19:45

## 2022-05-25 RX ADMIN — CLOZAPINE 200 MG: 200 TABLET ORAL at 21:09

## 2022-05-25 RX ADMIN — ACETAMINOPHEN 325MG 975 MG: 325 TABLET ORAL at 17:58

## 2022-05-25 RX ADMIN — LEVETIRACETAM 500 MG: 500 TABLET, FILM COATED ORAL at 21:14

## 2022-05-25 NOTE — ASSESSMENT & PLAN NOTE
Vitals reviewed  Will review labs when available    EKG pending  Patient is medically cleared for admission to the [facility] for treatment of the underlying psychiatric illness  Will continue to follow patient throughout admission
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
## 2022-05-25 NOTE — PLAN OF CARE
Problem: Alteration in Thoughts and Perception  Goal: Treatment Goal: Gain control of psychotic behaviors/thinking, reduce/eliminate presenting symptoms and demonstrate improved reality functioning upon discharge  Outcome: Progressing  Goal: Verbalize thoughts and feelings  Description: Interventions:  - Promote a nonjudgmental and trusting relationship with the patient through active listening and therapeutic communication  - Assess patient's level of functioning, behavior and potential for risk  - Engage patient in 1 on 1 interactions  - Encourage patient to express fears, feelings, frustrations, and discuss symptoms    - [place] patient to reality, help patient recognize reality-based thinking   - Administer medications as ordered and assess for potential side effects  - Provide the patient education related to the signs and symptoms of the illness and desired effects of prescribed medications  Outcome: Progressing  Goal: Refrain from acting on delusional thinking/internal stimuli  Description: Interventions:  - Monitor patient closely, per order   - Utilize least restrictive measures   - Set reasonable limits, give positive feedback for acceptable   - Administer medications as ordered and monitor of potential side effects  Outcome: Progressing  Goal: Agree to be compliant with medication regime, as prescribed and report medication side effects  Description: Interventions:  - Offer appropriate PRN medication and supervise ingestion; conduct AIMS, as needed   Outcome: Progressing  Goal: Attend and participate in unit activities, including therapeutic, recreational, and educational groups  Description: Interventions:  -Encourage Visitation and family involvement in care  Outcome: Progressing  Goal: Recognize dysfunctional thoughts, communicate reality-based thoughts at the time of discharge  Description: Interventions:  - Provide medication and psycho-education to assist patient in compliance and developing insight into his/her illness   Outcome: Progressing  Goal: Complete daily ADLs, including personal hygiene independently, as able  Description: Interventions:  - Observe, teach, and assist patient with ADLS  - Monitor and promote a balance of rest/activity, with adequate nutrition and elimination   Outcome: Progressing

## 2022-05-25 NOTE — ASSESSMENT & PLAN NOTE
History of brain lesion, most recent MRI 09/22/2021  · Showing frontal lesion mildly decreased in size  · Has been seen by Neuro and do not feel that this lesion is concerning for malignancy or for causing abnormal behaviors/symptoms

## 2022-05-25 NOTE — ASSESSMENT & PLAN NOTE
· Fall during previous inpatient [facility] stay at [facility] on 4/18  · With recent left intramedullary nail treatment of tibia fracture  · Uses cane  · Has been seeing PT  · PT consulted

## 2022-05-25 NOTE — ASSESSMENT & PLAN NOTE
Upon presentation to ED at [facility] had SVT, converted with adenosine  · Echo 02/20/2022 showing EF 66%, normal systolic and diastolic function  · Had longstanding history of SPT  · Underwent SVT ablation 5/20    EKG on admission pending  Continue Metoprolol  Follow-up with cardiology as outpatient
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
## 2022-05-26 PROBLEM — F43.10 PTSD (POST-TRAUMATIC STRESS DISORDER): Status: ACTIVE | Noted: 2022-05-26

## 2022-05-26 PROBLEM — F25.0 SCHIZOAFFECTIVE DISORDER, BIPOLAR TYPE (HCC): Status: ACTIVE | Noted: 2022-05-26

## 2022-05-26 PROBLEM — F20.9 SCHIZOPHRENIA (HCC): Status: ACTIVE | Noted: 2022-05-26

## 2022-05-26 PROCEDURE — 99221 1ST HOSP IP/OBS SF/LOW 40: CPT | Performed by: PSYCHIATRY & NEUROLOGY

## 2022-05-26 RX ORDER — AMMONIUM LACTATE 12 G/100G
LOTION TOPICAL 2 TIMES DAILY
Status: DISCONTINUED | OUTPATIENT
Start: 2022-05-26 | End: 2022-06-23

## 2022-05-26 RX ADMIN — MELATONIN TAB 3 MG 9 MG: 3 TAB at 21:07

## 2022-05-26 RX ADMIN — LAMOTRIGINE 25 MG: 25 TABLET ORAL at 17:09

## 2022-05-26 RX ADMIN — ALUMINUM HYDROXIDE, MAGNESIUM HYDROXIDE, AND SIMETHICONE 30 ML: 200; 200; 20 SUSPENSION ORAL at 12:26

## 2022-05-26 RX ADMIN — CLOZAPINE 200 MG: 200 TABLET ORAL at 08:12

## 2022-05-26 RX ADMIN — ACETAMINOPHEN 650 MG: 325 TABLET ORAL at 14:35

## 2022-05-26 RX ADMIN — LAMOTRIGINE 25 MG: 25 TABLET ORAL at 08:12

## 2022-05-26 RX ADMIN — LEVETIRACETAM 500 MG: 500 TABLET, FILM COATED ORAL at 21:07

## 2022-05-26 RX ADMIN — AMMONIUM LACTATE: 17 LOTION TOPICAL at 12:18

## 2022-05-26 RX ADMIN — ACETAMINOPHEN 650 MG: 325 TABLET ORAL at 19:34

## 2022-05-26 RX ADMIN — CLOZAPINE 200 MG: 200 TABLET ORAL at 21:07

## 2022-05-26 RX ADMIN — LEVETIRACETAM 500 MG: 500 TABLET, FILM COATED ORAL at 08:13

## 2022-05-26 RX ADMIN — ACETAMINOPHEN 325MG 975 MG: 325 TABLET ORAL at 08:34

## 2022-05-26 RX ADMIN — ASPIRIN 81 MG CHEWABLE TABLET 81 MG: 81 TABLET CHEWABLE at 08:12

## 2022-05-26 RX ADMIN — AMMONIUM LACTATE: 17 LOTION TOPICAL at 17:09

## 2022-05-26 RX ADMIN — METOPROLOL SUCCINATE 50 MG: 50 TABLET, EXTENDED RELEASE ORAL at 08:13

## 2022-05-26 RX ADMIN — NICOTINE POLACRILEX 2 MG: 2 GUM, CHEWING ORAL at 08:44
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
## 2022-05-26 NOTE — NURSING NOTE
Attempted x 2 obtain labs, failed  Encourage to drink water    Another attempt will be made by day shift before breakfast

## 2022-05-26 NOTE — NURSING NOTE
Pt returned to nurses station stating his foot/ankle where hurting again  Pt rating pain 6/10 Tylenol 650 mg administered at 1435, will monitor effectiveness

## 2022-05-26 NOTE — NURSING NOTE
Harold Carrel maintained on ongoing SAFE precaution without incident on this shift  Observed laying in bed with eyes closed, breath even and easy  Continuous q7 minutes rounding implemented  Scheduled to be obtain this morning CBC; A1C; CMP; Lipoid panels; RPR; TSH  No behavior noted  Will continue to monitor

## 2022-05-26 NOTE — NURSING NOTE
Sharlene Morales continues to adjust to the unit  Pt is currently using a cane to ambulate  Pleasant, cooperative, and visible intermittently  C/o left foot/ankle pain 8/10 Tylenol 975 mg administered at 0834, effective  Unable to obtain labs this A  M  pt encouraged to drink more fluids  Med/meal compliant  Consumed 75% of breakfast and 50% of lunch  Continue to monitor  The patient/family was/were informed of limited nature of the exam. Representative images were printed to be scanned into the chart.

## 2022-05-26 NOTE — NURSING NOTE
Ravin De Luna continues to adjust to the unit  Pt is currently using a cane to ambulate  Pleasant, cooperative, and visible intermittently  C/o left foot/ankle pain 8/10 PRN Tylenol 975 mg administered at 0834, effective  Per pt request PRN Mylanta administered at 1226  Unable to obtain labs this A  M  pt encouraged to drink more fluids  Med/meal compliant  Consumed 75% of breakfast and 50% of lunch  Continue to monitor

## 2022-05-26 NOTE — NURSING NOTE
Pt pleasant, calm, and cooperative  Compliant with scheduled medications  Good appetite and hygiene  Attempted to be more visible in community but stated ankle was too painful, 975mg tylenol given @1758 for 9/10 pain, stated moderate relief  Will continue to monitor for safety and support

## 2022-05-26 NOTE — PROGRESS NOTES
[facility] Group Note     05/26/22 1100   Activity/Group Checklist   Group Life Skills  (Teamwork and Communication)   Attendance Attended   Attendance Duration (min) 16-30  (pulled by clinician)   Interactions Interacted appropriately  (upon returning to group pt attempted to play game, but stated it "was too much to follow" and left group)   Affect/Mood Appropriate;Calm   Goals Achieved Identified feelings; Able to listen to others; Able to engage in interactions; Able to recieve feedback; Able to give feedback to another

## 2022-05-26 NOTE — TREATMENT PLAN
TREATMENT PLAN REVIEW - [address] 24 y o  1995 male MRN: [id number]    [address] [address] [facility] Room / Bed: [facility] 108/[facility] 060-87 Encounter: [id number]          Admit Date/Time:  5/25/2022  2:00 PM    Treatment Team: Attending Provider: Elsie Ballesteros MD; Consulting Physician: Alicia Sumner PA-C; : CORI Carlton; Patient Care Technician: Erasmo Harrison;  Patient Care Assistant: Raynelle Severin; Licensed Practical Nurse: Andrew Lai LPN    Diagnosis: Principal Problem:    Schizophrenia [facility])  Active Problems:    Medical clearance for psychiatric admission    Seizure disorder [facility])    Traumatic brain injury ([facility] [address] )    Brain lesion    History of cardiac radiofrequency ablation    History of fall    Psychosis, organic    PTSD (post-traumatic stress disorder)      Patient Strengths/Assets: adapts well, cooperative, negotiates basic needs, patient is on a voluntary commitment    Patient Barriers/Limitations: noncompliant with medication, noncompliant with treatment, poor insight, poor past treatment response, substance abuse    Short Term Goals: decrease in depressive symptoms, decrease in anxiety symptoms, decrease in paranoid thoughts, decrease in psychotic symptoms, decrease in suicidal thoughts, mood stabilization    Long Term Goals: improvement in depression, improvement in anxiety, stabilization of mood, free of suicidal thoughts, resolution of psychotic symptoms, acceptance of need for psychiatric medications, acceptance of need for psychiatric treatment    Progress Towards Goals: continue psychiatric medications as prescribed    Recommended Treatment: medication management, patient medication education, group therapy, milieu therapy, continued Behavioral Health psychiatric evaluation/assessment process    Treatment Frequency: daily medication monitoring, group and milieu therapy daily, monitoring through interdisciplinary rounds, monitoring through weekly patient care conferences    Expected Discharge Date:  11/24/2022    Discharge Plan: referrals as indicated    Treatment Plan Created/Updated By: Jet Kent DO

## 2022-05-26 NOTE — CONSULTS
Consult Note- Podiatry   Flaoc Romano 24 y o  male MRN: [id number]  [facility]/Bed[facility] [place] [facility] 108-01 Encounter: [id number]    Assessment/Plan     Assessment:  1  Xerosis b/l  2  Fissuring skin b/l       Plan:  - -pt eval and managed    - Number and complexity of problems addressed:  1 undiagnosed new problem with uncertain prognosis   as shown    - Amount/complexity of data reviewed and analyzed:     Category 1: prior patient notes were analyzed today before evaluating and managing patient  All PMH were discussed with pt today  - Risk of complications: moderate risk of morbidity from additional testing or treatment involved with this patient, which includes but not limited to:    - discussed anatomy, condition, treatment plan and options  They were instructed on proper foot care  The patient was seen today for greater than total of  45-59 minutes     This is total time spent today involving both face-to-face time and non face-to-face time  This time spent includes  reviewing their past medical history  , performing a medically appropriate examination and evaluation of the patient, counseling and educating the patient,  documenting all findings in EMR, and independently interpreting results and communicating results with  patient     and discussing their condition and treatment options, risks, and potential complications  I have discussed the findings of this examination with the patient  The discussion included a complete verbal explanation of the examination results, diagnosis and planned treatment(s)  A schedule for future care needs was explained  The patient has verbalized the understanding of these instructions at this time   If any questions should arise after returning home I have encouraged the patient to feel free to call the office       - there are no lesions of the foot as order indicates  - I will order Amlactin for patient to start twice a day  - should continue as oupt    - All questions and concerns addressed  - Podiatry signing off, thank you for the consult  History of Present Illness     HPI:  Sandeep Maher is a 24 y o  male who presents with dry skin to both feet  Pt not applying anything to the area  States it was worse  States that there is also cracking to the feet  No pain     Inpatient consult to Podiatry  Consult performed by: Estefani Murphy DPM  Consult ordered by: Araceli Rivera PA-C        Review of Systems   Constitutional: Negative  HENT: Negative  Eyes: Negative  Respiratory: Negative  Cardiovascular: Negative  Gastrointestinal: Negative  Musculoskeletal: Negative   Skin: dry skin   Neurological: Negative  Historical Information   Past Medical History:   Diagnosis Date    Hallucination     Head injury     Seizures ([address] )      No past surgical history on file    Social History   Social History     Substance and Sexual Activity   Alcohol Use Not Currently     Social History     Substance and Sexual Activity   Drug Use Not Currently    Types: Amphetamines, Marijuana, Other     Social History     Tobacco Use   Smoking Status Former Smoker    Packs/day: 1 50    Years: 5 00    Pack years: 7 50    Types: Cigarettes    Start date: 2016   Cristhian Abt Quit date: 2022    Years since quittin 3   Smokeless Tobacco Never Used     Family History:   Family History   Family history unknown: Yes       Meds/Allergies   Medications Prior to Admission   Medication    aspirin (ECOTRIN LOW STRENGTH) 81 mg EC tablet    clozapine (CLOZARIL) 200 MG tablet    levETIRAcetam (KEPPRA) 500 mg tablet    metoprolol tartrate (LOPRESSOR) 50 mg tablet    senna (SENOKOT) 8 6 MG tablet     No Known Allergies    Objective   First Vitals:   Blood Pressure: 134/84 (22 1429)  Pulse: 103 (22 1429)  Temperature: 97 7 °F (36 5 °C) (22 142)  Temp Source: Skin (22 142)  Respirations: 18 (22 142)  Height: 5' 11" (180 3 cm) (22)  Weight - Scale: 119 kg (262 lb 9 6 oz) (05/25/22 1429)  SpO2: 100 % (05/25/22 1449)    Current Vitals:   Blood Pressure: 145/80 (05/26/22 0835)  Pulse: 100 (05/26/22 0700)  Temperature: 97 6 °F (36 4 °C) (05/26/22 0700)  Temp Source: Skin (05/26/22 0700)  Respirations: 19 (05/26/22 0700)  Height: 5' 11" (180 3 cm) (05/25/22 1429)  Weight - Scale: 119 kg (262 lb 9 6 oz) (05/25/22 1429)  SpO2: 100 % (05/25/22 1449)    /80   Pulse 100   Temp 97 6 °F (36 4 °C) (Skin)   Resp 19   Ht 5' 11" (1 803 m)   Wt 119 kg (262 lb 9 6 oz)   SpO2 100%   BMI 36 63 kg/m²     General Appearance:    Alert, cooperative, no distress   Head:    Normocephalic, without obvious abnormality, atraumatic   Eyes:    PERRL, conjunctiva/corneas clear, EOM's intact            Nose:   Moist mucous membranes, no drainage or sinus tenderness   Throat:   No tenderness, no exudates   Neck:   Supple, symmetrical, trachea midline, no JVD   Back:     Symmetric, no CVA tenderness   Lungs:     Clear to auscultation bilaterally, respirations unlabored   Chest wall:    No tenderness or deformity   Heart:    Regular rate and rhythm, S1 and S2 normal, no murmur, rub   or gallop   Abdomen:     Soft, non-tender, bowel sounds active all four quadrants,     no masses, no organomegaly     Extremities:   MMT is 5/5 to all compartments of the LE, +0/4 edema B/L, Digital ROM is intact,    Pulses:   R DP is +2/4, R PT is +2/4, L DP is +2/4, L PT is +2/4, CFT< 3sec to all digits  Skin:   Dry, scaley skin to b/l plantar feet with cracking       Neurologic:   CNII-XII intact  Normal strength, sensation and reflexes       Throughout  Gross sensation is intact   Protective sensation is intact       Lab Results:   Admission on 05/25/2022   Component Date Value    Ventricular Rate 05/25/2022 101     Atrial Rate 05/25/2022 101     HI Interval 05/25/2022 152     QRSD Interval 05/25/2022 96     QT Interval 05/25/2022 344     QTC Interval 05/25/2022 446     P Axis 05/25/2022 23     QRS Axis 05/25/2022 17     T Wave Axis 05/25/2022 15      Imaging: I have personally reviewed pertinent films in PACS  EKG, Pathology, and Other Studies: I have personally reviewed pertinent reports        Code Status: Level 1 - Full Code  Advance Directive and Living Will:      Power of :    POLST:

## 2022-05-27 PROBLEM — F43.10 PTSD (POST-TRAUMATIC STRESS DISORDER): Chronic | Status: ACTIVE | Noted: 2022-05-26

## 2022-05-27 PROBLEM — F20.9 SCHIZOPHRENIA (HCC): Chronic | Status: ACTIVE | Noted: 2022-05-26

## 2022-05-27 LAB
ALBUMIN SERPL BCP-MCNC: 4.3 G/DL (ref 3–5.2)
ALBUMIN SERPL BCP-MCNC: 4.3 G/DL (ref 3–5.2)
ALP SERPL-CCNC: 141 U/L (ref 43–122)
ALP SERPL-CCNC: 141 U/L (ref 43–122)
ALT SERPL W P-5'-P-CCNC: 16 U/L
ALT SERPL W P-5'-P-CCNC: 16 U/L
ANION GAP SERPL CALCULATED.3IONS-SCNC: 11 MMOL/L (ref 5–14)
ANION GAP SERPL CALCULATED.3IONS-SCNC: 11 MMOL/L (ref 5–14)
AST SERPL W P-5'-P-CCNC: 20 U/L (ref 17–59)
AST SERPL W P-5'-P-CCNC: 20 U/L (ref 17–59)
BASOPHILS # BLD AUTO: 0.05 THOUSANDS/ΜL (ref 0–0.1)
BASOPHILS # BLD AUTO: 0.05 THOUSANDS/ΜL (ref 0–0.1)
BASOPHILS NFR BLD AUTO: 1 % (ref 0–1)
BASOPHILS NFR BLD AUTO: 1 % (ref 0–1)
BILIRUB SERPL-MCNC: 0.56 MG/DL
BILIRUB SERPL-MCNC: 0.56 MG/DL
BUN SERPL-MCNC: 13 MG/DL (ref 5–25)
BUN SERPL-MCNC: 13 MG/DL (ref 5–25)
CALCIUM SERPL-MCNC: 9.2 MG/DL (ref 8.4–10.2)
CALCIUM SERPL-MCNC: 9.2 MG/DL (ref 8.4–10.2)
CHLORIDE SERPL-SCNC: 106 MMOL/L (ref 97–108)
CHLORIDE SERPL-SCNC: 106 MMOL/L (ref 97–108)
CHOLEST SERPL-MCNC: 181 MG/DL
CHOLEST SERPL-MCNC: 181 MG/DL
CO2 SERPL-SCNC: 22 MMOL/L (ref 22–30)
CO2 SERPL-SCNC: 22 MMOL/L (ref 22–30)
CREAT SERPL-MCNC: 0.67 MG/DL (ref 0.7–1.5)
CREAT SERPL-MCNC: 0.67 MG/DL (ref 0.7–1.5)
EOSINOPHIL # BLD AUTO: 0.25 THOUSAND/ΜL (ref 0–0.61)
EOSINOPHIL # BLD AUTO: 0.25 THOUSAND/ΜL (ref 0–0.61)
EOSINOPHIL NFR BLD AUTO: 3 % (ref 0–6)
EOSINOPHIL NFR BLD AUTO: 3 % (ref 0–6)
ERYTHROCYTE [DISTWIDTH] IN BLOOD BY AUTOMATED COUNT: 12 % (ref 11.6–15.1)
ERYTHROCYTE [DISTWIDTH] IN BLOOD BY AUTOMATED COUNT: 12 % (ref 11.6–15.1)
EST. AVERAGE GLUCOSE BLD GHB EST-MCNC: 97 MG/DL
EST. AVERAGE GLUCOSE BLD GHB EST-MCNC: 97 MG/DL
GFR SERPL CREATININE-BSD FRML MDRD: 133 ML/MIN/1.73SQ M
GFR SERPL CREATININE-BSD FRML MDRD: 133 ML/MIN/1.73SQ M
GLUCOSE P FAST SERPL-MCNC: 94 MG/DL (ref 70–99)
GLUCOSE P FAST SERPL-MCNC: 94 MG/DL (ref 70–99)
GLUCOSE SERPL-MCNC: 94 MG/DL (ref 70–99)
GLUCOSE SERPL-MCNC: 94 MG/DL (ref 70–99)
HBA1C MFR BLD: 5 %
HBA1C MFR BLD: 5 %
HCT VFR BLD AUTO: 46.1 % (ref 36.5–49.3)
HCT VFR BLD AUTO: 46.1 % (ref 36.5–49.3)
HDLC SERPL-MCNC: 28 MG/DL
HDLC SERPL-MCNC: 28 MG/DL
HGB BLD-MCNC: 14.8 G/DL (ref 12–17)
HGB BLD-MCNC: 14.8 G/DL (ref 12–17)
IMM GRANULOCYTES # BLD AUTO: 0.05 THOUSAND/UL (ref 0–0.2)
IMM GRANULOCYTES # BLD AUTO: 0.05 THOUSAND/UL (ref 0–0.2)
IMM GRANULOCYTES NFR BLD AUTO: 1 % (ref 0–2)
IMM GRANULOCYTES NFR BLD AUTO: 1 % (ref 0–2)
LDLC SERPL CALC-MCNC: 111 MG/DL
LDLC SERPL CALC-MCNC: 111 MG/DL
LYMPHOCYTES # BLD AUTO: 2.42 THOUSANDS/ΜL (ref 0.6–4.47)
LYMPHOCYTES # BLD AUTO: 2.42 THOUSANDS/ΜL (ref 0.6–4.47)
LYMPHOCYTES NFR BLD AUTO: 27 % (ref 14–44)
LYMPHOCYTES NFR BLD AUTO: 27 % (ref 14–44)
MCH RBC QN AUTO: 29.1 PG (ref 26.8–34.3)
MCH RBC QN AUTO: 29.1 PG (ref 26.8–34.3)
MCHC RBC AUTO-ENTMCNC: 32.1 G/DL (ref 31.4–37.4)
MCHC RBC AUTO-ENTMCNC: 32.1 G/DL (ref 31.4–37.4)
MCV RBC AUTO: 91 FL (ref 82–98)
MCV RBC AUTO: 91 FL (ref 82–98)
MONOCYTES # BLD AUTO: 0.85 THOUSAND/ΜL (ref 0.17–1.22)
MONOCYTES # BLD AUTO: 0.85 THOUSAND/ΜL (ref 0.17–1.22)
MONOCYTES NFR BLD AUTO: 9 % (ref 4–12)
MONOCYTES NFR BLD AUTO: 9 % (ref 4–12)
NEUTROPHILS # BLD AUTO: 5.42 THOUSANDS/ΜL (ref 1.85–7.62)
NEUTROPHILS # BLD AUTO: 5.42 THOUSANDS/ΜL (ref 1.85–7.62)
NEUTS SEG NFR BLD AUTO: 59 % (ref 43–75)
NEUTS SEG NFR BLD AUTO: 59 % (ref 43–75)
NONHDLC SERPL-MCNC: 153 MG/DL
NONHDLC SERPL-MCNC: 153 MG/DL
NRBC BLD AUTO-RTO: 0 /100 WBCS
NRBC BLD AUTO-RTO: 0 /100 WBCS
PLATELET # BLD AUTO: 274 THOUSANDS/UL (ref 149–390)
PLATELET # BLD AUTO: 274 THOUSANDS/UL (ref 149–390)
PMV BLD AUTO: 9.1 FL (ref 8.9–12.7)
PMV BLD AUTO: 9.1 FL (ref 8.9–12.7)
POTASSIUM SERPL-SCNC: 3.9 MMOL/L (ref 3.6–5)
POTASSIUM SERPL-SCNC: 3.9 MMOL/L (ref 3.6–5)
PROT SERPL-MCNC: 7.4 G/DL (ref 5.9–8.4)
PROT SERPL-MCNC: 7.4 G/DL (ref 5.9–8.4)
RBC # BLD AUTO: 5.09 MILLION/UL (ref 3.88–5.62)
RBC # BLD AUTO: 5.09 MILLION/UL (ref 3.88–5.62)
RPR SER QL: NORMAL
RPR SER QL: NORMAL
SODIUM SERPL-SCNC: 139 MMOL/L (ref 137–147)
SODIUM SERPL-SCNC: 139 MMOL/L (ref 137–147)
TRIGL SERPL-MCNC: 210 MG/DL
TRIGL SERPL-MCNC: 210 MG/DL
TSH SERPL DL<=0.05 MIU/L-ACNC: 1.64 UIU/ML (ref 0.45–4.5)
TSH SERPL DL<=0.05 MIU/L-ACNC: 1.64 UIU/ML (ref 0.45–4.5)
WBC # BLD AUTO: 9.04 THOUSAND/UL (ref 4.31–10.16)
WBC # BLD AUTO: 9.04 THOUSAND/UL (ref 4.31–10.16)

## 2022-05-27 PROCEDURE — 85025 COMPLETE CBC W/AUTO DIFF WBC: CPT | Performed by: PSYCHIATRY & NEUROLOGY

## 2022-05-27 PROCEDURE — 86592 SYPHILIS TEST NON-TREP QUAL: CPT | Performed by: PSYCHIATRY & NEUROLOGY

## 2022-05-27 PROCEDURE — 83036 HEMOGLOBIN GLYCOSYLATED A1C: CPT | Performed by: PSYCHIATRY & NEUROLOGY

## 2022-05-27 PROCEDURE — 80053 COMPREHEN METABOLIC PANEL: CPT | Performed by: PSYCHIATRY & NEUROLOGY

## 2022-05-27 PROCEDURE — GZHZZZZ GROUP PSYCHOTHERAPY: ICD-10-PCS | Performed by: PSYCHIATRY & NEUROLOGY

## 2022-05-27 PROCEDURE — 84443 ASSAY THYROID STIM HORMONE: CPT | Performed by: PSYCHIATRY & NEUROLOGY

## 2022-05-27 PROCEDURE — 99232 SBSQ HOSP IP/OBS MODERATE 35: CPT | Performed by: PSYCHIATRY & NEUROLOGY

## 2022-05-27 PROCEDURE — 80061 LIPID PANEL: CPT | Performed by: PSYCHIATRY & NEUROLOGY

## 2022-05-27 RX ORDER — LANOLIN ALCOHOL/MO/W.PET/CERES
9 CREAM (GRAM) TOPICAL
Status: DISCONTINUED | OUTPATIENT
Start: 2022-05-27 | End: 2022-07-01 | Stop reason: HOSPADM

## 2022-05-27 RX ORDER — SENNOSIDES 8.6 MG
1 TABLET ORAL DAILY
Status: DISCONTINUED | OUTPATIENT
Start: 2022-05-28 | End: 2022-07-01 | Stop reason: HOSPADM

## 2022-05-27 RX ADMIN — CLOZAPINE 200 MG: 200 TABLET ORAL at 21:17

## 2022-05-27 RX ADMIN — ACETAMINOPHEN 325MG 975 MG: 325 TABLET ORAL at 17:53

## 2022-05-27 RX ADMIN — LEVETIRACETAM 500 MG: 500 TABLET, FILM COATED ORAL at 21:18

## 2022-05-27 RX ADMIN — NICOTINE POLACRILEX 2 MG: 2 GUM, CHEWING ORAL at 19:47

## 2022-05-27 RX ADMIN — ACETAMINOPHEN 325MG 975 MG: 325 TABLET ORAL at 06:08

## 2022-05-27 RX ADMIN — CLOZAPINE 200 MG: 200 TABLET ORAL at 08:08

## 2022-05-27 RX ADMIN — TRAZODONE HYDROCHLORIDE 50 MG: 50 TABLET ORAL at 21:17

## 2022-05-27 RX ADMIN — LAMOTRIGINE 25 MG: 25 TABLET ORAL at 08:08

## 2022-05-27 RX ADMIN — ASPIRIN 81 MG CHEWABLE TABLET 81 MG: 81 TABLET CHEWABLE at 08:08

## 2022-05-27 RX ADMIN — AMMONIUM LACTATE: 17 LOTION TOPICAL at 08:09

## 2022-05-27 RX ADMIN — METOPROLOL SUCCINATE 50 MG: 50 TABLET, EXTENDED RELEASE ORAL at 08:08

## 2022-05-27 RX ADMIN — HYDROXYZINE HYDROCHLORIDE 100 MG: 50 TABLET, FILM COATED ORAL at 14:34

## 2022-05-27 RX ADMIN — LEVETIRACETAM 500 MG: 500 TABLET, FILM COATED ORAL at 08:08

## 2022-05-27 NOTE — PROGRESS NOTES
05/27/22 1612   Team Meeting   Meeting Type Tx Team Meeting   Initial Conference Date 05/27/22   Team Members Present   Team Members Present Physician;; Other (Discipline and Name); Nurse   Physician Team Member Surendra CAMACHO   Nursing Team Member [facility] RN   Social Work Team Member Henry ARANGO   Patient/Family Present   Patient Present Yes   Patient's Family Present No   OTHER   Team Meeting - Additional Comments Patient engaged in first treatment team meeting upon admission to [facility]  Presents cooperative but also disorganized and not linear with thought processes  Reverted to his mother often, who appears to be very involved in his care  Patient questioned his diagnosis and medications, and was fixated on Metformin  Patient had difficulty staying focused and answering questions; would often answer with another topic and required redirection  He acknowledged he is in this program for mental health recovery and for himself but also for his mother so he can get better enough to go home  Patient signed treatment plan along with team members and was given a copy of his medications due to his confusion / forgetfulness

## 2022-05-27 NOTE — NURSING NOTE
Pt more confused at this time  Fixated on medications  Pt asking about what certain medications where for than requesting the medication  Med list printed for pt review and shortly after multiple pt education sheets printed  Pt intrusive at the nurses station multiple times in regards to PRN medications and than stated "I'm just asking I'm not trying to get high or anything " Per pt request PRN Atarax administered at 1434 for 4/4 anxiety  Pt states "I don't know why I just have anxiety " Pt proceeded to ask this writer If he was receiving the highest dose  Will continue to monitor

## 2022-05-27 NOTE — NURSING NOTE
Chrissy Valladares maintained on ongoing SAFE precaution without incident on this shift  Observed laying in bed with eyes closed, breath even and easy  Continuous q7 minutes rounding implemented  Scheduled for a 2nd attempt to be obtain this morning CBC; A1C; CMP; Lipoid panels; RPR; TSH  No behavior noted  Will continue to monitor

## 2022-05-27 NOTE — PROGRESS NOTES
Progress Note - Behavioral Health     Jacky Handy 24 y o  male MRN: [id number]  Unit/Bed#: [facility] 239-76 Encounter: [id number]    Jacky Handy was seen for continuing care and reviewed with treatment team   Pt was pleasant but scant and guarded on approach, looking downward, and reported feeling "Pretty good" and that his treatment is going "Good I guess "  He admits to being "A little anxious" and stated he does not know why he is here, but shortly thereafter said the reason was that his "Mom wants my meds stabilized " Pt presently denies depression, SI, HI, or psychotic Sxs  His Lt ankle is not painful at this time and he is feeling more balanced in the shower now  He admits he does not use his cane very much despite much encouragement  Per EMR and floor team, Pt has been calm, cooperative and  medication compliant, but does not always answer questions by staff appropriately--as when he was asked to rate his anxiety recently and he consistently gave a reply that was out of range of the given parameters  He can be confused at times as well, and things that challenge him a bit can be taxing for him--as when he left a Life Skills group activity due to the game being too much for him to follow  He has been attending groups and otherwise has behaved appropriately among peers    SLIM made a notation today s/p her research into his and mother's request for Metformin and the drug will not be restarted    Sleep:Good  Appetite: Adequate  Medication side effects: None per Pt    ROS: (All ROS Negative) at this time    Labs/Tests: Reviewed    Mental Status Evaluation:  Appearance:  Dressed, clean, Partial eye contact   Behavior:  Calm, cooperative, pleasant, Withdrawn, Guarded    Speech:  Clear, normal rate and volume when he did speak, but he was scant overall   Mood:  Anxious, Dysphoric   Affect:  Blunted   Thought Process:  Organized, Goal directed, Seemed a bit concrete, potentially some blocking   Associations: Intact associations   Thought Content:  No verbalized delusions   Perceptual Disturbances: Pt denies any hallucinations or paranoia and does not appear to be responding to internal stimuli   Risk Potential: Pt presently denies SI or HI    Sensorium:  Self, birthday, Place, Day of the week, Month, Year   Memory:  short term memory impaired   Consciousness:  alert, awake   Attention: Fair   Insight:  Poor   Judgment: Limited   Gait/Station: Steady gait   Motor Activity: No abnormal movements     Vitals:    05/27/22 0810 05/27/22 1500 05/28/22 0719 05/28/22 0840   BP: 153/78 144/67 140/80 138/77   BP Location:  Right arm Right arm    Pulse:  (!) 106 (!) 107 105   Resp:  18 18    Temp:  (!) 97 1 °F (36 2 °C) 97 6 °F (36 4 °C)    TempSrc:  Temporal Skin    SpO2:       Weight:       Height:           Admission on 05/25/2022   Component Date Value    Ventricular Rate 05/25/2022 101     Atrial Rate 05/25/2022 101     MN Interval 05/25/2022 152     QRSD Interval 05/25/2022 96     QT Interval 05/25/2022 344     QTC Interval 05/25/2022 446     P Axis 05/25/2022 23     QRS Axis 05/25/2022 17     T Wave Axis 05/25/2022 15     WBC 05/27/2022 9 04     RBC 05/27/2022 5 09     Hemoglobin 05/27/2022 14 8     Hematocrit 05/27/2022 46 1     MCV 05/27/2022 91     MCH 05/27/2022 29 1     MCHC 05/27/2022 32 1     RDW 05/27/2022 12 0     MPV 05/27/2022 9 1     Platelets 06/63/5248 274     nRBC 05/27/2022 0     Neutrophils Relative 05/27/2022 59     Immat GRANS % 05/27/2022 1     Lymphocytes Relative 05/27/2022 27     Monocytes Relative 05/27/2022 9     Eosinophils Relative 05/27/2022 3     Basophils Relative 05/27/2022 1     Neutrophils Absolute 05/27/2022 5 42     Immature Grans Absolute 05/27/2022 0 05     Lymphocytes Absolute 05/27/2022 2 42     Monocytes Absolute 05/27/2022 0 85     Eosinophils Absolute 05/27/2022 0 25     Basophils Absolute 05/27/2022 0 05     Sodium 05/27/2022 139     Potassium 05/27/2022 3 9     Chloride 05/27/2022 106     CO2 05/27/2022 22     ANION GAP 05/27/2022 11     BUN 05/27/2022 13     Creatinine 05/27/2022 0 67 (A)    Glucose 05/27/2022 94     Glucose, Fasting 05/27/2022 94     Calcium 05/27/2022 9 2     AST 05/27/2022 20     ALT 05/27/2022 16     Alkaline Phosphatase 05/27/2022 141 (A)    Total Protein 05/27/2022 7 4     Albumin 05/27/2022 4 3     Total Bilirubin 05/27/2022 0 56     eGFR 05/27/2022 133     Hemoglobin A1C 05/27/2022 5 0     EAG 05/27/2022 97     Cholesterol 05/27/2022 181     Triglycerides 05/27/2022 210 (A)    HDL, Direct 05/27/2022 28 (A)    LDL Calculated 05/27/2022 111     Non-HDL-Chol (CHOL-HDL) 05/27/2022 153     RPR 05/27/2022 Non-Reactive     TSH 3RD GENERATON 05/27/2022 1 640        Progress Toward Goals:  Based on today's interview and review of prior notes, no significant change  Continue the present regimen  Continue to monitor pulse  Lamotrigine 25mg qd x 5 more days     Assessment/Plan   Principal Problem:    Schizophrenia ([address] )  Active Problems:    Medical clearance for psychiatric admission    Seizure disorder [facility])    Traumatic brain injury ([address] )    Brain lesion    History of cardiac radiofrequency ablation    History of fall    PTSD (post-traumatic stress disorder)      Recommended Treatment: Continue with pharmacotherapy, group therapy, milieu therapy and occupational therapy    The patient will be maintained on the following medications:  Current Facility-Administered Medications   Medication Dose Route Frequency Provider Last Rate    acetaminophen  650 mg Oral Q6H PRN Marylouise Ge, DO      acetaminophen  650 mg Oral Q4H PRN Marylouise Ge, DO      acetaminophen  975 mg Oral Q6H PRN Marylouise Ge, DO      aluminum-magnesium hydroxide-simethicone  30 mL Oral Q4H PRN Marylouise Ge, DO      ammonium lactate   Topical BID Crystal Coley, DPM      artificial tear  1 application Both Eyes R6Z PRN Virgia Heimlich A Prayson, DO      aspirin  81 mg Oral Daily Manan Night, DO      benztropine  1 mg Intramuscular Q4H PRN Max 6/day Manan Night, DO      benztropine  1 mg Oral Q4H PRN Max 6/day Manan Night, DO      cloZAPine  200 mg Oral BID Manan Night, DO      hydrOXYzine HCL  50 mg Oral Q6H PRN Max 4/day Manan Night, DO      Or    diphenhydrAMINE  50 mg Intramuscular Q6H PRN Manan Night, DO      hydrOXYzine HCL  100 mg Oral Q6H PRN Max 4/day Manan Night, DO      Or    LORazepam  2 mg Intramuscular Q6H PRN Manan Night, DO      hydrOXYzine HCL  25 mg Oral Q6H PRN Max 4/day Manan Night, DO      lamoTRIgine  25 mg Oral Daily Manan Night, DO      levETIRAcetam  500 mg Oral Q12H [address] Kashif A Prayson, DO      melatonin  9 mg Oral HS PRN Manan Night, DO      metoprolol succinate  50 mg Oral Daily Manan Night, DO      nicotine polacrilex  2 mg Oral Q2H PRN Manan Night, DO      OLANZapine  10 mg Oral Q3H PRN Max 3/day Manan Night, DO      Or    OLANZapine  10 mg Intramuscular Q3H PRN Max 3/day Manan Night, DO      OLANZapine  5 mg Oral Q3H PRN Max 6/day Manan Night, DO      Or    OLANZapine  5 mg Intramuscular Q3H PRN Max 6/day Kashif A Prayson, DO      OLANZapine  2 5 mg Oral Q3H PRN Max 8/day Manan Night, DO      polyethylene glycol  17 g Oral Daily PRN Manan Night, DO      propranolol  10 mg Oral Q8H PRN Manan Night, DO      senna  1 tablet Oral Daily Naima Martinez MD      senna-docusate sodium  1 tablet Oral Daily PRN Polina Duran MD      traZODone  50 mg Oral HS PRN Manan Night, DO         Risks, benefits and possible side effects of Medications:   Risks, benefits, and possible side effects of medications explained to patient and patient verbalizes understanding
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
## 2022-05-27 NOTE — PROGRESS NOTES
05/27/22 6238   Team Meeting   Meeting Type Daily Rounds   Initial Conference Date 05/26/22   Patient/Family Present   Patient Present No   Patient's Family Present No       Daily Rounds  Alberto graham MD, Chpee Shay RN, Teresa ARANGO  Case reviewed  New admission; 201 commitment, h/o psychosis, hallucinations, h/o TBI, seizure d/o, and a fall on previous unit resulting in broken ankle (now on fall precautions, uses cane; also on seizure precautions) also brain lesion and report of having h/o trauma / PTSD  Cooperative with admission and getting accustomed to unit

## 2022-05-27 NOTE — NURSING NOTE
Walker Pryor maintained had lab drawn this morning with pending result  Requested PRN Acetaminophen for left ankle pain  Acetaminophen 975mg rendered at 0608, for 7/10 left ankle pain  Will continue to monitor

## 2022-05-27 NOTE — NURSING NOTE
Leon Arriola is mostly isolative to self and room  Pt is able to make needs known  Gait remains unsteady and pt is not consistent with using cane although encouraged  Pt requested tylenol for 3/10 (L)ankle/foot pain however it was not administered as it was too early  Pt somewhat irritable in conversation with staff  No peer interaction noted  Denies all psych symptoms at this time  Compliant with meds/meals  Consumed 75% of breakfast and 75% of lunch

## 2022-05-27 NOTE — PLAN OF CARE
Problem: Alteration in Thoughts and Perception  Goal: Treatment Goal: Gain control of psychotic behaviors/thinking, reduce/eliminate presenting symptoms and demonstrate improved reality functioning upon discharge  Outcome: Progressing  Goal: Verbalize thoughts and feelings  Description: Interventions:  - Promote a nonjudgmental and trusting relationship with the patient through active listening and therapeutic communication  - Assess patient's level of functioning, behavior and potential for risk  - Engage patient in 1 on 1 interactions  - Encourage patient to express fears, feelings, frustrations, and discuss symptoms    - [place] patient to reality, help patient recognize reality-based thinking   - Administer medications as ordered and assess for potential side effects  - Provide the patient education related to the signs and symptoms of the illness and desired effects of prescribed medications  Interventions:  - Assess and re-assess patient's lethality and potential for self-injury  - Engage patient in 1:1 interactions, daily, for a minimum of 15 minutes  - Encourage patient to express feelings, fears, frustrations, hopes  - Establish rapport/trust with patient   Interventions:  - Assess and re-assess patient's level of risk   - Engage patient in 1:1 interactions, daily, for a minimum of 15 minutes   - Encourage patient to express feelings, fears, frustrations, hopes   Interventions:  - Assess and re-assess patient's level of risk, every waking shift  - Engage patient in 1:1 interactions, daily, for a minimum of 15 minutes   - Allow patient to express feelings and frustrations in a safe and non-threatening manner   - Establish rapport/trust with patient   Outcome: Progressing  Goal: Agree to be compliant with medication regime, as prescribed and report medication side effects  Description: Interventions:  - Offer appropriate PRN medication and supervise ingestion; conduct AIMS, as needed   Outcome: Progressing Problem: Ineffective Coping  Goal: Demonstrates healthy coping skills  Outcome: Not Progressing  Goal: Participates in unit activities  Description: Interventions:  - Provide therapeutic environment   - Provide required programming   - Redirect inappropriate behaviors   Outcome: Not Progressing     Problem: Risk for Self Injury/Neglect  Goal: Verbalize thoughts and feelings  Description: Interventions:  - Promote a nonjudgmental and trusting relationship with the patient through active listening and therapeutic communication  - Assess patient's level of functioning, behavior and potential for risk  - Engage patient in 1 on 1 interactions  - Encourage patient to express fears, feelings, frustrations, and discuss symptoms    - [place] patient to reality, help patient recognize reality-based thinking   - Administer medications as ordered and assess for potential side effects  - Provide the patient education related to the signs and symptoms of the illness and desired effects of prescribed medications  Interventions:  - Assess and re-assess patient's lethality and potential for self-injury  - Engage patient in 1:1 interactions, daily, for a minimum of 15 minutes  - Encourage patient to express feelings, fears, frustrations, hopes  - Establish rapport/trust with patient   Interventions:  - Assess and re-assess patient's level of risk   - Engage patient in 1:1 interactions, daily, for a minimum of 15 minutes   - Encourage patient to express feelings, fears, frustrations, hopes   Interventions:  - Assess and re-assess patient's level of risk, every waking shift  - Engage patient in 1:1 interactions, daily, for a minimum of 15 minutes   - Allow patient to express feelings and frustrations in a safe and non-threatening manner   - Establish rapport/trust with patient   Outcome: Progressing     Problem: Risk for Self Injury/Neglect  Goal: Verbalize thoughts and feelings  Description: Interventions:  - Promote a nonjudgmental and trusting relationship with the patient through active listening and therapeutic communication  - Assess patient's level of functioning, behavior and potential for risk  - Engage patient in 1 on 1 interactions  - Encourage patient to express fears, feelings, frustrations, and discuss symptoms    - [place] patient to reality, help patient recognize reality-based thinking   - Administer medications as ordered and assess for potential side effects  - Provide the patient education related to the signs and symptoms of the illness and desired effects of prescribed medications  Interventions:  - Assess and re-assess patient's lethality and potential for self-injury  - Engage patient in 1:1 interactions, daily, for a minimum of 15 minutes  - Encourage patient to express feelings, fears, frustrations, hopes  - Establish rapport/trust with patient   Interventions:  - Assess and re-assess patient's level of risk   - Engage patient in 1:1 interactions, daily, for a minimum of 15 minutes   - Encourage patient to express feelings, fears, frustrations, hopes   Interventions:  - Assess and re-assess patient's level of risk, every waking shift  - Engage patient in 1:1 interactions, daily, for a minimum of 15 minutes   - Allow patient to express feelings and frustrations in a safe and non-threatening manner   - Establish rapport/trust with patient   Outcome: Progressing     Problem: Depression  Goal: Verbalize thoughts and feelings  Description: Interventions:  - Promote a nonjudgmental and trusting relationship with the patient through active listening and therapeutic communication  - Assess patient's level of functioning, behavior and potential for risk  - Engage patient in 1 on 1 interactions  - Encourage patient to express fears, feelings, frustrations, and discuss symptoms    - [place] patient to reality, help patient recognize reality-based thinking   - Administer medications as ordered and assess for potential side effects  - Provide the patient education related to the signs and symptoms of the illness and desired effects of prescribed medications  Interventions:  - Assess and re-assess patient's lethality and potential for self-injury  - Engage patient in 1:1 interactions, daily, for a minimum of 15 minutes  - Encourage patient to express feelings, fears, frustrations, hopes  - Establish rapport/trust with patient   Interventions:  - Assess and re-assess patient's level of risk   - Engage patient in 1:1 interactions, daily, for a minimum of 15 minutes   - Encourage patient to express feelings, fears, frustrations, hopes   Interventions:  - Assess and re-assess patient's level of risk, every waking shift  - Engage patient in 1:1 interactions, daily, for a minimum of 15 minutes   - Allow patient to express feelings and frustrations in a safe and non-threatening manner   - Establish rapport/trust with patient   Outcome: Progressing  Goal: Refrain from isolation  Description: Interventions:  - Develop a trusting relationship   - Encourage socialization   Outcome: Not Progressing  Goal: Refrain from self-neglect  Outcome: Progressing     Problem: Risk for Violence/Aggression Toward Others  Goal: Verbalize thoughts and feelings  Description: Interventions:  - Promote a nonjudgmental and trusting relationship with the patient through active listening and therapeutic communication  - Assess patient's level of functioning, behavior and potential for risk  - Engage patient in 1 on 1 interactions  - Encourage patient to express fears, feelings, frustrations, and discuss symptoms    - [place] patient to reality, help patient recognize reality-based thinking   - Administer medications as ordered and assess for potential side effects  - Provide the patient education related to the signs and symptoms of the illness and desired effects of prescribed medications  Interventions:  - Assess and re-assess patient's lethality and potential for self-injury  - Engage patient in 1:1 interactions, daily, for a minimum of 15 minutes  - Encourage patient to express feelings, fears, frustrations, hopes  - Establish rapport/trust with patient   Interventions:  - Assess and re-assess patient's level of risk   - Engage patient in 1:1 interactions, daily, for a minimum of 15 minutes   - Encourage patient to express feelings, fears, frustrations, hopes   Interventions:  - Assess and re-assess patient's level of risk, every waking shift  - Engage patient in 1:1 interactions, daily, for a minimum of 15 minutes   - Allow patient to express feelings and frustrations in a safe and non-threatening manner   - Establish rapport/trust with patient   Outcome: Progressing

## 2022-05-27 NOTE — PROGRESS NOTES
Progress Note - Behavioral Health   Meredith Patterson 24 y o  male MRN: [id number]  [facility] Encounter: [id number]    Assessment/Plan   Principal Problem:    Schizophrenia [facility])  Active Problems:    Medical clearance for psychiatric admission    Seizure disorder [facility])    Traumatic brain injury ([address] )    Brain lesion    History of cardiac radiofrequency ablation    History of fall    Psychosis, organic    PTSD (post-traumatic stress disorder)      Subjective: Patient was seen, chart reviewed and case discussed with team    Patient seen in room lying in bed  Patient overall is pleasant calm and cooperative  Patient seems confused at times and forgetful  Patient is not sure if he feels better at this time  Patient seems distracted and focused on what is mom would think about during a later meeting, the patient was focused on getting prescribed metformin because he was on before and they took him off of it  He is unsure if he absolutely needs it, but believes his mom wants him back on it  Patient and walks in milieu without his cane, otherwise he withdrawals to his room  His patient reports that he sleeping adequately  Patient has been medication compliant and without serious side effects lysed              Psychiatric Review of Systems:  Behavior over the last 24 hours:  unchanged  Sleep:  Improving  Appetite:  Improving  Medication side effects: No  ROS: Left ankle pain, all others negative    Current Medications:  Current Facility-Administered Medications   Medication Dose Route Frequency    acetaminophen (TYLENOL) tablet 650 mg  650 mg Oral Q6H PRN    acetaminophen (TYLENOL) tablet 650 mg  650 mg Oral Q4H PRN    acetaminophen (TYLENOL) tablet 975 mg  975 mg Oral Q6H PRN    aluminum-magnesium hydroxide-simethicone (MYLANTA) oral suspension 30 mL  30 mL Oral Q4H PRN    ammonium lactate (LAC-HYDRIN) 12 % lotion   Topical BID    artificial tear (LUBRIFRESH P M ) ophthalmic ointment 1 application 1 application Both Eyes O1G PRN    aspirin chewable tablet 81 mg  81 mg Oral Daily    benztropine (COGENTIN) injection 1 mg  1 mg Intramuscular Q4H PRN Max 6/day    benztropine (COGENTIN) tablet 1 mg  1 mg Oral Q4H PRN Max 6/day    clozapine (CLOZARIL) tablet 200 mg  200 mg Oral BID    hydrOXYzine HCL (ATARAX) tablet 50 mg  50 mg Oral Q6H PRN Max 4/day    Or    diphenhydrAMINE (BENADRYL) injection 50 mg  50 mg Intramuscular Q6H PRN    hydrOXYzine HCL (ATARAX) tablet 100 mg  100 mg Oral Q6H PRN Max 4/day    Or    LORazepam (ATIVAN) injection 2 mg  2 mg Intramuscular Q6H PRN    hydrOXYzine HCL (ATARAX) tablet 25 mg  25 mg Oral Q6H PRN Max 4/day    lamoTRIgine (LaMICtal) tablet 25 mg  25 mg Oral Daily    levETIRAcetam (KEPPRA) tablet 500 mg  500 mg Oral Q12H [address]    melatonin tablet 9 mg  9 mg Oral HS    metoprolol succinate (TOPROL-XL) 24 hr tablet 50 mg  50 mg Oral Daily    nicotine polacrilex (NICORETTE) gum 2 mg  2 mg Oral Q2H PRN    OLANZapine (ZyPREXA) tablet 10 mg  10 mg Oral Q3H PRN Max 3/day    Or    OLANZapine (ZyPREXA) IM injection 10 mg  10 mg Intramuscular Q3H PRN Max 3/day    OLANZapine (ZyPREXA) tablet 5 mg  5 mg Oral Q3H PRN Max 6/day    Or    OLANZapine (ZyPREXA) IM injection 5 mg  5 mg Intramuscular Q3H PRN Max 6/day    OLANZapine (ZyPREXA) tablet 2 5 mg  2 5 mg Oral Q3H PRN Max 8/day    polyethylene glycol (MIRALAX) packet 17 g  17 g Oral Daily PRN    propranolol (INDERAL) tablet 10 mg  10 mg Oral Q8H PRN    senna-docusate sodium (SENOKOT S) 8 6-50 mg per tablet 1 tablet  1 tablet Oral Daily PRN    traZODone (DESYREL) tablet 50 mg  50 mg Oral HS PRN       Behavioral Health Medications: all current active meds have been reviewed  Vitals:  Vitals:    05/27/22 0810   BP: 153/78   Pulse:    Resp:    Temp:    SpO2:        Laboratory results:    I have personally reviewed all pertinent laboratory/tests results    Most Recent Labs:   Lab Results   Component Value Date    WBC 9 04 05/27/2022    RBC 5 09 05/27/2022    HGB 14 8 05/27/2022    HCT 46 1 05/27/2022     05/27/2022    RDW 12 0 05/27/2022    NEUTROABS 5 42 05/27/2022    SODIUM 139 05/27/2022    K 3 9 05/27/2022     05/27/2022    CO2 22 05/27/2022    BUN 13 05/27/2022    CREATININE 0 67 (L) 05/27/2022    GLUC 94 05/27/2022    GLUF 94 05/27/2022    CALCIUM 9 2 05/27/2022    AST 20 05/27/2022    ALT 16 05/27/2022    ALKPHOS 141 (H) 05/27/2022    TP 7 4 05/27/2022    ALB 4 3 05/27/2022    TBILI 0 56 05/27/2022    CHOLESTEROL 181 05/27/2022    HDL 28 (L) 05/27/2022    TRIG 210 (H) 05/27/2022    LDLCALC 111 05/27/2022    [place] 153 05/27/2022    [id number] 1 640 05/27/2022       Mental Status Evaluation:  Appearance:  casually dressed and disheveled   Behavior:  Overall calm and cooperative, but seems confused at times  Speech:  normal pitch and normal volume   Mood:  anxious and depressed   Affect:  blunted   Thought Process:  perserverative   Thought Content:  No overt delusions verbalized   Perceptual Disturbances: Patient denies   Risk Potential: Suicidal Ideations none, Homicidal Ideations none and Potential for Aggression No   Sensorium:  person and place   Cognition:  recent and remote memory grossly intact   Consciousness:  alert and awake    Attention: attention span appeared shorter than expected for age   Insight:  Poor   Judgment: limited   Gait/Station: Patient lying in bed but does use a cane when ambulating   Motor Activity: no abnormal movements     Progress Toward Goals:  Progressing    Recommended Treatment: Continue with pharmacotherapy, group therapy, milieu therapy and occupational therapy  1  Continue current medications and treatments for now  2  Discharge planning    Risks, benefits and possible side effects of Medications:   Risks, benefits, and possible side effects of medications explained to patient and patient verbalizes understanding

## 2022-05-27 NOTE — PROGRESS NOTES
05/27/22 1610   Team Meeting   Meeting Type Daily Rounds   Initial Conference Date 05/27/22   Patient/Family Present   Patient Present No   Patient's Family Present No       Daily Rounds  Alberto graham MD, Chepe Velasquez RN, Makayla ARANGO  Case reviewed  Reported rectal bleeding to overnight nurse  Encouraged to use cane  Tylenol given for L ankle pain x3, and this morning at 0608  Podiatry consulted, cream given for dry skin  Cooperative with admission process and adjusting to unit

## 2022-05-27 NOTE — NURSING NOTE
Patient is concerned with his medications stating he "was on Metformin before coming here" but could not remember the dose  This was not on his list of medications (PTA) His mother also called and insisted patient be put on Gabapentin because "he was on it for quite awhile and she does not know why he is not on it now" SW stated patients mother has been intrusive with her medication advice and suggestions and she was advised that she can be present at the weekly Treatment Team meetings and give her input then  Patient asked writer what Cogentin was and what it was for   He addressed needs with other nurses as well and this writer tried to have him then come to writer so his needs could be more centralized     He c/o #8 left lower leg pain and received tylenol 975 mg at 1800  He was advised to lay down initially until the Tylenol could take effect then writer would f/u He stated he had no pain one hour later  Patient had writer look at the "swelling" of his LLE  He was worried about it and wanted to make sure "it was healing well" Patient was advised to ambulate then rest and not be on his feet for long periods of time due to this being a recent injury  He requested/received Trazadone with his HS medications for insomnia at 2117

## 2022-05-27 NOTE — PROGRESS NOTES
05/27/22 1602   Additional Tobacco Use Information: Usage within the past 30 days   Tobacco Use Past 30 days (all types) No       Patient is reported by mother to be a chain smoker / "he doesn't stop "

## 2022-05-27 NOTE — NURSING NOTE
Patient is polite, pleasant, and cooperative  Withdrawn and isolative  Poor insight  A&O x2 to person and time w/ mild confusion  When reassassed for pain scale, patient replied "650 " Reminded pain scale is 1-10 and replied "I don't know" but later stated "It's fine " Patient stated "I feel like I'm going to fall " Encouraged to use cane but replied "Karolyn Robles, I don't need it " Fall and seizure precautions now ordered and maintained  Observed ambulating on milieu w/o cane but later using w/ encouragement  Reported difficulty getting in shower  Hallway showered offered but declined  Later in conversation patient stated he did shower this evening in his bathroom which was confirmed by staff  Asked to rate anxiety 1-4 during assessment  Patient replied "6 " Reminded scale was 1-4 and replied "6" again  Stated he was using gabapentin for anxiety at previous hospital but in same conversation stated gabapentin made his anxiety worse  Encouraged to use coping skills but declined  PRN offered but also declined  Did not appear physically anxious and no further complaints of anxiety for remainder of shift  Ate 25% of dinner  Medication compliant  PRN tylenol 650mg administered at 1934 for 5/10 L ankle  Pain decreased to 4/10  Did not attend groups  Safety monitoring in place

## 2022-05-27 NOTE — PROGRESS NOTES
05/27/22 1547   Referral Data   Referral Source Physician   Referral Reason Lorene Bacon   Patient Information   Mental Status Alert   Primary Caregiver Self   Support System Immediate family; Other (Comment)  ([facility] staff / psychiatrist and therapist)   Legal Information   Tx Plan Signed Yes  (5/27/22)   Current Status: 12   Legal Documentation Status Filed   Legal Issues denies [place]ow; previous legal included 2016 incarceration for violating probation (had initially burglarized a home to steal marijuana and state troopers arrived)   Advance Directives Power of  for health care; Power of [employer] for finance  (Sister Kim Valderrama [phone])   Advance Directives Status   (requested)   Health Care Proxy Appointed Yes - See Health Care Proxy section  (mother / sister Shukri Tate)   Activities of Daily Living Prior to Admission   Functional Status Minimum assistance  (using walking cane)   Assistive Device Straight Cane   Living Arrangement House;Lives with someone  (lives with mother, step father, sister, and sister's boyfriend)   Ambulation Minimum assistance   Access to Firearms   Access to Firearms No   Income [address] [place] Other (Comment)  (inheritance (from late father) 0 a month)   Means of [employer] of Transport to [facility]: Family transport

## 2022-05-28 PROCEDURE — 99232 SBSQ HOSP IP/OBS MODERATE 35: CPT | Performed by: PHYSICIAN ASSISTANT

## 2022-05-28 RX ADMIN — METOPROLOL SUCCINATE 50 MG: 50 TABLET, EXTENDED RELEASE ORAL at 08:40

## 2022-05-28 RX ADMIN — SENNOSIDES 8.6 MG: 8.6 TABLET, FILM COATED ORAL at 08:40

## 2022-05-28 RX ADMIN — ASPIRIN 81 MG CHEWABLE TABLET 81 MG: 81 TABLET CHEWABLE at 08:40

## 2022-05-28 RX ADMIN — NICOTINE POLACRILEX 2 MG: 2 GUM, CHEWING ORAL at 10:15

## 2022-05-28 RX ADMIN — CLOZAPINE 200 MG: 200 TABLET ORAL at 08:40

## 2022-05-28 RX ADMIN — LEVETIRACETAM 500 MG: 500 TABLET, FILM COATED ORAL at 21:01

## 2022-05-28 RX ADMIN — HYDROXYZINE HYDROCHLORIDE 100 MG: 50 TABLET, FILM COATED ORAL at 17:29

## 2022-05-28 RX ADMIN — NICOTINE POLACRILEX 2 MG: 2 GUM, CHEWING ORAL at 14:26

## 2022-05-28 RX ADMIN — AMMONIUM LACTATE: 17 LOTION TOPICAL at 17:19

## 2022-05-28 RX ADMIN — NICOTINE POLACRILEX 2 MG: 2 GUM, CHEWING ORAL at 12:23

## 2022-05-28 RX ADMIN — CLOZAPINE 200 MG: 200 TABLET ORAL at 21:01

## 2022-05-28 RX ADMIN — NICOTINE POLACRILEX 2 MG: 2 GUM, CHEWING ORAL at 20:15

## 2022-05-28 RX ADMIN — ACETAMINOPHEN 650 MG: 325 TABLET ORAL at 18:44

## 2022-05-28 RX ADMIN — NICOTINE POLACRILEX 2 MG: 2 GUM, CHEWING ORAL at 08:09

## 2022-05-28 RX ADMIN — MINERAL OIL AND WHITE PETROLATUM 1 APPLICATION: 150; 830 OINTMENT OPHTHALMIC at 14:46

## 2022-05-28 RX ADMIN — LAMOTRIGINE 25 MG: 25 TABLET ORAL at 08:40

## 2022-05-28 RX ADMIN — LEVETIRACETAM 500 MG: 500 TABLET, FILM COATED ORAL at 08:40

## 2022-05-28 RX ADMIN — NICOTINE POLACRILEX 2 MG: 2 GUM, CHEWING ORAL at 17:19

## 2022-05-28 NOTE — PLAN OF CARE
Problem: Risk for Self Injury/Neglect  Goal: Verbalize thoughts and feelings  Description: Interventions:  - Promote a nonjudgmental and trusting relationship with the patient through active listening and therapeutic communication  - Assess patient's level of functioning, behavior and potential for risk  - Engage patient in 1 on 1 interactions  - Encourage patient to express fears, feelings, frustrations, and discuss symptoms    - [place] patient to reality, help patient recognize reality-based thinking   - Administer medications as ordered and assess for potential side effects  - Provide the patient education related to the signs and symptoms of the illness and desired effects of prescribed medications  Interventions:  - Assess and re-assess patient's lethality and potential for self-injury  - Engage patient in 1:1 interactions, daily, for a minimum of 15 minutes  - Encourage patient to express feelings, fears, frustrations, hopes  - Establish rapport/trust with patient   Interventions:  - Assess and re-assess patient's level of risk   - Engage patient in 1:1 interactions, daily, for a minimum of 15 minutes   - Encourage patient to express feelings, fears, frustrations, hopes   Interventions:  - Assess and re-assess patient's level of risk, every waking shift  - Engage patient in 1:1 interactions, daily, for a minimum of 15 minutes   - Allow patient to express feelings and frustrations in a safe and non-threatening manner   - Establish rapport/trust with patient   Outcome: Progressing  Goal: Treatment Goal: Remain safe during length of stay, learn and adopt new coping skills, and be free of self-injurious ideation, impulses and acts at the time of discharge  Outcome: Progressing  Goal: Refrain from harming self  Description: Interventions:  - Monitor patient closely, per order  - Develop a trusting relationship  - Supervise medication ingestion, monitor effects and side effects   Outcome: Progressing  Goal: Recognize maladaptive responses and adopt new coping mechanisms  Outcome: Progressing     Problem: Risk for Violence/Aggression Toward Others  Goal: Verbalize thoughts and feelings  Description: Interventions:  - Promote a nonjudgmental and trusting relationship with the patient through active listening and therapeutic communication  - Assess patient's level of functioning, behavior and potential for risk  - Engage patient in 1 on 1 interactions  - Encourage patient to express fears, feelings, frustrations, and discuss symptoms    - [place] patient to reality, help patient recognize reality-based thinking   - Administer medications as ordered and assess for potential side effects  - Provide the patient education related to the signs and symptoms of the illness and desired effects of prescribed medications  Interventions:  - Assess and re-assess patient's lethality and potential for self-injury  - Engage patient in 1:1 interactions, daily, for a minimum of 15 minutes  - Encourage patient to express feelings, fears, frustrations, hopes  - Establish rapport/trust with patient   Interventions:  - Assess and re-assess patient's level of risk   - Engage patient in 1:1 interactions, daily, for a minimum of 15 minutes   - Encourage patient to express feelings, fears, frustrations, hopes   Interventions:  - Assess and re-assess patient's level of risk, every waking shift  - Engage patient in 1:1 interactions, daily, for a minimum of 15 minutes   - Allow patient to express feelings and frustrations in a safe and non-threatening manner   - Establish rapport/trust with patient   Outcome: Progressing  Goal: Treatment Goal: Refrain from acts of violence/aggression during length of stay, and demonstrate improved impulse control at the time of discharge  Outcome: Progressing  Goal: Refrain from harming others  Outcome: Progressing  Goal: Refrain from destructive acts on the environment or property  Outcome: Progressing  Goal: Control angry outbursts  Description: Interventions:  - Monitor patient closely, per order  - Ensure early verbal de-escalation  - Monitor prn medication needs  - Set reasonable/therapeutic limits, outline behavioral expectations, and consequences   - Provide a non-threatening milieu, utilizing the least restrictive interventions   Outcome: Progressing  Goal: Identify appropriate positive anger management techniques  Description: Interventions:  - Offer anger management and coping skills groups   - Staff will provide positive feedback for appropriate anger control  Outcome: Progressing

## 2022-05-28 NOTE — NURSING NOTE
Patient was withdrawn to his room lying in bed quietly  Rate anxiety 3 5/4, denies all other psych s/s  Atarax 100 mg was given at 1729 per patient's request  Attended coping skills group  Had 100% for dinner  Cooperative and compliant with PM medication   Safety checks ongoing

## 2022-05-28 NOTE — NURSING NOTE
Pt is medication and meal compliant  Pt is anxious, but denies all other s/s currently  Pt is pleasant, but guarded in conversation and at times off topic  Pt otherwise rests in bed or walks short distances in milieu while conversing with peers  Pt given prn nicorette gum as ordered throughout shift  Pt offers no complaints at this time  Will continue to monitor

## 2022-05-28 NOTE — NURSING NOTE
Patient observed laying in quietly in bed with eyes closed  Normal respirations  No needs addressed during the night  Monitored on Q 7 minute checks

## 2022-05-28 NOTE — QUICK NOTE
Patient was not physically seen or examined however thorough chart review was performed including review of vitals and labs      Patient and patient's mother both concerned that he is not on metformin here  · States he was previously on it at [facility] for weight loss  · When asked what pharmacy/what dose he was on, patient and patient's mother do not know, it was never filled that outpatient pharmacy he was started on at [facility]  · It is also not available in any record reviews [facility] or [facility], also not included in faxed records from [facility]  · A1c - 5 on admission    Will not restart metformin at this time

## 2022-05-28 NOTE — PROGRESS NOTES
Progress Note - Geoff 24 y o  male MRN: [id number]  Unit/Bed#: [facility] 049-88 Encounter: [id number]    Shannon Walden was seen for continuing care and reviewed with treatment team   Pt was in bed, calm, but guarded on approach and appeared internally preoccupied  He reported "I'm a little tired "  He wants to go home and presently denies depression, SI, HI, anxiety, or psychotic symptoms  However he still appears paranoid with limited insight  He states he is here partly because of thoughts that his stepfather was cheating on his mother  He mentions that his mom wanted to know why he was taken off of the Metformin and this was explained to Pt again  His appetite is overall adequate but he did skip breakfast this morning  Does not want to do walking group due to fear of left leg pain however he denied pain at time of this interview  I encouraged Pt to do other activities during that time--IE to study for his GED, which he is in the process of obtaining  He has book on his nightstand  He has future goal to be a  or stocking shelves, Something simple in the beginning     Per EMR and floor team Pt remains calm, medication compliant, and behaviorally controlled  He admitted to nursing about having hallucinations last night but would not specify what they were  He required p r n  Hydroxyzine yesterday for anxiety, which worked very well  He did attend at least 1 therapeutic group yesterday       Sleep:Good  Appetite: Adequate  Medication side effects: None per Pt    ROS: No complaints per Pt    Labs/Tests: Reviewed    Mental Status Evaluation:  Appearance:  Dressed, clean, Partial eye contact   Behavior:  Calm, cooperative, pleasant, Withdrawn, Guarded    Speech:  Clear, normal rate and volume when he did speak, but was generally scant   Mood:  Anxious, Appears dysphoric   Affect:  Blunted   Thought Process:  Organized, Goal directed, Seems a bit concrete, potentially some blocking   Associations: Intact associations   Thought Content:  Paranoid delusions and preoccupation with Metformin   Perceptual Disturbances: Pt denies any hallucinations or paranoia and does not appear to be responding to internal stimuli, but appeared a bit paranoid    A bit paranoid   Risk Potential: Pt presently denies SI or HI    Sensorium:  Self, birthday, Place, Day of the week, Month, Year   Memory:  short term memory impaired   Consciousness:  alert, awake   Attention: Fair   Insight:  Poor   Judgment: Limited   Gait/Station: Unobserved--Pt in bed   Motor Activity: No abnormal movements     Vitals:    05/28/22 0719 05/28/22 0840 05/28/22 1500 05/29/22 0722   BP: 140/80 138/77 122/88 141/88   BP Location: Right arm  Right arm Right arm   Pulse: (!) 107 105 (!) 120 100   Resp: 18  18 18   Temp: 97 6 °F (36 4 °C)  97 8 °F (36 6 °C) 97 5 °F (36 4 °C)   TempSrc: Skin  Skin Temporal   SpO2:       Weight:       Height:           Admission on 05/25/2022   Component Date Value    Ventricular Rate 05/25/2022 101     Atrial Rate 05/25/2022 101     MD Interval 05/25/2022 152     QRSD Interval 05/25/2022 96     QT Interval 05/25/2022 344     QTC Interval 05/25/2022 446     P Axis 05/25/2022 23     QRS Axis 05/25/2022 17     T Wave Axis 05/25/2022 15     WBC 05/27/2022 9 04     RBC 05/27/2022 5 09     Hemoglobin 05/27/2022 14 8     Hematocrit 05/27/2022 46 1     MCV 05/27/2022 91     MCH 05/27/2022 29 1     MCHC 05/27/2022 32 1     RDW 05/27/2022 12 0     MPV 05/27/2022 9 1     Platelets 74/98/8737 274     nRBC 05/27/2022 0     Neutrophils Relative 05/27/2022 59     Immat GRANS % 05/27/2022 1     Lymphocytes Relative 05/27/2022 27     Monocytes Relative 05/27/2022 9     Eosinophils Relative 05/27/2022 3     Basophils Relative 05/27/2022 1     Neutrophils Absolute 05/27/2022 5 42     Immature Grans Absolute 05/27/2022 0 05     Lymphocytes Absolute 05/27/2022 2 42     Monocytes Absolute 05/27/2022 0 85     Eosinophils Absolute 05/27/2022 0 25     Basophils Absolute 05/27/2022 0 05     Sodium 05/27/2022 139     Potassium 05/27/2022 3 9     Chloride 05/27/2022 106     CO2 05/27/2022 22     ANION GAP 05/27/2022 11     BUN 05/27/2022 13     Creatinine 05/27/2022 0 67 (A)    Glucose 05/27/2022 94     Glucose, Fasting 05/27/2022 94     Calcium 05/27/2022 9 2     AST 05/27/2022 20     ALT 05/27/2022 16     Alkaline Phosphatase 05/27/2022 141 (A)    Total Protein 05/27/2022 7 4     Albumin 05/27/2022 4 3     Total Bilirubin 05/27/2022 0 56     eGFR 05/27/2022 133     Hemoglobin A1C 05/27/2022 5 0     EAG 05/27/2022 97     Cholesterol 05/27/2022 181     Triglycerides 05/27/2022 210 (A)    HDL, Direct 05/27/2022 28 (A)    LDL Calculated 05/27/2022 111     Non-HDL-Chol (CHOL-HDL) 05/27/2022 153     RPR 05/27/2022 Non-Reactive     TSH 3RD GENERATON 05/27/2022 1 640        Progress Toward Goals:  Based on today's interview and review of prior notes, no significant change to the weekend  Continue the present medication regimen  Continue to monitor pulse  Lamotrigine 25 mg daily x 4 more days    Assessment/Plan   Principal Problem:    Schizophrenia ([facility] )  Active Problems:    Medical clearance for psychiatric admission    Seizure disorder [facility])    [facility] ([facility] )    Brain lesion    History of cardiac radiofrequency ablation    History of fall    PTSD (post-traumatic stress disorder)      Recommended Treatment: Continue with pharmacotherapy, group therapy, milieu therapy and occupational therapy    The patient will be maintained on the following medications:  Current Facility-Administered Medications   Medication Dose Route Frequency Provider Last Rate    acetaminophen  650 mg Oral Q6H PRN Yvone Casper, DO      acetaminophen  650 mg Oral Q4H PRN Yvone Joshua, DO      acetaminophen  975 mg Oral Q6H PRN Yvone Casper, DO      aluminum-magnesium hydroxide-simethicone  30 mL Oral Q4H PRN Vitor Medicus, DO      ammonium lactate   Topical BID Sophia Pole, DPM      artificial tear  1 application Both Eyes T6F PRN Vitor Medicus, DO      aspirin  81 mg Oral Daily Vitor Medicus, DO      benztropine  1 mg Intramuscular Q4H PRN Max 6/day Vitor Medicus, DO      benztropine  1 mg Oral Q4H PRN Max 6/day Vitor Medicus, DO      cloZAPine  200 mg Oral BID Vitor Medicus, DO      hydrOXYzine HCL  50 mg Oral Q6H PRN Max 4/day Vitor Medicus, DO      Or    diphenhydrAMINE  50 mg Intramuscular Q6H PRN Vitor Medicus, DO      hydrOXYzine HCL  100 mg Oral Q6H PRN Max 4/day Vitor Medicus, DO      Or    LORazepam  2 mg Intramuscular Q6H PRN Vitor Medicus, DO      hydrOXYzine HCL  25 mg Oral Q6H PRN Max 4/day Vitor Medicus, DO      lamoTRIgine  25 mg Oral Daily Vitor Medicus, DO      levETIRAcetam  500 mg Oral Q12H [address] Kashif A Prayson, DO      melatonin  9 mg Oral HS PRN Vitor Medicus, DO      metoprolol succinate  50 mg Oral Daily Vitor Medicus, DO      nicotine polacrilex  2 mg Oral Q2H PRN Vitor Medicus, DO      OLANZapine  10 mg Oral Q3H PRN Max 3/day Vitor Medicus, DO      Or    OLANZapine  10 mg Intramuscular Q3H PRN Max 3/day Vitor Medicus, DO      OLANZapine  5 mg Oral Q3H PRN Max 6/day Vitor Medicus, DO      Or    OLANZapine  5 mg Intramuscular Q3H PRN Max 6/day Kashif A Prayson, DO      OLANZapine  2 5 mg Oral Q3H PRN Max 8/day Kashif A Prayson, DO      polyethylene glycol  17 g Oral Daily PRN Vitor Medicus, DO      propranolol  10 mg Oral Q8H PRN Vitor Medicus, DO      senna  1 tablet Oral Daily Kelley Fong MD      senna-docusate sodium  1 tablet Oral Daily PRN Leyla Buck MD      traZODone  50 mg Oral HS PRN Vitor Medicus, DO         Risks, benefits and possible side effects of Medications:   Risks, benefits, and possible side effects of medications explained to patient and patient verbalizes understanding

## 2022-05-28 NOTE — NURSING NOTE
Pt having c/o of L lower ext pain a #6/10  PRN acetaminophen 650mg administered @ 1844 for moderate pain  Results pending

## 2022-05-29 PROCEDURE — 99232 SBSQ HOSP IP/OBS MODERATE 35: CPT | Performed by: PHYSICIAN ASSISTANT

## 2022-05-29 RX ADMIN — NICOTINE POLACRILEX 2 MG: 2 GUM, CHEWING ORAL at 08:03

## 2022-05-29 RX ADMIN — LEVETIRACETAM 500 MG: 500 TABLET, FILM COATED ORAL at 21:04

## 2022-05-29 RX ADMIN — CLOZAPINE 200 MG: 200 TABLET ORAL at 08:02

## 2022-05-29 RX ADMIN — NICOTINE POLACRILEX 2 MG: 2 GUM, CHEWING ORAL at 17:54

## 2022-05-29 RX ADMIN — HYDROXYZINE HYDROCHLORIDE 100 MG: 50 TABLET, FILM COATED ORAL at 16:57

## 2022-05-29 RX ADMIN — ACETAMINOPHEN 650 MG: 325 TABLET ORAL at 14:07

## 2022-05-29 RX ADMIN — NICOTINE POLACRILEX 2 MG: 2 GUM, CHEWING ORAL at 15:42

## 2022-05-29 RX ADMIN — AMMONIUM LACTATE: 17 LOTION TOPICAL at 17:54

## 2022-05-29 RX ADMIN — HYDROXYZINE HYDROCHLORIDE 100 MG: 50 TABLET, FILM COATED ORAL at 09:27

## 2022-05-29 RX ADMIN — LEVETIRACETAM 500 MG: 500 TABLET, FILM COATED ORAL at 08:02

## 2022-05-29 RX ADMIN — NICOTINE POLACRILEX 2 MG: 2 GUM, CHEWING ORAL at 21:04

## 2022-05-29 RX ADMIN — ASPIRIN 81 MG CHEWABLE TABLET 81 MG: 81 TABLET CHEWABLE at 08:02

## 2022-05-29 RX ADMIN — METOPROLOL SUCCINATE 50 MG: 50 TABLET, EXTENDED RELEASE ORAL at 08:01

## 2022-05-29 RX ADMIN — NICOTINE POLACRILEX 2 MG: 2 GUM, CHEWING ORAL at 12:13

## 2022-05-29 RX ADMIN — NICOTINE POLACRILEX 2 MG: 2 GUM, CHEWING ORAL at 10:07

## 2022-05-29 RX ADMIN — LAMOTRIGINE 25 MG: 25 TABLET ORAL at 08:02

## 2022-05-29 RX ADMIN — SENNOSIDES 8.6 MG: 8.6 TABLET, FILM COATED ORAL at 08:02

## 2022-05-29 RX ADMIN — CLOZAPINE 200 MG: 200 TABLET ORAL at 21:04

## 2022-05-29 NOTE — NURSING NOTE
Brenda Payan was in bed at start of shift  Per 7 minute safety checks, pt appeared to sleep about 6+ hours overnight  No behavior incidence

## 2022-05-29 NOTE — PROGRESS NOTES
Progress Note - Geoff 24 y o  male MRN: [id number]  Unit/Bed#: [facility] 001-10 Encounter: [id number]    Srinivasa Schwartz was seen for continuing care and reviewed with treatment team   Pt was preoccupied appearing on approach, and reported "I'm ready to go home  If you're not doing anything with Metformin, there's no point in being here "  Insight is still impaired and he identifies the reason he is here is because his mom wants him here for the Metformin  I stated he does not have need for the Metformin at this time according to SLIM  Pt then stated his mom said that when his Clozaril is good, he will then be able to go home  He does not voice any somatic complaints  He presently denies depression, SI, HI, anxiety, or psychotic Sxs  However, Per EMR and floor team, Pt has utilized Hydroxyzine for anxiety multiple times through the holiday weekend, with benefit  He has been calm, medication compliant and selectively social in the milieu  He has attended some groups and remained appropriate among peers       Sleep:Good  Appetite: Adequate  Medication side effects: None per Pt    ROS: No complaints per Pt, (All ROS Negative)    Labs/Tests: Reviewed    Mental Status Evaluation:  Appearance:  Dressed, clean, Poor eye contact   Behavior:  Calm, cooperative, pleasant, Withdrawn, Guarded    Speech:  Clear, normal rate and volume, scant, but answered questions readily   Mood:  Anxious, Dysphoric, in appearance, though he denied Sxs   Affect:  Blunted   Thought Process:  Organized, Perseverative, Goal directed, Melvin   Associations: Melvin associations   Thought Content:  No verbalized delusions   Perceptual Disturbances: Pt denies any hallucinations or paranoia and does not appear to be responding to internal stimuli   Risk Potential: Pt presently denies SI or HI    Sensorium:  Self, birthday, Place, Month, Year   Memory:  short term memory impaired   Consciousness:  alert, awake Attention: Fair   Insight:  Poor   Judgment: Limited   Gait/Station: Steady gait   Motor Activity: No abnormal movements     Vitals:    05/30/22 0700 05/30/22 0823 05/30/22 1500 05/31/22 0700   BP: 142/80 138/78 132/76 144/90   BP Location: Right arm  Right arm Left arm   Pulse: 104 105 101 100   Resp: 18  18 20   Temp: (!) 97 °F (36 1 °C)  (!) 97 4 °F (36 3 °C) 97 5 °F (36 4 °C)   TempSrc: Skin  Skin Temporal   SpO2:       Weight:       Height:           Admission on 05/25/2022   Component Date Value    Ventricular Rate 05/25/2022 101     Atrial Rate 05/25/2022 101     VT Interval 05/25/2022 152     QRSD Interval 05/25/2022 96     QT Interval 05/25/2022 344     QTC Interval 05/25/2022 446     P Axis 05/25/2022 23     QRS Axis 05/25/2022 17     T Wave Axis 05/25/2022 15     WBC 05/27/2022 9 04     RBC 05/27/2022 5 09     Hemoglobin 05/27/2022 14 8     Hematocrit 05/27/2022 46 1     MCV 05/27/2022 91     MCH 05/27/2022 29 1     MCHC 05/27/2022 32 1     RDW 05/27/2022 12 0     MPV 05/27/2022 9 1     Platelets 91/41/5638 274     nRBC 05/27/2022 0     Neutrophils Relative 05/27/2022 59     Immat GRANS % 05/27/2022 1     Lymphocytes Relative 05/27/2022 27     Monocytes Relative 05/27/2022 9     Eosinophils Relative 05/27/2022 3     Basophils Relative 05/27/2022 1     Neutrophils Absolute 05/27/2022 5 42     Immature Grans Absolute 05/27/2022 0 05     Lymphocytes Absolute 05/27/2022 2 42     Monocytes Absolute 05/27/2022 0 85     Eosinophils Absolute 05/27/2022 0 25     Basophils Absolute 05/27/2022 0 05     Sodium 05/27/2022 139     Potassium 05/27/2022 3 9     Chloride 05/27/2022 106     CO2 05/27/2022 22     ANION GAP 05/27/2022 11     BUN 05/27/2022 13     Creatinine 05/27/2022 0 67 (A)    Glucose 05/27/2022 94     Glucose, Fasting 05/27/2022 94     Calcium 05/27/2022 9 2     AST 05/27/2022 20     ALT 05/27/2022 16     Alkaline Phosphatase 05/27/2022 141 (A)    Total Protein 05/27/2022 7 4     Albumin 05/27/2022 4 3     Total Bilirubin 05/27/2022 0 56     eGFR 05/27/2022 133     Hemoglobin A1C 05/27/2022 5 0     EAG 05/27/2022 97     Cholesterol 05/27/2022 181     Triglycerides 05/27/2022 210 (A)    HDL, Direct 05/27/2022 28 (A)    LDL Calculated 05/27/2022 111     Non-HDL-Chol (CHOL-HDL) 05/27/2022 153     RPR 05/27/2022 Non-Reactive     TSH 3RD GENERATON 05/27/2022 1 640        Progress Toward Goals:  Based on today's interview and review of prior notes, no change to the weekend  Continue the present medication regimen  Continue to monitor pulse  Lamotrigine 25mg daily x 4 more days    Assessment/Plan   Principal Problem:    Schizophrenia ([facility] )  Active Problems:    Medical clearance for psychiatric admission    Seizure disorder [facility])    Traumatic brain injury ([facility] )    Brain lesion    History of cardiac radiofrequency ablation    History of fall    PTSD (post-traumatic stress disorder)      Recommended Treatment: Continue with pharmacotherapy, group therapy, milieu therapy and occupational therapy    The patient will be maintained on the following medications:  Current Facility-Administered Medications   Medication Dose Route Frequency Provider Last Rate    acetaminophen  650 mg Oral Q6H PRN Rosea Carton, DO      acetaminophen  650 mg Oral Q4H PRN Rosea Carton, DO      acetaminophen  975 mg Oral Q6H PRN Rosea Carton, DO      aluminum-magnesium hydroxide-simethicone  30 mL Oral Q4H PRN Rosea Carton, DO      ammonium lactate   Topical BID Nyoka Pick, DPM      artificial tear  1 application Both Eyes K7P PRN Rosea Carton, DO      aspirin  81 mg Oral Daily Rosea Carton, DO      benztropine  1 mg Intramuscular Q4H PRN Max 6/day Rosea Carton, DO      benztropine  1 mg Oral Q4H PRN Max 6/day Rosea Carton, DO      cloZAPine  200 mg Oral BID [employer] Prayson, DO      hydrOXYzine HCL  50 mg Oral Q6H PRN Max 4/day Sidalethae Jimo A Prayson, DO      Or    diphenhydrAMINE  50 mg Intramuscular Q6H PRN Jolyne , DO      hydrOXYzine HCL  100 mg Oral Q6H PRN Max 4/day Jolyne , DO      Or    LORazepam  2 mg Intramuscular Q6H PRN Jolyne , DO      hydrOXYzine HCL  25 mg Oral Q6H PRN Max 4/day Jolyne , DO      lamoTRIgine  25 mg Oral Daily Jolymervat Boneff, DO      levETIRAcetam  500 mg Oral Q12H [facility] Kashif A Prayson, DO      melatonin  9 mg Oral HS PRN Sanazlymervat Boneff, DO      metoprolol succinate  50 mg Oral Daily Jolymervat Vizcarra, DO      nicotine polacrilex  2 mg Oral Q2H PRN Sanazlyne , DO      OLANZapine  10 mg Oral Q3H PRN Max 3/day Jolyne , DO      Or    OLANZapine  10 mg Intramuscular Q3H PRN Max 3/day Sanazlyne , DO      OLANZapine  5 mg Oral Q3H PRN Max 6/day Jolyne , DO      Or    OLANZapine  5 mg Intramuscular Q3H PRN Max 6/day Kashif HELADIO Schaeferyson, DO      OLANZapine  2 5 mg Oral Q3H PRN Max 8/day Sanazlyne , DO      polyethylene glycol  17 g Oral Daily PRN Sanazlyne , DO      propranolol  10 mg Oral Q8H PRN Jolyne , DO      senna  1 tablet Oral Daily Kinsey Billings MD      senna-docusate sodium  1 tablet Oral Daily PRN Joellen King MD      traZODone  50 mg Oral HS PRN Jolyne , DO         Risks, benefits and possible side effects of Medications:   Risks, benefits, and possible side effects of medications explained to patient and patient verbalizes understanding

## 2022-05-29 NOTE — NURSING NOTE
Patient c/o anxiety 3 5/4 and requesting medication  Asking him what else he has to address his anxiety   He was unable to use any other tools to help with his anxiety  Atarax 100 mg at 1650 which was successful in reducing his anxiety There were no other episodes of anxiety this evening and he was able remain relatively calm and cooperative

## 2022-05-29 NOTE — NURSING NOTE
Markos Parra has been isolative to room this evening  He reports pain decreased to 3/10 after taking tylenol  Pt reports " sometimes I hallucinate   " and could not elaborate  He denied SI/HI/AVH, denies hearing voices , and states his mother says lies about him  Markos Parra did not attend group and stated he would try tomorrow  He was compliant with HS medications

## 2022-05-29 NOTE — NURSING NOTE
Pt is medication and meal compliant  Pt is visible in the milieu and social with select peers  Prn atarax effective and patient reports anxiety is " better now"  Pt requesting prn nicorette gum as needed through out shift, at times putting chewed gum in pocket and asking for a new piece  Pt counseled and verbalized understanding as well as gum attempted to be cleaned from pocket  Pt otherwise denies all other s/s and is pleasant and polite in conversation and makes all needs known  Will continue to monitor

## 2022-05-29 NOTE — NURSING NOTE
Prn 100mg po atarax given as ordered for increased anxiety  Pt restless, pacing, wringing hands and unable to settle with other coping skills of reading news paper and listening to music  Will continue to monitor

## 2022-05-30 PROCEDURE — 99232 SBSQ HOSP IP/OBS MODERATE 35: CPT | Performed by: PHYSICIAN ASSISTANT

## 2022-05-30 RX ADMIN — CLOZAPINE 200 MG: 200 TABLET ORAL at 08:23

## 2022-05-30 RX ADMIN — LAMOTRIGINE 25 MG: 25 TABLET ORAL at 08:23

## 2022-05-30 RX ADMIN — NICOTINE POLACRILEX 2 MG: 2 GUM, CHEWING ORAL at 07:34

## 2022-05-30 RX ADMIN — NICOTINE POLACRILEX 2 MG: 2 GUM, CHEWING ORAL at 15:58

## 2022-05-30 RX ADMIN — LEVETIRACETAM 500 MG: 500 TABLET, FILM COATED ORAL at 21:06

## 2022-05-30 RX ADMIN — LEVETIRACETAM 500 MG: 500 TABLET, FILM COATED ORAL at 08:23

## 2022-05-30 RX ADMIN — ASPIRIN 81 MG CHEWABLE TABLET 81 MG: 81 TABLET CHEWABLE at 08:23

## 2022-05-30 RX ADMIN — ACETAMINOPHEN 325MG 975 MG: 325 TABLET ORAL at 16:09

## 2022-05-30 RX ADMIN — METOPROLOL SUCCINATE 50 MG: 50 TABLET, EXTENDED RELEASE ORAL at 08:23

## 2022-05-30 RX ADMIN — NICOTINE POLACRILEX 2 MG: 2 GUM, CHEWING ORAL at 18:21

## 2022-05-30 RX ADMIN — NICOTINE POLACRILEX 2 MG: 2 GUM, CHEWING ORAL at 21:09

## 2022-05-30 RX ADMIN — NICOTINE POLACRILEX 2 MG: 2 GUM, CHEWING ORAL at 14:23

## 2022-05-30 RX ADMIN — NICOTINE POLACRILEX 2 MG: 2 GUM, CHEWING ORAL at 11:03

## 2022-05-30 RX ADMIN — CLOZAPINE 200 MG: 200 TABLET ORAL at 21:06

## 2022-05-30 RX ADMIN — SENNOSIDES 8.6 MG: 8.6 TABLET, FILM COATED ORAL at 08:23

## 2022-05-30 NOTE — NURSING NOTE
Jorge Munroe is at the nursing office window several times in a short period of time, asking about something  He loves the attention from staff  He requested and was given Nicorette gum at 1558  At [phone] he stated he had severe pain,#8,in his left ankle and needed something to relieve the pain  He said the pain was so bad, that he could not attend the group going on at the time  He was given Tylenol 975 mg po prn for a #8 pain,po prn for relief  A hour later he said the pain was gone and that he really didn't need the med after all  Afew minutes later he said he was having back pain  Writer told patient that the Tylenol he was given a hour or so ago, should be helping his back pain  He then said he needs to talk with the doctor about his allergies  Seven minute patient checks maintained  He walks the unit, not much peer interaction noted

## 2022-05-30 NOTE — NURSING NOTE
Pt is medication compliant, refused breakfast, but ate lunch  Pt is anxious during interaction, disorganized at times, but pleasant and polite  Pt denies psychiatric s/s otherwise  Pt complains of "dry air irritating his allergies", provider made aware  Pt utilizes nicorette gum through out shift and makes all needs known frequently  Pt is social with select peers, paces and uses phone or rests in bed  Pt offers no other complaints otherwise  Will continue to monitor

## 2022-05-30 NOTE — NURSING NOTE
Renan Salmeron has asked several times this evening when he would start taking his metformin to help with his loosing weight  He is visible on the unit and social with his peers  Voiced no issues or complaints  Seven minute patient checks maintained

## 2022-05-31 PROCEDURE — 99232 SBSQ HOSP IP/OBS MODERATE 35: CPT | Performed by: PSYCHIATRY & NEUROLOGY

## 2022-05-31 RX ORDER — GABAPENTIN 100 MG/1
100 CAPSULE ORAL 3 TIMES DAILY
Status: DISCONTINUED | OUTPATIENT
Start: 2022-05-31 | End: 2022-06-01

## 2022-05-31 RX ADMIN — LEVETIRACETAM 500 MG: 500 TABLET, FILM COATED ORAL at 21:02

## 2022-05-31 RX ADMIN — NICOTINE POLACRILEX 2 MG: 2 GUM, CHEWING ORAL at 11:38

## 2022-05-31 RX ADMIN — ASPIRIN 81 MG CHEWABLE TABLET 81 MG: 81 TABLET CHEWABLE at 08:20

## 2022-05-31 RX ADMIN — LEVETIRACETAM 500 MG: 500 TABLET, FILM COATED ORAL at 08:21

## 2022-05-31 RX ADMIN — METOPROLOL SUCCINATE 50 MG: 50 TABLET, EXTENDED RELEASE ORAL at 08:20

## 2022-05-31 RX ADMIN — GABAPENTIN 100 MG: 100 CAPSULE ORAL at 21:02

## 2022-05-31 RX ADMIN — NICOTINE POLACRILEX 2 MG: 2 GUM, CHEWING ORAL at 16:52

## 2022-05-31 RX ADMIN — LAMOTRIGINE 25 MG: 25 TABLET ORAL at 08:20

## 2022-05-31 RX ADMIN — NICOTINE POLACRILEX 2 MG: 2 GUM, CHEWING ORAL at 18:51

## 2022-05-31 RX ADMIN — CLOZAPINE 200 MG: 200 TABLET ORAL at 21:02

## 2022-05-31 RX ADMIN — AMMONIUM LACTATE: 17 LOTION TOPICAL at 17:07

## 2022-05-31 RX ADMIN — GABAPENTIN 100 MG: 100 CAPSULE ORAL at 15:50

## 2022-05-31 RX ADMIN — NICOTINE POLACRILEX 2 MG: 2 GUM, CHEWING ORAL at 14:28

## 2022-05-31 RX ADMIN — ACETAMINOPHEN 650 MG: 325 TABLET ORAL at 15:50

## 2022-05-31 RX ADMIN — METFORMIN HYDROCHLORIDE 500 MG: 500 TABLET ORAL at 15:50

## 2022-05-31 RX ADMIN — GABAPENTIN 100 MG: 100 CAPSULE ORAL at 12:44

## 2022-05-31 RX ADMIN — SENNOSIDES 8.6 MG: 8.6 TABLET, FILM COATED ORAL at 08:21

## 2022-05-31 RX ADMIN — NICOTINE POLACRILEX 2 MG: 2 GUM, CHEWING ORAL at 08:20

## 2022-05-31 RX ADMIN — CLOZAPINE 200 MG: 200 TABLET ORAL at 08:20

## 2022-05-31 NOTE — PLAN OF CARE
Problem: Alteration in Thoughts and Perception  Goal: Treatment Goal: Gain control of psychotic behaviors/thinking, reduce/eliminate presenting symptoms and demonstrate improved reality functioning upon discharge  Outcome: Progressing  Goal: Verbalize thoughts and feelings  Description: Interventions:  - Promote a nonjudgmental and trusting relationship with the patient through active listening and therapeutic communication  - Assess patient's level of functioning, behavior and potential for risk  - Engage patient in 1 on 1 interactions  - Encourage patient to express fears, feelings, frustrations, and discuss symptoms    - [place] patient to reality, help patient recognize reality-based thinking   - Administer medications as ordered and assess for potential side effects  - Provide the patient education related to the signs and symptoms of the illness and desired effects of prescribed medications  Interventions:  - Assess and re-assess patient's lethality and potential for self-injury  - Engage patient in 1:1 interactions, daily, for a minimum of 15 minutes  - Encourage patient to express feelings, fears, frustrations, hopes  - Establish rapport/trust with patient   Interventions:  - Assess and re-assess patient's level of risk   - Engage patient in 1:1 interactions, daily, for a minimum of 15 minutes   - Encourage patient to express feelings, fears, frustrations, hopes   Interventions:  - Assess and re-assess patient's level of risk, every waking shift  - Engage patient in 1:1 interactions, daily, for a minimum of 15 minutes   - Allow patient to express feelings and frustrations in a safe and non-threatening manner   - Establish rapport/trust with patient   Outcome: Progressing  Goal: Refrain from acting on delusional thinking/internal stimuli  Description: Interventions:  - Monitor patient closely, per order   - Utilize least restrictive measures   - Set reasonable limits, give positive feedback for acceptable - Administer medications as ordered and monitor of potential side effects  Outcome: Progressing  Goal: Agree to be compliant with medication regime, as prescribed and report medication side effects  Description: Interventions:  - Offer appropriate PRN medication and supervise ingestion; conduct AIMS, as needed   Outcome: Progressing  Goal: Recognize dysfunctional thoughts, communicate reality-based thoughts at the time of discharge  Description: Interventions:  - Provide medication and psycho-education to assist patient in compliance and developing insight into his/her illness   Outcome: Progressing     Problem: Ineffective Coping  Goal: Cooperates with admission process  Description: Interventions:   - Complete admission process  Outcome: Progressing  Goal: Identifies ineffective coping skills  Outcome: Progressing  Goal: Identifies healthy coping skills  Outcome: Progressing  Goal: Demonstrates healthy coping skills  Outcome: Progressing  Goal: Participates in unit activities  Description: Interventions:  - Provide therapeutic environment   - Provide required programming   - Redirect inappropriate behaviors   Outcome: Progressing  Goal: Patient/Family participate in treatment and DC plans  Description: Interventions:  - Provide therapeutic environment  Outcome: Progressing  Goal: Patient/Family verbalizes awareness of resources  Outcome: Progressing  Goal: Understands least restrictive measures  Description: Interventions:  - Utilize least restrictive behavior  Outcome: Progressing  Goal: Free from restraint events  Description: - Utilize least restrictive measures   - Provide behavioral interventions   - Redirect inappropriate behaviors   Outcome: Progressing     Problem: Risk for Self Injury/Neglect  Goal: Verbalize thoughts and feelings  Description: Interventions:  - Promote a nonjudgmental and trusting relationship with the patient through active listening and therapeutic communication  - Assess patient's level of functioning, behavior and potential for risk  - Engage patient in 1 on 1 interactions  - Encourage patient to express fears, feelings, frustrations, and discuss symptoms    - [place] patient to reality, help patient recognize reality-based thinking   - Administer medications as ordered and assess for potential side effects  - Provide the patient education related to the signs and symptoms of the illness and desired effects of prescribed medications  Interventions:  - Assess and re-assess patient's lethality and potential for self-injury  - Engage patient in 1:1 interactions, daily, for a minimum of 15 minutes  - Encourage patient to express feelings, fears, frustrations, hopes  - Establish rapport/trust with patient   Interventions:  - Assess and re-assess patient's level of risk   - Engage patient in 1:1 interactions, daily, for a minimum of 15 minutes   - Encourage patient to express feelings, fears, frustrations, hopes   Interventions:  - Assess and re-assess patient's level of risk, every waking shift  - Engage patient in 1:1 interactions, daily, for a minimum of 15 minutes   - Allow patient to express feelings and frustrations in a safe and non-threatening manner   - Establish rapport/trust with patient   Outcome: Progressing  Goal: Treatment Goal: Remain safe during length of stay, learn and adopt new coping skills, and be free of self-injurious ideation, impulses and acts at the time of discharge  Outcome: Progressing  Goal: Refrain from harming self  Description: Interventions:  - Monitor patient closely, per order  - Develop a trusting relationship  - Supervise medication ingestion, monitor effects and side effects   Outcome: Progressing  Goal: Recognize maladaptive responses and adopt new coping mechanisms  Outcome: Progressing     Problem: Depression  Goal: Verbalize thoughts and feelings  Description: Interventions:  - Promote a nonjudgmental and trusting relationship with the patient through active listening and therapeutic communication  - Assess patient's level of functioning, behavior and potential for risk  - Engage patient in 1 on 1 interactions  - Encourage patient to express fears, feelings, frustrations, and discuss symptoms    - [place] patient to reality, help patient recognize reality-based thinking   - Administer medications as ordered and assess for potential side effects  - Provide the patient education related to the signs and symptoms of the illness and desired effects of prescribed medications  Interventions:  - Assess and re-assess patient's lethality and potential for self-injury  - Engage patient in 1:1 interactions, daily, for a minimum of 15 minutes  - Encourage patient to express feelings, fears, frustrations, hopes  - Establish rapport/trust with patient   Interventions:  - Assess and re-assess patient's level of risk   - Engage patient in 1:1 interactions, daily, for a minimum of 15 minutes   - Encourage patient to express feelings, fears, frustrations, hopes   Interventions:  - Assess and re-assess patient's level of risk, every waking shift  - Engage patient in 1:1 interactions, daily, for a minimum of 15 minutes   - Allow patient to express feelings and frustrations in a safe and non-threatening manner   - Establish rapport/trust with patient   Outcome: Progressing  Goal: Treatment Goal: Demonstrate behavioral control of depressive symptoms, verbalize feelings of improved mood/affect, and adopt new coping skills prior to discharge  Outcome: Progressing  Goal: Refrain from harming self  Description: Interventions:  - Monitor patient closely, per order   - Supervise medication ingestion, monitor effects and side effects   Outcome: Progressing  Goal: Refrain from isolation  Description: Interventions:  - Develop a trusting relationship   - Encourage socialization   Outcome: Progressing  Goal: Refrain from self-neglect  Outcome: Progressing     Problem: Anxiety  Goal: Anxiety is at manageable level  Description: Interventions:  - Assess and monitor patient's anxiety level  - Monitor for signs and symptoms (heart palpitations, chest pain, shortness of breath, headaches, nausea, feeling jumpy, restlessness, irritable, apprehensive)  - Collaborate with interdisciplinary team and initiate plan and interventions as ordered    - [place] patient to unit/surroundings  - Explain treatment plan  - Encourage participation in care  - Encourage verbalization of concerns/fears  - Identify coping mechanisms  - Assist in developing anxiety-reducing skills  - Administer/offer alternative therapies  - Limit or eliminate stimulants  Outcome: Progressing     Problem: Risk for Violence/Aggression Toward Others  Goal: Verbalize thoughts and feelings  Description: Interventions:  - Promote a nonjudgmental and trusting relationship with the patient through active listening and therapeutic communication  - Assess patient's level of functioning, behavior and potential for risk  - Engage patient in 1 on 1 interactions  - Encourage patient to express fears, feelings, frustrations, and discuss symptoms    - [place] patient to reality, help patient recognize reality-based thinking   - Administer medications as ordered and assess for potential side effects  - Provide the patient education related to the signs and symptoms of the illness and desired effects of prescribed medications  Interventions:  - Assess and re-assess patient's lethality and potential for self-injury  - Engage patient in 1:1 interactions, daily, for a minimum of 15 minutes  - Encourage patient to express feelings, fears, frustrations, hopes  - Establish rapport/trust with patient   Interventions:  - Assess and re-assess patient's level of risk   - Engage patient in 1:1 interactions, daily, for a minimum of 15 minutes   - Encourage patient to express feelings, fears, frustrations, hopes   Interventions:  - Assess and re-assess patient's level of risk, every waking shift  - Engage patient in 1:1 interactions, daily, for a minimum of 15 minutes   - Allow patient to express feelings and frustrations in a safe and non-threatening manner   - Establish rapport/trust with patient   Outcome: Progressing  Goal: Treatment Goal: Refrain from acts of violence/aggression during length of stay, and demonstrate improved impulse control at the time of discharge  Outcome: Progressing  Goal: Refrain from harming others  Outcome: Progressing  Goal: Refrain from destructive acts on the environment or property  Outcome: Progressing  Goal: Control angry outbursts  Description: Interventions:  - Monitor patient closely, per order  - Ensure early verbal de-escalation  - Monitor prn medication needs  - Set reasonable/therapeutic limits, outline behavioral expectations, and consequences   - Provide a non-threatening milieu, utilizing the least restrictive interventions   Outcome: Progressing  Goal: Identify appropriate positive anger management techniques  Description: Interventions:  - Offer anger management and coping skills groups   - Staff will provide positive feedback for appropriate anger control  Outcome: Progressing     Problem: DISCHARGE PLANNING - CARE MANAGEMENT  Goal: Discharge to post-acute care or home with appropriate resources  Description: INTERVENTIONS:  - Conduct assessment to determine patient/family and health care team treatment goals, and need for post-acute services based on payer coverage, community resources, and patient preferences, and barriers to discharge  - Address psychosocial, clinical, and financial barriers to discharge as identified in assessment in conjunction with the patient/family and health care team  - Arrange appropriate level of post-acute services according to patients   needs and preference and payer coverage in collaboration with the physician and health care team  - Communicate with and update the patient/family, physician, and health care team regarding progress on the discharge plan  - Arrange appropriate transportation to post-acute venues  Outcome: Progressing     Problem: SUBSTANCE USE/ABUSE  Goal: By discharge, will develop insight into their chemical dependency and sustain motivation to continue in recovery  Description: INTERVENTIONS:  - Attends all daily group sessions and scheduled AA groups  - Actively practices coping skills through participation in the therapeutic community and adherence to program rules  - Reviews and completes assignments from individual treatment plan  - Assist patient development of understanding of their personal cycle of addiction and relapse triggers  Outcome: Progressing  Goal: By discharge, patient will have ongoing treatment plan addressing chemical dependency  Description: INTERVENTIONS:  - Assist patient with resources and/or appointments for ongoing recovery based living  Outcome: Progressing

## 2022-05-31 NOTE — PROGRESS NOTES
05/31/22 0930   Team Meeting   Meeting Type Daily Rounds   Initial Conference Date 05/31/22   Patient/Family Present   Patient Present No   Patient's Family Present No       Daily Rounds  Alberto graham MD, Chepe Geronimo RN, Aniket ARANGO  Case reviewed  Tylenol used frequently for leg pain  Trazodone and Atarax prns given  Rated 2 5/4 anxiety on 28th  3/7 groups  Fixated on discharge and returning home

## 2022-05-31 NOTE — PROGRESS NOTES
Progress Note - Behavioral Health   Lv Rivas 24 y o  male MRN: [id number]  Unit/Bed#: [place] 980-31 Encounter: [id number]    Assessment/Plan   Principal Problem:    Schizophrenia [facility])  Active Problems:    Medical clearance for psychiatric admission    Seizure disorder [facility])    Traumatic brain injury ([address] )    Brain lesion    History of cardiac radiofrequency ablation    History of fall    PTSD (post-traumatic stress disorder)      Subjective: Patient was seen, chart reviewed and case discussed with team    Patient is seen out in milieu  Patient is anxious see this writer  The patient anxious and he is cooperative but he is focused on his mother and what she wants  Patient is focused on the metformin and Neurontin medications that his mom keeps bringing up to him  Patient was relieved to find that he will be started on Neurontin today  Regarding the metformin, the patient was ordered metformin specifically for antipsychotic induced metabolic syndrome  This was found in records sent from previous hospital and not in epic  Patient does not endorse any suicidal homicidal ideation  The patient does not endorse any auditory visual hallucinations        Psychiatric Review of Systems:  Behavior over the last 24 hours:  unchanged  Sleep:  Adequate  Appetite:  Variable  Medication side effects:  None verbalized  ROS: no complaints, all others negative    Current Medications:  Current Facility-Administered Medications   Medication Dose Route Frequency    acetaminophen (TYLENOL) tablet 650 mg  650 mg Oral Q6H PRN    acetaminophen (TYLENOL) tablet 650 mg  650 mg Oral Q4H PRN    acetaminophen (TYLENOL) tablet 975 mg  975 mg Oral Q6H PRN    aluminum-magnesium hydroxide-simethicone (MYLANTA) oral suspension 30 mL  30 mL Oral Q4H PRN    ammonium lactate (LAC-HYDRIN) 12 % lotion   Topical BID    artificial tear (LUBRIFRESH P M ) ophthalmic ointment 1 application  1 application Both Eyes U5G PRN    aspirin chewable tablet 81 mg  81 mg Oral Daily    benztropine (COGENTIN) injection 1 mg  1 mg Intramuscular Q4H PRN Max 6/day    benztropine (COGENTIN) tablet 1 mg  1 mg Oral Q4H PRN Max 6/day    clozapine (CLOZARIL) tablet 200 mg  200 mg Oral BID    hydrOXYzine HCL (ATARAX) tablet 50 mg  50 mg Oral Q6H PRN Max 4/day    Or    diphenhydrAMINE (BENADRYL) injection 50 mg  50 mg Intramuscular Q6H PRN    gabapentin (NEURONTIN) capsule 100 mg  100 mg Oral TID    hydrOXYzine HCL (ATARAX) tablet 100 mg  100 mg Oral Q6H PRN Max 4/day    Or    LORazepam (ATIVAN) injection 2 mg  2 mg Intramuscular Q6H PRN    hydrOXYzine HCL (ATARAX) tablet 25 mg  25 mg Oral Q6H PRN Max 4/day    lamoTRIgine (LaMICtal) tablet 25 mg  25 mg Oral Daily    levETIRAcetam (KEPPRA) tablet 500 mg  500 mg Oral Q12H [address]    melatonin tablet 9 mg  9 mg Oral HS PRN    metFORMIN (GLUCOPHAGE) tablet 500 mg  500 mg Oral BID With Meals    metoprolol succinate (TOPROL-XL) 24 hr tablet 50 mg  50 mg Oral Daily    nicotine polacrilex (NICORETTE) gum 2 mg  2 mg Oral Q2H PRN    OLANZapine (ZyPREXA) tablet 10 mg  10 mg Oral Q3H PRN Max 3/day    Or    OLANZapine (ZyPREXA) IM injection 10 mg  10 mg Intramuscular Q3H PRN Max 3/day    OLANZapine (ZyPREXA) tablet 5 mg  5 mg Oral Q3H PRN Max 6/day    Or    OLANZapine (ZyPREXA) IM injection 5 mg  5 mg Intramuscular Q3H PRN Max 6/day    OLANZapine (ZyPREXA) tablet 2 5 mg  2 5 mg Oral Q3H PRN Max 8/day    polyethylene glycol (MIRALAX) packet 17 g  17 g Oral Daily PRN    propranolol (INDERAL) tablet 10 mg  10 mg Oral Q8H PRN    senna (SENOKOT) tablet 8 6 mg  1 tablet Oral Daily    senna-docusate sodium (SENOKOT S) 8 6-50 mg per tablet 1 tablet  1 tablet Oral Daily PRN    traZODone (DESYREL) tablet 50 mg  50 mg Oral HS PRN       Behavioral Health Medications: all current active meds have been reviewed      Vitals:  Vitals:    05/31/22 0700   BP: 144/90   Pulse: 100   Resp: 20   Temp: 97 5 °F (36 4 °C)   SpO2: Laboratory results:    I have personally reviewed all pertinent laboratory/tests results  Most Recent Labs:   Lab Results   Component Value Date    WBC 9 04 05/27/2022    RBC 5 09 05/27/2022    HGB 14 8 05/27/2022    HCT 46 1 05/27/2022     05/27/2022    RDW 12 0 05/27/2022    NEUTROABS 5 42 05/27/2022    SODIUM 139 05/27/2022    K 3 9 05/27/2022     05/27/2022    CO2 22 05/27/2022    BUN 13 05/27/2022    CREATININE 0 67 (L) 05/27/2022    GLUC 94 05/27/2022    GLUF 94 05/27/2022    CALCIUM 9 2 05/27/2022    AST 20 05/27/2022    ALT 16 05/27/2022    ALKPHOS 141 (H) 05/27/2022    TP 7 4 05/27/2022    ALB 4 3 05/27/2022    TBILI 0 56 05/27/2022    CHOLESTEROL 181 05/27/2022    HDL 28 (L) 05/27/2022    TRIG 210 (H) 05/27/2022    LDLCALC 111 05/27/2022    [place] 153 05/27/2022    DMI2WRXJPZVI 1 640 05/27/2022    RPR Non-Reactive 05/27/2022    HGBA1C 5 0 05/27/2022    EAG 97 05/27/2022       Mental Status Evaluation:  Appearance:  casually dressed   Behavior:  Anxious but cooperative   Speech:  normal pitch and normal volume   Mood:  anxious   Affect:  blunted   Thought Process:  concrete and perserverative   Thought Content:  No overt delusions were verbalized   Perceptual Disturbances: None   Risk Potential: Suicidal Ideations none and Homicidal Ideations none   Sensorium:  person, place and time/date   Consciousness:  alert and awake    Attention: attention span appeared shorter than expected for age   Insight:  Poor   Judgment: limited     Progress Toward Goals:  No significant changes over the last 24 hours  Recommended Treatment: Continue with pharmacotherapy, group therapy, milieu therapy and occupational therapy  1  Neurontin 100 mg 3 times daily for anxiety; metformin 500 mg twice daily with meals for antipsychotic induced metabolic syndrome    2  Discharge planning    Risks, benefits and possible side effects of Medications:   Risks, benefits, and possible side effects of medications explained to patient and patient verbalizes understanding

## 2022-05-31 NOTE — NURSING NOTE
Pt isolative to room, pleasant upon approach and cooperative with staff  Pt reported skin irritation above ears due to the wearing face mask  Area cleaned and advised pt to avoid using mask while sleeping or not in the milieu  Compliant with medications and meals  Attended 1 group in morning  Will continue to monitor for safety and support

## 2022-05-31 NOTE — PROGRESS NOTES
05/31/22 1243   [facility] Admission Notification   Notification of Admission Provided to:  Therapist   Therapist Notified via: Phone call  (voicemail left at [id number] [facility],[address], contact info given for call back request)

## 2022-05-31 NOTE — NURSING NOTE
Cyril Cooper was in bed at shift change  Per Q 7 minute safety checks , pt appeared to sleep about 7 hours  No behavior  Incidence overnight

## 2022-05-31 NOTE — NURSING NOTE
Mark Jeong has been approaching the nursing station frequently this evening with numerous requests  He asked for coffee and was redirected , asked staff to help make his bed , and was encouraged to attempt to do it himself  [place] Jeong requested nicotine gum twice within the 2 hour window , and then when it was offered he declined it , only to change his mind 2 minutes later  Sweet [place]ramya Jeong took HS medications without incidence

## 2022-06-01 PROCEDURE — 99232 SBSQ HOSP IP/OBS MODERATE 35: CPT | Performed by: PSYCHIATRY & NEUROLOGY

## 2022-06-01 RX ORDER — GABAPENTIN 300 MG/1
300 CAPSULE ORAL 3 TIMES DAILY
Status: DISCONTINUED | OUTPATIENT
Start: 2022-06-01 | End: 2022-06-03

## 2022-06-01 RX ADMIN — LEVETIRACETAM 500 MG: 500 TABLET, FILM COATED ORAL at 21:29

## 2022-06-01 RX ADMIN — CLOZAPINE 200 MG: 200 TABLET ORAL at 21:29

## 2022-06-01 RX ADMIN — GABAPENTIN 100 MG: 100 CAPSULE ORAL at 08:39

## 2022-06-01 RX ADMIN — NICOTINE POLACRILEX 2 MG: 2 GUM, CHEWING ORAL at 17:53

## 2022-06-01 RX ADMIN — LAMOTRIGINE 25 MG: 25 TABLET ORAL at 08:39

## 2022-06-01 RX ADMIN — SENNOSIDES 8.6 MG: 8.6 TABLET, FILM COATED ORAL at 08:39

## 2022-06-01 RX ADMIN — METFORMIN HYDROCHLORIDE 500 MG: 500 TABLET ORAL at 08:39

## 2022-06-01 RX ADMIN — AMMONIUM LACTATE 1 APPLICATION: 17 LOTION TOPICAL at 17:28

## 2022-06-01 RX ADMIN — NICOTINE POLACRILEX 2 MG: 2 GUM, CHEWING ORAL at 15:43

## 2022-06-01 RX ADMIN — HYDROXYZINE HYDROCHLORIDE 50 MG: 50 TABLET, FILM COATED ORAL at 13:28

## 2022-06-01 RX ADMIN — ASPIRIN 81 MG CHEWABLE TABLET 81 MG: 81 TABLET CHEWABLE at 08:39

## 2022-06-01 RX ADMIN — NICOTINE POLACRILEX 2 MG: 2 GUM, CHEWING ORAL at 08:40

## 2022-06-01 RX ADMIN — METFORMIN HYDROCHLORIDE 500 MG: 500 TABLET ORAL at 16:52

## 2022-06-01 RX ADMIN — METOPROLOL SUCCINATE 50 MG: 50 TABLET, EXTENDED RELEASE ORAL at 08:39

## 2022-06-01 RX ADMIN — NICOTINE POLACRILEX 2 MG: 2 GUM, CHEWING ORAL at 12:55

## 2022-06-01 RX ADMIN — NICOTINE POLACRILEX 2 MG: 2 GUM, CHEWING ORAL at 21:29

## 2022-06-01 RX ADMIN — LEVETIRACETAM 500 MG: 500 TABLET, FILM COATED ORAL at 08:39

## 2022-06-01 RX ADMIN — GABAPENTIN 300 MG: 300 CAPSULE ORAL at 16:52

## 2022-06-01 RX ADMIN — GABAPENTIN 300 MG: 300 CAPSULE ORAL at 21:29

## 2022-06-01 RX ADMIN — CLOZAPINE 200 MG: 200 TABLET ORAL at 08:39

## 2022-06-01 NOTE — PROGRESS NOTES
06/01/22 1032   Team Meeting   Meeting Type Daily Rounds   Initial Conference Date 06/01/22   Patient/Family Present   Patient Present No   Patient's Family Present No       Daily Rounds  Alberto graham MD, Chepe Bryan RN, Renu Viramontes RN, Henry ARANGO  Case reviewed  No behavioral issues  Neurontin x 3 and metformin BID added  Tylenol prn for ankle pain  3/7 groups

## 2022-06-01 NOTE — NURSING NOTE
Pt is medication and meal compliant  Pt is visible in the milieu and social with select peers  Pt denies s/s, but reports 3/4 anxiety and appears anxious during conversation, pacing unit and complains of "feeling flushed", pt given prn po atarax 50mg per a rating of moderate anxiety on scale   Pt attends groups and able to make needs known  Pt fixated on discharge during conversation and "worry", but could not elaborate  Pt currently resting in bed  Will continue to monitor

## 2022-06-01 NOTE — NURSING NOTE
Jessica Conroy maintained on ongoing SAFE precaution without incident on this shift   Observed laying in bed with eyes closed, breath even and easy   Continuous q7 minutes rounding implemented    No behavior noted   Will continue to monitor

## 2022-06-01 NOTE — PROGRESS NOTES
Progress Note - Behavioral Health   Meredith Patterson 24 y o  male MRN: [id number]  Unit/Bed#: [facility] 991-43 Encounter: [id number]    Assessment/Plan   Principal Problem:    Schizophrenia [facility])  Active Problems:    Medical clearance for psychiatric admission    Seizure disorder [facility])    Traumatic brain injury ([address] )    Brain lesion    History of cardiac radiofrequency ablation    History of fall    PTSD (post-traumatic stress disorder)      Subjective: Patient was seen, chart reviewed and case discussed with team    Patient seen in treatment team and out in milieu  The patient is cooperative but mostly anxious  Patient had reported being sad and patient presents anxious  The patient is focused on discharge  Patient has poor insight into mental illness  Patient has been indecisive in that he reports being sad but not depressed he does endorse having traumatic brain injury and now knowledge is PTSD but is unsure of the schizophrenia as a diagnosis  Patient's agitation and anxiety to increase when interacting with mom it seems  Patient has been sleeping and eating adequately  The patient has been medication compliant            Psychiatric Review of Systems:  Behavior over the last 24 hours:  unchanged  Sleep: normal  Appetite: normal  Medication side effects:  None verbalized  ROS: Leg pain, all others negative    Current Medications:  Current Facility-Administered Medications   Medication Dose Route Frequency    acetaminophen (TYLENOL) tablet 650 mg  650 mg Oral Q6H PRN    acetaminophen (TYLENOL) tablet 650 mg  650 mg Oral Q4H PRN    acetaminophen (TYLENOL) tablet 975 mg  975 mg Oral Q6H PRN    aluminum-magnesium hydroxide-simethicone (MYLANTA) oral suspension 30 mL  30 mL Oral Q4H PRN    ammonium lactate (LAC-HYDRIN) 12 % lotion   Topical BID    artificial tear (LUBRIFRESH P M ) ophthalmic ointment 1 application  1 application Both Eyes P6J PRN    aspirin chewable tablet 81 mg  81 mg Oral Daily    benztropine (COGENTIN) injection 1 mg  1 mg Intramuscular Q4H PRN Max 6/day    benztropine (COGENTIN) tablet 1 mg  1 mg Oral Q4H PRN Max 6/day    clozapine (CLOZARIL) tablet 200 mg  200 mg Oral BID    hydrOXYzine HCL (ATARAX) tablet 50 mg  50 mg Oral Q6H PRN Max 4/day    Or    diphenhydrAMINE (BENADRYL) injection 50 mg  50 mg Intramuscular Q6H PRN    gabapentin (NEURONTIN) capsule 100 mg  100 mg Oral TID    hydrOXYzine HCL (ATARAX) tablet 100 mg  100 mg Oral Q6H PRN Max 4/day    Or    LORazepam (ATIVAN) injection 2 mg  2 mg Intramuscular Q6H PRN    hydrOXYzine HCL (ATARAX) tablet 25 mg  25 mg Oral Q6H PRN Max 4/day    lamoTRIgine (LaMICtal) tablet 25 mg  25 mg Oral Daily    levETIRAcetam (KEPPRA) tablet 500 mg  500 mg Oral Q12H [address]    melatonin tablet 9 mg  9 mg Oral HS PRN    metFORMIN (GLUCOPHAGE) tablet 500 mg  500 mg Oral BID With Meals    metoprolol succinate (TOPROL-XL) 24 hr tablet 50 mg  50 mg Oral Daily    nicotine polacrilex (NICORETTE) gum 2 mg  2 mg Oral Q2H PRN    OLANZapine (ZyPREXA) tablet 10 mg  10 mg Oral Q3H PRN Max 3/day    Or    OLANZapine (ZyPREXA) IM injection 10 mg  10 mg Intramuscular Q3H PRN Max 3/day    OLANZapine (ZyPREXA) tablet 5 mg  5 mg Oral Q3H PRN Max 6/day    Or    OLANZapine (ZyPREXA) IM injection 5 mg  5 mg Intramuscular Q3H PRN Max 6/day    OLANZapine (ZyPREXA) tablet 2 5 mg  2 5 mg Oral Q3H PRN Max 8/day    polyethylene glycol (MIRALAX) packet 17 g  17 g Oral Daily PRN    propranolol (INDERAL) tablet 10 mg  10 mg Oral Q8H PRN    senna (SENOKOT) tablet 8 6 mg  1 tablet Oral Daily    senna-docusate sodium (SENOKOT S) 8 6-50 mg per tablet 1 tablet  1 tablet Oral Daily PRN    traZODone (DESYREL) tablet 50 mg  50 mg Oral HS PRN       Behavioral Health Medications: all current active meds have been reviewed      Vitals:  Vitals:    06/01/22 0839   BP: 142/80   Pulse: 105   Resp:    Temp:    SpO2:        Laboratory results:    I have personally reviewed all pertinent laboratory/tests results  Most Recent Labs:   Lab Results   Component Value Date    WBC 9 04 05/27/2022    RBC 5 09 05/27/2022    HGB 14 8 05/27/2022    HCT 46 1 05/27/2022     05/27/2022    RDW 12 0 05/27/2022    NEUTROABS 5 42 05/27/2022    SODIUM 139 05/27/2022    K 3 9 05/27/2022     05/27/2022    CO2 22 05/27/2022    BUN 13 05/27/2022    CREATININE 0 67 (L) 05/27/2022    GLUC 94 05/27/2022    GLUF 94 05/27/2022    CALCIUM 9 2 05/27/2022    AST 20 05/27/2022    ALT 16 05/27/2022    ALKPHOS 141 (H) 05/27/2022    TP 7 4 05/27/2022    ALB 4 3 05/27/2022    TBILI 0 56 05/27/2022    CHOLESTEROL 181 05/27/2022    HDL 28 (L) 05/27/2022    TRIG 210 (H) 05/27/2022    LDLCALC 111 05/27/2022    [place] 153 05/27/2022    LEY2IBCVIHSC 1 640 05/27/2022    RPR Non-Reactive 05/27/2022    HGBA1C 5 0 05/27/2022    EAG 97 05/27/2022       Mental Status Evaluation:  Appearance:  casually dressed   Behavior:  Anxious but cooperative   Speech:  normal pitch and normal volume   Mood:  anxious and sad   Affect:  constricted   Thought Process:  concrete and perserverative   Thought Content:  No overt delusions expressed   Perceptual Disturbances: Patient denies   Risk Potential: Suicidal Ideations none and Homicidal Ideations none   Sensorium:  person, place and time/date   Cognition:  recent and remote memory grossly intact   Consciousness:  alert and awake    Attention: attention span appeared shorter than expected for age   Insight:  Poor   Judgment: limited   Gait/Station: Does not always use his cane   Motor Activity: no abnormal movements     Progress Toward Goals:  No significant changes over the last 24 hours    Recommended Treatment: Continue with pharmacotherapy, group therapy, milieu therapy and occupational therapy  1  Will increase Neurontin to 300 mg 3 times a day for anxiety  2   Discharge planning    Risks, benefits and possible side effects of Medications:   Risks, benefits, and possible side effects of medications explained to patient and patient verbalizes understanding

## 2022-06-01 NOTE — PLAN OF CARE
Problem: Alteration in Thoughts and Perception  Goal: Verbalize thoughts and feelings  Description: Interventions:  - Promote a nonjudgmental and trusting relationship with the patient through active listening and therapeutic communication  - Assess patient's level of functioning, behavior and potential for risk  - Engage patient in 1 on 1 interactions  - Encourage patient to express fears, feelings, frustrations, and discuss symptoms    - [place] patient to reality, help patient recognize reality-based thinking   - Administer medications as ordered and assess for potential side effects  - Provide the patient education related to the signs and symptoms of the illness and desired effects of prescribed medications  Interventions:  - Assess and re-assess patient's lethality and potential for self-injury  - Engage patient in 1:1 interactions, daily, for a minimum of 15 minutes  - Encourage patient to express feelings, fears, frustrations, hopes  - Establish rapport/trust with patient   Interventions:  - Assess and re-assess patient's level of risk   - Engage patient in 1:1 interactions, daily, for a minimum of 15 minutes   - Encourage patient to express feelings, fears, frustrations, hopes   Interventions:  - Assess and re-assess patient's level of risk, every waking shift  - Engage patient in 1:1 interactions, daily, for a minimum of 15 minutes   - Allow patient to express feelings and frustrations in a safe and non-threatening manner   - Establish rapport/trust with patient   Outcome: Progressing  Goal: Refrain from acting on delusional thinking/internal stimuli  Description: Interventions:  - Monitor patient closely, per order   - Utilize least restrictive measures   - Set reasonable limits, give positive feedback for acceptable   - Administer medications as ordered and monitor of potential side effects  Outcome: Progressing  Goal: Agree to be compliant with medication regime, as prescribed and report medication side effects  Description: Interventions:  - Offer appropriate PRN medication and supervise ingestion; conduct AIMS, as needed   Outcome: Progressing     Problem: Ineffective Coping  Goal: Demonstrates healthy coping skills  Outcome: Progressing  Goal: Participates in unit activities  Description: Interventions:  - Provide therapeutic environment   - Provide required programming   - Redirect inappropriate behaviors   Outcome: Progressing  Goal: Understands least restrictive measures  Description: Interventions:  - Utilize least restrictive behavior  Outcome: Progressing     Problem: Risk for Self Injury/Neglect  Goal: Verbalize thoughts and feelings  Description: Interventions:  - Promote a nonjudgmental and trusting relationship with the patient through active listening and therapeutic communication  - Assess patient's level of functioning, behavior and potential for risk  - Engage patient in 1 on 1 interactions  - Encourage patient to express fears, feelings, frustrations, and discuss symptoms    - [place] patient to reality, help patient recognize reality-based thinking   - Administer medications as ordered and assess for potential side effects  - Provide the patient education related to the signs and symptoms of the illness and desired effects of prescribed medications  Interventions:  - Assess and re-assess patient's lethality and potential for self-injury  - Engage patient in 1:1 interactions, daily, for a minimum of 15 minutes  - Encourage patient to express feelings, fears, frustrations, hopes  - Establish rapport/trust with patient   Interventions:  - Assess and re-assess patient's level of risk   - Engage patient in 1:1 interactions, daily, for a minimum of 15 minutes   - Encourage patient to express feelings, fears, frustrations, hopes   Interventions:  - Assess and re-assess patient's level of risk, every waking shift  - Engage patient in 1:1 interactions, daily, for a minimum of 15 minutes   - Allow patient to express feelings and frustrations in a safe and non-threatening manner   - Establish rapport/trust with patient   Outcome: Progressing     Problem: Depression  Goal: Verbalize thoughts and feelings  Description: Interventions:  - Promote a nonjudgmental and trusting relationship with the patient through active listening and therapeutic communication  - Assess patient's level of functioning, behavior and potential for risk  - Engage patient in 1 on 1 interactions  - Encourage patient to express fears, feelings, frustrations, and discuss symptoms    - [place] patient to reality, help patient recognize reality-based thinking   - Administer medications as ordered and assess for potential side effects  - Provide the patient education related to the signs and symptoms of the illness and desired effects of prescribed medications  Interventions:  - Assess and re-assess patient's lethality and potential for self-injury  - Engage patient in 1:1 interactions, daily, for a minimum of 15 minutes  - Encourage patient to express feelings, fears, frustrations, hopes  - Establish rapport/trust with patient   Interventions:  - Assess and re-assess patient's level of risk   - Engage patient in 1:1 interactions, daily, for a minimum of 15 minutes   - Encourage patient to express feelings, fears, frustrations, hopes   Interventions:  - Assess and re-assess patient's level of risk, every waking shift  - Engage patient in 1:1 interactions, daily, for a minimum of 15 minutes   - Allow patient to express feelings and frustrations in a safe and non-threatening manner   - Establish rapport/trust with patient   Outcome: Progressing  Goal: Refrain from isolation  Description: Interventions:  - Develop a trusting relationship   - Encourage socialization   Outcome: Progressing     Problem: Anxiety  Goal: Anxiety is at manageable level  Description: Interventions:  - Assess and monitor patient's anxiety level     - Monitor for signs and symptoms (heart palpitations, chest pain, shortness of breath, headaches, nausea, feeling jumpy, restlessness, irritable, apprehensive)  - Collaborate with interdisciplinary team and initiate plan and interventions as ordered    - [place] patient to unit/surroundings  - Explain treatment plan  - Encourage participation in care  - Encourage verbalization of concerns/fears  - Identify coping mechanisms  - Assist in developing anxiety-reducing skills  - Administer/offer alternative therapies  - Limit or eliminate stimulants  Outcome: Progressing     Problem: Risk for Violence/Aggression Toward Others  Goal: Verbalize thoughts and feelings  Description: Interventions:  - Promote a nonjudgmental and trusting relationship with the patient through active listening and therapeutic communication  - Assess patient's level of functioning, behavior and potential for risk  - Engage patient in 1 on 1 interactions  - Encourage patient to express fears, feelings, frustrations, and discuss symptoms    - [place] patient to reality, help patient recognize reality-based thinking   - Administer medications as ordered and assess for potential side effects  - Provide the patient education related to the signs and symptoms of the illness and desired effects of prescribed medications  Interventions:  - Assess and re-assess patient's lethality and potential for self-injury  - Engage patient in 1:1 interactions, daily, for a minimum of 15 minutes  - Encourage patient to express feelings, fears, frustrations, hopes  - Establish rapport/trust with patient   Interventions:  - Assess and re-assess patient's level of risk   - Engage patient in 1:1 interactions, daily, for a minimum of 15 minutes   - Encourage patient to express feelings, fears, frustrations, hopes   Interventions:  - Assess and re-assess patient's level of risk, every waking shift  - Engage patient in 1:1 interactions, daily, for a minimum of 15 minutes   - Allow patient to express feelings and frustrations in a safe and non-threatening manner   - Establish rapport/trust with patient   Outcome: Progressing

## 2022-06-01 NOTE — PROGRESS NOTES
06/01/22 1033   Team Meeting   Meeting Type Tx Team Meeting   Initial Conference Date 06/01/22   Next Conference Date 06/08/22   Team Members Present   Team Members Present Physician;; Other (Discipline and Name)   Physician Team Member Surendra CAMACHO   Social Work Team Member Henry ARANGO   Other (Discipline and Name) [facility] VIMAL Karimi)   Patient/Family Present   Patient Present Yes   Patient's Family Present Yes   Family Relationship Parent   Parent Rosario       Treatment Team Summary  · Patient present: yes  · Family/ Supports present: mother Rosario- by phone  · Self assessment: completed  · Appearance / Presentation: groomed, wearing casual street clothing, fair eye contact, fixated on discharge and tends to be repetitive, tangential and often does not answer questions directly / responds with his own questions he is fixated on   · Symptoms reported: sadness "not depression" ptsd, 'feeling bored, disinterested' 'feeling irritable'   · Main barrier: 'pain in my leg'   · Coping Skills: talk as much as possible, be friendly  · Goal last week / accomplished: show respect  · Goal for this week: attend more groups  · Learning medications: knows some /   · Reactions to medications: denied  · Medical Issues: ankle pain  · Self Care: focus, stay calm, make friends  · Medication changes: recently metformin and Neurontin were added  · Main topics discussed: patient asked a few times "how long will I be here for" asked his mother "how long are you going to make me stay here" and "I have ptsd, but the schizophrenia thing    I know I had traumatic brain injury but if they're saying there's something wrong with me there must be   " His mother also spent time asking questions about his medication, about Clozapine and the treatment plan, and received clarification from psychiatrist    · Treatment Plan Review: not due  · Discharge Planning: to return home with [facility] services in place (to resume OP with them)

## 2022-06-01 NOTE — NURSING NOTE
Pt isolative to room most of the shift, visible for meals and medications  Pleasant and cooperative with staff  Demonstrates poor insight and memory, asked multiple times what time his metformin is due, asked, "is K2 a good drug? I haven't done it in years "  Med and meal compliant  Reported 6/10 left ankle pain @1550 was given 650mg tylenol with positive effect  Demonstrates good hygiene  Will continue to monitor for safety and support

## 2022-06-01 NOTE — CMS CERTIFICATION NOTE
Recertification: Based upon physical, mental and social evaluations, I certify that inpatient psychiatric services continue to be medically necessary for this patient for a duration of 12 midnights for the treatment of  Schizophrenia [facility])   Available alternative community resources still do not meet the patient's mental health care needs  I further attest that an established written individualized plan of care has been updated and is outlined in the patient's medical records

## 2022-06-02 PROCEDURE — 99232 SBSQ HOSP IP/OBS MODERATE 35: CPT | Performed by: PSYCHIATRY & NEUROLOGY

## 2022-06-02 RX ADMIN — GABAPENTIN 300 MG: 300 CAPSULE ORAL at 08:21

## 2022-06-02 RX ADMIN — HYDROXYZINE HYDROCHLORIDE 100 MG: 50 TABLET, FILM COATED ORAL at 09:39

## 2022-06-02 RX ADMIN — CLOZAPINE 200 MG: 200 TABLET ORAL at 08:21

## 2022-06-02 RX ADMIN — ASPIRIN 81 MG CHEWABLE TABLET 81 MG: 81 TABLET CHEWABLE at 08:21

## 2022-06-02 RX ADMIN — NICOTINE POLACRILEX 2 MG: 2 GUM, CHEWING ORAL at 13:33

## 2022-06-02 RX ADMIN — METFORMIN HYDROCHLORIDE 500 MG: 500 TABLET ORAL at 15:42

## 2022-06-02 RX ADMIN — LEVETIRACETAM 500 MG: 500 TABLET, FILM COATED ORAL at 21:15

## 2022-06-02 RX ADMIN — LEVETIRACETAM 500 MG: 500 TABLET, FILM COATED ORAL at 08:21

## 2022-06-02 RX ADMIN — CLOZAPINE 200 MG: 200 TABLET ORAL at 21:15

## 2022-06-02 RX ADMIN — NICOTINE POLACRILEX 2 MG: 2 GUM, CHEWING ORAL at 10:46

## 2022-06-02 RX ADMIN — NICOTINE POLACRILEX 2 MG: 2 GUM, CHEWING ORAL at 15:42

## 2022-06-02 RX ADMIN — SENNOSIDES 8.6 MG: 8.6 TABLET, FILM COATED ORAL at 08:21

## 2022-06-02 RX ADMIN — NICOTINE POLACRILEX 2 MG: 2 GUM, CHEWING ORAL at 08:21

## 2022-06-02 RX ADMIN — NICOTINE POLACRILEX 2 MG: 2 GUM, CHEWING ORAL at 20:24

## 2022-06-02 RX ADMIN — METOPROLOL SUCCINATE 50 MG: 50 TABLET, EXTENDED RELEASE ORAL at 08:21

## 2022-06-02 RX ADMIN — GABAPENTIN 300 MG: 300 CAPSULE ORAL at 15:42

## 2022-06-02 RX ADMIN — GABAPENTIN 300 MG: 300 CAPSULE ORAL at 21:15

## 2022-06-02 RX ADMIN — LAMOTRIGINE 25 MG: 25 TABLET ORAL at 08:21

## 2022-06-02 RX ADMIN — METFORMIN HYDROCHLORIDE 500 MG: 500 TABLET ORAL at 08:21

## 2022-06-02 RX ADMIN — NICOTINE POLACRILEX 2 MG: 2 GUM, CHEWING ORAL at 18:18

## 2022-06-02 NOTE — NURSING NOTE
Kendell Victoria woke up and ask for his blood pressure to be taken  /63  P103  This writer ask him did he have a nightmare or was he anxious  He denies anxiety and did want anything  This writer assure him that he is fine  And vital will be recheck in a few hours

## 2022-06-02 NOTE — NURSING NOTE
Patient was pleasant, cooperative and medication compliant this morning  Pt did report anxiety 4/4 and requested and given PRN Atarax 100mg at 0939  Pt reports medication was effective , rates now 1 5/4 at 1051  Will monitor

## 2022-06-02 NOTE — PROGRESS NOTES
Progress Note - Behavioral Health   Collette Santana 24 y o  male MRN: [id number]  [facility]-68 Encounter: [id number]    Assessment/Plan   Principal Problem:    Schizophrenia [facility])  Active Problems:    Medical clearance for psychiatric admission    Seizure disorder [facility])    Traumatic brain injury ([address] )    Brain lesion    History of cardiac radiofrequency ablation    History of fall    PTSD (post-traumatic stress disorder)      Subjective: Patient was seen, chart reviewed and case discussed with team    The patient seen out in milieu  Patient is calm, pleasant and cooperative  Patient reports being very anxious and hoping that the Neurontin will help bring that down  Assured patient that the Neurontin is still being titrated  Patient does go to groups  Patient reports eating and sleeping adequately    Patient is compliant with medications and without serious side effects being reported        Psychiatric Review of Systems:  Behavior over the last 24 hours:  unchanged  Sleep: normal  Appetite: normal  Medication side effects:  None verbalized  ROS: no complaints, all others negative    Current Medications:  Current Facility-Administered Medications   Medication Dose Route Frequency    acetaminophen (TYLENOL) tablet 650 mg  650 mg Oral Q6H PRN    acetaminophen (TYLENOL) tablet 650 mg  650 mg Oral Q4H PRN    acetaminophen (TYLENOL) tablet 975 mg  975 mg Oral Q6H PRN    aluminum-magnesium hydroxide-simethicone (MYLANTA) oral suspension 30 mL  30 mL Oral Q4H PRN    ammonium lactate (LAC-HYDRIN) 12 % lotion   Topical BID    artificial tear (LUBRIFRESH P M ) ophthalmic ointment 1 application  1 application Both Eyes L3U PRN    aspirin chewable tablet 81 mg  81 mg Oral Daily    benztropine (COGENTIN) injection 1 mg  1 mg Intramuscular Q4H PRN Max 6/day    benztropine (COGENTIN) tablet 1 mg  1 mg Oral Q4H PRN Max 6/day    clozapine (CLOZARIL) tablet 200 mg  200 mg Oral BID    hydrOXYzine HCL (ATARAX) tablet 50 mg  50 mg Oral Q6H PRN Max 4/day    Or    diphenhydrAMINE (BENADRYL) injection 50 mg  50 mg Intramuscular Q6H PRN    gabapentin (NEURONTIN) capsule 300 mg  300 mg Oral TID    hydrOXYzine HCL (ATARAX) tablet 100 mg  100 mg Oral Q6H PRN Max 4/day    Or    LORazepam (ATIVAN) injection 2 mg  2 mg Intramuscular Q6H PRN    hydrOXYzine HCL (ATARAX) tablet 25 mg  25 mg Oral Q6H PRN Max 4/day    levETIRAcetam (KEPPRA) tablet 500 mg  500 mg Oral Q12H [address]    melatonin tablet 9 mg  9 mg Oral HS PRN    metFORMIN (GLUCOPHAGE) tablet 500 mg  500 mg Oral BID With Meals    metoprolol succinate (TOPROL-XL) 24 hr tablet 50 mg  50 mg Oral Daily    nicotine polacrilex (NICORETTE) gum 2 mg  2 mg Oral Q2H PRN    OLANZapine (ZyPREXA) tablet 10 mg  10 mg Oral Q3H PRN Max 3/day    Or    OLANZapine (ZyPREXA) IM injection 10 mg  10 mg Intramuscular Q3H PRN Max 3/day    OLANZapine (ZyPREXA) tablet 5 mg  5 mg Oral Q3H PRN Max 6/day    Or    OLANZapine (ZyPREXA) IM injection 5 mg  5 mg Intramuscular Q3H PRN Max 6/day    OLANZapine (ZyPREXA) tablet 2 5 mg  2 5 mg Oral Q3H PRN Max 8/day    polyethylene glycol (MIRALAX) packet 17 g  17 g Oral Daily PRN    propranolol (INDERAL) tablet 10 mg  10 mg Oral Q8H PRN    senna (SENOKOT) tablet 8 6 mg  1 tablet Oral Daily    senna-docusate sodium (SENOKOT S) 8 6-50 mg per tablet 1 tablet  1 tablet Oral Daily PRN    traZODone (DESYREL) tablet 50 mg  50 mg Oral HS PRN       Behavioral Health Medications: all current active meds have been reviewed  Vitals:  Vitals:    06/02/22 0700   BP: 118/56   Pulse: (!) 120   Resp: 18   Temp: 97 6 °F (36 4 °C)   SpO2:        Laboratory results:    I have personally reviewed all pertinent laboratory/tests results    Most Recent Labs:   Lab Results   Component Value Date    WBC 9 04 05/27/2022    RBC 5 09 05/27/2022    HGB 14 8 05/27/2022    HCT 46 1 05/27/2022     05/27/2022    RDW 12 0 05/27/2022    NEUTROABS 5 42 05/27/2022    SODIUM 139 05/27/2022    K 3 9 05/27/2022     05/27/2022    CO2 22 05/27/2022    BUN 13 05/27/2022    CREATININE 0 67 (L) 05/27/2022    GLUC 94 05/27/2022    GLUF 94 05/27/2022    CALCIUM 9 2 05/27/2022    AST 20 05/27/2022    ALT 16 05/27/2022    ALKPHOS 141 (H) 05/27/2022    TP 7 4 05/27/2022    ALB 4 3 05/27/2022    TBILI 0 56 05/27/2022    CHOLESTEROL 181 05/27/2022    HDL 28 (L) 05/27/2022    TRIG 210 (H) 05/27/2022    LDLCALC 111 05/27/2022    [place] 153 05/27/2022    IAR0HVKHUTVA 1 640 05/27/2022    RPR Non-Reactive 05/27/2022    HGBA1C 5 0 05/27/2022    EAG 97 05/27/2022       Mental Status Evaluation:  Appearance:  casually dressed and Adequate grooming and hygiene   Behavior:  Anxious but cooperative   Speech:  normal pitch and normal volume   Mood:  anxious   Affect:  normal and Anxious   Thought Process:  concrete and perserverative   Thought Content:  No overt delusions expressed   Perceptual Disturbances: Patient denies   Risk Potential: Suicidal Ideations none, Homicidal Ideations none and Potential for Aggression No   Sensorium:  person, place and time/date   Cognition:  recent and remote memory grossly intact   Consciousness:  alert and awake    Attention: attention span appeared shorter than expected for age   Insight:  limited   Judgment: limited   Gait/Station: Does not always use his cane   Motor Activity: no abnormal movements     Progress Toward Goals:  No significant changes in behaviors or status over the last 24 hours    Recommended Treatment: Continue with pharmacotherapy, group therapy, milieu therapy and occupational therapy  1  Continue current medications and treatments for now  2  Discharge planning    Risks, benefits and possible side effects of Medications:   Risks, benefits, and possible side effects of medications explained to patient and patient verbalizes understanding

## 2022-06-02 NOTE — NURSING NOTE
Jhon Rivera has been awake, alert, and visible intermittently out in the milieu  Pt ate 100% supper  Pt requested prn Nicotine gum and 2 mg po prn given at 1543, 1753, and 2129  Pt stated to staff that "I want to play with myself but I don't want to do it in here "  Pt asked writer about who he should ask to get an HIV test done  Writer instructed pt to speak to his doctor about this  Pt needy and at nurse's station frequently  Pt attended and participated in coping skills group and wrap up group  Compliant with scheduled meds and cooperative with mouth checks  Continue to monitor/assess for any changes

## 2022-06-02 NOTE — PLAN OF CARE
Problem: Alteration in Thoughts and Perception  Goal: Treatment Goal: Gain control of psychotic behaviors/thinking, reduce/eliminate presenting symptoms and demonstrate improved reality functioning upon discharge  Outcome: Progressing  Goal: Verbalize thoughts and feelings  Description: Interventions:  - Promote a nonjudgmental and trusting relationship with the patient through active listening and therapeutic communication  - Assess patient's level of functioning, behavior and potential for risk  - Engage patient in 1 on 1 interactions  - Encourage patient to express fears, feelings, frustrations, and discuss symptoms    - [place] patient to reality, help patient recognize reality-based thinking   - Administer medications as ordered and assess for potential side effects  - Provide the patient education related to the signs and symptoms of the illness and desired effects of prescribed medications  Interventions:  - Assess and re-assess patient's lethality and potential for self-injury  - Engage patient in 1:1 interactions, daily, for a minimum of 15 minutes  - Encourage patient to express feelings, fears, frustrations, hopes  - Establish rapport/trust with patient   Interventions:  - Assess and re-assess patient's level of risk   - Engage patient in 1:1 interactions, daily, for a minimum of 15 minutes   - Encourage patient to express feelings, fears, frustrations, hopes   Interventions:  - Assess and re-assess patient's level of risk, every waking shift  - Engage patient in 1:1 interactions, daily, for a minimum of 15 minutes   - Allow patient to express feelings and frustrations in a safe and non-threatening manner   - Establish rapport/trust with patient   Outcome: Progressing  Goal: Refrain from acting on delusional thinking/internal stimuli  Description: Interventions:  - Monitor patient closely, per order   - Utilize least restrictive measures   - Set reasonable limits, give positive feedback for acceptable - Administer medications as ordered and monitor of potential side effects  Outcome: Progressing  Goal: Agree to be compliant with medication regime, as prescribed and report medication side effects  Description: Interventions:  - Offer appropriate PRN medication and supervise ingestion; conduct AIMS, as needed   Outcome: Progressing  Goal: Recognize dysfunctional thoughts, communicate reality-based thoughts at the time of discharge  Description: Interventions:  - Provide medication and psycho-education to assist patient in compliance and developing insight into his/her illness   Outcome: Progressing     Problem: Ineffective Coping  Goal: Participates in unit activities  Description: Interventions:  - Provide therapeutic environment   - Provide required programming   - Redirect inappropriate behaviors   Outcome: Progressing  Goal: Free from restraint events  Description: - Utilize least restrictive measures   - Provide behavioral interventions   - Redirect inappropriate behaviors   Outcome: Progressing     Problem: Risk for Self Injury/Neglect  Goal: Verbalize thoughts and feelings  Description: Interventions:  - Promote a nonjudgmental and trusting relationship with the patient through active listening and therapeutic communication  - Assess patient's level of functioning, behavior and potential for risk  - Engage patient in 1 on 1 interactions  - Encourage patient to express fears, feelings, frustrations, and discuss symptoms    - [place] patient to reality, help patient recognize reality-based thinking   - Administer medications as ordered and assess for potential side effects  - Provide the patient education related to the signs and symptoms of the illness and desired effects of prescribed medications  Interventions:  - Assess and re-assess patient's lethality and potential for self-injury  - Engage patient in 1:1 interactions, daily, for a minimum of 15 minutes  - Encourage patient to express feelings, fears, frustrations, hopes  - Establish rapport/trust with patient   Interventions:  - Assess and re-assess patient's level of risk   - Engage patient in 1:1 interactions, daily, for a minimum of 15 minutes   - Encourage patient to express feelings, fears, frustrations, hopes   Interventions:  - Assess and re-assess patient's level of risk, every waking shift  - Engage patient in 1:1 interactions, daily, for a minimum of 15 minutes   - Allow patient to express feelings and frustrations in a safe and non-threatening manner   - Establish rapport/trust with patient   Outcome: Progressing  Goal: Treatment Goal: Remain safe during length of stay, learn and adopt new coping skills, and be free of self-injurious ideation, impulses and acts at the time of discharge  Outcome: Progressing  Goal: Refrain from harming self  Description: Interventions:  - Monitor patient closely, per order  - Develop a trusting relationship  - Supervise medication ingestion, monitor effects and side effects   Outcome: Progressing  Goal: Recognize maladaptive responses and adopt new coping mechanisms  Outcome: Progressing     Problem: Depression  Goal: Verbalize thoughts and feelings  Description: Interventions:  - Promote a nonjudgmental and trusting relationship with the patient through active listening and therapeutic communication  - Assess patient's level of functioning, behavior and potential for risk  - Engage patient in 1 on 1 interactions  - Encourage patient to express fears, feelings, frustrations, and discuss symptoms    - [place] patient to reality, help patient recognize reality-based thinking   - Administer medications as ordered and assess for potential side effects  - Provide the patient education related to the signs and symptoms of the illness and desired effects of prescribed medications  Interventions:  - Assess and re-assess patient's lethality and potential for self-injury  - Engage patient in 1:1 interactions, daily, for a minimum of 15 minutes  - Encourage patient to express feelings, fears, frustrations, hopes  - Establish rapport/trust with patient   Interventions:  - Assess and re-assess patient's level of risk   - Engage patient in 1:1 interactions, daily, for a minimum of 15 minutes   - Encourage patient to express feelings, fears, frustrations, hopes   Interventions:  - Assess and re-assess patient's level of risk, every waking shift  - Engage patient in 1:1 interactions, daily, for a minimum of 15 minutes   - Allow patient to express feelings and frustrations in a safe and non-threatening manner   - Establish rapport/trust with patient   Outcome: Progressing  Goal: Treatment Goal: Demonstrate behavioral control of depressive symptoms, verbalize feelings of improved mood/affect, and adopt new coping skills prior to discharge  Outcome: Progressing  Goal: Refrain from harming self  Description: Interventions:  - Monitor patient closely, per order   - Supervise medication ingestion, monitor effects and side effects   Outcome: Progressing  Goal: Refrain from isolation  Description: Interventions:  - Develop a trusting relationship   - Encourage socialization   Outcome: Progressing  Goal: Refrain from self-neglect  Outcome: Progressing     Problem: Anxiety  Goal: Anxiety is at manageable level  Description: Interventions:  - Assess and monitor patient's anxiety level  - Monitor for signs and symptoms (heart palpitations, chest pain, shortness of breath, headaches, nausea, feeling jumpy, restlessness, irritable, apprehensive)  - Collaborate with interdisciplinary team and initiate plan and interventions as ordered    - [employer] patient to unit/surroundings  - Explain treatment plan  - Encourage participation in care  - Encourage verbalization of concerns/fears  - Identify coping mechanisms  - Assist in developing anxiety-reducing skills  - Administer/offer alternative therapies  - Limit or eliminate stimulants  Outcome: Progressing Problem: Risk for Violence/Aggression Toward Others  Goal: Verbalize thoughts and feelings  Description: Interventions:  - Promote a nonjudgmental and trusting relationship with the patient through active listening and therapeutic communication  - Assess patient's level of functioning, behavior and potential for risk  - Engage patient in 1 on 1 interactions  - Encourage patient to express fears, feelings, frustrations, and discuss symptoms    - [place] patient to reality, help patient recognize reality-based thinking   - Administer medications as ordered and assess for potential side effects  - Provide the patient education related to the signs and symptoms of the illness and desired effects of prescribed medications  Interventions:  - Assess and re-assess patient's lethality and potential for self-injury  - Engage patient in 1:1 interactions, daily, for a minimum of 15 minutes  - Encourage patient to express feelings, fears, frustrations, hopes  - Establish rapport/trust with patient   Interventions:  - Assess and re-assess patient's level of risk   - Engage patient in 1:1 interactions, daily, for a minimum of 15 minutes   - Encourage patient to express feelings, fears, frustrations, hopes   Interventions:  - Assess and re-assess patient's level of risk, every waking shift  - Engage patient in 1:1 interactions, daily, for a minimum of 15 minutes   - Allow patient to express feelings and frustrations in a safe and non-threatening manner   - Establish rapport/trust with patient   Outcome: Progressing     Problem: Ineffective Coping  Goal: Cooperates with admission process  Description: Interventions:   - Complete admission process  Outcome: Completed

## 2022-06-02 NOTE — NURSING NOTE
Tatiana Ochoa maintained on ongoing SAFE precaution without incident on this shift   Observed laying in bed with eyes closed, breath even and easy   Continuous q7 minutes rounding implemented    No behavior noted   Will continue to monitor

## 2022-06-02 NOTE — PROGRESS NOTES
06/02/22 1131   Team Meeting   Meeting Type Daily Rounds   Initial Conference Date 06/02/22   Patient/Family Present   Patient Present No   Patient's Family Present No       Daily Rounds  Alberto graham DO, Chepe Whittington RN, Jacob Godoy LSW  Case reviewed:    Neurontin increased from 100 to 300mg  Reported his BP medication isn't working (pulse taken and was high in evening; will continue to monitor)  4/7 groups  Reported anxiety 3/4, Atarax prn given and effective  No behavioral issues

## 2022-06-03 PROCEDURE — 99232 SBSQ HOSP IP/OBS MODERATE 35: CPT | Performed by: PSYCHIATRY & NEUROLOGY

## 2022-06-03 RX ORDER — GABAPENTIN 400 MG/1
400 CAPSULE ORAL 3 TIMES DAILY
Status: DISCONTINUED | OUTPATIENT
Start: 2022-06-03 | End: 2022-07-01 | Stop reason: HOSPADM

## 2022-06-03 RX ADMIN — LEVETIRACETAM 500 MG: 500 TABLET, FILM COATED ORAL at 21:19

## 2022-06-03 RX ADMIN — NICOTINE POLACRILEX 2 MG: 2 GUM, CHEWING ORAL at 17:42

## 2022-06-03 RX ADMIN — METFORMIN HYDROCHLORIDE 500 MG: 500 TABLET ORAL at 08:03

## 2022-06-03 RX ADMIN — METOPROLOL SUCCINATE 50 MG: 50 TABLET, EXTENDED RELEASE ORAL at 08:03

## 2022-06-03 RX ADMIN — GABAPENTIN 300 MG: 300 CAPSULE ORAL at 08:04

## 2022-06-03 RX ADMIN — NICOTINE POLACRILEX 2 MG: 2 GUM, CHEWING ORAL at 19:58

## 2022-06-03 RX ADMIN — GABAPENTIN 400 MG: 400 CAPSULE ORAL at 21:19

## 2022-06-03 RX ADMIN — CLOZAPINE 200 MG: 200 TABLET ORAL at 08:03

## 2022-06-03 RX ADMIN — NICOTINE POLACRILEX 2 MG: 2 GUM, CHEWING ORAL at 08:19

## 2022-06-03 RX ADMIN — ASPIRIN 81 MG CHEWABLE TABLET 81 MG: 81 TABLET CHEWABLE at 08:03

## 2022-06-03 RX ADMIN — NICOTINE POLACRILEX 2 MG: 2 GUM, CHEWING ORAL at 15:21

## 2022-06-03 RX ADMIN — ACETAMINOPHEN 325MG 975 MG: 325 TABLET ORAL at 08:18

## 2022-06-03 RX ADMIN — NICOTINE POLACRILEX 2 MG: 2 GUM, CHEWING ORAL at 21:58

## 2022-06-03 RX ADMIN — HYDROXYZINE HYDROCHLORIDE 50 MG: 50 TABLET, FILM COATED ORAL at 13:45

## 2022-06-03 RX ADMIN — METFORMIN HYDROCHLORIDE 500 MG: 500 TABLET ORAL at 15:48

## 2022-06-03 RX ADMIN — LEVETIRACETAM 500 MG: 500 TABLET, FILM COATED ORAL at 08:03

## 2022-06-03 RX ADMIN — GABAPENTIN 400 MG: 400 CAPSULE ORAL at 15:48

## 2022-06-03 RX ADMIN — NICOTINE POLACRILEX 2 MG: 2 GUM, CHEWING ORAL at 13:02

## 2022-06-03 RX ADMIN — SENNOSIDES 8.6 MG: 8.6 TABLET, FILM COATED ORAL at 08:04

## 2022-06-03 RX ADMIN — CLOZAPINE 200 MG: 200 TABLET ORAL at 21:19

## 2022-06-03 RX ADMIN — ACETAMINOPHEN 650 MG: 325 TABLET ORAL at 13:44

## 2022-06-03 NOTE — PROGRESS NOTES
06/03/22 1225   Team Meeting   Meeting Type Daily Rounds   Initial Conference Date 06/03/22   Patient/Family Present   Patient Present No   Patient's Family Present No       Daily Rounds  Alberto graham MD, Corwin Payan, [address] RN, Radhames MARCUSW  Case reviewed  Rated high anxiety, 4/4, received Atarax 100 mg (then rated anxiety 1 5/4)  Heart rate was elevated; continuing to monitor  Tylenol given for 10/10 ankle pain  Incontinent around 2100; showered after  3/5 groups

## 2022-06-03 NOTE — NURSING NOTE
Sharlene Morales is mostly isolative to self and room  Pt continues with poor insight and believes he does not need to be here  Pt exhibits poor memory retention and will ask for something forget he asked and proceed to ask the same questions  Pt ambulates with cane when reminded and gait remains unsteady at times  Social with select peers  Denies all psych symptoms at this time  Compliant with meds/meals  Consumed 50% of breakfast, 50% lunch and refused dinner

## 2022-06-03 NOTE — PROGRESS NOTES
06/03/22 1100   Activity/Group Checklist   Group Wellness   Attendance Did not attend   Affect/Mood RENUKA

## 2022-06-03 NOTE — PROGRESS NOTES
Progress Note - Behavioral Health   Madison Mackenzie 24 y o  male MRN: [id number]  [facility] 606-36 Encounter: [id number]    Assessment/Plan   Principal Problem:    Schizophrenia [facility])  Active Problems:    Medical clearance for psychiatric admission    Seizure disorder [facility])    Traumatic brain injury ([address] )    Brain lesion    History of cardiac radiofrequency ablation    History of fall    PTSD (post-traumatic stress disorder)      Subjective: Patient was seen, chart reviewed and case discussed with team    Patient seen on milieu  He is anxious but cooperative  Patient reports that he is anxious and has been given Atarax at times  Patient focused on discharge and keeps asking precisely when she will be leaving  Informed patient stays usually over several weeks or more  Patient is not happy about that response, but handled well  Patient reports he is sleeping and eating adequately the patient has been medication compliant and without any side effects reported or noted            Psychiatric Review of Systems:  Behavior over the last 24 hours:  unchanged  Sleep: normal  Appetite: normal  Medication side effects: No  ROS: no complaints, all others negative    Current Medications:  Current Facility-Administered Medications   Medication Dose Route Frequency    acetaminophen (TYLENOL) tablet 650 mg  650 mg Oral Q6H PRN    acetaminophen (TYLENOL) tablet 650 mg  650 mg Oral Q4H PRN    acetaminophen (TYLENOL) tablet 975 mg  975 mg Oral Q6H PRN    aluminum-magnesium hydroxide-simethicone (MYLANTA) oral suspension 30 mL  30 mL Oral Q4H PRN    ammonium lactate (LAC-HYDRIN) 12 % lotion   Topical BID    artificial tear (LUBRIFRESH P M ) ophthalmic ointment 1 application  1 application Both Eyes S9Y PRN    aspirin chewable tablet 81 mg  81 mg Oral Daily    benztropine (COGENTIN) injection 1 mg  1 mg Intramuscular Q4H PRN Max 6/day    benztropine (COGENTIN) tablet 1 mg  1 mg Oral Q4H PRN Max 6/day    clozapine (CLOZARIL) tablet 200 mg  200 mg Oral BID    hydrOXYzine HCL (ATARAX) tablet 50 mg  50 mg Oral Q6H PRN Max 4/day    Or    diphenhydrAMINE (BENADRYL) injection 50 mg  50 mg Intramuscular Q6H PRN    gabapentin (NEURONTIN) capsule 300 mg  300 mg Oral TID    hydrOXYzine HCL (ATARAX) tablet 100 mg  100 mg Oral Q6H PRN Max 4/day    Or    LORazepam (ATIVAN) injection 2 mg  2 mg Intramuscular Q6H PRN    hydrOXYzine HCL (ATARAX) tablet 25 mg  25 mg Oral Q6H PRN Max 4/day    levETIRAcetam (KEPPRA) tablet 500 mg  500 mg Oral Q12H [address]    melatonin tablet 9 mg  9 mg Oral HS PRN    metFORMIN (GLUCOPHAGE) tablet 500 mg  500 mg Oral BID With Meals    metoprolol succinate (TOPROL-XL) 24 hr tablet 50 mg  50 mg Oral Daily    nicotine polacrilex (NICORETTE) gum 2 mg  2 mg Oral Q2H PRN    OLANZapine (ZyPREXA) tablet 10 mg  10 mg Oral Q3H PRN Max 3/day    Or    OLANZapine (ZyPREXA) IM injection 10 mg  10 mg Intramuscular Q3H PRN Max 3/day    OLANZapine (ZyPREXA) tablet 5 mg  5 mg Oral Q3H PRN Max 6/day    Or    OLANZapine (ZyPREXA) IM injection 5 mg  5 mg Intramuscular Q3H PRN Max 6/day    OLANZapine (ZyPREXA) tablet 2 5 mg  2 5 mg Oral Q3H PRN Max 8/day    polyethylene glycol (MIRALAX) packet 17 g  17 g Oral Daily PRN    propranolol (INDERAL) tablet 10 mg  10 mg Oral Q8H PRN    senna (SENOKOT) tablet 8 6 mg  1 tablet Oral Daily    senna-docusate sodium (SENOKOT S) 8 6-50 mg per tablet 1 tablet  1 tablet Oral Daily PRN    traZODone (DESYREL) tablet 50 mg  50 mg Oral HS PRN       Behavioral Health Medications: all current active meds have been reviewed  Vitals:  Vitals:    06/03/22 0700   BP: 146/80   Pulse: 92   Resp: 18   Temp: 97 6 °F (36 4 °C)   SpO2:        Laboratory results:    I have personally reviewed all pertinent laboratory/tests results    Most Recent Labs:   Lab Results   Component Value Date    WBC 9 04 05/27/2022    RBC 5 09 05/27/2022    HGB 14 8 05/27/2022    HCT 46 1 05/27/2022     05/27/2022    RDW 12 0 05/27/2022    NEUTROABS 5 42 05/27/2022    SODIUM 139 05/27/2022    K 3 9 05/27/2022     05/27/2022    CO2 22 05/27/2022    BUN 13 05/27/2022    CREATININE 0 67 (L) 05/27/2022    GLUC 94 05/27/2022    GLUF 94 05/27/2022    CALCIUM 9 2 05/27/2022    AST 20 05/27/2022    ALT 16 05/27/2022    ALKPHOS 141 (H) 05/27/2022    TP 7 4 05/27/2022    ALB 4 3 05/27/2022    TBILI 0 56 05/27/2022    CHOLESTEROL 181 05/27/2022    HDL 28 (L) 05/27/2022    TRIG 210 (H) 05/27/2022    LDLCALC 111 05/27/2022    [place] 153 05/27/2022    NVF2IADXYWCX 1 640 05/27/2022    RPR Non-Reactive 05/27/2022    HGBA1C 5 0 05/27/2022    EAG 97 05/27/2022       Mental Status Evaluation:  Appearance:  casually dressed   Behavior:  Anxious but cooperative   Speech:  normal pitch and normal volume   Mood:  anxious   Affect:  blunted   Thought Process:  concrete and perserverative   Thought Content:  No overt delusions reported   Perceptual Disturbances: Patient denies   Risk Potential: Suicidal Ideations none and Homicidal Ideations none   Sensorium:  person and place   Cognition:  recent and remote memory grossly intact   Consciousness:  alert and awake    Attention: attention span appeared shorter than expected for age   Insight:  limited   Judgment: limited   Gait/Station: normal gait/station and normal balance   Motor Activity: no abnormal movements     Progress Toward Goals:  No significant changes in behaviors or status over the last 24 hours  Recommended Treatment: Continue with pharmacotherapy, group therapy, milieu therapy and occupational therapy  1  Will increase Neurontin to 400 mg 3 times a day for anxiety  Clozaril level in the a m  2  Discharge planning    Risks, benefits and possible side effects of Medications:   Risks, benefits, and possible side effects of medications explained to patient and patient verbalizes understanding

## 2022-06-03 NOTE — NURSING NOTE
Pt stayed in room for majority of time, visible in community intermittently, attend select groups  Pleasant and cooperative with staff  Demonstrates poor insight and memory, will ask for something and then forget what he asked for or will repeat the same requests  Continues to ambulate with cane  Reported pain 3/10 on left ankle, was given an ice pack with positive effect  Pt reported he was incontinent of urine @2100, pt showered and changed clothes  Pt continues to deny psychiatric symptoms and believes he is only here to have medications stabilized  Pt's pulse has been elevated at times, 120 @7a, rechecked at 1100 was 109, 121 @1500, recheck at 1930 was 104  Will continue to monitor for safety and support

## 2022-06-03 NOTE — NURSING NOTE
Reported anxiety 3/4 and pain 6/10 for lower left leg/ankle  Given 50mg atarax and tylenol 650mg  Stated he does not know causes the anxiety, "I just felt it come on "  Will monitor for effectiveness

## 2022-06-04 PROCEDURE — 99232 SBSQ HOSP IP/OBS MODERATE 35: CPT | Performed by: PHYSICIAN ASSISTANT

## 2022-06-04 PROCEDURE — 80159 DRUG ASSAY CLOZAPINE: CPT | Performed by: PSYCHIATRY & NEUROLOGY

## 2022-06-04 RX ADMIN — SENNOSIDES 8.6 MG: 8.6 TABLET, FILM COATED ORAL at 08:08

## 2022-06-04 RX ADMIN — NICOTINE POLACRILEX 2 MG: 2 GUM, CHEWING ORAL at 08:08

## 2022-06-04 RX ADMIN — NICOTINE POLACRILEX 2 MG: 2 GUM, CHEWING ORAL at 16:45

## 2022-06-04 RX ADMIN — GABAPENTIN 400 MG: 400 CAPSULE ORAL at 17:00

## 2022-06-04 RX ADMIN — METFORMIN HYDROCHLORIDE 500 MG: 500 TABLET ORAL at 08:08

## 2022-06-04 RX ADMIN — LEVETIRACETAM 500 MG: 500 TABLET, FILM COATED ORAL at 08:08

## 2022-06-04 RX ADMIN — METOPROLOL SUCCINATE 50 MG: 50 TABLET, EXTENDED RELEASE ORAL at 08:08

## 2022-06-04 RX ADMIN — ACETAMINOPHEN 325MG 975 MG: 325 TABLET ORAL at 13:25

## 2022-06-04 RX ADMIN — METFORMIN HYDROCHLORIDE 500 MG: 500 TABLET ORAL at 17:12

## 2022-06-04 RX ADMIN — AMMONIUM LACTATE: 17 LOTION TOPICAL at 08:08

## 2022-06-04 RX ADMIN — NICOTINE POLACRILEX 2 MG: 2 GUM, CHEWING ORAL at 19:35

## 2022-06-04 RX ADMIN — LEVETIRACETAM 500 MG: 500 TABLET, FILM COATED ORAL at 21:08

## 2022-06-04 RX ADMIN — GABAPENTIN 400 MG: 400 CAPSULE ORAL at 08:08

## 2022-06-04 RX ADMIN — NICOTINE POLACRILEX 2 MG: 2 GUM, CHEWING ORAL at 10:20

## 2022-06-04 RX ADMIN — CLOZAPINE 200 MG: 200 TABLET ORAL at 08:08

## 2022-06-04 RX ADMIN — CLOZAPINE 200 MG: 200 TABLET ORAL at 21:08

## 2022-06-04 RX ADMIN — NICOTINE POLACRILEX 2 MG: 2 GUM, CHEWING ORAL at 12:42

## 2022-06-04 RX ADMIN — ASPIRIN 81 MG CHEWABLE TABLET 81 MG: 81 TABLET CHEWABLE at 08:08

## 2022-06-04 RX ADMIN — NICOTINE POLACRILEX 2 MG: 2 GUM, CHEWING ORAL at 14:44

## 2022-06-04 RX ADMIN — GABAPENTIN 400 MG: 400 CAPSULE ORAL at 21:08

## 2022-06-04 NOTE — NURSING NOTE
Pt came to nurse's station stating he would like a wheelchair due to his issues with walking on his L leg  Pt educated on the importance of maintaining mobility  Psych provider made aware and gave N O for PT eval and treat

## 2022-06-04 NOTE — PROGRESS NOTES
Progress Note - Timi [age] y o  male MRN: [id number]  [facility] Encounter: [id number]    Harold Carrel was seen for follow-up, chart reviewed and discussed with staff  Nursing staff notes he is restless at times and can be intrusive  Appetite has been fair her and sleep is adequate  Attending about half of the groups  No aggressive or agitated behavior  Patient is fixated on discharge and asking when he will leave  He is redirectable though and cooperative on interview  States that he feels anxious and depressed because he is still here in the hospital   Limited insight into hospitalization  He denies suicidal or homicidal ideation  Denies auditory or visual hallucinations  Reports that he is sleeping well and his appetite is adequate  Reported left lower extremity pain which he states is "his normal pain "  No other physical discomfort      Behavior over the last 24 hours:  unchanged  Sleep: normal  Appetite: normal  Medication side effects: No  ROS:  chronic left lower extremity pain  /81 (BP Location: Right arm)   Pulse (!) 109   Temp 97 7 °F (36 5 °C) (Temporal)   Resp 20   Ht 5' 11" (1 803 m)   Wt 118 kg (260 lb 6 4 oz)   SpO2 100%   BMI 36 32 kg/m²     Medications:   Current Facility-Administered Medications   Medication Dose Route Frequency    acetaminophen (TYLENOL) tablet 650 mg  650 mg Oral Q6H PRN    acetaminophen (TYLENOL) tablet 650 mg  650 mg Oral Q4H PRN    acetaminophen (TYLENOL) tablet 975 mg  975 mg Oral Q6H PRN    aluminum-magnesium hydroxide-simethicone (MYLANTA) oral suspension 30 mL  30 mL Oral Q4H PRN    ammonium lactate (LAC-HYDRIN) 12 % lotion   Topical BID    artificial tear (LUBRIFRESH P M ) ophthalmic ointment 1 application  1 application Both Eyes G4O PRN    aspirin chewable tablet 81 mg  81 mg Oral Daily    benztropine (COGENTIN) injection 1 mg  1 mg Intramuscular Q4H PRN Max 6/day    benztropine (COGENTIN) tablet 1 mg  1 mg Oral Q4H PRN Max 6/day    clozapine (CLOZARIL) tablet 200 mg  200 mg Oral BID    hydrOXYzine HCL (ATARAX) tablet 50 mg  50 mg Oral Q6H PRN Max 4/day    Or    diphenhydrAMINE (BENADRYL) injection 50 mg  50 mg Intramuscular Q6H PRN    gabapentin (NEURONTIN) capsule 400 mg  400 mg Oral TID    hydrOXYzine HCL (ATARAX) tablet 100 mg  100 mg Oral Q6H PRN Max 4/day    Or    LORazepam (ATIVAN) injection 2 mg  2 mg Intramuscular Q6H PRN    hydrOXYzine HCL (ATARAX) tablet 25 mg  25 mg Oral Q6H PRN Max 4/day    levETIRAcetam (KEPPRA) tablet 500 mg  500 mg Oral Q12H [address]    melatonin tablet 9 mg  9 mg Oral HS PRN    metFORMIN (GLUCOPHAGE) tablet 500 mg  500 mg Oral BID With Meals    metoprolol succinate (TOPROL-XL) 24 hr tablet 50 mg  50 mg Oral Daily    nicotine polacrilex (NICORETTE) gum 2 mg  2 mg Oral Q2H PRN    OLANZapine (ZyPREXA) tablet 10 mg  10 mg Oral Q3H PRN Max 3/day    Or    OLANZapine (ZyPREXA) IM injection 10 mg  10 mg Intramuscular Q3H PRN Max 3/day    OLANZapine (ZyPREXA) tablet 5 mg  5 mg Oral Q3H PRN Max 6/day    Or    OLANZapine (ZyPREXA) IM injection 5 mg  5 mg Intramuscular Q3H PRN Max 6/day    OLANZapine (ZyPREXA) tablet 2 5 mg  2 5 mg Oral Q3H PRN Max 8/day    polyethylene glycol (MIRALAX) packet 17 g  17 g Oral Daily PRN    propranolol (INDERAL) tablet 10 mg  10 mg Oral Q8H PRN    senna (SENOKOT) tablet 8 6 mg  1 tablet Oral Daily    senna-docusate sodium (SENOKOT S) 8 6-50 mg per tablet 1 tablet  1 tablet Oral Daily PRN    traZODone (DESYREL) tablet 50 mg  50 mg Oral HS PRN       Labs:   Admission on 05/25/2022   Component Date Value Ref Range Status    Ventricular Rate 05/25/2022 101  BPM Final    Atrial Rate 05/25/2022 101  BPM Final    IL Interval 05/25/2022 152  ms Final    QRSD Interval 05/25/2022 96  ms Final    QT Interval 05/25/2022 344  ms Final    QTC Interval 05/25/2022 446  ms Final    P Axis 05/25/2022 23  degrees Final    QRS Axis 05/25/2022 17 degrees Final    T Wave Axis 05/25/2022 15  degrees Final    WBC 05/27/2022 9 04  4 31 - 10 16 Thousand/uL Final    RBC 05/27/2022 5 09  3 88 - 5 62 Million/uL Final    Hemoglobin 05/27/2022 14 8  12 0 - 17 0 g/dL Final    Hematocrit 05/27/2022 46 1  36 5 - 49 3 % Final    MCV 05/27/2022 91  82 - 98 fL Final    MCH 05/27/2022 29 1  26 8 - 34 3 pg Final    MCHC 05/27/2022 32 1  31 4 - 37 4 g/dL Final    RDW 05/27/2022 12 0  11 6 - 15 1 % Final    MPV 05/27/2022 9 1  8 9 - 12 7 fL Final    Platelets 12/92/2238 274  149 - 390 Thousands/uL Final    nRBC 05/27/2022 0  /100 WBCs Final    Neutrophils Relative 05/27/2022 59  43 - 75 % Final    Immat GRANS % 05/27/2022 1  0 - 2 % Final    Lymphocytes Relative 05/27/2022 27  14 - 44 % Final    Monocytes Relative 05/27/2022 9  4 - 12 % Final    Eosinophils Relative 05/27/2022 3  0 - 6 % Final    Basophils Relative 05/27/2022 1  0 - 1 % Final    Neutrophils Absolute 05/27/2022 5 42  1 85 - 7 62 Thousands/µL Final    Immature Grans Absolute 05/27/2022 0 05  0 00 - 0 20 Thousand/uL Final    Lymphocytes Absolute 05/27/2022 2 42  0 60 - 4 47 Thousands/µL Final    Monocytes Absolute 05/27/2022 0 85  0 17 - 1 22 Thousand/µL Final    Eosinophils Absolute 05/27/2022 0 25  0 00 - 0 61 Thousand/µL Final    Basophils Absolute 05/27/2022 0 05  0 00 - 0 10 Thousands/µL Final    Sodium 05/27/2022 139  137 - 147 mmol/L Final    Potassium 05/27/2022 3 9  3 6 - 5 0 mmol/L Final    Chloride 05/27/2022 106  97 - 108 mmol/L Final    CO2 05/27/2022 22  22 - 30 mmol/L Final    ANION GAP 05/27/2022 11  5 - 14 mmol/L Final    BUN 05/27/2022 13  5 - 25 mg/dL Final    Creatinine 05/27/2022 0 67 (A) 0 70 - 1 50 mg/dL Final    Glucose 05/27/2022 94  70 - 99 mg/dL Final    Glucose, Fasting 05/27/2022 94  70 - 99 mg/dL Final    Calcium 05/27/2022 9 2  8 4 - 10 2 mg/dL Final    AST 05/27/2022 20  17 - 59 U/L Final    ALT 05/27/2022 16  <50 U/L Final    Alkaline Phosphatase 05/27/2022 141 (A) 43 - 122 U/L Final    Total Protein 05/27/2022 7 4  5 9 - 8 4 g/dL Final    Albumin 05/27/2022 4 3  3 0 - 5 2 g/dL Final    Total Bilirubin 05/27/2022 0 56  <1 30 mg/dL Final    eGFR 05/27/2022 133  ml/min/1 73sq m Final    Hemoglobin A1C 05/27/2022 5 0  Normal 3 8-5 6%; PreDiabetic 5 7-6 4%;  Diabetic >=6 5%; Glycemic control for adults with diabetes <7 0% % Final    EAG 05/27/2022 97  mg/dl Final    Cholesterol 05/27/2022 181  See Comment mg/dL Final    Triglycerides 05/27/2022 210 (A) See Comment mg/dL Final    HDL, Direct 05/27/2022 28 (A) >=40 mg/dL Final    LDL Calculated 05/27/2022 111  <130 mg/dL Final    Non-HDL-Chol (CHOL-HDL) 05/27/2022 153  mg/dl Final    RPR 05/27/2022 Non-Reactive  Non-Reactive Final    TSH 3RD GENERATON 05/27/2022 1 640  0 450 - 4 500 uIU/mL Final       Mental Status Evaluation:  Appearance:  age appropriate and casually dressed   Behavior:   cooperative, fair eye contact   Speech:  normal pitch and normal volume   Mood:  anxious and dysthymic   Affect:  mood-congruent   Thought Process:  concrete   Thought Content:   no delusions voiced   Perceptual Disturbances:  denies auditory or visual hallucinations   Risk Potential: Suicidal Ideations none, Homicidal Ideations none and Potential for Aggression No   Sensorium:  person and place   Cognition:  recent and remote memory grossly intact   Consciousness:  alert and awake    Attention: attention span appeared shorter than expected for age   Insight:  limited   Judgment: limited   Gait/Station:  not observed, lying in bed   Motor Activity: no abnormal movements     Progress Toward Goals:  Minimal change    Assessment/Plan   Principal Problem:    Schizophrenia (HCC)  Active Problems:    Medical clearance for psychiatric admission    Seizure disorder [facility])    Traumatic brain injury ([facility] [address] )    Brain lesion    History of cardiac radiofrequency ablation    History of fall    PTSD (post-traumatic stress disorder)      Recommended Treatment:     Consult physical therapy due to left lower extremity pain, decreased mobility     Continue gabapentin 400 mg t i d  and monitor with increase from yesterday for anxiety   Clozaril 200 mg b i d , Clozaril level pending    Continue with group therapy, milieu therapy and occupational therapy  Risks, benefits and possible side effects of Medications:   Risks, benefits, and possible side effects of medications explained to patient and patient verbalizes understanding  Counseling / Coordination of Care  Total floor / unit time spent today 25 minutes  Greater than 50% of total time was spent with the patient and / or family counseling and / or coordination of care   A description of the counseling / coordination of care:  Medication management, chart review, patient interview

## 2022-06-04 NOTE — NURSING NOTE
Piotr Ortizman maintained on ongoing SAFE precaution without incident on this shift   Observed laying in bed with eyes closed, breath even and easy   Continuous q7 minutes rounding implemented    No behavior noted   Will continue to monitor

## 2022-06-04 NOTE — PLAN OF CARE
Problem: Alteration in Thoughts and Perception  Goal: Treatment Goal: Gain control of psychotic behaviors/thinking, reduce/eliminate presenting symptoms and demonstrate improved reality functioning upon discharge  Outcome: Not Progressing  Goal: Verbalize thoughts and feelings  Description: Interventions:  - Promote a nonjudgmental and trusting relationship with the patient through active listening and therapeutic communication  - Assess patient's level of functioning, behavior and potential for risk  - Engage patient in 1 on 1 interactions  - Encourage patient to express fears, feelings, frustrations, and discuss symptoms    - [place] patient to reality, help patient recognize reality-based thinking   - Administer medications as ordered and assess for potential side effects  - Provide the patient education related to the signs and symptoms of the illness and desired effects of prescribed medications  Interventions:  - Assess and re-assess patient's lethality and potential for self-injury  - Engage patient in 1:1 interactions, daily, for a minimum of 15 minutes  - Encourage patient to express feelings, fears, frustrations, hopes  - Establish rapport/trust with patient   Interventions:  - Assess and re-assess patient's level of risk   - Engage patient in 1:1 interactions, daily, for a minimum of 15 minutes   - Encourage patient to express feelings, fears, frustrations, hopes   Interventions:  - Assess and re-assess patient's level of risk, every waking shift  - Engage patient in 1:1 interactions, daily, for a minimum of 15 minutes   - Allow patient to express feelings and frustrations in a safe and non-threatening manner   - Establish rapport/trust with patient   Outcome: Not Progressing  Goal: Refrain from acting on delusional thinking/internal stimuli  Description: Interventions:  - Monitor patient closely, per order   - Utilize least restrictive measures   - Set reasonable limits, give positive feedback for acceptable   - Administer medications as ordered and monitor of potential side effects  Outcome: Progressing  Goal: Agree to be compliant with medication regime, as prescribed and report medication side effects  Description: Interventions:  - Offer appropriate PRN medication and supervise ingestion; conduct AIMS, as needed   Outcome: Progressing  Goal: Recognize dysfunctional thoughts, communicate reality-based thoughts at the time of discharge  Description: Interventions:  - Provide medication and psycho-education to assist patient in compliance and developing insight into his/her illness   Outcome: Progressing     Problem: Ineffective Coping  Goal: Identifies ineffective coping skills  Outcome: Not Progressing  Goal: Identifies healthy coping skills  Outcome: Progressing  Goal: Demonstrates healthy coping skills  Outcome: Progressing  Goal: Participates in unit activities  Description: Interventions:  - Provide therapeutic environment   - Provide required programming   - Redirect inappropriate behaviors   Outcome: Progressing  Goal: Patient/Family participate in treatment and DC plans  Description: Interventions:  - Provide therapeutic environment  Outcome: Not Progressing  Goal: Patient/Family verbalizes awareness of resources  Outcome: Not Progressing  Goal: Understands least restrictive measures  Description: Interventions:  - Utilize least restrictive behavior  Outcome: Progressing  Goal: Free from restraint events  Description: - Utilize least restrictive measures   - Provide behavioral interventions   - Redirect inappropriate behaviors   Outcome: Progressing     Problem: Risk for Self Injury/Neglect  Goal: Verbalize thoughts and feelings  Description: Interventions:  - Promote a nonjudgmental and trusting relationship with the patient through active listening and therapeutic communication  - Assess patient's level of functioning, behavior and potential for risk  - Engage patient in 1 on 1 interactions  - Encourage patient to express fears, feelings, frustrations, and discuss symptoms    - [place] patient to reality, help patient recognize reality-based thinking   - Administer medications as ordered and assess for potential side effects  - Provide the patient education related to the signs and symptoms of the illness and desired effects of prescribed medications  Interventions:  - Assess and re-assess patient's lethality and potential for self-injury  - Engage patient in 1:1 interactions, daily, for a minimum of 15 minutes  - Encourage patient to express feelings, fears, frustrations, hopes  - Establish rapport/trust with patient   Interventions:  - Assess and re-assess patient's level of risk   - Engage patient in 1:1 interactions, daily, for a minimum of 15 minutes   - Encourage patient to express feelings, fears, frustrations, hopes   Interventions:  - Assess and re-assess patient's level of risk, every waking shift  - Engage patient in 1:1 interactions, daily, for a minimum of 15 minutes   - Allow patient to express feelings and frustrations in a safe and non-threatening manner   - Establish rapport/trust with patient   Outcome: Not Progressing  Goal: Treatment Goal: Remain safe during length of stay, learn and adopt new coping skills, and be free of self-injurious ideation, impulses and acts at the time of discharge  Outcome: Progressing  Goal: Refrain from harming self  Description: Interventions:  - Monitor patient closely, per order  - Develop a trusting relationship  - Supervise medication ingestion, monitor effects and side effects   Outcome: Progressing  Goal: Recognize maladaptive responses and adopt new coping mechanisms  Outcome: Not Progressing     Problem: Depression  Goal: Verbalize thoughts and feelings  Description: Interventions:  - Promote a nonjudgmental and trusting relationship with the patient through active listening and therapeutic communication  - Assess patient's level of functioning, behavior and potential for risk  - Engage patient in 1 on 1 interactions  - Encourage patient to express fears, feelings, frustrations, and discuss symptoms    - [place] patient to reality, help patient recognize reality-based thinking   - Administer medications as ordered and assess for potential side effects  - Provide the patient education related to the signs and symptoms of the illness and desired effects of prescribed medications  Interventions:  - Assess and re-assess patient's lethality and potential for self-injury  - Engage patient in 1:1 interactions, daily, for a minimum of 15 minutes  - Encourage patient to express feelings, fears, frustrations, hopes  - Establish rapport/trust with patient   Interventions:  - Assess and re-assess patient's level of risk   - Engage patient in 1:1 interactions, daily, for a minimum of 15 minutes   - Encourage patient to express feelings, fears, frustrations, hopes   Interventions:  - Assess and re-assess patient's level of risk, every waking shift  - Engage patient in 1:1 interactions, daily, for a minimum of 15 minutes   - Allow patient to express feelings and frustrations in a safe and non-threatening manner   - Establish rapport/trust with patient   Outcome: Not Progressing  Goal: Treatment Goal: Demonstrate behavioral control of depressive symptoms, verbalize feelings of improved mood/affect, and adopt new coping skills prior to discharge  Outcome: Not Progressing  Goal: Refrain from harming self  Description: Interventions:  - Monitor patient closely, per order   - Supervise medication ingestion, monitor effects and side effects   Outcome: Progressing  Goal: Refrain from isolation  Description: Interventions:  - Develop a trusting relationship   - Encourage socialization   Outcome: Not Progressing  Goal: Refrain from self-neglect  Outcome: Progressing     Problem: Risk for Violence/Aggression Toward Others  Goal: Verbalize thoughts and feelings  Description: Interventions:  - Promote a nonjudgmental and trusting relationship with the patient through active listening and therapeutic communication  - Assess patient's level of functioning, behavior and potential for risk  - Engage patient in 1 on 1 interactions  - Encourage patient to express fears, feelings, frustrations, and discuss symptoms    - [place] patient to reality, help patient recognize reality-based thinking   - Administer medications as ordered and assess for potential side effects  - Provide the patient education related to the signs and symptoms of the illness and desired effects of prescribed medications  Interventions:  - Assess and re-assess patient's lethality and potential for self-injury  - Engage patient in 1:1 interactions, daily, for a minimum of 15 minutes  - Encourage patient to express feelings, fears, frustrations, hopes  - Establish rapport/trust with patient   Interventions:  - Assess and re-assess patient's level of risk   - Engage patient in 1:1 interactions, daily, for a minimum of 15 minutes   - Encourage patient to express feelings, fears, frustrations, hopes   Interventions:  - Assess and re-assess patient's level of risk, every waking shift  - Engage patient in 1:1 interactions, daily, for a minimum of 15 minutes   - Allow patient to express feelings and frustrations in a safe and non-threatening manner   - Establish rapport/trust with patient   Outcome: Not Progressing  Goal: Treatment Goal: Refrain from acts of violence/aggression during length of stay, and demonstrate improved impulse control at the time of discharge  Outcome: Not Progressing  Goal: Refrain from harming others  Outcome: Progressing  Goal: Refrain from destructive acts on the environment or property  Outcome: Progressing  Goal: Control angry outbursts  Description: Interventions:  - Monitor patient closely, per order  - Ensure early verbal de-escalation  - Monitor prn medication needs  - Set reasonable/therapeutic limits, outline behavioral expectations, and consequences   - Provide a non-threatening milieu, utilizing the least restrictive interventions   Outcome: Progressing  Goal: Identify appropriate positive anger management techniques  Description: Interventions:  - Offer anger management and coping skills groups   - Staff will provide positive feedback for appropriate anger control  Outcome: Progressing

## 2022-06-04 NOTE — NURSING NOTE
Shine Velez has been visible intermittently in the milieu  At times he will just lay in bed  Denied anxiety, depression and voices  Social with select peers when out and about  Able to make needs known to staff  Given Nicotine gum at 0808, 1020 and 1242 per request  Attended Coffee Talk group and Coping Skills (used laptop)  Ambulated in the hallway with use of his cane  He does have a limp  Ate 50% of breakfast and 75% of lunch  Took all medication without difficulty  Requested and given Tylenol 975mg po for 7/10 left leg pain at 1325  Continue to monitor  Precautions maintained

## 2022-06-04 NOTE — NURSING NOTE
Dary Yanez maintained on ongoing fall, seizure and SAFE precaution without incident on this shift  He is awake, alert, visible and pleasant upon approach  Continues to be meds and snack complaint  He is instrusive at the desk and forgetful at times  PRN 1958 and 2158 nicorette gum  At 2100 med pass, Another male peer ask this writer to give pain med to him because his in pain  When this writer approach him and ask him about his pain level  He stated "nah I am not in pain right now"  Acetaminophen 975mg was return to omnicell  Refuse to use his cane and walk with an unsteady gait  Denies depression or anxiety  No delusion or A/T/V hallucination noted  No behavioral noted  Will continue to monitor

## 2022-06-05 PROCEDURE — 99232 SBSQ HOSP IP/OBS MODERATE 35: CPT | Performed by: PHYSICIAN ASSISTANT

## 2022-06-05 RX ADMIN — NICOTINE POLACRILEX 2 MG: 2 GUM, CHEWING ORAL at 08:19

## 2022-06-05 RX ADMIN — CLOZAPINE 200 MG: 200 TABLET ORAL at 08:07

## 2022-06-05 RX ADMIN — SENNOSIDES 8.6 MG: 8.6 TABLET, FILM COATED ORAL at 08:07

## 2022-06-05 RX ADMIN — NICOTINE POLACRILEX 2 MG: 2 GUM, CHEWING ORAL at 12:44

## 2022-06-05 RX ADMIN — LEVETIRACETAM 500 MG: 500 TABLET, FILM COATED ORAL at 21:09

## 2022-06-05 RX ADMIN — NICOTINE POLACRILEX 2 MG: 2 GUM, CHEWING ORAL at 17:07

## 2022-06-05 RX ADMIN — GABAPENTIN 400 MG: 400 CAPSULE ORAL at 08:07

## 2022-06-05 RX ADMIN — CLOZAPINE 200 MG: 200 TABLET ORAL at 21:09

## 2022-06-05 RX ADMIN — METFORMIN HYDROCHLORIDE 500 MG: 500 TABLET ORAL at 08:07

## 2022-06-05 RX ADMIN — ACETAMINOPHEN 325MG 975 MG: 325 TABLET ORAL at 19:47

## 2022-06-05 RX ADMIN — GABAPENTIN 400 MG: 400 CAPSULE ORAL at 21:09

## 2022-06-05 RX ADMIN — AMMONIUM LACTATE: 17 LOTION TOPICAL at 17:07

## 2022-06-05 RX ADMIN — NICOTINE POLACRILEX 2 MG: 2 GUM, CHEWING ORAL at 21:09

## 2022-06-05 RX ADMIN — NICOTINE POLACRILEX 2 MG: 2 GUM, CHEWING ORAL at 15:02

## 2022-06-05 RX ADMIN — HYDROXYZINE HYDROCHLORIDE 50 MG: 50 TABLET, FILM COATED ORAL at 13:47

## 2022-06-05 RX ADMIN — NICOTINE POLACRILEX 2 MG: 2 GUM, CHEWING ORAL at 19:09

## 2022-06-05 RX ADMIN — TRAZODONE HYDROCHLORIDE 50 MG: 50 TABLET ORAL at 21:24

## 2022-06-05 RX ADMIN — METFORMIN HYDROCHLORIDE 500 MG: 500 TABLET ORAL at 17:07

## 2022-06-05 RX ADMIN — GABAPENTIN 400 MG: 400 CAPSULE ORAL at 17:07

## 2022-06-05 RX ADMIN — METOPROLOL SUCCINATE 50 MG: 50 TABLET, EXTENDED RELEASE ORAL at 08:07

## 2022-06-05 RX ADMIN — LEVETIRACETAM 500 MG: 500 TABLET, FILM COATED ORAL at 08:07

## 2022-06-05 RX ADMIN — ASPIRIN 81 MG CHEWABLE TABLET 81 MG: 81 TABLET CHEWABLE at 08:07

## 2022-06-05 RX ADMIN — AMMONIUM LACTATE: 17 LOTION TOPICAL at 08:08

## 2022-06-05 NOTE — NURSING NOTE
Patient is intrusive, disorganized, and irritable w/ poor insight  Believes he was admitted r/t his recent cardiac ablation  Patient reports "I'm here for my heart and you guys aren't even doing anything about it " Attempt to obtain CBC but patient uncooperative, moving extremity, and cursing  Removing tourniquet, yelling out for other staff members, and stating "Fuck that doctor"  CBC reordered for tomorrow  Patient reported "I had to refuse the blood work because I had a heart attack  My heart rate is going crazy " Radial pulse 96 at that time  Denied angina  No physical distress observed  Reassured patient he was stable  Patient then stated "You're not even giving me the meds I was on at [facility] " Patient shown PTA list and current medication list  Reassured he was receiving all previous medications as well as gabapentin and metformin as he had requested last week  Later stated "I need the doctor to increase my metformin to 2,000mg because my mom says I need it " Visible intermittently and social w/ select peers  Ambulating on unit w/o cane stating "I don't need it " Patient reminded yesterday he requested a w/c and frequently c/o L foot pain  Patient not receptive and continues to refuse cane  Medication compliant  PRN nicotine gum x4  PRN tylenol 975mg administered at 1947 r/t 8/10 L foot pain  Patient reported medication was effective and denied pain but then moments later stated pain was a 5  PRN trazodone 50mg administered at 2124 per his request  Vitals at HS: HR 79 /77 Sp02 99% RA  Safety monitoring in place

## 2022-06-05 NOTE — NURSING NOTE
Patient was visible in the milieu; he continues to be a bit forgetful He states he wants to go home and does not know why he needs to be here  He is calm and pleasant and he is minimally social with select few peers    There were no issues this evening

## 2022-06-05 NOTE — NURSING NOTE
Placido Molina has been in his room most of the shift  Very little interaction with peers when out for meals  He was not very cooperative (with positioning) when this writer when into draw his blood work (staff will try again this evening)  Ate 50% of breakfast and  100% of lunch  Took all pills without an issue  Had Nicotine gum at 0819  Attended Coffee Talk  No complaint of leg pain  Approached nurses station at [phone] and asked this writer to take his blood pressure due to having "anxiety"  Blood pressure was 144/88 and heart rate of 112  Denies pain or shortness of breath  At 1347 was medicated for anxiety 2 5-3/4 with Atarax 50mg po  At [id number] he stated, "Thank You  I feel so much better"  Continue to monitor  Precautions maintained

## 2022-06-05 NOTE — NURSING NOTE
Pt in bed asleep at start of shift  Per Q 7 minute safety checks , pt slept about 7 hours overnight  Brittney Jane was cooperative with lab draw on waking, unable to obtain sample , will retry on next shift

## 2022-06-05 NOTE — PROGRESS NOTES
Progress Note - Timi [age] y o  male MRN: [id number]  Unit/Bed#: [facility] 479-18 Encounter: [id number]    Harold Carrel was seen for follow-up, chart reviewed and discussed with nursing staff  Nursing staff notes no significant change over the past 24 hours  Attended some of the groups  Can be intrusive and demanding with staff at times  No aggression or agitation  Patient seen in his room this morning  Irritable edge  Preoccupied  and fixated with discharge home  States that he is anxious and depressed because he is still here in the hospital    Denies suicidal or homicidal ideation  Denies auditory or visual hallucinations  Currently no lower extremity pain voiced  Reports that he is sleeping and eating well      Behavior over the last 24 hours:  unchanged  Sleep: normal  Appetite: normal  Medication side effects: No  ROS: no complaints  /90 (BP Location: Left arm)   Pulse (!) 107   Temp 97 5 °F (36 4 °C) (Skin)   Resp 18   Ht 5' 11" (1 803 m)   Wt 118 kg (260 lb 6 4 oz)   SpO2 100%   BMI 36 32 kg/m²     Medications:   Current Facility-Administered Medications   Medication Dose Route Frequency    acetaminophen (TYLENOL) tablet 650 mg  650 mg Oral Q6H PRN    acetaminophen (TYLENOL) tablet 650 mg  650 mg Oral Q4H PRN    acetaminophen (TYLENOL) tablet 975 mg  975 mg Oral Q6H PRN    aluminum-magnesium hydroxide-simethicone (MYLANTA) oral suspension 30 mL  30 mL Oral Q4H PRN    ammonium lactate (LAC-HYDRIN) 12 % lotion   Topical BID    artificial tear (LUBRIFRESH P M ) ophthalmic ointment 1 application  1 application Both Eyes B4L PRN    aspirin chewable tablet 81 mg  81 mg Oral Daily    benztropine (COGENTIN) injection 1 mg  1 mg Intramuscular Q4H PRN Max 6/day    benztropine (COGENTIN) tablet 1 mg  1 mg Oral Q4H PRN Max 6/day    clozapine (CLOZARIL) tablet 200 mg  200 mg Oral BID    hydrOXYzine HCL (ATARAX) tablet 50 mg  50 mg Oral Q6H PRN Max 4/day    Or    diphenhydrAMINE (BENADRYL) injection 50 mg  50 mg Intramuscular Q6H PRN    gabapentin (NEURONTIN) capsule 400 mg  400 mg Oral TID    hydrOXYzine HCL (ATARAX) tablet 100 mg  100 mg Oral Q6H PRN Max 4/day    Or    LORazepam (ATIVAN) injection 2 mg  2 mg Intramuscular Q6H PRN    hydrOXYzine HCL (ATARAX) tablet 25 mg  25 mg Oral Q6H PRN Max 4/day    levETIRAcetam (KEPPRA) tablet 500 mg  500 mg Oral Q12H [address]    melatonin tablet 9 mg  9 mg Oral HS PRN    metFORMIN (GLUCOPHAGE) tablet 500 mg  500 mg Oral BID With Meals    metoprolol succinate (TOPROL-XL) 24 hr tablet 50 mg  50 mg Oral Daily    nicotine polacrilex (NICORETTE) gum 2 mg  2 mg Oral Q2H PRN    OLANZapine (ZyPREXA) tablet 10 mg  10 mg Oral Q3H PRN Max 3/day    Or    OLANZapine (ZyPREXA) IM injection 10 mg  10 mg Intramuscular Q3H PRN Max 3/day    OLANZapine (ZyPREXA) tablet 5 mg  5 mg Oral Q3H PRN Max 6/day    Or    OLANZapine (ZyPREXA) IM injection 5 mg  5 mg Intramuscular Q3H PRN Max 6/day    OLANZapine (ZyPREXA) tablet 2 5 mg  2 5 mg Oral Q3H PRN Max 8/day    polyethylene glycol (MIRALAX) packet 17 g  17 g Oral Daily PRN    propranolol (INDERAL) tablet 10 mg  10 mg Oral Q8H PRN    senna (SENOKOT) tablet 8 6 mg  1 tablet Oral Daily    senna-docusate sodium (SENOKOT S) 8 6-50 mg per tablet 1 tablet  1 tablet Oral Daily PRN    traZODone (DESYREL) tablet 50 mg  50 mg Oral HS PRN       Labs:   Admission on 05/25/2022   Component Date Value Ref Range Status    Ventricular Rate 05/25/2022 101  BPM Final    Atrial Rate 05/25/2022 101  BPM Final    ME Interval 05/25/2022 152  ms Final    QRSD Interval 05/25/2022 96  ms Final    QT Interval 05/25/2022 344  ms Final    QTC Interval 05/25/2022 446  ms Final    P Axis 05/25/2022 23  degrees Final    QRS Axis 05/25/2022 17  degrees Final    T Wave Axis 05/25/2022 15  degrees Final    WBC 05/27/2022 9 04  4 31 - 10 16 Thousand/uL Final    RBC 05/27/2022 5 09  3 88 - 5 62 Million/uL Final    Hemoglobin 05/27/2022 14 8  12 0 - 17 0 g/dL Final    Hematocrit 05/27/2022 46 1  36 5 - 49 3 % Final    MCV 05/27/2022 91  82 - 98 fL Final    MCH 05/27/2022 29 1  26 8 - 34 3 pg Final    MCHC 05/27/2022 32 1  31 4 - 37 4 g/dL Final    RDW 05/27/2022 12 0  11 6 - 15 1 % Final    MPV 05/27/2022 9 1  8 9 - 12 7 fL Final    Platelets 65/88/2781 274  149 - 390 Thousands/uL Final    nRBC 05/27/2022 0  /100 WBCs Final    Neutrophils Relative 05/27/2022 59  43 - 75 % Final    Immat GRANS % 05/27/2022 1  0 - 2 % Final    Lymphocytes Relative 05/27/2022 27  14 - 44 % Final    Monocytes Relative 05/27/2022 9  4 - 12 % Final    Eosinophils Relative 05/27/2022 3  0 - 6 % Final    Basophils Relative 05/27/2022 1  0 - 1 % Final    Neutrophils Absolute 05/27/2022 5 42  1 85 - 7 62 Thousands/µL Final    Immature Grans Absolute 05/27/2022 0 05  0 00 - 0 20 Thousand/uL Final    Lymphocytes Absolute 05/27/2022 2 42  0 60 - 4 47 Thousands/µL Final    Monocytes Absolute 05/27/2022 0 85  0 17 - 1 22 Thousand/µL Final    Eosinophils Absolute 05/27/2022 0 25  0 00 - 0 61 Thousand/µL Final    Basophils Absolute 05/27/2022 0 05  0 00 - 0 10 Thousands/µL Final    Sodium 05/27/2022 139  137 - 147 mmol/L Final    Potassium 05/27/2022 3 9  3 6 - 5 0 mmol/L Final    Chloride 05/27/2022 106  97 - 108 mmol/L Final    CO2 05/27/2022 22  22 - 30 mmol/L Final    ANION GAP 05/27/2022 11  5 - 14 mmol/L Final    BUN 05/27/2022 13  5 - 25 mg/dL Final    Creatinine 05/27/2022 0 67 (A) 0 70 - 1 50 mg/dL Final    Glucose 05/27/2022 94  70 - 99 mg/dL Final    Glucose, Fasting 05/27/2022 94  70 - 99 mg/dL Final    Calcium 05/27/2022 9 2  8 4 - 10 2 mg/dL Final    AST 05/27/2022 20  17 - 59 U/L Final    ALT 05/27/2022 16  <50 U/L Final    Alkaline Phosphatase 05/27/2022 141 (A) 43 - 122 U/L Final    Total Protein 05/27/2022 7 4  5 9 - 8 4 g/dL Final    Albumin 05/27/2022 4 3  3 0 - 5 2 g/dL Final    Total Bilirubin 05/27/2022 0 56  <1 30 mg/dL Final    eGFR 05/27/2022 133  ml/min/1 73sq m Final    Hemoglobin A1C 05/27/2022 5 0  Normal 3 8-5 6%; PreDiabetic 5 7-6 4%;  Diabetic >=6 5%; Glycemic control for adults with diabetes <7 0% % Final    EAG 05/27/2022 97  mg/dl Final    Cholesterol 05/27/2022 181  See Comment mg/dL Final    Triglycerides 05/27/2022 210 (A) See Comment mg/dL Final    HDL, Direct 05/27/2022 28 (A) >=40 mg/dL Final    LDL Calculated 05/27/2022 111  <130 mg/dL Final    Non-HDL-Chol (CHOL-HDL) 05/27/2022 153  mg/dl Final    RPR 05/27/2022 Non-Reactive  Non-Reactive Final    TSH 3RD GENERATON 05/27/2022 1 640  0 450 - 4 500 uIU/mL Final       Mental Status Evaluation:  Appearance:  age appropriate and casually dressed   Behavior:   dismissive, irritable edge   Speech:  normal pitch and normal volume   Mood:  anxious and dysthymic   Affect:  mood-congruent   Thought Process:  concrete   Thought Content:    no delusions voiced   Perceptual Disturbances: None   Risk Potential: Suicidal Ideations none, Homicidal Ideations none and Potential for Aggression No   Sensorium:  person and place   Cognition:  recent and remote memory grossly intact   Consciousness:  alert and awake    Attention: attention span appeared shorter than expected for age   Insight:  limited   Judgment: limited   Gait/Station:  not observed, lying in bed   Motor Activity: no abnormal movements     Progress Toward Goals:  Minimal change    Assessment/Plan   Principal Problem:    Schizophrenia (HCC)  Active Problems:    Medical clearance for psychiatric admission    Seizure disorder ([facility] [address] )    Traumatic brain injury ([facility] [address] )    Brain lesion    History of cardiac radiofrequency ablation    History of fall    PTSD (post-traumatic stress disorder)      Recommended Treatment:      attempt to repeat CBC with diff this afternoon since unable to obtain this morning     Continue Clozaril 200 mg b i d      Gabapentin 400 mg t i d  may consider continued titration as tolerated     physical therapy eval ordered yesterday due to left lower extremity pain    Continue with group therapy, milieu therapy and occupational therapy  Risks, benefits and possible side effects of Medications:   Risks, benefits, and possible side effects of medications explained to patient and patient verbalizes understanding  Counseling / Coordination of Care  Total floor / unit time spent today 25 minutes  Greater than 50% of total time was spent with the patient and / or family counseling and / or coordination of care   A description of the counseling / coordination of care:  Medication management, chart review, patient interview

## 2022-06-05 NOTE — PLAN OF CARE
Problem: Alteration in Thoughts and Perception  Goal: Treatment Goal: Gain control of psychotic behaviors/thinking, reduce/eliminate presenting symptoms and demonstrate improved reality functioning upon discharge  Outcome: Not Progressing  Goal: Verbalize thoughts and feelings  Description: Interventions:  - Promote a nonjudgmental and trusting relationship with the patient through active listening and therapeutic communication  - Assess patient's level of functioning, behavior and potential for risk  - Engage patient in 1 on 1 interactions  - Encourage patient to express fears, feelings, frustrations, and discuss symptoms    - [place] patient to reality, help patient recognize reality-based thinking   - Administer medications as ordered and assess for potential side effects  - Provide the patient education related to the signs and symptoms of the illness and desired effects of prescribed medications  Interventions:  - Assess and re-assess patient's lethality and potential for self-injury  - Engage patient in 1:1 interactions, daily, for a minimum of 15 minutes  - Encourage patient to express feelings, fears, frustrations, hopes  - Establish rapport/trust with patient   Interventions:  - Assess and re-assess patient's level of risk   - Engage patient in 1:1 interactions, daily, for a minimum of 15 minutes   - Encourage patient to express feelings, fears, frustrations, hopes   Interventions:  - Assess and re-assess patient's level of risk, every waking shift  - Engage patient in 1:1 interactions, daily, for a minimum of 15 minutes   - Allow patient to express feelings and frustrations in a safe and non-threatening manner   - Establish rapport/trust with patient   Outcome: Progressing  Goal: Agree to be compliant with medication regime, as prescribed and report medication side effects  Description: Interventions:  - Offer appropriate PRN medication and supervise ingestion; conduct AIMS, as needed   Outcome: Progressing     Problem: Ineffective Coping  Goal: Identifies healthy coping skills  Outcome: Not Progressing  Goal: Demonstrates healthy coping skills  Outcome: Not Progressing  Goal: Participates in unit activities  Description: Interventions:  - Provide therapeutic environment   - Provide required programming   - Redirect inappropriate behaviors   Outcome: Not Progressing     Problem: Risk for Self Injury/Neglect  Goal: Verbalize thoughts and feelings  Description: Interventions:  - Promote a nonjudgmental and trusting relationship with the patient through active listening and therapeutic communication  - Assess patient's level of functioning, behavior and potential for risk  - Engage patient in 1 on 1 interactions  - Encourage patient to express fears, feelings, frustrations, and discuss symptoms    - [place] patient to reality, help patient recognize reality-based thinking   - Administer medications as ordered and assess for potential side effects  - Provide the patient education related to the signs and symptoms of the illness and desired effects of prescribed medications  Interventions:  - Assess and re-assess patient's lethality and potential for self-injury  - Engage patient in 1:1 interactions, daily, for a minimum of 15 minutes  - Encourage patient to express feelings, fears, frustrations, hopes  - Establish rapport/trust with patient   Interventions:  - Assess and re-assess patient's level of risk   - Engage patient in 1:1 interactions, daily, for a minimum of 15 minutes   - Encourage patient to express feelings, fears, frustrations, hopes   Interventions:  - Assess and re-assess patient's level of risk, every waking shift  - Engage patient in 1:1 interactions, daily, for a minimum of 15 minutes   - Allow patient to express feelings and frustrations in a safe and non-threatening manner   - Establish rapport/trust with patient   Outcome: Progressing     Problem: Depression  Goal: Verbalize thoughts and feelings  Description: Interventions:  - Promote a nonjudgmental and trusting relationship with the patient through active listening and therapeutic communication  - Assess patient's level of functioning, behavior and potential for risk  - Engage patient in 1 on 1 interactions  - Encourage patient to express fears, feelings, frustrations, and discuss symptoms    - [place] patient to reality, help patient recognize reality-based thinking   - Administer medications as ordered and assess for potential side effects  - Provide the patient education related to the signs and symptoms of the illness and desired effects of prescribed medications  Interventions:  - Assess and re-assess patient's lethality and potential for self-injury  - Engage patient in 1:1 interactions, daily, for a minimum of 15 minutes  - Encourage patient to express feelings, fears, frustrations, hopes  - Establish rapport/trust with patient   Interventions:  - Assess and re-assess patient's level of risk   - Engage patient in 1:1 interactions, daily, for a minimum of 15 minutes   - Encourage patient to express feelings, fears, frustrations, hopes   Interventions:  - Assess and re-assess patient's level of risk, every waking shift  - Engage patient in 1:1 interactions, daily, for a minimum of 15 minutes   - Allow patient to express feelings and frustrations in a safe and non-threatening manner   - Establish rapport/trust with patient   Outcome: Progressing     Problem: Anxiety  Goal: Anxiety is at manageable level  Description: Interventions:  - Assess and monitor patient's anxiety level  - Monitor for signs and symptoms (heart palpitations, chest pain, shortness of breath, headaches, nausea, feeling jumpy, restlessness, irritable, apprehensive)  - Collaborate with interdisciplinary team and initiate plan and interventions as ordered    - [place] patient to unit/surroundings  - Explain treatment plan  - Encourage participation in care  - Encourage verbalization of concerns/fears  - Identify coping mechanisms  - Assist in developing anxiety-reducing skills  - Administer/offer alternative therapies  - Limit or eliminate stimulants  Outcome: Not Progressing     Problem: Risk for Violence/Aggression Toward Others  Goal: Verbalize thoughts and feelings  Description: Interventions:  - Promote a nonjudgmental and trusting relationship with the patient through active listening and therapeutic communication  - Assess patient's level of functioning, behavior and potential for risk  - Engage patient in 1 on 1 interactions  - Encourage patient to express fears, feelings, frustrations, and discuss symptoms    - [place] patient to reality, help patient recognize reality-based thinking   - Administer medications as ordered and assess for potential side effects  - Provide the patient education related to the signs and symptoms of the illness and desired effects of prescribed medications  Interventions:  - Assess and re-assess patient's lethality and potential for self-injury  - Engage patient in 1:1 interactions, daily, for a minimum of 15 minutes  - Encourage patient to express feelings, fears, frustrations, hopes  - Establish rapport/trust with patient   Interventions:  - Assess and re-assess patient's level of risk   - Engage patient in 1:1 interactions, daily, for a minimum of 15 minutes   - Encourage patient to express feelings, fears, frustrations, hopes   Interventions:  - Assess and re-assess patient's level of risk, every waking shift  - Engage patient in 1:1 interactions, daily, for a minimum of 15 minutes   - Allow patient to express feelings and frustrations in a safe and non-threatening manner   - Establish rapport/trust with patient   Outcome: Progressing

## 2022-06-06 PROCEDURE — 99232 SBSQ HOSP IP/OBS MODERATE 35: CPT | Performed by: PSYCHIATRY & NEUROLOGY

## 2022-06-06 RX ADMIN — GABAPENTIN 400 MG: 400 CAPSULE ORAL at 17:13

## 2022-06-06 RX ADMIN — ASPIRIN 81 MG CHEWABLE TABLET 81 MG: 81 TABLET CHEWABLE at 08:12

## 2022-06-06 RX ADMIN — NICOTINE POLACRILEX 2 MG: 2 GUM, CHEWING ORAL at 14:04

## 2022-06-06 RX ADMIN — CLOZAPINE 200 MG: 200 TABLET ORAL at 21:01

## 2022-06-06 RX ADMIN — GABAPENTIN 400 MG: 400 CAPSULE ORAL at 08:12

## 2022-06-06 RX ADMIN — NICOTINE POLACRILEX 2 MG: 2 GUM, CHEWING ORAL at 08:12

## 2022-06-06 RX ADMIN — AMMONIUM LACTATE: 17 LOTION TOPICAL at 18:43

## 2022-06-06 RX ADMIN — LEVETIRACETAM 500 MG: 500 TABLET, FILM COATED ORAL at 21:01

## 2022-06-06 RX ADMIN — NICOTINE POLACRILEX 2 MG: 2 GUM, CHEWING ORAL at 18:42

## 2022-06-06 RX ADMIN — MELATONIN TAB 3 MG 9 MG: 3 TAB at 23:09

## 2022-06-06 RX ADMIN — NICOTINE POLACRILEX 2 MG: 2 GUM, CHEWING ORAL at 16:39

## 2022-06-06 RX ADMIN — NICOTINE POLACRILEX 2 MG: 2 GUM, CHEWING ORAL at 11:43

## 2022-06-06 RX ADMIN — LEVETIRACETAM 500 MG: 500 TABLET, FILM COATED ORAL at 08:12

## 2022-06-06 RX ADMIN — METOPROLOL SUCCINATE 50 MG: 50 TABLET, EXTENDED RELEASE ORAL at 08:12

## 2022-06-06 RX ADMIN — METFORMIN HYDROCHLORIDE 500 MG: 500 TABLET ORAL at 08:12

## 2022-06-06 RX ADMIN — METFORMIN HYDROCHLORIDE 850 MG: 850 TABLET, FILM COATED ORAL at 17:14

## 2022-06-06 RX ADMIN — CLOZAPINE 200 MG: 200 TABLET ORAL at 08:12

## 2022-06-06 RX ADMIN — GABAPENTIN 400 MG: 400 CAPSULE ORAL at 21:01

## 2022-06-06 RX ADMIN — SENNOSIDES 8.6 MG: 8.6 TABLET, FILM COATED ORAL at 08:12

## 2022-06-06 NOTE — PROGRESS NOTES
06/06/22 1112   Team Meeting   Meeting Type Tx Team Meeting   Initial Conference Date 06/06/22   Next Conference Date 06/13/22   Team Members Present   Team Members Present Physician;; Other (Discipline and Name)   Physician Team Member Eda Ma MD   Social Work Team Member Henry ARANGO   Other (Discipline and Name) Dipti Montes [facility] VIMAL   Patient/Family Present   Patient Present Yes   Patient's Family Present No  (PC attempt to mother, no response)         Treatment Team Summary  · Patient present: yes  · Family/ Supports present: no (attempted to call mother, no response)  · Self assessment: completed  · Appearance / Presentation: no changes; fair eye contact, cooperative, constricted affect, focused on discharge  · Symptoms reported: "feeling irritable, agitated, "feeling depressed, sad" explained this is related to not being in the community doing what he normally does and being in hospital instead  · Main barrier: hurt leg'  · Coping Skills: talking, being more open  · Goal last week / accomplished: "how to move"  · Goal for this week: n/a  · Learning medications: "no" but tends to know one or two when discussing  · Reactions to medications: "not sure"  · Medical Issues: "heart" described chest hurting at times   · Self Care: staying healthy, staying active  · Medication changes: none discussed  · Main topics discussed: patient plans on returning to [facility] services; some time spent building rapport with attending psychiatrist and sharing history  · Treatment Plan Review: not due   · Discharge Planning: returning home to live with his mother / family, and follow up with [facility] (psych, therapy, possibly partial again)

## 2022-06-06 NOTE — SOCIAL WORK
PC with patient's mother (in response to her two pervious voicemails) as she was asking for update  Update provided, also discussed that Nicole Orantes has not returned SW calls but SW will call back this week to connect for DC planning  Mother Yina Lees explained patient perseverates on discharge and continues to ask her when he is coming home  Yina eLes is aware that patient will likely be here at least a month / and patient is reminded of same and the function of the eac program  Call was then transferred to rn as she had additional questions more appropriate for nurse / in regards to medication and recent labs

## 2022-06-06 NOTE — PROGRESS NOTES
06/06/22 0919   Team Meeting   Meeting Type Daily Rounds   Initial Conference Date 06/06/22   Patient/Family Present   Patient Present No   Patient's Family Present No       Daily Rafaela Pereira MD, Penny RN, Lala Sorensen RN, Teresa MARCUSW  Case reviewed  Tylenol prn both Saturday and Sunday for left leg pain  Atarax and Trazodone prns Sunday, effective  PT consult in to follow up on left leg  Was not cooperative with labs earlier  4/7 groups

## 2022-06-06 NOTE — PLAN OF CARE
Problem: Alteration in Thoughts and Perception  Goal: Agree to be compliant with medication regime, as prescribed and report medication side effects  Description: Interventions:  - Offer appropriate PRN medication and supervise ingestion; conduct AIMS, as needed   Outcome: Progressing  Goal: Recognize dysfunctional thoughts, communicate reality-based thoughts at the time of discharge  Description: Interventions:  - Provide medication and psycho-education to assist patient in compliance and developing insight into his/her illness   Outcome: Not Progressing     Problem: Ineffective Coping  Goal: Free from restraint events  Description: - Utilize least restrictive measures   - Provide behavioral interventions   - Redirect inappropriate behaviors   Outcome: Progressing

## 2022-06-06 NOTE — PROGRESS NOTES
Psychiatry Progress Note [facility] 24 [facility]  [facility] MRN: [id number]  [facility] Encounter: [id number]  Code Status: Level 1 - Full Code    PCP: No primary care provider on file  Date of Admission:  2022 1400   Date of Service:  22    Patient Active Problem List   Diagnosis    Medical clearance for psychiatric admission    Seizure disorder [facility])    Traumatic brain injury ([facility] [address] )    Brain lesion    History of cardiac radiofrequency ablation    History of fall    Schizophrenia ([facility] [address] )    PTSD (post-traumatic stress disorder)         Review of systems: none, NO NEW SEIZURES, UNREMARKABLE TODAY , uses a cane as he broke his left leg and needed ORIF 5 weeks armen at [facility]   Diagnosis:  Schizophrenia  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     Assessment   Overall Status: chart reviewed and case reviewed with team On [facility] for last 12 days from [facility] where he was readmitted on  after discharge on 3/25/22  Still admits to some paranoia and mood swings off and on   Was living with his mom and attending [facility] with Dr rPiscila Farrell for over 2 years, he used to live with his dad in [place] who had  and then with his paternal  grandparents before he moved in with his mom      Certification Statement: The patient will continue to require additional inpatient hospital stay due to denial of illness, noncompliance to meds    Acceptance by patient: questioning need for dignosis of shizophrenia     Hopefulness in recovery: about living withfamily   Understanding of medications: questioningg meds     Involved in reintegration process: adjusting to unit    Trusting in relationship with psychiatrist: beginning to trust   Justification for dual anti-psychotics: not applicable   Side effects from treatment: none  Medications: CLOZAPINE, KEPPRA, GABAPENTIN  Medication changes    Increase metformin to help lose weight from being on     Medication education   Risks side effects benefits and precautions of medications discussed with patient and he did verbalize an understanding about risks for metabolic syndrome from being on neuroleptics and is form tardive dyskinesia etc       Non-pharmacological treatments   Continue with individual, group, milieu and occupational therapy using recovery principles and psycho-education about accepting illness and the need for treatment  Safety   Safety and communication plan established to target dynamic risk factors discussed above  Discharge Plan    To a CRR once a place is available with an ACT team     Interval Progress   Somewhat intrusive, demanding and preoccupied with discharge, no bouts of aggression or agitation or SIB or destructive behaviors reported lately  Attends only some of the groups Patient is still questioning diagnosis of schizphrenia insisting he only has PTSD   Claims he gets agitated with mom but he feels he is getting better already and plans to go back to her    Does come across as preoccupied and irritable and occasionally anxious and does admit to some paranoia  , Not aggressive or Self injurious or destructive on the unit      Sleep:good   Appetite:good   Compliance with medicationsgood   side effects:none   Group attendance:good   Significant events: none      Mental Status Exam  Appearance: age appropriate, casually dressed, dressed appropriately, adequate grooming, overweight  Behavior: pleasant, cooperative, appears anxious, guarded  Speech: normal rate, normal volume, normal pitch, fluent, coherent, decreased rate, slow  Mood: anxious  Affect: constricted, mood-congruent  Thought Process: organized, goal directed  Thought Content: paranoid ideation, ideas of reference, intrusive thoughts, ruminations no other delusions, no current s/h thoughts intent o rplans, no distorted body perceptions   Perceptual Disturbances: no auditory hallucinations, no visual hallucinations, denies auditory hallucinations when asked, does not appear responding to internal stimuli, appears preoccupied  Hx Risk Factors: chronic psychiatric problems  Sensorium: alert oriented x 4   Cognition: recent and remote memory grossly intact  Consciousness: alert and awake  Attention: attention span and concentration are age appropriate  Intellect: appears to be of average intelligence  Insight: intact and improving  Judgement: limited  Motor Activity: no abnormal movements     Vitals  Temp:  [97 5 °F (36 4 °C)-97 7 °F (36 5 °C)] 97 7 °F (36 5 °C)  HR:  [] 79  Resp:  [18] 18  BP: (128-147)/(75-90) 128/77  SpO2:  [99 %] 99 %  No intake or output data in the 24 hours ending 06/06/22 0636    Lab Results:  [address] Admission Reviewed      Current Facility-Administered Medications   Medication Dose Route Frequency Provider Last Rate    acetaminophen  650 mg Oral Q6H PRN Usman Smolder, DO      acetaminophen  650 mg Oral Q4H PRN Usman Smolder, DO      acetaminophen  975 mg Oral Q6H PRN Usman Smolder, DO      aluminum-magnesium hydroxide-simethicone  30 mL Oral Q4H PRN Usman Smolder, DO      ammonium lactate   Topical BID Drema Balm, DPM      artificial tear  1 application Both Eyes O5S PRN Usman Smolder, DO      aspirin  81 mg Oral Daily Usman Smolder, DO      benztropine  1 mg Intramuscular Q4H PRN Max 6/day Usman Smolder, DO      benztropine  1 mg Oral Q4H PRN Max 6/day Usman Smolder, DO      cloZAPine  200 mg Oral BID Usman Smolder, DO      hydrOXYzine HCL  50 mg Oral Q6H PRN Max 4/day Usman Smolder, DO      Or    diphenhydrAMINE  50 mg Intramuscular Q6H PRN Usman Smolder, DO      gabapentin  400 mg Oral TID Usman Smolder, DO      hydrOXYzine HCL  100 mg Oral Q6H PRN Max 4/day Usman Smolder, DO      Or    LORazepam  2 mg Intramuscular Q6H PRN Usman Smolder, DO      hydrOXYzine HCL  25 mg Oral Q6H PRN Max 4/day Kashif A Prayson, DO      levETIRAcetam  500 mg Oral Q12H [address] Kashif A Prayson, DO      melatonin  9 mg Oral HS PRN Mike Puls, DO      metFORMIN  500 mg Oral BID With Meals Mike Puls, DO      metoprolol succinate  50 mg Oral Daily Mike Puls, DO      nicotine polacrilex  2 mg Oral Q2H PRN Mike Puls, DO      OLANZapine  10 mg Oral Q3H PRN Max 3/day Mike Puls, DO      Or    OLANZapine  10 mg Intramuscular Q3H PRN Max 3/day Mike Puls, DO      OLANZapine  5 mg Oral Q3H PRN Max 6/day Kashif A Prayson, DO      Or    OLANZapine  5 mg Intramuscular Q3H PRN Max 6/day Kashif A Prayson, DO      OLANZapine  2 5 mg Oral Q3H PRN Max 8/day Kashif A Prayson, DO      polyethylene glycol  17 g Oral Daily PRN Mike Puls, DO      propranolol  10 mg Oral Q8H PRN Mike Puls, DO      senna  1 tablet Oral Daily Jazmín Gaytan MD      senna-docusate sodium  1 tablet Oral Daily PRN Harjit Burton MD      traZODone  50 mg Oral HS PRN Mike Puls, DO         Counseling / Coordination of Care: Total floor / unit time spent today 15 minutes  Greater than 50% of total time was spent with the patient and / or family counseling and / or somewhat receptive to supportive listening and teaching positive coping skills to deal with symptom mangement  Patient's Rights, confidentiality and exceptions to confidentiality, use of automated medical record, [address] [place] [address] staff access to medical record, and consent to treatment reviewed  This note has been dictated  enhance them may be problems with punctuation, spelling and formatting and if anyone has any concerns please address them to Dr Benitez Sorensen   This not is not shared with patient due to potential for making patient's condition worse by knowing the content of the note

## 2022-06-06 NOTE — NURSING NOTE
Irl Few was in bed at start of shift  Per Q 7 minute safety checks , pt appeared to sleep about 7 hours overnight

## 2022-06-07 LAB
ATRIAL RATE: 95 BPM
ATRIAL RATE: 95 BPM
ATRIAL RATE: 98 BPM
ATRIAL RATE: 98 BPM
BASOPHILS # BLD AUTO: 0.04 THOUSANDS/ΜL (ref 0–0.1)
BASOPHILS # BLD AUTO: 0.04 THOUSANDS/ΜL (ref 0–0.1)
BASOPHILS NFR BLD AUTO: 1 % (ref 0–1)
BASOPHILS NFR BLD AUTO: 1 % (ref 0–1)
CLOZAPINE SERPL-MCNC: 459 NG/ML (ref 350–650)
CLOZAPINE SERPL-MCNC: 459 NG/ML (ref 350–650)
CLOZAPINE+NOR SERPL-MCNC: 748 NG/ML
CLOZAPINE+NOR SERPL-MCNC: 748 NG/ML
EOSINOPHIL # BLD AUTO: 0.2 THOUSAND/ΜL (ref 0–0.61)
EOSINOPHIL # BLD AUTO: 0.2 THOUSAND/ΜL (ref 0–0.61)
EOSINOPHIL NFR BLD AUTO: 2 % (ref 0–6)
EOSINOPHIL NFR BLD AUTO: 2 % (ref 0–6)
ERYTHROCYTE [DISTWIDTH] IN BLOOD BY AUTOMATED COUNT: 12 % (ref 11.6–15.1)
ERYTHROCYTE [DISTWIDTH] IN BLOOD BY AUTOMATED COUNT: 12 % (ref 11.6–15.1)
HCT VFR BLD AUTO: 48.1 % (ref 36.5–49.3)
HCT VFR BLD AUTO: 48.1 % (ref 36.5–49.3)
HGB BLD-MCNC: 15.8 G/DL (ref 12–17)
HGB BLD-MCNC: 15.8 G/DL (ref 12–17)
IMM GRANULOCYTES # BLD AUTO: 0.02 THOUSAND/UL (ref 0–0.2)
IMM GRANULOCYTES # BLD AUTO: 0.02 THOUSAND/UL (ref 0–0.2)
IMM GRANULOCYTES NFR BLD AUTO: 0 % (ref 0–2)
IMM GRANULOCYTES NFR BLD AUTO: 0 % (ref 0–2)
LYMPHOCYTES # BLD AUTO: 2.42 THOUSANDS/ΜL (ref 0.6–4.47)
LYMPHOCYTES # BLD AUTO: 2.42 THOUSANDS/ΜL (ref 0.6–4.47)
LYMPHOCYTES NFR BLD AUTO: 28 % (ref 14–44)
LYMPHOCYTES NFR BLD AUTO: 28 % (ref 14–44)
MCH RBC QN AUTO: 29.2 PG (ref 26.8–34.3)
MCH RBC QN AUTO: 29.2 PG (ref 26.8–34.3)
MCHC RBC AUTO-ENTMCNC: 32.8 G/DL (ref 31.4–37.4)
MCHC RBC AUTO-ENTMCNC: 32.8 G/DL (ref 31.4–37.4)
MCV RBC AUTO: 89 FL (ref 82–98)
MCV RBC AUTO: 89 FL (ref 82–98)
MONOCYTES # BLD AUTO: 0.77 THOUSAND/ΜL (ref 0.17–1.22)
MONOCYTES # BLD AUTO: 0.77 THOUSAND/ΜL (ref 0.17–1.22)
MONOCYTES NFR BLD AUTO: 9 % (ref 4–12)
MONOCYTES NFR BLD AUTO: 9 % (ref 4–12)
NEUTROPHILS # BLD AUTO: 5.06 THOUSANDS/ΜL (ref 1.85–7.62)
NEUTROPHILS # BLD AUTO: 5.06 THOUSANDS/ΜL (ref 1.85–7.62)
NEUTS SEG NFR BLD AUTO: 60 % (ref 43–75)
NEUTS SEG NFR BLD AUTO: 60 % (ref 43–75)
NORCLOZAPINE SERPL-MCNC: 289 NG/ML
NORCLOZAPINE SERPL-MCNC: 289 NG/ML
NRBC BLD AUTO-RTO: 0 /100 WBCS
NRBC BLD AUTO-RTO: 0 /100 WBCS
P AXIS: 28 DEGREES
P AXIS: 28 DEGREES
P AXIS: 31 DEGREES
P AXIS: 31 DEGREES
PLATELET # BLD AUTO: 435 THOUSANDS/UL (ref 149–390)
PLATELET # BLD AUTO: 435 THOUSANDS/UL (ref 149–390)
PMV BLD AUTO: 9.1 FL (ref 8.9–12.7)
PMV BLD AUTO: 9.1 FL (ref 8.9–12.7)
PR INTERVAL: 144 MS
PR INTERVAL: 144 MS
PR INTERVAL: 148 MS
PR INTERVAL: 148 MS
QRS AXIS: 19 DEGREES
QRSD INTERVAL: 92 MS
QRSD INTERVAL: 92 MS
QRSD INTERVAL: 94 MS
QRSD INTERVAL: 94 MS
QT INTERVAL: 352 MS
QT INTERVAL: 352 MS
QT INTERVAL: 354 MS
QT INTERVAL: 354 MS
QTC INTERVAL: 444 MS
QTC INTERVAL: 444 MS
QTC INTERVAL: 449 MS
QTC INTERVAL: 449 MS
RBC # BLD AUTO: 5.41 MILLION/UL (ref 3.88–5.62)
RBC # BLD AUTO: 5.41 MILLION/UL (ref 3.88–5.62)
T WAVE AXIS: 14 DEGREES
VENTRICULAR RATE: 95 BPM
VENTRICULAR RATE: 95 BPM
VENTRICULAR RATE: 98 BPM
VENTRICULAR RATE: 98 BPM
WBC # BLD AUTO: 8.51 THOUSAND/UL (ref 4.31–10.16)
WBC # BLD AUTO: 8.51 THOUSAND/UL (ref 4.31–10.16)

## 2022-06-07 PROCEDURE — 85025 COMPLETE CBC W/AUTO DIFF WBC: CPT | Performed by: PSYCHIATRY & NEUROLOGY

## 2022-06-07 PROCEDURE — 93005 ELECTROCARDIOGRAM TRACING: CPT

## 2022-06-07 PROCEDURE — 99232 SBSQ HOSP IP/OBS MODERATE 35: CPT | Performed by: PSYCHIATRY & NEUROLOGY

## 2022-06-07 PROCEDURE — 97163 PT EVAL HIGH COMPLEX 45 MIN: CPT

## 2022-06-07 PROCEDURE — 93010 ELECTROCARDIOGRAM REPORT: CPT | Performed by: INTERNAL MEDICINE

## 2022-06-07 RX ADMIN — METFORMIN HYDROCHLORIDE 850 MG: 850 TABLET, FILM COATED ORAL at 08:09

## 2022-06-07 RX ADMIN — GABAPENTIN 400 MG: 400 CAPSULE ORAL at 08:09

## 2022-06-07 RX ADMIN — CLOZAPINE 200 MG: 200 TABLET ORAL at 21:13

## 2022-06-07 RX ADMIN — GABAPENTIN 400 MG: 400 CAPSULE ORAL at 17:16

## 2022-06-07 RX ADMIN — NICOTINE POLACRILEX 2 MG: 2 GUM, CHEWING ORAL at 16:32

## 2022-06-07 RX ADMIN — NICOTINE POLACRILEX 2 MG: 2 GUM, CHEWING ORAL at 14:12

## 2022-06-07 RX ADMIN — AMMONIUM LACTATE 1 APPLICATION: 17 LOTION TOPICAL at 08:15

## 2022-06-07 RX ADMIN — NICOTINE POLACRILEX 2 MG: 2 GUM, CHEWING ORAL at 08:41

## 2022-06-07 RX ADMIN — METFORMIN HYDROCHLORIDE 850 MG: 850 TABLET, FILM COATED ORAL at 17:16

## 2022-06-07 RX ADMIN — GABAPENTIN 400 MG: 400 CAPSULE ORAL at 21:13

## 2022-06-07 RX ADMIN — ASPIRIN 81 MG CHEWABLE TABLET 81 MG: 81 TABLET CHEWABLE at 08:09

## 2022-06-07 RX ADMIN — LEVETIRACETAM 500 MG: 500 TABLET, FILM COATED ORAL at 08:09

## 2022-06-07 RX ADMIN — LEVETIRACETAM 500 MG: 500 TABLET, FILM COATED ORAL at 21:13

## 2022-06-07 RX ADMIN — CLOZAPINE 200 MG: 200 TABLET ORAL at 08:09

## 2022-06-07 RX ADMIN — METOPROLOL SUCCINATE 50 MG: 50 TABLET, EXTENDED RELEASE ORAL at 08:09

## 2022-06-07 RX ADMIN — ACETAMINOPHEN 650 MG: 325 TABLET ORAL at 19:30

## 2022-06-07 RX ADMIN — SENNOSIDES 8.6 MG: 8.6 TABLET, FILM COATED ORAL at 08:09

## 2022-06-07 RX ADMIN — NICOTINE POLACRILEX 2 MG: 2 GUM, CHEWING ORAL at 18:23

## 2022-06-07 NOTE — PROGRESS NOTES
Progress Note - Behavioral Health   Beth Raya 24 y o  male MRN: [id number]  [facility] Encounter: [id number]    Assessment/Plan   Principal Problem:    Schizophrenia [facility])  Active Problems:    Medical clearance for psychiatric admission    Seizure disorder [facility])    Traumatic brain injury ([address] )    Brain lesion    History of cardiac radiofrequency ablation    History of fall    PTSD (post-traumatic stress disorder)      ROS: No significant; patient reports no new seizures  He continues to use a cane due to a break to his left leg that required ORIF 5 weeks ago at 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  Recommended Treatment:   Continue current medication regimen  · Clozapine  · Gabapentin  · Keppra  Continue to encourage PT exercises  Continue with group therapy, milieu therapy and occupational therapy  Continue frequent safety checks and vitals per unit protocol  Case discussed with treatment team   Continue with SLIM medical management as indicated  Continue coordinating with case management regarding disposition  Risks, benefits and possible side effects of Medications: Risks, benefits, and possible side effects of medications have previously been explained  No new medications at this time  ------------------------------------------------------------    Subjective: Per nursing report, Nohemy Fierro has been compliant with medications  He has been noncompliant with lab work  Physical therapy saw the patient yesterday to work with the patient with his chronic left leg injury  They gave him exercises to help minimize his pain that that patient said he would try  Pt will attend some groups, but not all  Nursing reports he occasionally appears agitated, but is redirectable  Today, Nohemy Fierro is consenting for safety on the unit  Upon walking into his room, he was laying in bed with his eyes closed   He was easily awakeable and agreeable to walk to a conference room for further discussion  He reports feeling "good" and would like to get out of here as he "feels fine" and he want to get back to his mom  He remains very fixated on immediate discharge from the unit to go home during the conversation stating he regrets having said anything about his homicidal ideations as now he is "stuck here " He reports that his homicidal ideations have resolved, and he feels his mood has improved  Patient continues to question schizophrenia diagnosis claiming he only has PTSD  Sharlene Morales notes having about 6 hours of sleep  He appeared to be slightly lethargic during the conversation, but reports he just is "not a morning person " He reports having slept well with no nightmares, trouble falling asleep, frequent or early awakenings  Sharlene Morales states having a diminished appetite  He reports that he did not eat breakfast this morning, and although he thought he ate dinner last night, when asked further admitted he probably did not because he could not remember what he had  Sharlene Morales has been taking the medications as prescribed and reporting no side effects  He denies having any side effects such as N/V, abdominal pain, xerostomia, constipation, diarrhea, dizziness, or headaches  Not aggressive, self-injurious, or destructive on the unit  Psychiatric Review of Systems:  Behavior over the last 24 hours: improved  Sleep: normal  Appetite: adequate, decreased from baseline  Medication compliance: good  Medication side effects: none verbalized  Group attendance:good  Significant events: none    PRNs overnight: None   VS: Reviewed, within normal limits    Progress Toward Goals: Continued slow improvement; per nursing he is more social and now admits to some of his problems but not all  Sometimes he becomes agitated, but is redirectable      Vital signs in last 24 hours:  Temp:  [97 7 °F (36 5 °C)-97 8 °F (36 6 °C)] 97 7 °F (36 5 °C)  HR:  [110-118] 110  Resp:  [18] 18  BP: (123-130)/(70-86) 123/70    Mental Status Exam:  Appearance:  alert, drowsy, minimal eye contact, appears stated age, casually dressed, appropriate grooming and hygiene and obese   Behavior:  calm, cooperative and guarded   Motor: no abnormal movements, stable gait with assistive device and uses cane   Speech:  spontaneous, slow, not pressured, soft and coherent   Mood:  euthymic   Affect:  mood-congruent and constricted   Thought Process:  organized, goal directed   Thought Content: paranoid ideation, intrusive thoughts, ruminating thoughts, ideas of reference, no current s/h thoughts, intent, or plans  No distorted body perceptions  Perceptual disturbances: denies current hallucinations, does not appear to be responding to internal stimuli at this time and no auditory or visual hallucinations, denies auditory hallucinations when asked     Risk Potential: No active or passive suicidal or homicidal ideation was verbalized during interview   Cognition: appears to be of average intelligence and oriented to person, place, and situation, but not time   Insight:  Improving   Judgment: Limited   Sensorium: alert and oriented x 4  Consciousness: alert and awake  Attention: attention span and concentration are age appropriate  Intellect: appears to be of average intelligence  Motor Activity: no abnormal movements      Current Medications:  Current Facility-Administered Medications   Medication Dose Route Frequency Provider Last Rate    acetaminophen  650 mg Oral Q6H PRN Adelso Guallpa, DO      acetaminophen  650 mg Oral Q4H PRN Adelso Guallpa, DO      acetaminophen  975 mg Oral Q6H PRN Adelso Guallpa, DO      aluminum-magnesium hydroxide-simethicone  30 mL Oral Q4H PRN Adelso Guallpa, DO      ammonium lactate   Topical BID Vinita Latus, DPM      artificial tear  1 application Both Eyes H1P PRN Adelso Guallpa, DO      aspirin  81 mg Oral Daily Kashif Agosto, DO      benztropine  1 mg Intramuscular Q4H PRN Max 6/day Sheldon LEIJA Prayson, DO      benztropine  1 mg Oral Q4H PRN Max 6/day Milus Bald, DO      cloZAPine  200 mg Oral BID Milus Bald, DO      hydrOXYzine HCL  50 mg Oral Q6H PRN Max 4/day Milus Bald, DO      Or    diphenhydrAMINE  50 mg Intramuscular Q6H PRN Milus Bald, DO      gabapentin  400 mg Oral TID Milus Bald, DO      hydrOXYzine HCL  100 mg Oral Q6H PRN Max 4/day Milus Bald, DO      Or    LORazepam  2 mg Intramuscular Q6H PRN Milus Bald, DO      hydrOXYzine HCL  25 mg Oral Q6H PRN Max 4/day Milus Bald, DO      levETIRAcetam  500 mg Oral Q12H [address] Kashif A Prayson, DO      melatonin  9 mg Oral HS PRN Milus Bald, DO      metFORMIN  850 mg Oral BID With Meals Prerna Youssef MD      metoprolol succinate  50 mg Oral Daily Milus Bald, DO      nicotine polacrilex  2 mg Oral Q2H PRN Milus Bald, DO      OLANZapine  10 mg Oral Q3H PRN Max 3/day Milus Bald, DO      Or    OLANZapine  10 mg Intramuscular Q3H PRN Max 3/day Milus Bald, DO      OLANZapine  5 mg Oral Q3H PRN Max 6/day Kashif A Prayson, DO      Or    OLANZapine  5 mg Intramuscular Q3H PRN Max 6/day Kashfi A Prayson, DO      OLANZapine  2 5 mg Oral Q3H PRN Max 8/day Kashif A Prayson, DO      polyethylene glycol  17 g Oral Daily PRN Milus Bald, DO      propranolol  10 mg Oral Q8H PRN Milus Bald, DO      senna  1 tablet Oral Daily Kendrick Sanches MD      senna-docusate sodium  1 tablet Oral Daily PRN Prerna Youssef MD      traZODone  50 mg Oral HS PRN Milus Bald, DO         Behavioral Health Medications: all current active meds have been reviewed  Changes as in plan section above  Laboratory results:  I have personally reviewed all pertinent laboratory/tests results  No results found for this or any previous visit (from the past 48 hour(s))  This note has been constructed using a voice recognition system   There may be translation, syntax, or grammatical errors  If you have any questions, please contact the dictating author       JOHNATHAN Fairchild Mc

## 2022-06-07 NOTE — PLAN OF CARE
Problem: Alteration in Thoughts and Perception  Goal: Treatment Goal: Gain control of psychotic behaviors/thinking, reduce/eliminate presenting symptoms and demonstrate improved reality functioning upon discharge  Outcome: Not Progressing  Goal: Verbalize thoughts and feelings  Description: Interventions:  - Promote a nonjudgmental and trusting relationship with the patient through active listening and therapeutic communication  - Assess patient's level of functioning, behavior and potential for risk  - Engage patient in 1 on 1 interactions  - Encourage patient to express fears, feelings, frustrations, and discuss symptoms    - [place] patient to reality, help patient recognize reality-based thinking   - Administer medications as ordered and assess for potential side effects  - Provide the patient education related to the signs and symptoms of the illness and desired effects of prescribed medications  Interventions:  - Assess and re-assess patient's lethality and potential for self-injury  - Engage patient in 1:1 interactions, daily, for a minimum of 15 minutes  - Encourage patient to express feelings, fears, frustrations, hopes  - Establish rapport/trust with patient   Interventions:  - Assess and re-assess patient's level of risk   - Engage patient in 1:1 interactions, daily, for a minimum of 15 minutes   - Encourage patient to express feelings, fears, frustrations, hopes   Interventions:  - Assess and re-assess patient's level of risk, every waking shift  - Engage patient in 1:1 interactions, daily, for a minimum of 15 minutes   - Allow patient to express feelings and frustrations in a safe and non-threatening manner   - Establish rapport/trust with patient   Outcome: Progressing  Goal: Refrain from acting on delusional thinking/internal stimuli  Description: Interventions:  - Monitor patient closely, per order   - Utilize least restrictive measures   - Set reasonable limits, give positive feedback for acceptable   - Administer medications as ordered and monitor of potential side effects  Outcome: Progressing  Goal: Agree to be compliant with medication regime, as prescribed and report medication side effects  Description: Interventions:  - Offer appropriate PRN medication and supervise ingestion; conduct AIMS, as needed   Outcome: Progressing  Goal: Recognize dysfunctional thoughts, communicate reality-based thoughts at the time of discharge  Description: Interventions:  - Provide medication and psycho-education to assist patient in compliance and developing insight into his/her illness   Outcome: Not Progressing

## 2022-06-07 NOTE — PROGRESS NOTES
06/07/22 1032   Team Meeting   Meeting Type Daily Rounds   Initial Conference Date 06/07/22   Patient/Family Present   Patient Present No   Patient's Family Present No         Daily De Conley MD, Penny RN, Amado ARANGO  Case reviewed  Not compliant with labs, need to be reordered  PT consulted and gave patient exercises for leg  2/7 groups  C/o chest hurting  Melatonin given at 2309, effective

## 2022-06-07 NOTE — NURSING NOTE
Patient remains compliant with medication and meals  Patient is making some progress  He will admit to some of his problems but not all of them  At times he acts very needy and depressed and other times he is appropriate in groups and social with peers  He is only attending a few groups at this time  Physical Therapy was here and gave him some exercises to do to help the pain in his ankle  Patient has some resistance but will try them  Will continue to monitor and chart his progress

## 2022-06-07 NOTE — NURSING NOTE
Santos Watson was in bed at start of shift  Per Q 7 minute safety checks , pt appeared to sleep about 6+ hours overnight  No behavior incidence

## 2022-06-07 NOTE — PHYSICAL THERAPY NOTE
Physical Therapy Evaluation    Patient's Name: Ary Bridges    Admitting Diagnosis  Depression [F32  A]  Schizophrenia ([facility] ) [F20 9]    Problem List  Patient Active Problem List   Diagnosis    Medical clearance for psychiatric admission    Seizure disorder [facility])    Traumatic brain injury ([facility] )    Brain lesion    History of cardiac radiofrequency ablation    History of fall    Schizophrenia ([facility] )    PTSD (post-traumatic stress disorder)       Past Medical History  Past Medical History:   Diagnosis Date    Hallucination     Head injury     Seizures ([facility] )        Past Surgical History  No past surgical history on file      Recent Imaging  No orders to display       Recent Vital Signs  Vitals:    06/05/22 2100 06/06/22 0700 06/06/22 1500 06/07/22 0700   BP: 128/77 130/70 130/86 123/70   BP Location: Left arm Right arm Right arm Right arm   Pulse: 79 89 (!) 118 (!) 110   Resp:  18 18 18   Temp:  97 8 °F (36 6 °C) 97 8 °F (36 6 °C) 97 7 °F (36 5 °C)   TempSrc:  Skin Skin Skin   SpO2: 99%      Weight:       Height:            06/06/22 1400   PT Last Visit   PT Visit Date 06/06/22   Note Type   Note type Evaluation   Pain Assessment   Pain Assessment Tool 0-10   Pain Score No Pain   Home Living   Type of Home Mobile home   Home Layout One level;Stairs to enter with rails  (3 STEFANIE)   Home Equipment Cane   Prior Function   Level of [place] Independent with ADLs and functional mobility   Lives With [facility] Help From Family   ADL Assistance Independent   IADLs Needs assistance   Falls in the last 6 months 1 to 4   General   Family/Caregiver Present No   Cognition   Overall Cognitive Status Impaired   Arousal/Participation Alert   Memory Within functional limits   Following Commands Follows all commands and directions without difficulty   RLE Assessment   RLE Assessment   (5/5)   Strength LLE   L Hip Flexion 5/5   L Knee Flexion 3+/5   L Knee Extension 4-/5   L Ankle Dorsiflexion 4-/5   L Ankle Plantar Flexion 3+/5 Coordination   Movements are Fluid and Coordinated 0   Coordination and Movement Description antalgic gait with L limp   Sensation WFL   Light Touch   RLE Light Touch Grossly intact   LLE Light Touch Grossly intact   Bed Mobility   Supine to Sit 7  Independent   Sit to Supine 7  Independent   Transfers   Sit to Stand 6  Modified independent   Additional items Increased time required   Stand to Sit 6  Modified independent   Additional items Increased time required   Additional Comments with [facility]   Ambulation/Elevation   Gait pattern Step through pattern;Decreased foot clearance;Decreased L stance; Antalgic; Improper Weight shift   Gait Assistance 6  Modified independent   Assistive Device [facility]   Distance 250ft   Balance   Static Sitting Fair +   Dynamic Sitting Fair +   Static Standing Fair   Dynamic Standing Fair -   Ambulatory Fair -   Endurance Deficit   Endurance Deficit Yes   Endurance Deficit Description L leg pain   Activity Tolerance   Activity Tolerance Patient limited by pain   Medical Staff Made Aware spoke to RN   Nurse Made Aware spoke to RN   Assessment   Prognosis Good   Problem List Decreased strength;Decreased endurance; Impaired balance;Decreased mobility;Pain   Barriers to Discharge Inaccessible home environment;Decreased caregiver support   Goals   Patient Goals to have less pain and stronger LLE   Recommendation   PT Discharge Recommendation Home with outpatient rehabilitation   Equipment Recommended Cane   AM-PAC Basic Mobility Inpatient   Turning in Bed Without Bedrails 4   Lying on Back to Sitting on Edge of Flat Bed 4   Moving Bed to Chair 4   Standing Up From Chair 4   Walk in Room 4   Climb 3-5 Stairs 4   Basic Mobility Inpatient Raw Score 24   Basic Mobility Standardized Score 57 68   Highest Level Of Mobility   JH-HLM Goal 8: Walk 250 feet or more   JH-HLM Achieved 8: Walk 250 feet ot more   End of Consult   Patient Position at End of Consult Seated edge of bed; All needs within reach ASSESSMENT                                                                                                                     Sharla Clements is a 24 y o  male admitted to [facility] on 5/25/2022 for Schizophrenia ([facility] )  Pt  has a past medical history of Hallucination, Head injury, and Seizures ([facility] )    PT was consulted and pt was seen on 6/7/2022 for mobility assessment and d/c planning  Pt presents supine in bed alert and agreeable to therapy  He is reporting pain with weight bearing and with palpation to the L ankle primarily on the medial side  He ambulates with a limp due to pain and weakness of the LLE  Utilizes cane on the left, educated on cane use in R hand to improve gait pattern however pt prefers L hand use  Impairments limiting pt at this time include weakness, impaired balance, decreased endurance, increased fall risk, pain, decreased activity tolerance, and impaired judgement  Pt is currently functioning at a independent level for bed mobility, modified independent assistance level for transfers, modified independent assistance level for ambulation with [address]  The patient's AM-PAC Basic Mobility Inpatient Short Form Raw Score is 24  A Raw score of greater than 16 suggests the patient may benefit from discharge to home  Please also refer to the recommendation of the Physical Therapist for safe discharge planning  Recommendations                                                                                                              Will provide HEP to pt in order to improve L ankle strength and mobility        DME: Single Point Cane    Discharge Disposition:  Home with Outpatient Physical Therapy       Nneka Morales PT, DPT

## 2022-06-07 NOTE — PROGRESS NOTES
06/07/22 1100   Activity/Group Checklist   Group Wellness  (seated [place]/breathing techniques)   Attendance Attended   Attendance Duration (min) 31-45   Interactions Interacted appropriately  (pt was receptive to the guided muscial moves )   Affect/Mood Appropriate;Calm;Normal range   Goals Achieved Able to engage in interactions; Able to listen to others

## 2022-06-07 NOTE — NURSING NOTE
Patient initially was needing frequent attention  He repeatedly asked who his nurse was  He was focusing on why he should be discharging  He asked for "something for pain" then immediatly said "I don't know why they stopped my Percocet; they should not have done that" Writer told him that he had a PT eval today and I understood he was going to get exercises from them  Many times PT exercises are very helpful in reducing pain; more so than pain medication as they treat the problem therefore they become part of the solution  After we spoke about this he got up and walked away and did not return for any pain Rx  Jamas Sic is forgetful and has poor short term memory Patient will go to evening group then a few minutes later he walks out of the group  Patient c/o insomnia  Received 9mg melatonin

## 2022-06-07 NOTE — PLAN OF CARE
Problem: Alteration in Thoughts and Perception  Goal: Treatment Goal: Gain control of psychotic behaviors/thinking, reduce/eliminate presenting symptoms and demonstrate improved reality functioning upon discharge  Outcome: Not Progressing  Goal: Verbalize thoughts and feelings  Description: Interventions:  - Promote a nonjudgmental and trusting relationship with the patient through active listening and therapeutic communication  - Assess patient's level of functioning, behavior and potential for risk  - Engage patient in 1 on 1 interactions  - Encourage patient to express fears, feelings, frustrations, and discuss symptoms    - [place] patient to reality, help patient recognize reality-based thinking   - Administer medications as ordered and assess for potential side effects  - Provide the patient education related to the signs and symptoms of the illness and desired effects of prescribed medications  Interventions:  - Assess and re-assess patient's lethality and potential for self-injury  - Engage patient in 1:1 interactions, daily, for a minimum of 15 minutes  - Encourage patient to express feelings, fears, frustrations, hopes  - Establish rapport/trust with patient   Interventions:  - Assess and re-assess patient's level of risk   - Engage patient in 1:1 interactions, daily, for a minimum of 15 minutes   - Encourage patient to express feelings, fears, frustrations, hopes   Interventions:  - Assess and re-assess patient's level of risk, every waking shift  - Engage patient in 1:1 interactions, daily, for a minimum of 15 minutes   - Allow patient to express feelings and frustrations in a safe and non-threatening manner   - Establish rapport/trust with patient   Outcome: Not Progressing  Goal: Refrain from acting on delusional thinking/internal stimuli  Description: Interventions:  - Monitor patient closely, per order   - Utilize least restrictive measures   - Set reasonable limits, give positive feedback for acceptable   - Administer medications as ordered and monitor of potential side effects  Outcome: Progressing  Goal: Agree to be compliant with medication regime, as prescribed and report medication side effects  Description: Interventions:  - Offer appropriate PRN medication and supervise ingestion; conduct AIMS, as needed   Outcome: Progressing  Goal: Recognize dysfunctional thoughts, communicate reality-based thoughts at the time of discharge  Description: Interventions:  - Provide medication and psycho-education to assist patient in compliance and developing insight into his/her illness   Outcome: Not Progressing     Problem: Ineffective Coping  Goal: Identifies ineffective coping skills  Outcome: Not Progressing  Goal: Identifies healthy coping skills  Outcome: Not Progressing  Goal: Demonstrates healthy coping skills  Outcome: Not Progressing  Goal: Participates in unit activities  Description: Interventions:  - Provide therapeutic environment   - Provide required programming   - Redirect inappropriate behaviors   Outcome: Not Progressing  Goal: Patient/Family participate in treatment and DC plans  Description: Interventions:  - Provide therapeutic environment  Outcome: Progressing  Goal: Patient/Family verbalizes awareness of resources  Outcome: Progressing  Goal: Understands least restrictive measures  Description: Interventions:  - Utilize least restrictive behavior  Outcome: Progressing  Goal: Free from restraint events  Description: - Utilize least restrictive measures   - Provide behavioral interventions   - Redirect inappropriate behaviors   Outcome: Progressing     Problem: Risk for Self Injury/Neglect  Goal: Verbalize thoughts and feelings  Description: Interventions:  - Promote a nonjudgmental and trusting relationship with the patient through active listening and therapeutic communication  - Assess patient's level of functioning, behavior and potential for risk  - Engage patient in 1 on 1 interactions  - Encourage patient to express fears, feelings, frustrations, and discuss symptoms    - [place] patient to reality, help patient recognize reality-based thinking   - Administer medications as ordered and assess for potential side effects  - Provide the patient education related to the signs and symptoms of the illness and desired effects of prescribed medications  Interventions:  - Assess and re-assess patient's lethality and potential for self-injury  - Engage patient in 1:1 interactions, daily, for a minimum of 15 minutes  - Encourage patient to express feelings, fears, frustrations, hopes  - Establish rapport/trust with patient   Interventions:  - Assess and re-assess patient's level of risk   - Engage patient in 1:1 interactions, daily, for a minimum of 15 minutes   - Encourage patient to express feelings, fears, frustrations, hopes   Interventions:  - Assess and re-assess patient's level of risk, every waking shift  - Engage patient in 1:1 interactions, daily, for a minimum of 15 minutes   - Allow patient to express feelings and frustrations in a safe and non-threatening manner   - Establish rapport/trust with patient   Outcome: Not Progressing  Goal: Treatment Goal: Remain safe during length of stay, learn and adopt new coping skills, and be free of self-injurious ideation, impulses and acts at the time of discharge  Outcome: Progressing  Goal: Refrain from harming self  Description: Interventions:  - Monitor patient closely, per order  - Develop a trusting relationship  - Supervise medication ingestion, monitor effects and side effects   Outcome: Progressing  Goal: Recognize maladaptive responses and adopt new coping mechanisms  Outcome: Not Progressing     Problem: Depression  Goal: Verbalize thoughts and feelings  Description: Interventions:  - Promote a nonjudgmental and trusting relationship with the patient through active listening and therapeutic communication  - Assess patient's level of functioning, behavior and potential for risk  - Engage patient in 1 on 1 interactions  - Encourage patient to express fears, feelings, frustrations, and discuss symptoms    - [place] patient to reality, help patient recognize reality-based thinking   - Administer medications as ordered and assess for potential side effects  - Provide the patient education related to the signs and symptoms of the illness and desired effects of prescribed medications  Interventions:  - Assess and re-assess patient's lethality and potential for self-injury  - Engage patient in 1:1 interactions, daily, for a minimum of 15 minutes  - Encourage patient to express feelings, fears, frustrations, hopes  - Establish rapport/trust with patient   Interventions:  - Assess and re-assess patient's level of risk   - Engage patient in 1:1 interactions, daily, for a minimum of 15 minutes   - Encourage patient to express feelings, fears, frustrations, hopes   Interventions:  - Assess and re-assess patient's level of risk, every waking shift  - Engage patient in 1:1 interactions, daily, for a minimum of 15 minutes   - Allow patient to express feelings and frustrations in a safe and non-threatening manner   - Establish rapport/trust with patient   Outcome: Not Progressing  Goal: Treatment Goal: Demonstrate behavioral control of depressive symptoms, verbalize feelings of improved mood/affect, and adopt new coping skills prior to discharge  Outcome: Not Progressing  Goal: Refrain from harming self  Description: Interventions:  - Monitor patient closely, per order   - Supervise medication ingestion, monitor effects and side effects   Outcome: Progressing  Goal: Refrain from isolation  Description: Interventions:  - Develop a trusting relationship   - Encourage socialization   Outcome: Progressing  Goal: Refrain from self-neglect  Outcome: Progressing     Problem: Anxiety  Goal: Anxiety is at manageable level  Description: Interventions:  - Assess and monitor patient's anxiety level  - Monitor for signs and symptoms (heart palpitations, chest pain, shortness of breath, headaches, nausea, feeling jumpy, restlessness, irritable, apprehensive)  - Collaborate with interdisciplinary team and initiate plan and interventions as ordered    - [place] patient to unit/surroundings  - Explain treatment plan  - Encourage participation in care  - Encourage verbalization of concerns/fears  - Identify coping mechanisms  - Assist in developing anxiety-reducing skills  - Administer/offer alternative therapies  - Limit or eliminate stimulants  Outcome: Progressing     Problem: SUBSTANCE USE/ABUSE  Goal: By discharge, will develop insight into their chemical dependency and sustain motivation to continue in recovery  Description: INTERVENTIONS:  - Attends all daily group sessions and scheduled AA groups  - Actively practices coping skills through participation in the therapeutic community and adherence to program rules  - Reviews and completes assignments from individual treatment plan  - Assist patient development of understanding of their personal cycle of addiction and relapse triggers  Outcome: Not Progressing  Goal: By discharge, patient will have ongoing treatment plan addressing chemical dependency  Description: INTERVENTIONS:  - Assist patient with resources and/or appointments for ongoing recovery based living  Outcome: Not Progressing     Problem: SUBSTANCE USE/ABUSE  Goal: By discharge, will develop insight into their chemical dependency and sustain motivation to continue in recovery  Description: INTERVENTIONS:  - Attends all daily group sessions and scheduled AA groups  - Actively practices coping skills through participation in the therapeutic community and adherence to program rules  - Reviews and completes assignments from individual treatment plan  - Assist patient development of understanding of their personal cycle of addiction and relapse triggers  Outcome: Not Progressing  Goal: By discharge, patient will have ongoing treatment plan addressing chemical dependency  Description: INTERVENTIONS:  - Assist patient with resources and/or appointments for ongoing recovery based living  Outcome: Not Progressing

## 2022-06-08 PROCEDURE — 99232 SBSQ HOSP IP/OBS MODERATE 35: CPT | Performed by: PSYCHIATRY & NEUROLOGY

## 2022-06-08 RX ADMIN — GABAPENTIN 400 MG: 400 CAPSULE ORAL at 15:56

## 2022-06-08 RX ADMIN — METFORMIN HYDROCHLORIDE 850 MG: 850 TABLET, FILM COATED ORAL at 15:56

## 2022-06-08 RX ADMIN — NICOTINE POLACRILEX 2 MG: 2 GUM, CHEWING ORAL at 08:27

## 2022-06-08 RX ADMIN — METOPROLOL SUCCINATE 50 MG: 50 TABLET, EXTENDED RELEASE ORAL at 08:00

## 2022-06-08 RX ADMIN — METFORMIN HYDROCHLORIDE 850 MG: 850 TABLET, FILM COATED ORAL at 08:26

## 2022-06-08 RX ADMIN — LEVETIRACETAM 500 MG: 500 TABLET, FILM COATED ORAL at 08:27

## 2022-06-08 RX ADMIN — ASPIRIN 81 MG CHEWABLE TABLET 81 MG: 81 TABLET CHEWABLE at 08:27

## 2022-06-08 RX ADMIN — ACETAMINOPHEN 325MG 975 MG: 325 TABLET ORAL at 11:50

## 2022-06-08 RX ADMIN — SENNOSIDES 8.6 MG: 8.6 TABLET, FILM COATED ORAL at 08:27

## 2022-06-08 RX ADMIN — NICOTINE POLACRILEX 2 MG: 2 GUM, CHEWING ORAL at 10:52

## 2022-06-08 RX ADMIN — LEVETIRACETAM 500 MG: 500 TABLET, FILM COATED ORAL at 21:10

## 2022-06-08 RX ADMIN — NICOTINE POLACRILEX 2 MG: 2 GUM, CHEWING ORAL at 17:38

## 2022-06-08 RX ADMIN — CLOZAPINE 200 MG: 200 TABLET ORAL at 08:26

## 2022-06-08 RX ADMIN — ACETAMINOPHEN 325MG 975 MG: 325 TABLET ORAL at 17:37

## 2022-06-08 RX ADMIN — GABAPENTIN 400 MG: 400 CAPSULE ORAL at 08:00

## 2022-06-08 RX ADMIN — NICOTINE POLACRILEX 2 MG: 2 GUM, CHEWING ORAL at 20:29

## 2022-06-08 RX ADMIN — NICOTINE POLACRILEX 2 MG: 2 GUM, CHEWING ORAL at 13:34

## 2022-06-08 RX ADMIN — NICOTINE POLACRILEX 2 MG: 2 GUM, CHEWING ORAL at 15:35

## 2022-06-08 RX ADMIN — CLOZAPINE 200 MG: 200 TABLET ORAL at 21:10

## 2022-06-08 RX ADMIN — GABAPENTIN 400 MG: 400 CAPSULE ORAL at 21:10

## 2022-06-08 NOTE — SOCIAL WORK
Pt approached writer this afternoon during fresh air time and asked several times when he is able to leave (be discharged)  Pt does ask this question to SW at least once a day, sometimes several  Plan to meet with him this week and explore if he is open to keeping a journal / record of his questions and the answers as this can be supportive and helpful especially if there are any challenges with his short term memory

## 2022-06-08 NOTE — PROGRESS NOTES
Progress Note - Behavioral Health   Gabby Lebron 24 y o  male MRN: [id number]  Unit/Bed#: [facility] 112-01 Encounter: [id number]    Assessment/Plan   Principal Problem:    Schizophrenia [facility])  Active Problems:    Medical clearance for psychiatric admission    Seizure disorder [facility])    Traumatic brain injury ([address] )    Brain lesion    History of cardiac radiofrequency ablation    History of fall    PTSD (post-traumatic stress disorder)    ROS: None significant; patient reports no new seizures  He continues to experience left leg pain occasionally throughout the day due to break requiring ORIF 5 weeks ago at [facility]  Uses cane to ambulate  Diagnosis: Schizophrenia  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  Recommended Treatment:   Continue current medication regimen  ? Clozapine  ? Gabapentin  ? Keppra  Continue to encourage PT exercises  Continue with group therapy, milieu therapy and occupational therapy  Continue frequent safety checks and vitals per unit protocol  Case discussed with treatment team   Continue with SLIM medical management as indicated  Continue coordinating with case management regarding disposition  Risks, benefits and possible side effects of Medications: Risks, benefits, and possible side effects of medications have previously been explained  No new medications at this time  ------------------------------------------------------------    Subjective: Per nursing report, Aurelia Tenorio has been compliant with medications  He has been needy on the unit constantly calling the nurses to assist him  This behavior upset his roommate, resulting in his room needing to be changed  He attended 0 of 7 groups yesterday  He was encouraged to continue his PT exercises  Today, Aurelia Tenorio is consenting for safety on the unit  When I entered his room, the patient was sitting on the edge of the bed in a t-shirt and sweatpants holding other clothing with his head down   He stated that he was waiting to get in the shower  He reports feeling "fine" today  He was originally hospitalized at [facility] after his mother brought him to the emergency department due to agitation before transferring to the [facility]  The patient states that he was brought in because his mom felt "his meds were off," and he was having homicidal thoughts, but those are gone now  Patient still remains fixated on leaving  He reports that he was told it will be another 4 weeks, and he states he "cannot wait that long " Piotr Bartlett notes having 6 hours of sleep, and he slept "good " He denies any nightmares, frequent, or early awakenings  Piotr Bartlett states that he is still having a diminished appetite  He reports eating some of his breakfast this morning, but he could not finish it  When asked about his group attendance yesterday, he reported attending 2 or 3, but he claims he does not go to some of them because of the pain in his leg  When asked about his plans, for the day he said that he did not have any  He also reports no plans for when he gets discharged as of now  He enjoys reading the bible in his room and studying for his GED  Piotr Bartlett has been taking the medications as prescribed and reporting no side effects  No significant events occurred overnight  Not aggressive, self-injurious, or destructive on the unit       Psychiatric Review of Systems:  Behavior over the last 24 hours: improved  Sleep: normal  Appetite: adequate, decreased from baseline  Medication compliance: good  Medication side effects: none verbalized  Group attendance: low, 0 out of 7 attended  Significant events: none    PRNs overnight: None   VS: Reviewed, within normal limits    Progress Toward Goals: Continues to demonstrate slow improvement  He is now more social and admits to some of his medical diagnoses  He continues to remain fixated on getting discharged      Vital signs in last 24 hours:  Temp:  [97 5 °F (36 4 °C)-97 7 °F (36 5 °C)] 97 5 °F (36 4 °C)  HR:  [] 105  Resp:  [18] 18  BP: (113-148)/(59-70) 148/68    Mental Status Exam:  Appearance:  alert, minimal eye contact, appears stated age, casually dressed, appropriate grooming and hygiene and obese   Behavior:  calm, cooperative and guarded   Motor: no abnormal movements and gait with slight limp while using cane   Speech:  spontaneous, slow, not pressured, soft and coherent   Mood:  euthymic   Affect:  mood-congruent and constricted   Thought Process:  organized, goal directed   Thought Content: paranoid ideation, intrusive thoughts, ruminating thoughts, ideas of reference, no current s/h thoughts, intent, or plans  No distorted body perceptions or phobias     Perceptual disturbances: denies current hallucinations and does not appear to be responding to internal stimuli at this time  no auditory or visual hallucinations, denies auditory hallucinations when asked   Risk Potential: No active or passive suicidal or homicidal ideation was verbalized during interview   Cognition: oriented to person, place, time, and situation and appears to be of average intelligence   Insight:  Improving   Judgment: Limited   Sensorium: alert and oriented x 4  Consciousness: alert and awake  Attention: attention span and concentration are age appropriate  Intellect: appears to be of average intelligence  Motor Activity: no abnormal movements    Current Medications:  Current Facility-Administered Medications   Medication Dose Route Frequency Provider Last Rate    acetaminophen  650 mg Oral Q6H PRN Asia Barrett, DO      acetaminophen  650 mg Oral Q4H PRN Asia Barrett, DO      acetaminophen  975 mg Oral Q6H PRN Asia Barrett, DO      aluminum-magnesium hydroxide-simethicone  30 mL Oral Q4H PRN Asia Barrett, DO      ammonium lactate   Topical BID Ramonia Gratiot, DPM      artificial tear  1 application Both Eyes O3J PRN Asia Barrett, DO      aspirin  81 mg Oral Daily Kashif Agosto, DO      benztropine  1 mg Intramuscular Q4H PRN Max 6/day Jolyne , DO      benztropine  1 mg Oral Q4H PRN Max 6/day Jolyne , DO      cloZAPine  200 mg Oral BID Jolyne , DO      hydrOXYzine HCL  50 mg Oral Q6H PRN Max 4/day Jolyne , DO      Or    diphenhydrAMINE  50 mg Intramuscular Q6H PRN Jolyne , DO      gabapentin  400 mg Oral TID Jolymervat Boneff, DO      hydrOXYzine HCL  100 mg Oral Q6H PRN Max 4/day Jolyne , DO      Or    LORazepam  2 mg Intramuscular Q6H PRN Jolyne , DO      hydrOXYzine HCL  25 mg Oral Q6H PRN Max 4/day Jolyne , DO      levETIRAcetam  500 mg Oral Q12H [address] Jolyne , DO      melatonin  9 mg Oral HS PRN Sanazlymervat Boneff, DO      metFORMIN  850 mg Oral BID With Meals Joellen King MD      metoprolol succinate  50 mg Oral Daily Jolyne , DO      nicotine polacrilex  2 mg Oral Q2H PRN Jolyne , DO      OLANZapine  10 mg Oral Q3H PRN Max 3/day Jolyne , DO      Or    OLANZapine  10 mg Intramuscular Q3H PRN Max 3/day Jolyne , DO      OLANZapine  5 mg Oral Q3H PRN Max 6/day Kashif A Prayson, DO      Or    OLANZapine  5 mg Intramuscular Q3H PRN Max 6/day Kashif A Prayson, DO      OLANZapine  2 5 mg Oral Q3H PRN Max 8/day Kashif A Prayson, DO      polyethylene glycol  17 g Oral Daily PRN Jolyne , DO      propranolol  10 mg Oral Q8H PRN Jolyne , DO      senna  1 tablet Oral Daily Kinsey Billings MD      senna-docusate sodium  1 tablet Oral Daily PRN Joellen King MD      traZODone  50 mg Oral HS PRN Jolyne , DO         Behavioral Health Medications: all current active meds have been reviewed  Changes as in plan section above  Laboratory results:  I have personally reviewed all pertinent laboratory/tests results    Recent Results (from the past 48 hour(s))   ECG 12 lead    Collection Time: 06/07/22 12:53 PM   Result Value Ref Range    Ventricular Rate 95 BPM    Atrial Rate 95 BPM    MS Interval 144 ms    QRSD Interval 94 ms    QT Interval 354 ms    QTC Interval 444 ms    P Axis 31 degrees    QRS Axis 19 degrees    T Wave Axis 14 degrees   ECG 12 lead    Collection Time: 06/07/22 12:54 PM   Result Value Ref Range    Ventricular Rate 98 BPM    Atrial Rate 98 BPM    MS Interval 148 ms    QRSD Interval 92 ms    QT Interval 352 ms    QTC Interval 449 ms    P [place] 28 degrees    QRS Axis 19 degrees    T Wave Axis 14 degrees   CBC and differential    Collection Time: 06/07/22  4:38 PM   Result Value Ref Range    WBC 8 51 4 31 - 10 16 Thousand/uL    RBC 5 41 3 88 - 5 62 Million/uL    Hemoglobin 15 8 12 0 - 17 0 g/dL    Hematocrit 48 1 36 5 - 49 3 %    MCV 89 82 - 98 fL    MCH 29 2 26 8 - 34 3 pg    MCHC 32 8 31 4 - 37 4 g/dL    RDW 12 0 11 6 - 15 1 %    MPV 9 1 8 9 - 12 7 fL    Platelets 511 (H) 691 - 390 Thousands/uL    nRBC 0 /100 WBCs    Neutrophils Relative 60 43 - 75 %    Immat GRANS % 0 0 - 2 %    Lymphocytes Relative 28 14 - 44 %    Monocytes Relative 9 4 - 12 %    Eosinophils Relative 2 0 - 6 %    Basophils Relative 1 0 - 1 %    Neutrophils Absolute 5 06 1 85 - 7 62 Thousands/µL    Immature Grans Absolute 0 02 0 00 - 0 20 Thousand/uL    Lymphocytes Absolute 2 42 0 60 - 4 47 Thousands/µL    Monocytes Absolute 0 77 0 17 - 1 22 Thousand/µL    Eosinophils Absolute 0 20 0 00 - 0 61 Thousand/µL    Basophils Absolute 0 04 0 00 - 0 10 Thousands/µL        This note has been constructed using a voice recognition system  There may be translation, syntax, or grammatical errors  If you have any questions, please contact the dictating author      JOHNATHAN Alfonso

## 2022-06-08 NOTE — SOCIAL WORK
SW met with [facility] staff following their meeting with patient to explore more about their services and follow up on how the meeting went  They explained that patient was not very interested in the services and focused on discharge, which is consistent with his presentation day to day on [facility]  Several questions asked by [facility] about his situation and support, and they also clarified they can follow up with him even after discharge to see if he is more open minded or has identified ways he can be supported in community  VIRGILIO will follow up with patient later to further discuss      [facility]  Cell:  [phone]

## 2022-06-08 NOTE — PROGRESS NOTES
06/08/22 1645   Team Meeting   Meeting Type Daily Rounds   Initial Conference Date 06/08/22   Patient/Family Present   Patient Present No   Patient's Family Present No       Daily Eric Corona MD, Penny RN, Tamela MARCUSW  Case reviewed  Tylenol for leg pain  Platelets elevated  Interview with [facility] this morning at 0930  Somatic, multiple requests to RN

## 2022-06-08 NOTE — PLAN OF CARE
Problem: Alteration in Thoughts and Perception  Goal: Treatment Goal: Gain control of psychotic behaviors/thinking, reduce/eliminate presenting symptoms and demonstrate improved reality functioning upon discharge  Outcome: Progressing  Goal: Verbalize thoughts and feelings  Description: Interventions:  - Promote a nonjudgmental and trusting relationship with the patient through active listening and therapeutic communication  - Assess patient's level of functioning, behavior and potential for risk  - Engage patient in 1 on 1 interactions  - Encourage patient to express fears, feelings, frustrations, and discuss symptoms    - [place] patient to reality, help patient recognize reality-based thinking   - Administer medications as ordered and assess for potential side effects  - Provide the patient education related to the signs and symptoms of the illness and desired effects of prescribed medications  Interventions:  - Assess and re-assess patient's lethality and potential for self-injury  - Engage patient in 1:1 interactions, daily, for a minimum of 15 minutes  - Encourage patient to express feelings, fears, frustrations, hopes  - Establish rapport/trust with patient   Interventions:  - Assess and re-assess patient's level of risk   - Engage patient in 1:1 interactions, daily, for a minimum of 15 minutes   - Encourage patient to express feelings, fears, frustrations, hopes   Interventions:  - Assess and re-assess patient's level of risk, every waking shift  - Engage patient in 1:1 interactions, daily, for a minimum of 15 minutes   - Allow patient to express feelings and frustrations in a safe and non-threatening manner   - Establish rapport/trust with patient   Outcome: Progressing  Goal: Refrain from acting on delusional thinking/internal stimuli  Description: Interventions:  - Monitor patient closely, per order   - Utilize least restrictive measures   - Set reasonable limits, give positive feedback for acceptable - Administer medications as ordered and monitor of potential side effects  Outcome: Progressing  Goal: Agree to be compliant with medication regime, as prescribed and report medication side effects  Description: Interventions:  - Offer appropriate PRN medication and supervise ingestion; conduct AIMS, as needed   Outcome: Progressing  Goal: Recognize dysfunctional thoughts, communicate reality-based thoughts at the time of discharge  Description: Interventions:  - Provide medication and psycho-education to assist patient in compliance and developing insight into his/her illness   Outcome: Progressing     Problem: Ineffective Coping  Goal: Identifies ineffective coping skills  Outcome: Progressing  Goal: Identifies healthy coping skills  Outcome: Progressing  Goal: Demonstrates healthy coping skills  Outcome: Progressing  Goal: Participates in unit activities  Description: Interventions:  - Provide therapeutic environment   - Provide required programming   - Redirect inappropriate behaviors   Outcome: Progressing  Goal: Patient/Family participate in treatment and DC plans  Description: Interventions:  - Provide therapeutic environment  Outcome: Progressing  Goal: Patient/Family verbalizes awareness of resources  Outcome: Progressing  Goal: Understands least restrictive measures  Description: Interventions:  - Utilize least restrictive behavior  Outcome: Progressing  Goal: Free from restraint events  Description: - Utilize least restrictive measures   - Provide behavioral interventions   - Redirect inappropriate behaviors   Outcome: Progressing     Problem: Risk for Self Injury/Neglect  Goal: Verbalize thoughts and feelings  Description: Interventions:  - Promote a nonjudgmental and trusting relationship with the patient through active listening and therapeutic communication  - Assess patient's level of functioning, behavior and potential for risk  - Engage patient in 1 on 1 interactions  - Encourage patient to express fears, feelings, frustrations, and discuss symptoms    - [place] patient to reality, help patient recognize reality-based thinking   - Administer medications as ordered and assess for potential side effects  - Provide the patient education related to the signs and symptoms of the illness and desired effects of prescribed medications  Interventions:  - Assess and re-assess patient's lethality and potential for self-injury  - Engage patient in 1:1 interactions, daily, for a minimum of 15 minutes  - Encourage patient to express feelings, fears, frustrations, hopes  - Establish rapport/trust with patient   Interventions:  - Assess and re-assess patient's level of risk   - Engage patient in 1:1 interactions, daily, for a minimum of 15 minutes   - Encourage patient to express feelings, fears, frustrations, hopes   Interventions:  - Assess and re-assess patient's level of risk, every waking shift  - Engage patient in 1:1 interactions, daily, for a minimum of 15 minutes   - Allow patient to express feelings and frustrations in a safe and non-threatening manner   - Establish rapport/trust with patient   Outcome: Progressing  Goal: Treatment Goal: Remain safe during length of stay, learn and adopt new coping skills, and be free of self-injurious ideation, impulses and acts at the time of discharge  Outcome: Progressing  Goal: Refrain from harming self  Description: Interventions:  - Monitor patient closely, per order  - Develop a trusting relationship  - Supervise medication ingestion, monitor effects and side effects   Outcome: Progressing  Goal: Recognize maladaptive responses and adopt new coping mechanisms  Outcome: Progressing     Problem: Depression  Goal: Verbalize thoughts and feelings  Description: Interventions:  - Promote a nonjudgmental and trusting relationship with the patient through active listening and therapeutic communication  - Assess patient's level of functioning, behavior and potential for risk  - Engage patient in 1 on 1 interactions  - Encourage patient to express fears, feelings, frustrations, and discuss symptoms    - [place] patient to reality, help patient recognize reality-based thinking   - Administer medications as ordered and assess for potential side effects  - Provide the patient education related to the signs and symptoms of the illness and desired effects of prescribed medications  Interventions:  - Assess and re-assess patient's lethality and potential for self-injury  - Engage patient in 1:1 interactions, daily, for a minimum of 15 minutes  - Encourage patient to express feelings, fears, frustrations, hopes  - Establish rapport/trust with patient   Interventions:  - Assess and re-assess patient's level of risk   - Engage patient in 1:1 interactions, daily, for a minimum of 15 minutes   - Encourage patient to express feelings, fears, frustrations, hopes   Interventions:  - Assess and re-assess patient's level of risk, every waking shift  - Engage patient in 1:1 interactions, daily, for a minimum of 15 minutes   - Allow patient to express feelings and frustrations in a safe and non-threatening manner   - Establish rapport/trust with patient   Outcome: Progressing  Goal: Treatment Goal: Demonstrate behavioral control of depressive symptoms, verbalize feelings of improved mood/affect, and adopt new coping skills prior to discharge  Outcome: Progressing  Goal: Refrain from harming self  Description: Interventions:  - Monitor patient closely, per order   - Supervise medication ingestion, monitor effects and side effects   Outcome: Progressing  Goal: Refrain from isolation  Description: Interventions:  - Develop a trusting relationship   - Encourage socialization   Outcome: Progressing  Goal: Refrain from self-neglect  Outcome: Progressing     Problem: Anxiety  Goal: Anxiety is at manageable level  Description: Interventions:  - Assess and monitor patient's anxiety level     - Monitor for signs and symptoms (heart palpitations, chest pain, shortness of breath, headaches, nausea, feeling jumpy, restlessness, irritable, apprehensive)  - Collaborate with interdisciplinary team and initiate plan and interventions as ordered    - [place] patient to unit/surroundings  - Explain treatment plan  - Encourage participation in care  - Encourage verbalization of concerns/fears  - Identify coping mechanisms  - Assist in developing anxiety-reducing skills  - Administer/offer alternative therapies  - Limit or eliminate stimulants  Outcome: Progressing     Problem: Risk for Violence/Aggression Toward Others  Goal: Verbalize thoughts and feelings  Description: Interventions:  - Promote a nonjudgmental and trusting relationship with the patient through active listening and therapeutic communication  - Assess patient's level of functioning, behavior and potential for risk  - Engage patient in 1 on 1 interactions  - Encourage patient to express fears, feelings, frustrations, and discuss symptoms    - [place] patient to reality, help patient recognize reality-based thinking   - Administer medications as ordered and assess for potential side effects  - Provide the patient education related to the signs and symptoms of the illness and desired effects of prescribed medications  Interventions:  - Assess and re-assess patient's lethality and potential for self-injury  - Engage patient in 1:1 interactions, daily, for a minimum of 15 minutes  - Encourage patient to express feelings, fears, frustrations, hopes  - Establish rapport/trust with patient   Interventions:  - Assess and re-assess patient's level of risk   - Engage patient in 1:1 interactions, daily, for a minimum of 15 minutes   - Encourage patient to express feelings, fears, frustrations, hopes   Interventions:  - Assess and re-assess patient's level of risk, every waking shift  - Engage patient in 1:1 interactions, daily, for a minimum of 15 minutes   - Allow patient to express feelings and frustrations in a safe and non-threatening manner   - Establish rapport/trust with patient   Outcome: Progressing  Goal: Treatment Goal: Refrain from acts of violence/aggression during length of stay, and demonstrate improved impulse control at the time of discharge  Outcome: Progressing  Goal: Refrain from harming others  Outcome: Progressing  Goal: Refrain from destructive acts on the environment or property  Outcome: Progressing  Goal: Control angry outbursts  Description: Interventions:  - Monitor patient closely, per order  - Ensure early verbal de-escalation  - Monitor prn medication needs  - Set reasonable/therapeutic limits, outline behavioral expectations, and consequences   - Provide a non-threatening milieu, utilizing the least restrictive interventions   Outcome: Progressing  Goal: Identify appropriate positive anger management techniques  Description: Interventions:  - Offer anger management and coping skills groups   - Staff will provide positive feedback for appropriate anger control  Outcome: Progressing     Problem: DISCHARGE PLANNING - CARE MANAGEMENT  Goal: Discharge to post-acute care or home with appropriate resources  Description: INTERVENTIONS:  - Conduct assessment to determine patient/family and health care team treatment goals, and need for post-acute services based on payer coverage, community resources, and patient preferences, and barriers to discharge  - Address psychosocial, clinical, and financial barriers to discharge as identified in assessment in conjunction with the patient/family and health care team  - Arrange appropriate level of post-acute services according to patients   needs and preference and payer coverage in collaboration with the physician and health care team  - Communicate with and update the patient/family, physician, and health care team regarding progress on the discharge plan  - Arrange appropriate transportation to post-acute venues  Outcome: Progressing     Problem: SUBSTANCE USE/ABUSE  Goal: By discharge, will develop insight into their chemical dependency and sustain motivation to continue in recovery  Description: INTERVENTIONS:  - Attends all daily group sessions and scheduled AA groups  - Actively practices coping skills through participation in the therapeutic community and adherence to program rules  - Reviews and completes assignments from individual treatment plan  - Assist patient development of understanding of their personal cycle of addiction and relapse triggers  Outcome: Progressing  Goal: By discharge, patient will have ongoing treatment plan addressing chemical dependency  Description: INTERVENTIONS:  - Assist patient with resources and/or appointments for ongoing recovery based living  Outcome: Progressing

## 2022-06-08 NOTE — NURSING NOTE
Rodo's bloodwork was obtained:CBC with Diff and sent to the lab  There was a room change and he was moved to room 112 bed 1  He continues to ask another nurse ([facility]) for things dozens of times despite being told I am his nurse and she is not his nurse  He would sit on gis bed and look up at her and say "Brionna Art, can you het me this"  "Radhaeda Art, can you get me that" (As his bed was facing right where she was sitting)     He c/o #6 pain in l leg and received Tylenol 650 mg at  1928 He stated the Tylenol worked well adding "I think it worked better that [employer]" Then again he asked when he was going home   Writer asked what he does at home and basically he said "nothing; I hang around and watch tv" He talked at length about all the drugs he has done in the past especially smoking weed and K2, "a lot of Michael Broad told him he needed to be here awhile longer to focus on recovery and reinforce sobriety

## 2022-06-09 PROCEDURE — 99232 SBSQ HOSP IP/OBS MODERATE 35: CPT | Performed by: PSYCHIATRY & NEUROLOGY

## 2022-06-09 RX ADMIN — LEVETIRACETAM 500 MG: 500 TABLET, FILM COATED ORAL at 08:17

## 2022-06-09 RX ADMIN — GABAPENTIN 400 MG: 400 CAPSULE ORAL at 15:42

## 2022-06-09 RX ADMIN — ALUMINUM HYDROXIDE, MAGNESIUM HYDROXIDE, AND SIMETHICONE 30 ML: 200; 200; 20 SUSPENSION ORAL at 10:18

## 2022-06-09 RX ADMIN — CLOZAPINE 200 MG: 200 TABLET ORAL at 08:14

## 2022-06-09 RX ADMIN — METOPROLOL SUCCINATE 50 MG: 50 TABLET, EXTENDED RELEASE ORAL at 08:00

## 2022-06-09 RX ADMIN — GABAPENTIN 400 MG: 400 CAPSULE ORAL at 21:05

## 2022-06-09 RX ADMIN — CLOZAPINE 200 MG: 200 TABLET ORAL at 21:05

## 2022-06-09 RX ADMIN — HYDROXYZINE HYDROCHLORIDE 50 MG: 50 TABLET, FILM COATED ORAL at 10:18

## 2022-06-09 RX ADMIN — NICOTINE POLACRILEX 2 MG: 2 GUM, CHEWING ORAL at 12:01

## 2022-06-09 RX ADMIN — METFORMIN HYDROCHLORIDE 850 MG: 850 TABLET, FILM COATED ORAL at 08:14

## 2022-06-09 RX ADMIN — GABAPENTIN 400 MG: 400 CAPSULE ORAL at 08:14

## 2022-06-09 RX ADMIN — ACETAMINOPHEN 325MG 975 MG: 325 TABLET ORAL at 15:42

## 2022-06-09 RX ADMIN — METFORMIN HYDROCHLORIDE 850 MG: 850 TABLET, FILM COATED ORAL at 15:42

## 2022-06-09 RX ADMIN — NICOTINE POLACRILEX 2 MG: 2 GUM, CHEWING ORAL at 08:14

## 2022-06-09 RX ADMIN — LEVETIRACETAM 500 MG: 500 TABLET, FILM COATED ORAL at 21:05

## 2022-06-09 RX ADMIN — SENNOSIDES 8.6 MG: 8.6 TABLET, FILM COATED ORAL at 08:14

## 2022-06-09 RX ADMIN — ASPIRIN 81 MG CHEWABLE TABLET 81 MG: 81 TABLET CHEWABLE at 08:00

## 2022-06-09 NOTE — PROGRESS NOTES
Progress Note - Behavioral Health   Jenelle Don 24 y o  male MRN: [id number]  Unit/Bed#: [facility] 112-01 Encounter: [id number]    Assessment/Plan   Principal Problem:    Schizophrenia [facility])  Active Problems:    Medical clearance for psychiatric admission    Seizure disorder [facility])    Traumatic brain injury ([address] )    Brain lesion    History of cardiac radiofrequency ablation    History of fall    PTSD (post-traumatic stress disorder)    ROS: None significant; patient reports no new seizures  He continues to experience left leg pain occasionally throughout the day due to break requiring ORIF 5 weeks ago at [facility]  Uses cane to ambulate       Diagnosis: Schizophrenia  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  Recommended Treatment:   Continue current medication regimen  ? Clozapine  ? Gabapentin  ? Keppra  Continue to encourage PT exercises  Continue with group therapy, milieu therapy and occupational therapy  Continue frequent safety checks and vitals per unit protocol  Case discussed with treatment team    Continue with SLIM medical management as indicated  Continue coordinating with case management regarding disposition  Risks, benefits and possible side effects of Medications: Risks, benefits, and possible side effects of medications have previously been explained  No new medications at this time  ------------------------------------------------------------    Subjective: Per nursing report, John Albarado has been compliant with medications  He continues to receive Tylenol periodically for his left leg pain requiring two doses yesterday  Patient has been cooperative with nursing staff, but still demonstrates some neediness  He attended 4 of 6 groups  Today, John Albarado is consenting for safety on the unit  I approached the patient while he was sitting in the dining banegas waiting for breakfast to be served  He was agreeable to go to a conference room to talk    He reports feeling "okay" this morning, and his mood is about the same as yesterday  He was less cooperative today with questioning, giving short answers  He was later apologetic at the end of the interview stating "I'm sorry, I'm not trying to be mean to you, I am just tired of being here" and offering a fist bump  He was originally hospitalized at [facility] after his mother brought him to the emergency department due to agitation before transferring to the [facility]  The patient states that he was brought in because his mom felt "his meds were off," and he was having homicidal thoughts, but those are gone now  He remains fixated on the prospect of being discharged saying that the 3 weeks someone told him was "too long" and asking if I could speak with the doctor to speed the process up as he "feels fine " He reports going to more groups yesterday, so he can "get out of here faster " He attended 4 of 6 groups  Santos Watson notes having slept "fine " When ask how many hours he slept for he states, that he "doesn't keep track " He denies any nightmares, frequent, or early awakenings  Santos Watson states he is still having a diminished appetite, but it is better today than normal, and he is hungry for breakfast  Santos Watson has been taking the medications as prescribed and reporting no current side effects  He reports several days ago he has some nausea and constipation, but that has since resolved  He states that his leg is still bothering him, but that has not gotten any worse or better  He reports that he is performing the exercises recommended to him by physical therapy  No significant events occurred overnight      Not aggressive, self-injurious, or destructive on the unit       Psychiatric Review of Systems:  Behavior over the last 24 hours: improved  Sleep: normal  Appetite: adequate, decreased from baseline  Medication compliance: good  Medication side effects: none verbalized  Group attendance:4 out of 6 attended  Significant events: none    PRNs overnight: None   VS: Reviewed, within normal limits    Progress Toward Goals: Slow improvement in that he is now attending more groups  He still remains adamant about leaving the unit to be discharged home  Vital signs in last 24 hours:  Temp:  [97 5 °F (36 4 °C)-97 9 °F (36 6 °C)] 97 5 °F (36 4 °C)  HR:  [107-108] 107  Resp:  [18-19] 18  BP: (134-140)/(75-78) 140/75    Mental Status Exam:  Appearance:  alert, minimal eye contact, appears stated age, casually dressed, appropriate grooming and hygiene and obese   Behavior:  calm, limited cooperativity and guarded   Motor: no abnormal movements and gait with slight limp while using cane   Speech:  spontaneous, normal rate, not pressured, scant and coherent   Mood:  euthymic   Affect:  mood-congruent and constricted   Thought Process:  Organized and goal directed   Thought Content: paranoid ideation, intrusive thoughts, ruminating thoughts, ideas of reference no current s/h thoughts, intent, or plans  No distorted body perceptions or phobias     Perceptual disturbances: denies current hallucinations and does not appear to be responding to internal stimuli at this time, denies auditory hallucinations when asked   Risk Potential: No active or passive suicidal or homicidal ideation was verbalized during interview   Cognition: oriented to person, place, time, and situation and appears to be of average intelligence   Insight:  Improving   Judgment: Limited   Sensorium: alert and awake  Orientation: Oriented x 4  Attention: attention span and concentration are age appropriate  Intellect: appears to be of average intelligence  Motor Activity: no abnormal movements    Current Medications:  Current Facility-Administered Medications   Medication Dose Route Frequency Provider Last Rate    acetaminophen  650 mg Oral Q6H PRN Adaline Javi, DO      acetaminophen  650 mg Oral Q4H PRN Adaline Javi, DO      acetaminophen  975 mg Oral Q6H PRN Adaline Javi, DO      aluminum-magnesium hydroxide-simethicone  30 mL Oral Q4H PRN Marylouise Ge, DO      ammonium lactate   Topical BID Crystaljs Coley, DPM      artificial tear  1 application Both Eyes M7T PRN Marylouise Ge, DO      aspirin  81 mg Oral Daily Marylouise Ge, DO      benztropine  1 mg Intramuscular Q4H PRN Max 6/day Marylouise Ge, DO      benztropine  1 mg Oral Q4H PRN Max 6/day Marylouise Ge, DO      cloZAPine  200 mg Oral BID Marylouise Ge, DO      hydrOXYzine HCL  50 mg Oral Q6H PRN Max 4/day Marylouise Ge, DO      Or    diphenhydrAMINE  50 mg Intramuscular Q6H PRN Marylouise Ge, DO      gabapentin  400 mg Oral TID Marylouise Ge, DO      hydrOXYzine HCL  100 mg Oral Q6H PRN Max 4/day Marylouise Ge, DO      Or    LORazepam  2 mg Intramuscular Q6H PRN Marylouise Ge, DO      hydrOXYzine HCL  25 mg Oral Q6H PRN Max 4/day Marylouise Ge, DO      levETIRAcetam  500 mg Oral Q12H [address] Kashif A Prayson, DO      melatonin  9 mg Oral HS PRN Marylouise Ge, DO      metFORMIN  850 mg Oral BID With Meals Levon Fishman MD      metoprolol succinate  50 mg Oral Daily Marylouise Ge, DO      nicotine polacrilex  2 mg Oral Q2H PRN Marylouise Ge, DO      OLANZapine  10 mg Oral Q3H PRN Max 3/day Marylouise Ge, DO      Or    OLANZapine  10 mg Intramuscular Q3H PRN Max 3/day Marylouise Ge, DO      OLANZapine  5 mg Oral Q3H PRN Max 6/day Kashif A Prayson, DO      Or    OLANZapine  5 mg Intramuscular Q3H PRN Max 6/day Kashif A Prayson, DO      OLANZapine  2 5 mg Oral Q3H PRN Max 8/day Kashif A Prayson, DO      polyethylene glycol  17 g Oral Daily PRN Marylouise Ge, DO      propranolol  10 mg Oral Q8H PRN Marylouise Ge, DO      senna  1 tablet Oral Daily Margarita Rothman MD      senna-docusate sodium  1 tablet Oral Daily PRN Levon Fishman MD      traZODone  50 mg Oral HS PRN Marylouise Ge, DO         Behavioral Health Medications: all current active meds have been reviewed  Changes as in plan section above  Laboratory results:  I have personally reviewed all pertinent laboratory/tests results  Recent Results (from the past 48 hour(s))   ECG 12 lead    Collection Time: 06/07/22 12:53 PM   Result Value Ref Range    Ventricular Rate 95 BPM    Atrial Rate 95 BPM    DC Interval 144 ms    QRSD Interval 94 ms    QT Interval 354 ms    QTC Interval 444 ms    P Axis 31 degrees    QRS Axis 19 degrees    T Wave Axis 14 degrees   ECG 12 lead    Collection Time: 06/07/22 12:54 PM   Result Value Ref Range    Ventricular Rate 98 BPM    Atrial Rate 98 BPM    DC Interval 148 ms    QRSD Interval 92 ms    QT Interval 352 ms    QTC Interval 449 ms    P [place] 28 degrees    QRS Axis 19 degrees    T Wave Axis 14 degrees   CBC and differential    Collection Time: 06/07/22  4:38 PM   Result Value Ref Range    WBC 8 51 4 31 - 10 16 Thousand/uL    RBC 5 41 3 88 - 5 62 Million/uL    Hemoglobin 15 8 12 0 - 17 0 g/dL    Hematocrit 48 1 36 5 - 49 3 %    MCV 89 82 - 98 fL    MCH 29 2 26 8 - 34 3 pg    MCHC 32 8 31 4 - 37 4 g/dL    RDW 12 0 11 6 - 15 1 %    MPV 9 1 8 9 - 12 7 fL    Platelets 054 (H) 114 - 390 Thousands/uL    nRBC 0 /100 WBCs    Neutrophils Relative 60 43 - 75 %    Immat GRANS % 0 0 - 2 %    Lymphocytes Relative 28 14 - 44 %    Monocytes Relative 9 4 - 12 %    Eosinophils Relative 2 0 - 6 %    Basophils Relative 1 0 - 1 %    Neutrophils Absolute 5 06 1 85 - 7 62 Thousands/µL    Immature Grans Absolute 0 02 0 00 - 0 20 Thousand/uL    Lymphocytes Absolute 2 42 0 60 - 4 47 Thousands/µL    Monocytes Absolute 0 77 0 17 - 1 22 Thousand/µL    Eosinophils Absolute 0 20 0 00 - 0 61 Thousand/µL    Basophils Absolute 0 04 0 00 - 0 10 Thousands/µL        This note has been constructed using a voice recognition system  There may be translation, syntax, or grammatical errors  If you have any questions, please contact the dictating author      JOHNATHAN Villa

## 2022-06-09 NOTE — PROGRESS NOTES
06/09/22 1014   Team Meeting   Meeting Type Daily Rounds   Initial Conference Date 06/09/22   Patient/Family Present   Patient Present No   Patient's Family Present No       Daily Zain Lee MD, Penny RN, Radhames Shah LSW  Case reviewed  Tylenol x 2 for ankle pain  Poor memory  Cooperative, medication compliant  4/6 groups

## 2022-06-09 NOTE — NURSING NOTE
Patient is calm, pleasant, and cooperative  Visible on milieu and social w/ select peers  Attended half of the groups offered today  Continues to demonstrate poor memory and understanding of basic unit rules  Asked multiple staff the same question about why he was unable to use computer during coping skills, was finally able to understand after 20 minutes of explaining the same rule to him  Continues to report left ankle pain, was given tylenol 975mg @1150 and 1740, mild relief  Pt demonstrates good appetite and hygiene, compliant with all medications  Will continue to monitor for safety and support

## 2022-06-09 NOTE — NURSING NOTE
Patient reports heartburn, nausea and anxiety, states "I feel like I have to throw up, indigestion in my chest, may just be anxiety " Pt given PRN Mylanta and PO Atarax 50mg at 1010, will monitor for effectiveness      1122 Pt reports medication is effective, states "Yeah, I feel way better "

## 2022-06-09 NOTE — PLAN OF CARE
Problem: Alteration in Thoughts and Perception  Goal: Treatment Goal: Gain control of psychotic behaviors/thinking, reduce/eliminate presenting symptoms and demonstrate improved reality functioning upon discharge  Outcome: Progressing  Goal: Verbalize thoughts and feelings  Description: Interventions:  - Promote a nonjudgmental and trusting relationship with the patient through active listening and therapeutic communication  - Assess patient's level of functioning, behavior and potential for risk  - Engage patient in 1 on 1 interactions  - Encourage patient to express fears, feelings, frustrations, and discuss symptoms    - [place] patient to reality, help patient recognize reality-based thinking   - Administer medications as ordered and assess for potential side effects  - Provide the patient education related to the signs and symptoms of the illness and desired effects of prescribed medications  Interventions:  - Assess and re-assess patient's lethality and potential for self-injury  - Engage patient in 1:1 interactions, daily, for a minimum of 15 minutes  - Encourage patient to express feelings, fears, frustrations, hopes  - Establish rapport/trust with patient   Interventions:  - Assess and re-assess patient's level of risk   - Engage patient in 1:1 interactions, daily, for a minimum of 15 minutes   - Encourage patient to express feelings, fears, frustrations, hopes   Interventions:  - Assess and re-assess patient's level of risk, every waking shift  - Engage patient in 1:1 interactions, daily, for a minimum of 15 minutes   - Allow patient to express feelings and frustrations in a safe and non-threatening manner   - Establish rapport/trust with patient   Outcome: Progressing  Goal: Refrain from acting on delusional thinking/internal stimuli  Description: Interventions:  - Monitor patient closely, per order   - Utilize least restrictive measures   - Set reasonable limits, give positive feedback for acceptable - Administer medications as ordered and monitor of potential side effects  Outcome: Progressing  Goal: Agree to be compliant with medication regime, as prescribed and report medication side effects  Description: Interventions:  - Offer appropriate PRN medication and supervise ingestion; conduct AIMS, as needed   Outcome: Progressing  Goal: Recognize dysfunctional thoughts, communicate reality-based thoughts at the time of discharge  Description: Interventions:  - Provide medication and psycho-education to assist patient in compliance and developing insight into his/her illness   Outcome: Progressing     Problem: Ineffective Coping  Goal: Identifies ineffective coping skills  Outcome: Progressing  Goal: Identifies healthy coping skills  Outcome: Progressing  Goal: Demonstrates healthy coping skills  Outcome: Progressing  Goal: Participates in unit activities  Description: Interventions:  - Provide therapeutic environment   - Provide required programming   - Redirect inappropriate behaviors   Outcome: Progressing  Goal: Patient/Family participate in treatment and DC plans  Description: Interventions:  - Provide therapeutic environment  Outcome: Progressing  Goal: Patient/Family verbalizes awareness of resources  Outcome: Progressing  Goal: Understands least restrictive measures  Description: Interventions:  - Utilize least restrictive behavior  Outcome: Progressing  Goal: Free from restraint events  Description: - Utilize least restrictive measures   - Provide behavioral interventions   - Redirect inappropriate behaviors   Outcome: Progressing     Problem: Risk for Self Injury/Neglect  Goal: Verbalize thoughts and feelings  Description: Interventions:  - Promote a nonjudgmental and trusting relationship with the patient through active listening and therapeutic communication  - Assess patient's level of functioning, behavior and potential for risk  - Engage patient in 1 on 1 interactions  - Encourage patient to express fears, feelings, frustrations, and discuss symptoms    - [place] patient to reality, help patient recognize reality-based thinking   - Administer medications as ordered and assess for potential side effects  - Provide the patient education related to the signs and symptoms of the illness and desired effects of prescribed medications  Interventions:  - Assess and re-assess patient's lethality and potential for self-injury  - Engage patient in 1:1 interactions, daily, for a minimum of 15 minutes  - Encourage patient to express feelings, fears, frustrations, hopes  - Establish rapport/trust with patient   Interventions:  - Assess and re-assess patient's level of risk   - Engage patient in 1:1 interactions, daily, for a minimum of 15 minutes   - Encourage patient to express feelings, fears, frustrations, hopes   Interventions:  - Assess and re-assess patient's level of risk, every waking shift  - Engage patient in 1:1 interactions, daily, for a minimum of 15 minutes   - Allow patient to express feelings and frustrations in a safe and non-threatening manner   - Establish rapport/trust with patient   Outcome: Progressing  Goal: Treatment Goal: Remain safe during length of stay, learn and adopt new coping skills, and be free of self-injurious ideation, impulses and acts at the time of discharge  Outcome: Progressing  Goal: Refrain from harming self  Description: Interventions:  - Monitor patient closely, per order  - Develop a trusting relationship  - Supervise medication ingestion, monitor effects and side effects   Outcome: Progressing  Goal: Recognize maladaptive responses and adopt new coping mechanisms  Outcome: Progressing     Problem: Depression  Goal: Verbalize thoughts and feelings  Description: Interventions:  - Promote a nonjudgmental and trusting relationship with the patient through active listening and therapeutic communication  - Assess patient's level of functioning, behavior and potential for risk  - Engage patient in 1 on 1 interactions  - Encourage patient to express fears, feelings, frustrations, and discuss symptoms    - [place] patient to reality, help patient recognize reality-based thinking   - Administer medications as ordered and assess for potential side effects  - Provide the patient education related to the signs and symptoms of the illness and desired effects of prescribed medications  Interventions:  - Assess and re-assess patient's lethality and potential for self-injury  - Engage patient in 1:1 interactions, daily, for a minimum of 15 minutes  - Encourage patient to express feelings, fears, frustrations, hopes  - Establish rapport/trust with patient   Interventions:  - Assess and re-assess patient's level of risk   - Engage patient in 1:1 interactions, daily, for a minimum of 15 minutes   - Encourage patient to express feelings, fears, frustrations, hopes   Interventions:  - Assess and re-assess patient's level of risk, every waking shift  - Engage patient in 1:1 interactions, daily, for a minimum of 15 minutes   - Allow patient to express feelings and frustrations in a safe and non-threatening manner   - Establish rapport/trust with patient   Outcome: Progressing  Goal: Treatment Goal: Demonstrate behavioral control of depressive symptoms, verbalize feelings of improved mood/affect, and adopt new coping skills prior to discharge  Outcome: Progressing  Goal: Refrain from harming self  Description: Interventions:  - Monitor patient closely, per order   - Supervise medication ingestion, monitor effects and side effects   Outcome: Progressing  Goal: Refrain from isolation  Description: Interventions:  - Develop a trusting relationship   - Encourage socialization   Outcome: Progressing  Goal: Refrain from self-neglect  Outcome: Progressing     Problem: Anxiety  Goal: Anxiety is at manageable level  Description: Interventions:  - Assess and monitor patient's anxiety level     - Monitor for signs and symptoms (heart palpitations, chest pain, shortness of breath, headaches, nausea, feeling jumpy, restlessness, irritable, apprehensive)  - Collaborate with interdisciplinary team and initiate plan and interventions as ordered    - [place] patient to unit/surroundings  - Explain treatment plan  - Encourage participation in care  - Encourage verbalization of concerns/fears  - Identify coping mechanisms  - Assist in developing anxiety-reducing skills  - Administer/offer alternative therapies  - Limit or eliminate stimulants  Outcome: Progressing     Problem: Risk for Violence/Aggression Toward Others  Goal: Verbalize thoughts and feelings  Description: Interventions:  - Promote a nonjudgmental and trusting relationship with the patient through active listening and therapeutic communication  - Assess patient's level of functioning, behavior and potential for risk  - Engage patient in 1 on 1 interactions  - Encourage patient to express fears, feelings, frustrations, and discuss symptoms    - [place] patient to reality, help patient recognize reality-based thinking   - Administer medications as ordered and assess for potential side effects  - Provide the patient education related to the signs and symptoms of the illness and desired effects of prescribed medications  Interventions:  - Assess and re-assess patient's lethality and potential for self-injury  - Engage patient in 1:1 interactions, daily, for a minimum of 15 minutes  - Encourage patient to express feelings, fears, frustrations, hopes  - Establish rapport/trust with patient   Interventions:  - Assess and re-assess patient's level of risk   - Engage patient in 1:1 interactions, daily, for a minimum of 15 minutes   - Encourage patient to express feelings, fears, frustrations, hopes   Interventions:  - Assess and re-assess patient's level of risk, every waking shift  - Engage patient in 1:1 interactions, daily, for a minimum of 15 minutes   - Allow patient to express feelings and frustrations in a safe and non-threatening manner   - Establish rapport/trust with patient   Outcome: Progressing  Goal: Treatment Goal: Refrain from acts of violence/aggression during length of stay, and demonstrate improved impulse control at the time of discharge  Outcome: Progressing  Goal: Refrain from harming others  Outcome: Progressing  Goal: Refrain from destructive acts on the environment or property  Outcome: Progressing  Goal: Control angry outbursts  Description: Interventions:  - Monitor patient closely, per order  - Ensure early verbal de-escalation  - Monitor prn medication needs  - Set reasonable/therapeutic limits, outline behavioral expectations, and consequences   - Provide a non-threatening milieu, utilizing the least restrictive interventions   Outcome: Progressing  Goal: Identify appropriate positive anger management techniques  Description: Interventions:  - Offer anger management and coping skills groups   - Staff will provide positive feedback for appropriate anger control  Outcome: Progressing     Problem: DISCHARGE PLANNING - CARE MANAGEMENT  Goal: Discharge to post-acute care or home with appropriate resources  Description: INTERVENTIONS:  - Conduct assessment to determine patient/family and health care team treatment goals, and need for post-acute services based on payer coverage, community resources, and patient preferences, and barriers to discharge  - Address psychosocial, clinical, and financial barriers to discharge as identified in assessment in conjunction with the patient/family and health care team  - Arrange appropriate level of post-acute services according to patients   needs and preference and payer coverage in collaboration with the physician and health care team  - Communicate with and update the patient/family, physician, and health care team regarding progress on the discharge plan  - Arrange appropriate transportation to post-acute venues  Outcome: Progressing     Problem: SUBSTANCE USE/ABUSE  Goal: By discharge, will develop insight into their chemical dependency and sustain motivation to continue in recovery  Description: INTERVENTIONS:  - Attends all daily group sessions and scheduled AA groups  - Actively practices coping skills through participation in the therapeutic community and adherence to program rules  - Reviews and completes assignments from individual treatment plan  - Assist patient development of understanding of their personal cycle of addiction and relapse triggers  Outcome: Progressing  Goal: By discharge, patient will have ongoing treatment plan addressing chemical dependency  Description: INTERVENTIONS:  - Assist patient with resources and/or appointments for ongoing recovery based living  Outcome: Progressing
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
## 2022-06-09 NOTE — PROGRESS NOTES
[facility] Note     06/09/22 1100   Activity/Group Checklist   Group Life Skills  (Teamwork and Communication)   Attendance Attended   Attendance Duration (min) 16-30  (arrived late)   Interactions Interacted appropriately   Affect/Mood Appropriate;Calm   Goals Achieved Identified feelings; Able to listen to others; Able to engage in interactions; Able to reflect/comment on own behavior;Able to recieve feedback; Able to give feedback to another

## 2022-06-10 PROCEDURE — 99232 SBSQ HOSP IP/OBS MODERATE 35: CPT | Performed by: PSYCHIATRY & NEUROLOGY

## 2022-06-10 RX ADMIN — METFORMIN HYDROCHLORIDE 850 MG: 850 TABLET, FILM COATED ORAL at 15:55

## 2022-06-10 RX ADMIN — HYDROXYZINE HYDROCHLORIDE 100 MG: 50 TABLET, FILM COATED ORAL at 18:24

## 2022-06-10 RX ADMIN — NICOTINE POLACRILEX 2 MG: 2 GUM, CHEWING ORAL at 20:27

## 2022-06-10 RX ADMIN — ACETAMINOPHEN 325MG 975 MG: 325 TABLET ORAL at 18:23

## 2022-06-10 RX ADMIN — ACETAMINOPHEN 650 MG: 325 TABLET ORAL at 10:23

## 2022-06-10 RX ADMIN — METFORMIN HYDROCHLORIDE 850 MG: 850 TABLET, FILM COATED ORAL at 08:00

## 2022-06-10 RX ADMIN — NICOTINE POLACRILEX 2 MG: 2 GUM, CHEWING ORAL at 09:00

## 2022-06-10 RX ADMIN — GABAPENTIN 400 MG: 400 CAPSULE ORAL at 15:55

## 2022-06-10 RX ADMIN — NICOTINE POLACRILEX 2 MG: 2 GUM, CHEWING ORAL at 18:22

## 2022-06-10 RX ADMIN — SENNOSIDES 8.6 MG: 8.6 TABLET, FILM COATED ORAL at 08:58

## 2022-06-10 RX ADMIN — METOPROLOL SUCCINATE 50 MG: 50 TABLET, EXTENDED RELEASE ORAL at 08:00

## 2022-06-10 RX ADMIN — NICOTINE POLACRILEX 2 MG: 2 GUM, CHEWING ORAL at 15:55

## 2022-06-10 RX ADMIN — CLOZAPINE 200 MG: 200 TABLET ORAL at 08:58

## 2022-06-10 RX ADMIN — CLOZAPINE 200 MG: 200 TABLET ORAL at 21:10

## 2022-06-10 RX ADMIN — LEVETIRACETAM 500 MG: 500 TABLET, FILM COATED ORAL at 08:07

## 2022-06-10 RX ADMIN — LEVETIRACETAM 500 MG: 500 TABLET, FILM COATED ORAL at 21:10

## 2022-06-10 RX ADMIN — GABAPENTIN 400 MG: 400 CAPSULE ORAL at 08:08

## 2022-06-10 RX ADMIN — NICOTINE POLACRILEX 2 MG: 2 GUM, CHEWING ORAL at 14:00

## 2022-06-10 RX ADMIN — ASPIRIN 81 MG CHEWABLE TABLET 81 MG: 81 TABLET CHEWABLE at 08:57

## 2022-06-10 RX ADMIN — GABAPENTIN 400 MG: 400 CAPSULE ORAL at 21:10

## 2022-06-10 NOTE — PLAN OF CARE
Problem: Alteration in Thoughts and Perception  Goal: Treatment Goal: Gain control of psychotic behaviors/thinking, reduce/eliminate presenting symptoms and demonstrate improved reality functioning upon discharge  Outcome: Progressing  Goal: Verbalize thoughts and feelings  Description: Interventions:  - Promote a nonjudgmental and trusting relationship with the patient through active listening and therapeutic communication  - Assess patient's level of functioning, behavior and potential for risk  - Engage patient in 1 on 1 interactions  - Encourage patient to express fears, feelings, frustrations, and discuss symptoms    - [place] patient to reality, help patient recognize reality-based thinking   - Administer medications as ordered and assess for potential side effects  - Provide the patient education related to the signs and symptoms of the illness and desired effects of prescribed medications  Interventions:  - Assess and re-assess patient's lethality and potential for self-injury  - Engage patient in 1:1 interactions, daily, for a minimum of 15 minutes  - Encourage patient to express feelings, fears, frustrations, hopes  - Establish rapport/trust with patient   Interventions:  - Assess and re-assess patient's level of risk   - Engage patient in 1:1 interactions, daily, for a minimum of 15 minutes   - Encourage patient to express feelings, fears, frustrations, hopes   Interventions:  - Assess and re-assess patient's level of risk, every waking shift  - Engage patient in 1:1 interactions, daily, for a minimum of 15 minutes   - Allow patient to express feelings and frustrations in a safe and non-threatening manner   - Establish rapport/trust with patient   Outcome: Progressing  Goal: Refrain from acting on delusional thinking/internal stimuli  Description: Interventions:  - Monitor patient closely, per order   - Utilize least restrictive measures   - Set reasonable limits, give positive feedback for acceptable - Administer medications as ordered and monitor of potential side effects  Outcome: Progressing  Goal: Agree to be compliant with medication regime, as prescribed and report medication side effects  Description: Interventions:  - Offer appropriate PRN medication and supervise ingestion; conduct AIMS, as needed   Outcome: Progressing  Goal: Recognize dysfunctional thoughts, communicate reality-based thoughts at the time of discharge  Description: Interventions:  - Provide medication and psycho-education to assist patient in compliance and developing insight into his/her illness   Outcome: Progressing     Problem: Ineffective Coping  Goal: Identifies ineffective coping skills  Outcome: Progressing  Goal: Identifies healthy coping skills  Outcome: Progressing  Goal: Demonstrates healthy coping skills  Outcome: Progressing  Goal: Participates in unit activities  Description: Interventions:  - Provide therapeutic environment   - Provide required programming   - Redirect inappropriate behaviors   Outcome: Progressing  Goal: Patient/Family participate in treatment and DC plans  Description: Interventions:  - Provide therapeutic environment  Outcome: Progressing  Goal: Patient/Family verbalizes awareness of resources  Outcome: Progressing  Goal: Understands least restrictive measures  Description: Interventions:  - Utilize least restrictive behavior  Outcome: Progressing  Goal: Free from restraint events  Description: - Utilize least restrictive measures   - Provide behavioral interventions   - Redirect inappropriate behaviors   Outcome: Progressing     Problem: Risk for Self Injury/Neglect  Goal: Verbalize thoughts and feelings  Description: Interventions:  - Promote a nonjudgmental and trusting relationship with the patient through active listening and therapeutic communication  - Assess patient's level of functioning, behavior and potential for risk  - Engage patient in 1 on 1 interactions  - Encourage patient to express fears, feelings, frustrations, and discuss symptoms    - [place] patient to reality, help patient recognize reality-based thinking   - Administer medications as ordered and assess for potential side effects  - Provide the patient education related to the signs and symptoms of the illness and desired effects of prescribed medications  Interventions:  - Assess and re-assess patient's lethality and potential for self-injury  - Engage patient in 1:1 interactions, daily, for a minimum of 15 minutes  - Encourage patient to express feelings, fears, frustrations, hopes  - Establish rapport/trust with patient   Interventions:  - Assess and re-assess patient's level of risk   - Engage patient in 1:1 interactions, daily, for a minimum of 15 minutes   - Encourage patient to express feelings, fears, frustrations, hopes   Interventions:  - Assess and re-assess patient's level of risk, every waking shift  - Engage patient in 1:1 interactions, daily, for a minimum of 15 minutes   - Allow patient to express feelings and frustrations in a safe and non-threatening manner   - Establish rapport/trust with patient   Outcome: Progressing  Goal: Treatment Goal: Remain safe during length of stay, learn and adopt new coping skills, and be free of self-injurious ideation, impulses and acts at the time of discharge  Outcome: Progressing  Goal: Refrain from harming self  Description: Interventions:  - Monitor patient closely, per order  - Develop a trusting relationship  - Supervise medication ingestion, monitor effects and side effects   Outcome: Progressing  Goal: Recognize maladaptive responses and adopt new coping mechanisms  Outcome: Progressing     Problem: Depression  Goal: Verbalize thoughts and feelings  Description: Interventions:  - Promote a nonjudgmental and trusting relationship with the patient through active listening and therapeutic communication  - Assess patient's level of functioning, behavior and potential for risk  - Engage patient in 1 on 1 interactions  - Encourage patient to express fears, feelings, frustrations, and discuss symptoms    - [place] patient to reality, help patient recognize reality-based thinking   - Administer medications as ordered and assess for potential side effects  - Provide the patient education related to the signs and symptoms of the illness and desired effects of prescribed medications  Interventions:  - Assess and re-assess patient's lethality and potential for self-injury  - Engage patient in 1:1 interactions, daily, for a minimum of 15 minutes  - Encourage patient to express feelings, fears, frustrations, hopes  - Establish rapport/trust with patient   Interventions:  - Assess and re-assess patient's level of risk   - Engage patient in 1:1 interactions, daily, for a minimum of 15 minutes   - Encourage patient to express feelings, fears, frustrations, hopes   Interventions:  - Assess and re-assess patient's level of risk, every waking shift  - Engage patient in 1:1 interactions, daily, for a minimum of 15 minutes   - Allow patient to express feelings and frustrations in a safe and non-threatening manner   - Establish rapport/trust with patient   Outcome: Progressing  Goal: Treatment Goal: Demonstrate behavioral control of depressive symptoms, verbalize feelings of improved mood/affect, and adopt new coping skills prior to discharge  Outcome: Progressing  Goal: Refrain from harming self  Description: Interventions:  - Monitor patient closely, per order   - Supervise medication ingestion, monitor effects and side effects   Outcome: Progressing  Goal: Refrain from isolation  Description: Interventions:  - Develop a trusting relationship   - Encourage socialization   Outcome: Progressing  Goal: Refrain from self-neglect  Outcome: Progressing     Problem: Anxiety  Goal: Anxiety is at manageable level  Description: Interventions:  - Assess and monitor patient's anxiety level     - Monitor for signs and symptoms (heart palpitations, chest pain, shortness of breath, headaches, nausea, feeling jumpy, restlessness, irritable, apprehensive)  - Collaborate with interdisciplinary team and initiate plan and interventions as ordered    - [place] patient to unit/surroundings  - Explain treatment plan  - Encourage participation in care  - Encourage verbalization of concerns/fears  - Identify coping mechanisms  - Assist in developing anxiety-reducing skills  - Administer/offer alternative therapies  - Limit or eliminate stimulants  Outcome: Progressing     Problem: Risk for Violence/Aggression Toward Others  Goal: Verbalize thoughts and feelings  Description: Interventions:  - Promote a nonjudgmental and trusting relationship with the patient through active listening and therapeutic communication  - Assess patient's level of functioning, behavior and potential for risk  - Engage patient in 1 on 1 interactions  - Encourage patient to express fears, feelings, frustrations, and discuss symptoms    - [place] patient to reality, help patient recognize reality-based thinking   - Administer medications as ordered and assess for potential side effects  - Provide the patient education related to the signs and symptoms of the illness and desired effects of prescribed medications  Interventions:  - Assess and re-assess patient's lethality and potential for self-injury  - Engage patient in 1:1 interactions, daily, for a minimum of 15 minutes  - Encourage patient to express feelings, fears, frustrations, hopes  - Establish rapport/trust with patient   Interventions:  - Assess and re-assess patient's level of risk   - Engage patient in 1:1 interactions, daily, for a minimum of 15 minutes   - Encourage patient to express feelings, fears, frustrations, hopes   Interventions:  - Assess and re-assess patient's level of risk, every waking shift  - Engage patient in 1:1 interactions, daily, for a minimum of 15 minutes   - Allow patient to express feelings and frustrations in a safe and non-threatening manner   - Establish rapport/trust with patient   Outcome: Progressing  Goal: Treatment Goal: Refrain from acts of violence/aggression during length of stay, and demonstrate improved impulse control at the time of discharge  Outcome: Progressing  Goal: Refrain from harming others  Outcome: Progressing  Goal: Refrain from destructive acts on the environment or property  Outcome: Progressing  Goal: Control angry outbursts  Description: Interventions:  - Monitor patient closely, per order  - Ensure early verbal de-escalation  - Monitor prn medication needs  - Set reasonable/therapeutic limits, outline behavioral expectations, and consequences   - Provide a non-threatening milieu, utilizing the least restrictive interventions   Outcome: Progressing  Goal: Identify appropriate positive anger management techniques  Description: Interventions:  - Offer anger management and coping skills groups   - Staff will provide positive feedback for appropriate anger control  Outcome: Progressing     Problem: DISCHARGE PLANNING - CARE MANAGEMENT  Goal: Discharge to post-acute care or home with appropriate resources  Description: INTERVENTIONS:  - Conduct assessment to determine patient/family and health care team treatment goals, and need for post-acute services based on payer coverage, community resources, and patient preferences, and barriers to discharge  - Address psychosocial, clinical, and financial barriers to discharge as identified in assessment in conjunction with the patient/family and health care team  - Arrange appropriate level of post-acute services according to patients   needs and preference and payer coverage in collaboration with the physician and health care team  - Communicate with and update the patient/family, physician, and health care team regarding progress on the discharge plan  - Arrange appropriate transportation to post-acute venues  Outcome: Progressing     Problem: SUBSTANCE USE/ABUSE  Goal: By discharge, will develop insight into their chemical dependency and sustain motivation to continue in recovery  Description: INTERVENTIONS:  - Attends all daily group sessions and scheduled AA groups  - Actively practices coping skills through participation in the therapeutic community and adherence to program rules  - Reviews and completes assignments from individual treatment plan  - Assist patient development of understanding of their personal cycle of addiction and relapse triggers  Outcome: Progressing  Goal: By discharge, patient will have ongoing treatment plan addressing chemical dependency  Description: INTERVENTIONS:  - Assist patient with resources and/or appointments for ongoing recovery based living  Outcome: Progressing
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
## 2022-06-10 NOTE — NURSING NOTE
Patient is calm, pleasant, and cooperative  Visible on milieu and social w/ select peers  Attended half of the groups offered today  Continues to demonstrate poor memory and insight  Pt asked RN, "is it normal to hear voices? When I smoked marijuana I think I used to hear voices but I don't know "  RN asked Pt if he hears voices now pt denied, stated, "I only heard voices when I smoked K2 or marijuana "  Pt was encouraged to report any psychiatric symptoms, pt was receptive  Pt appears disoriented and confused at times, will ask same question multiple times  Demonstrated poor appetite today  Compliant with medications  Tylenol 975mg given for 8/10 pain left ankle pain, pain reduced to 5/10  Will continue to monitor for safety and support

## 2022-06-10 NOTE — PROGRESS NOTES
06/10/22 1004   Team Meeting   Meeting Type Daily Rounds   Initial Conference Date 06/10/22   Patient/Family Present   Patient Present No   Patient's Family Present No       Daily Mac ePreira MD, Penny RN, Soraya Balderrama LSW  Case reviewed  Reported heart burn and anxiety, received atarax and mylanta prns  Tylenol also given for ankle pain  Has PT exercises he can do on unit  5/8 groups
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
## 2022-06-10 NOTE — PROGRESS NOTES
Progress Note - Behavioral Health   Mateo Batista 24 y o  male MRN: [id number]  [facility] Encounter: [id number]    Assessment/Plan   Principal Problem:    Schizophrenia ([facility] )  Active Problems:    Medical clearance for psychiatric admission    Seizure disorder [facility])    Traumatic brain injury ([facility] )    Brain lesion    History of cardiac radiofrequency ablation    History of fall    PTSD (post-traumatic stress disorder)    ROS: Nursing staff reported that the patient has some "indigestion" and "heart burn" yesterday at 10:18am that he was unsure of whether it was food or anxiety related  He reports feeling much better after being administered Atarax and Mylanta  He denies having any episodes since that occurrence yesterday morning  Patient reports NO SEIZURES  He continues to experience left leg pain due to break requiring ORIF 5 weeks ago  Need tylenol once yesterday for pain  Uses cane to ambulate  Diagnosis: Schizophrenia  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  Recommended Treatment:   Continue current medication regimen   - Clozapine    - Gabapentin   - Keppra  Continue to encourage PT exercises  Continue with group therapy, milieu therapy and occupational therapy  Continue frequent safety checks and vitals per unit protocol  Case discussed with treatment team   Continue with SLIM medical management as indicated  Continue coordinating with case management regarding disposition  Risks, benefits and possible side effects of Medications: Risks, benefits, and possible side effects of medications have been explained to the patient, who verbalizes understanding    ------------------------------------------------------------    Subjective: Per nursing report, Chrissy Valladares has been cooperative on the unit and compliant with medications  Yesterday at 10:18 am he reported feelings of "heart burn" or "indigestion," but the patient was not sure whether it was food or anxiety related   He was given Atarax and Azeem at that time, and then reported feeling better  Patient continues to complain of left leg pain which he received tylenol once yesterday at 15:42  Patient tends to ask strange questions to nursing staff at times that are unrelated to conversation such as "would my fingers get cut off if I stuck them in a ?" He attended 5 out of 8 groups  Today, Chrissy Valladares is consenting for safety on the unit  He was sitting on the edge of his bed holding his cane wearing a button down shirt tucked into shorts  He reports feeling "good" today  He states that he feels "a little" better than yesterday  He was originally hospitalized at [facility] after his mother Sudhakar Larger him to the emergency department due to agitation, homicidal ideation, and his medications "being off " He reports that he no longer is experiencing any of those symptoms  Patient less fixated on going home today and did not mention it during our conversation  He plans on going to groups today, but has not other plans for the day  He attended 5 out of 8 groups yesterday  Chrissy Valladares notes having slept well, but is unsure of how many hours he got  Chrissy Valladares states having a "good" appetite and claims it is better than it was yesterday  Chrissy Valladares has been taking the medications as prescribed and reporting no side effects  When asked about his "heart burn" he experienced yesterday, he reports that the medications made it feel better, and he has not had any other occurrences of that same feeling since  He reports that his leg has an "aching" pain that comes and goes still  He reports that he has been doing the exercises physical therapy recommended  No significant events occurred overnight  Not aggressive, self-injurious, or destructive on the unit      Psychiatric Review of Systems:   Behavior over the last 24 hours: improved  Sleep: normal  Appetite: improving  Medication side effects: none verbalized   Medication compliance: good  Group attendance: 5 out of 8 groups  Significant events: none     PRNs overnight: Received Atarax and Mylanta at 10:18 am yesterday for "indigestion" (see subjective)   VS: Reviewed, within normal limits    Progress Toward Goals: Slow improvement demonstrated as patient has been attending more groups and did not mention wanting to be discharged as he has other mornings  Vital signs in last 24 hours:  Temp:  [97 6 °F (36 4 °C)-98 6 °F (37 °C)] 97 6 °F (36 4 °C)  HR:  [102] 102  Resp:  [18] 18  BP: (123-133)/(67-76) 133/67    Mental Status Exam:  Appearance:  alert, good eye contact, appears stated age, well dressed, appropriate grooming and hygiene and obese   Behavior:  calm, cooperative and guarded   Motor: no abnormal movements, gait with slight limp while using cane   Speech:  spontaneous, normal rate, not pressured, scant and coherent   Mood:  euthymic   Affect:  mood-congruent appeared to be in a good mood as stated   Thought Process:  organized, goal directed   Thought Content: paranoid ideation, intrusive thoughts, ruminating thoughts, ideas of reference  No current s/h thoughts, intent, or plans  No distorted body perceptions or phobias     Perceptual disturbances: denies current hallucinations and does not appear to be responding to internal stimuli at this time, denies auditory hallucinations when asked   Risk Potential: No active or passive suicidal or homicidal ideation was verbalized during interview   Cognition: oriented to self and situation and appears to be of average intelligence   Insight:  Improving   Judgment: Limited   Sensorium: alert and awake  Orientation: Oriented x 4  Attention: attention span and concentration are age appropriate  Intellect: Appears to be of average intelligence  Motor Activity: No abnormal movements    Current Medications:  Current Facility-Administered Medications   Medication Dose Route Frequency Provider Last Rate    acetaminophen  650 mg Oral Q6H PRN DO Romel Martins acetaminophen  650 mg Oral Q4H PRN Erik Linker, DO      acetaminophen  975 mg Oral Q6H PRN Erik Linker, DO      aluminum-magnesium hydroxide-simethicone  30 mL Oral Q4H PRN Erik Linker, DO      ammonium lactate   Topical BID Dianna Mustapha, DPM      artificial tear  1 application Both Eyes P2D PRN Erik Linker, DO      aspirin  81 mg Oral Daily Erik Linker, DO      benztropine  1 mg Intramuscular Q4H PRN Max 6/day Erik Linker, DO      benztropine  1 mg Oral Q4H PRN Max 6/day Erik Linker, DO      cloZAPine  200 mg Oral BID Erik Linker, DO      hydrOXYzine HCL  50 mg Oral Q6H PRN Max 4/day Erik Linker, DO      Or    diphenhydrAMINE  50 mg Intramuscular Q6H PRN Erik Linker, DO      gabapentin  400 mg Oral TID Erik Linker, DO      hydrOXYzine HCL  100 mg Oral Q6H PRN Max 4/day Erik Linker, DO      Or    LORazepam  2 mg Intramuscular Q6H PRN Erik Linker, DO      hydrOXYzine HCL  25 mg Oral Q6H PRN Max 4/day Erik Linker, DO      levETIRAcetam  500 mg Oral Q12H [facility] Kashif A Prayson, DO      melatonin  9 mg Oral HS PRN Erik Linker, DO      metFORMIN  850 mg Oral BID With Meals Kassandra Paul MD      metoprolol succinate  50 mg Oral Daily Erik Linker, DO      nicotine polacrilex  2 mg Oral Q2H PRN Erik Linker, DO      OLANZapine  10 mg Oral Q3H PRN Max 3/day Erik Linker, DO      Or    OLANZapine  10 mg Intramuscular Q3H PRN Max 3/day Erik Linker, DO      OLANZapine  5 mg Oral Q3H PRN Max 6/day Kashif A Prayson, DO      Or    OLANZapine  5 mg Intramuscular Q3H PRN Max 6/day Kashif A Prayson, DO      OLANZapine  2 5 mg Oral Q3H PRN Max 8/day Kashif A Prayson, DO      polyethylene glycol  17 g Oral Daily PRN Erik Linker, DO      propranolol  10 mg Oral Q8H PRN Erik Linker, DO      senna  1 tablet Oral Daily Nalini Pena MD      senna-docusate sodium  1 tablet Oral Daily PRN Eduin Allen MD      traZODone  50 mg Oral HS PRN Cami Jamison,          Behavioral Health Medications: all current active meds have been reviewed  Changes as in plan section above  Laboratory results:  I have personally reviewed all pertinent laboratory/tests results  No results found for this or any previous visit (from the past 48 hour(s))  This note has been constructed using a voice recognition system  There may be translation, syntax, or grammatical errors  If you have any questions, please contact the dictating author      JOHNATHAN Saravia

## 2022-06-11 PROCEDURE — 99232 SBSQ HOSP IP/OBS MODERATE 35: CPT | Performed by: STUDENT IN AN ORGANIZED HEALTH CARE EDUCATION/TRAINING PROGRAM

## 2022-06-11 RX ADMIN — LEVETIRACETAM 500 MG: 500 TABLET, FILM COATED ORAL at 21:11

## 2022-06-11 RX ADMIN — METFORMIN HYDROCHLORIDE 850 MG: 850 TABLET, FILM COATED ORAL at 17:19

## 2022-06-11 RX ADMIN — NICOTINE POLACRILEX 2 MG: 2 GUM, CHEWING ORAL at 18:04

## 2022-06-11 RX ADMIN — CLOZAPINE 200 MG: 200 TABLET ORAL at 08:06

## 2022-06-11 RX ADMIN — LEVETIRACETAM 500 MG: 500 TABLET, FILM COATED ORAL at 08:06

## 2022-06-11 RX ADMIN — NICOTINE POLACRILEX 2 MG: 2 GUM, CHEWING ORAL at 11:44

## 2022-06-11 RX ADMIN — HYDROXYZINE HYDROCHLORIDE 50 MG: 50 TABLET, FILM COATED ORAL at 12:46

## 2022-06-11 RX ADMIN — AMMONIUM LACTATE: 17 LOTION TOPICAL at 08:07

## 2022-06-11 RX ADMIN — NICOTINE POLACRILEX 2 MG: 2 GUM, CHEWING ORAL at 13:48

## 2022-06-11 RX ADMIN — ACETAMINOPHEN 325MG 975 MG: 325 TABLET ORAL at 14:27

## 2022-06-11 RX ADMIN — GABAPENTIN 400 MG: 400 CAPSULE ORAL at 08:07

## 2022-06-11 RX ADMIN — ASPIRIN 81 MG CHEWABLE TABLET 81 MG: 81 TABLET CHEWABLE at 08:06

## 2022-06-11 RX ADMIN — NICOTINE POLACRILEX 2 MG: 2 GUM, CHEWING ORAL at 08:06

## 2022-06-11 RX ADMIN — NICOTINE POLACRILEX 2 MG: 2 GUM, CHEWING ORAL at 20:16

## 2022-06-11 RX ADMIN — SENNOSIDES 8.6 MG: 8.6 TABLET, FILM COATED ORAL at 08:07

## 2022-06-11 RX ADMIN — METOPROLOL SUCCINATE 50 MG: 50 TABLET, EXTENDED RELEASE ORAL at 08:07

## 2022-06-11 RX ADMIN — CLOZAPINE 200 MG: 200 TABLET ORAL at 21:11

## 2022-06-11 RX ADMIN — NICOTINE POLACRILEX 2 MG: 2 GUM, CHEWING ORAL at 15:52

## 2022-06-11 RX ADMIN — METFORMIN HYDROCHLORIDE 850 MG: 850 TABLET, FILM COATED ORAL at 08:06

## 2022-06-11 RX ADMIN — GABAPENTIN 400 MG: 400 CAPSULE ORAL at 17:19

## 2022-06-11 RX ADMIN — GABAPENTIN 400 MG: 400 CAPSULE ORAL at 21:11

## 2022-06-11 NOTE — NURSING NOTE
Maricruz Osorio has been out and about, with use of his cane, intermittently  Ambulates with a limp  Has not required any pain medication  Denies anxiety and depression  Took all medication without difficulty  Ate 100% of breakfast, but refused lunch  Nicotine gum per request at 0806 and 1144  Attended [employer] [facility]  [facility] nurses station at [phone] stating that he needs "Atarax for anxiety"  Did not elaborate on situattion  Stated "I don't know why"  Medicated with Atarax 50mg po for 3/4 anxiety  He then stated "It helped a lot"  Now anxiety 2 3/4  Went out on the deck for [employer]  Had Nicotine gum at 1348 and stated, "This is going to be my last piece"  Tylenol 975mg po given for 7/10 left ankle pain at 1427  Continue to monitor  Precautions maintained

## 2022-06-11 NOTE — PROGRESS NOTES
Progress Note - Geoff 24 y o  male MRN: [id number]   [facility] 112-01 Encounter: [id number]    Behavior over the last 24 hours: unchanged  I saw Staci Bang for follow up and continuation of care  I have review the chart and discussed progress with the nursing staff  On assessment, patient was seen lying in his bed awake  He is calm, cooperative but minimally engaged in conversation with brief responses and irritable undertones  He reports his mood as "fine" denying anxious or depressed mood  Denies SI, HI, plan, intent or self-injurious behaviors  Denies A/VH and does not appear to be responding to internal stimuli  When asked about anxiety he has in the early afternoons requiring PRN Atarax, patient does not elaborate or identify any triggers and states, "I don't know I just need it"  He denies sleep or appetite disturbance  He denies pain or side effects or of his medication  Per nursing, patient has been visible, miniamlly social, calm, cooperative and attends some groups  He is isolative at times and returns to his room even after encouragement from staff to stay with the milieu  He is observed to utilize his cane at times but not always  Exercises learned from PT encouraged, though never observed  He required p r n  pain medication yesterday afternoon and made another request before dose able to be given  He approached the nurses station last evening requesting Atarax and was given 50mg for 3/4 anxiety  He is also utilizing p r n  Nicorette gum for cravings  With the amount of PRNs utilized, it is unclear if med seeking behaviors are evident as patient does not elaborate         Group attendance: Some  Sleep: normal  Appetite: normal  Medication side effects: No   ROS: no complaints, all other systems are negative    Mental Status Evaluation:    Appearance:  age appropriate, dressed appropriately, adequate grooming, looks stated age, Wears glasses, no distress Behavior:  cooperative, calm, guarded, fair eye contact, lying in bed   Speech:  normal rate, normal volume, normal pitch, scant   Mood:  dysphoric   Affect:  flat   Thought Process:  logical, goal directed, linear   Associations: intact associations   Thought Content:  no overt delusions   Perceptual Disturbances: no auditory hallucinations, no visual hallucinations, does not appear responding to internal stimuli   Risk Potential: Suicidal ideation - None at present  Homicidal ideation - None at present  Potential for aggression - Not at present   Sensorium:  oriented to person, place, time/date and situation   Memory:  recent and remote memory grossly intact   Consciousness:  alert and awake   Attention/Concentration: attention span and concentration are age appropriate   Insight:  fair   Judgment: limited   [address]: Uses cane, walks with limp   Motor movements: No abnormal movements     Vital signs in last 24 hours:    Temp:  [97 1 °F (36 2 °C)-97 5 °F (36 4 °C)] 97 1 °F (36 2 °C)  HR:  [102-113] 102  Resp:  [18-20] 20  BP: (135-148)/(83-92) 135/92    Laboratory results: I have personally reviewed all pertinent laboratory/tests results    Results from the past 24 hours: No results found for this or any previous visit (from the past 24 hour(s))    Most Recent Labs:   Lab Results   Component Value Date    WBC 8 51 06/07/2022    RBC 5 41 06/07/2022    HGB 15 8 06/07/2022    HCT 48 1 06/07/2022     (H) 06/07/2022    RDW 12 0 06/07/2022    NEUTROABS 5 06 06/07/2022    SODIUM 139 05/27/2022    K 3 9 05/27/2022     05/27/2022    CO2 22 05/27/2022    BUN 13 05/27/2022    CREATININE 0 67 (L) 05/27/2022    GLUC 94 05/27/2022    CALCIUM 9 2 05/27/2022    AST 20 05/27/2022    ALT 16 05/27/2022    ALKPHOS 141 (H) 05/27/2022    TP 7 4 05/27/2022    ALB 4 3 05/27/2022    TBILI 0 56 05/27/2022    CHOLESTEROL 181 05/27/2022    HDL 28 (L) 05/27/2022    TRIG 210 (H) 05/27/2022    LDLCALC 111 05/27/2022 John 153 05/27/2022    [id number] 1 640 05/27/2022    RPR Non-Reactive 05/27/2022    HGBA1C 5 0 05/27/2022    EAG 97 05/27/2022     Labs in last 72 hours: No results for input(s): WBC, RBC, HGB, HCT, PLT, RDW, NEUTROABS, SODIUM, K, CL, CO2, BUN, CREATININE, GLUC, CALCIUM, AST, ALT, ALKPHOS, TP, ALB, TBILI, CHOLESTEROL, HDL, TRIG, LDLCALC, VALPROICTOT, CARBAMAZEPIN, LITHIUM, AMMONIA, [id number], FREET4, T3FREE, PREGTESTUR, PREGSERUM, HCG, HCGQUANT, RPR in the last 72 hours  Invalid input(s):  RBC    Progress Toward Goals: progressing, attends groups slightly more often, placement pending    Assessment/Plan   Principal Problem:    Schizophrenia ([facility] 75 )  Active Problems:    Medical clearance for psychiatric admission    Seizure disorder [facility])    Traumatic brain injury ([facility] 75 )    Brain lesion    History of cardiac radiofrequency ablation    History of fall    PTSD (post-traumatic stress disorder)      Recommended Treatment:     Planned medication and treatment changes:     All current active medications have been reviewed  Encourage group therapy, milieu therapy and occupational therapy  Behavioral Health checks every 15 minutes   Assault precautions  Fall precautions  Seizure precautions  Encourage PT exercises  Obtain labs weekly q Mondays for Clozaril monitoring, next tomorrow 6/13/22 for Clozapine level, CK with reflex MB, High Sensitivity Troponin, BNP  Observe progress over weekend  Continue current medications:    Current Facility-Administered Medications   Medication Dose Route Frequency Provider Last Rate    acetaminophen  650 mg Oral Q6H PRN Vinicius Potters, DO      acetaminophen  650 mg Oral Q4H PRN [place] Pot[place], DO      acetaminophen  975 mg Oral Q6H PRN [place] Pot[place], DO      aluminum-magnesium hydroxide-simethicone  30 mL Oral Q4H PRN Vinicius Potters, DO      ammonium lactate   Topical BID Zena Henry DPM      artificial tear  1 application Both Eyes M1C PRN [place] Potters, DO  aspirin  81 mg Oral Daily [place], DO      benztropine  1 mg Intramuscular Q4H PRN Max 6/day [place], DO      benztropine  1 mg Oral Q4H PRN Max 6/day [place], DO      cloZAPine  200 mg Oral BID [place], DO      hydrOXYzine HCL  50 mg Oral Q6H PRN Max 4/day [place], DO      Or    diphenhydrAMINE  50 mg Intramuscular Q6H PRN [place], DO      gabapentin  400 mg Oral TID [place], DO      hydrOXYzine HCL  100 mg Oral Q6H PRN Max 4/day [place], DO      Or    LORazepam  2 mg Intramuscular Q6H PRN [place], DO      hydrOXYzine HCL  25 mg Oral Q6H PRN Max 4/day [place], DO      levETIRAcetam  500 mg Oral Q12H [address] Kashif A Prayson, DO      melatonin  9 mg Oral HS PRN [place], DO      metFORMIN  850 mg Oral BID With Meals Elsie Ballesteros MD      metoprolol succinate  50 mg Oral Daily [place], DO      nicotine polacrilex  2 mg Oral Q2H PRN [place], DO      OLANZapine  10 mg Oral Q3H PRN Max 3/day [place], DO      Or    OLANZapine  10 mg Intramuscular Q3H PRN Max 3/day [place], DO      OLANZapine  5 mg Oral Q3H PRN Max 6/day Kashif A Prayson, DO      Or    OLANZapine  5 mg Intramuscular Q3H PRN Max 6/day Kashif A Prayson, DO      OLANZapine  2 5 mg Oral Q3H PRN Max 8/day Kashif A Prayson, DO      polyethylene glycol  17 g Oral Daily PRN [place], DO      propranolol  10 mg Oral Q8H PRN [place], DO      senna  1 tablet Oral Daily Bryant Rivero MD      senna-docusate sodium  1 tablet Oral Daily PRN Elsie Ballesteros MD      traZODone  50 mg Oral HS PRN [place], DO       Risks / Benefits of Treatment:    Risks, benefits, and possible side effects of medications explained to patient and patient verbalizes understanding and agreement for treatment  Counseling / Coordination of Care:     Total floor / unit time spent today 15 minutes  Greater than 50% of total time was spent with the patient and / or family counseling and / or coordination of care  A description of counseling / coordination of care:  Patient's progress reviewed with nursing staff  Medication changes reviewed with nursing staff  Medications, treatment progress and treatment plan reviewed with patient  Importance of medication and treatment compliance reviewed with patient  Reassurance and supportive therapy provided  Encouraged participation in milieu and group therapy on the unit  Billie Bloch [place], [address] [place] [address] 06/12/22    This office note has been dictated

## 2022-06-11 NOTE — PLAN OF CARE
Problem: Alteration in Thoughts and Perception  Goal: Treatment Goal: Gain control of psychotic behaviors/thinking, reduce/eliminate presenting symptoms and demonstrate improved reality functioning upon discharge  Outcome: Progressing  Goal: Verbalize thoughts and feelings  Description: Interventions:  - Promote a nonjudgmental and trusting relationship with the patient through active listening and therapeutic communication  - Assess patient's level of functioning, behavior and potential for risk  - Engage patient in 1 on 1 interactions  - Encourage patient to express fears, feelings, frustrations, and discuss symptoms    - [place] patient to reality, help patient recognize reality-based thinking   - Administer medications as ordered and assess for potential side effects  - Provide the patient education related to the signs and symptoms of the illness and desired effects of prescribed medications  Interventions:  - Assess and re-assess patient's lethality and potential for self-injury  - Engage patient in 1:1 interactions, daily, for a minimum of 15 minutes  - Encourage patient to express feelings, fears, frustrations, hopes  - Establish rapport/trust with patient   Interventions:  - Assess and re-assess patient's level of risk   - Engage patient in 1:1 interactions, daily, for a minimum of 15 minutes   - Encourage patient to express feelings, fears, frustrations, hopes   Interventions:  - Assess and re-assess patient's level of risk, every waking shift  - Engage patient in 1:1 interactions, daily, for a minimum of 15 minutes   - Allow patient to express feelings and frustrations in a safe and non-threatening manner   - Establish rapport/trust with patient   Outcome: Not Progressing  Goal: Refrain from acting on delusional thinking/internal stimuli  Description: Interventions:  - Monitor patient closely, per order   - Utilize least restrictive measures   - Set reasonable limits, give positive feedback for acceptable   - Administer medications as ordered and monitor of potential side effects  Outcome: Progressing  Goal: Agree to be compliant with medication regime, as prescribed and report medication side effects  Description: Interventions:  - Offer appropriate PRN medication and supervise ingestion; conduct AIMS, as needed   Outcome: Progressing  Goal: Recognize dysfunctional thoughts, communicate reality-based thoughts at the time of discharge  Description: Interventions:  - Provide medication and psycho-education to assist patient in compliance and developing insight into his/her illness   Outcome: Not Progressing     Problem: Ineffective Coping  Goal: Identifies ineffective coping skills  Outcome: Not Progressing  Goal: Identifies healthy coping skills  Outcome: Not Progressing  Goal: Demonstrates healthy coping skills  Outcome: Not Progressing  Goal: Participates in unit activities  Description: Interventions:  - Provide therapeutic environment   - Provide required programming   - Redirect inappropriate behaviors   Outcome: Progressing  Goal: Patient/Family participate in treatment and DC plans  Description: Interventions:  - Provide therapeutic environment  Outcome: Progressing  Goal: Patient/Family verbalizes awareness of resources  Outcome: Not Progressing  Goal: Understands least restrictive measures  Description: Interventions:  - Utilize least restrictive behavior  Outcome: Not Progressing  Goal: Free from restraint events  Description: - Utilize least restrictive measures   - Provide behavioral interventions   - Redirect inappropriate behaviors   Outcome: Progressing     Problem: Ineffective Coping  Goal: Identifies ineffective coping skills  Outcome: Not Progressing  Goal: Identifies healthy coping skills  Outcome: Not Progressing  Goal: Demonstrates healthy coping skills  Outcome: Not Progressing  Goal: Participates in unit activities  Description: Interventions:  - Provide therapeutic environment   - Provide required programming   - Redirect inappropriate behaviors   Outcome: Progressing  Goal: Patient/Family participate in treatment and DC plans  Description: Interventions:  - Provide therapeutic environment  Outcome: Progressing  Goal: Patient/Family verbalizes awareness of resources  Outcome: Not Progressing  Goal: Understands least restrictive measures  Description: Interventions:  - Utilize least restrictive behavior  Outcome: Not Progressing  Goal: Free from restraint events  Description: - Utilize least restrictive measures   - Provide behavioral interventions   - Redirect inappropriate behaviors   Outcome: Progressing     Problem: Risk for Self Injury/Neglect  Goal: Verbalize thoughts and feelings  Description: Interventions:  - Promote a nonjudgmental and trusting relationship with the patient through active listening and therapeutic communication  - Assess patient's level of functioning, behavior and potential for risk  - Engage patient in 1 on 1 interactions  - Encourage patient to express fears, feelings, frustrations, and discuss symptoms    - [place] patient to reality, help patient recognize reality-based thinking   - Administer medications as ordered and assess for potential side effects  - Provide the patient education related to the signs and symptoms of the illness and desired effects of prescribed medications  Interventions:  - Assess and re-assess patient's lethality and potential for self-injury  - Engage patient in 1:1 interactions, daily, for a minimum of 15 minutes  - Encourage patient to express feelings, fears, frustrations, hopes  - Establish rapport/trust with patient   Interventions:  - Assess and re-assess patient's level of risk   - Engage patient in 1:1 interactions, daily, for a minimum of 15 minutes   - Encourage patient to express feelings, fears, frustrations, hopes   Interventions:  - Assess and re-assess patient's level of risk, every waking shift  - Engage patient in 1:1 interactions, daily, for a minimum of 15 minutes   - Allow patient to express feelings and frustrations in a safe and non-threatening manner   - Establish rapport/trust with patient   Outcome: Not Progressing  Goal: Treatment Goal: Remain safe during length of stay, learn and adopt new coping skills, and be free of self-injurious ideation, impulses and acts at the time of discharge  Outcome: Progressing  Goal: Refrain from harming self  Description: Interventions:  - Monitor patient closely, per order  - Develop a trusting relationship  - Supervise medication ingestion, monitor effects and side effects   Outcome: Progressing  Goal: Recognize maladaptive responses and adopt new coping mechanisms  Outcome: Not Progressing     Problem: Depression  Goal: Verbalize thoughts and feelings  Description: Interventions:  - Promote a nonjudgmental and trusting relationship with the patient through active listening and therapeutic communication  - Assess patient's level of functioning, behavior and potential for risk  - Engage patient in 1 on 1 interactions  - Encourage patient to express fears, feelings, frustrations, and discuss symptoms    - [place] patient to reality, help patient recognize reality-based thinking   - Administer medications as ordered and assess for potential side effects  - Provide the patient education related to the signs and symptoms of the illness and desired effects of prescribed medications  Interventions:  - Assess and re-assess patient's lethality and potential for self-injury  - Engage patient in 1:1 interactions, daily, for a minimum of 15 minutes  - Encourage patient to express feelings, fears, frustrations, hopes  - Establish rapport/trust with patient   Interventions:  - Assess and re-assess patient's level of risk   - Engage patient in 1:1 interactions, daily, for a minimum of 15 minutes   - Encourage patient to express feelings, fears, frustrations, hopes   Interventions:  - Assess and re-assess patient's level of risk, every waking shift  - Engage patient in 1:1 interactions, daily, for a minimum of 15 minutes   - Allow patient to express feelings and frustrations in a safe and non-threatening manner   - Establish rapport/trust with patient   Outcome: Not Progressing  Goal: Treatment Goal: Demonstrate behavioral control of depressive symptoms, verbalize feelings of improved mood/affect, and adopt new coping skills prior to discharge  Outcome: Not Progressing  Goal: Refrain from harming self  Description: Interventions:  - Monitor patient closely, per order   - Supervise medication ingestion, monitor effects and side effects   Outcome: Progressing  Goal: Refrain from isolation  Description: Interventions:  - Develop a trusting relationship   - Encourage socialization   Outcome: Not Progressing  Goal: Refrain from self-neglect  Outcome: Progressing     Problem: Anxiety  Goal: Anxiety is at manageable level  Description: Interventions:  - Assess and monitor patient's anxiety level  - Monitor for signs and symptoms (heart palpitations, chest pain, shortness of breath, headaches, nausea, feeling jumpy, restlessness, irritable, apprehensive)  - Collaborate with interdisciplinary team and initiate plan and interventions as ordered    - [place] patient to unit/surroundings  - Explain treatment plan  - Encourage participation in care  - Encourage verbalization of concerns/fears  - Identify coping mechanisms  - Assist in developing anxiety-reducing skills  - Administer/offer alternative therapies  - Limit or eliminate stimulants  Outcome: Not Progressing     Problem: Risk for Violence/Aggression Toward Others  Goal: Verbalize thoughts and feelings  Description: Interventions:  - Promote a nonjudgmental and trusting relationship with the patient through active listening and therapeutic communication  - Assess patient's level of functioning, behavior and potential for risk  - Engage patient in 1 on 1 interactions  - Encourage patient to express fears, feelings, frustrations, and discuss symptoms    - [place] patient to reality, help patient recognize reality-based thinking   - Administer medications as ordered and assess for potential side effects  - Provide the patient education related to the signs and symptoms of the illness and desired effects of prescribed medications  Interventions:  - Assess and re-assess patient's lethality and potential for self-injury  - Engage patient in 1:1 interactions, daily, for a minimum of 15 minutes  - Encourage patient to express feelings, fears, frustrations, hopes  - Establish rapport/trust with patient   Interventions:  - Assess and re-assess patient's level of risk   - Engage patient in 1:1 interactions, daily, for a minimum of 15 minutes   - Encourage patient to express feelings, fears, frustrations, hopes   Interventions:  - Assess and re-assess patient's level of risk, every waking shift  - Engage patient in 1:1 interactions, daily, for a minimum of 15 minutes   - Allow patient to express feelings and frustrations in a safe and non-threatening manner   - Establish rapport/trust with patient   Outcome: Not Progressing  Goal: Treatment Goal: Refrain from acts of violence/aggression during length of stay, and demonstrate improved impulse control at the time of discharge  Outcome: Not Progressing  Goal: Refrain from harming others  Outcome: Progressing  Goal: Refrain from destructive acts on the environment or property  Outcome: Progressing  Goal: Control angry outbursts  Description: Interventions:  - Monitor patient closely, per order  - Ensure early verbal de-escalation  - Monitor prn medication needs  - Set reasonable/therapeutic limits, outline behavioral expectations, and consequences   - Provide a non-threatening milieu, utilizing the least restrictive interventions   Outcome: Progressing  Goal: Identify appropriate positive anger management techniques  Description: Interventions:  - Offer anger management and coping skills groups   - Staff will provide positive feedback for appropriate anger control  Outcome: Progressing     Problem: SUBSTANCE USE/ABUSE  Goal: By discharge, will develop insight into their chemical dependency and sustain motivation to continue in recovery  Description: INTERVENTIONS:  - Attends all daily group sessions and scheduled AA groups  - Actively practices coping skills through participation in the therapeutic community and adherence to program rules  - Reviews and completes assignments from individual treatment plan  - Assist patient development of understanding of their personal cycle of addiction and relapse triggers  Outcome: Not Progressing  Goal: By discharge, patient will have ongoing treatment plan addressing chemical dependency  Description: INTERVENTIONS:  - Assist patient with resources and/or appointments for ongoing recovery based living  Outcome: Not Progressing

## 2022-06-11 NOTE — NURSING NOTE
Patient remained in bed and slept through the night without incident  Staff to maintain continuous rounding for safety and support

## 2022-06-11 NOTE — NURSING NOTE
Patient is frequently at the nurses station asking for Nicotine Gum, c/o pain, c/o being "tired"  Patient was urged to stay in the dining room after eating so he can get his medications (instead of immediatly going to his room) He did not appear to be interested in the conversation  He seems to have impulsivity in his behavior  He asked for something for pain and was told that he had received something a few hours ago at 1437 so he replied "so I have to wait awhile" and writer said "yes" and he never asked for a pain Rx again     Laurie Baumgarten left wrap up group several minutes early and came to writer to ask for a piece of Nicotine Gum  Writer told him it is not appropriate to leave group early for a prn  Such as nicotine gum  Patient is observed ambulating with a cane He is monitored for safety and encouraged to perform his PT exercises for strengthening and pain reduction  He has not performed them as yet   His behavior has improved somewhat and his requests at the nurses station have diminished slightly Overall there is improvement in his behaviors

## 2022-06-11 NOTE — NURSING NOTE
Patient is calm, pleasant, and cooperative  Visible on milieu and social w/ select peers   Attended half of the groups offered today   Continues to demonstrate poor memory and insight  Reported anxiety "4 and a half" but was unable to explain what caused or triggered it, stated, "I don't know I was just laying in bed and I felt really anxious " Pt was given atarax 100mg @1824, stated positive effect  Pt given tylenol 975mg @1823 for 9/10 left ankle pain, stated pain reduced to 5/10  Demonstrates poor appetite at times  Compliant with scheduled medications  Will continue to monitor for safety and support

## 2022-06-12 PROCEDURE — 99232 SBSQ HOSP IP/OBS MODERATE 35: CPT | Performed by: STUDENT IN AN ORGANIZED HEALTH CARE EDUCATION/TRAINING PROGRAM

## 2022-06-12 RX ADMIN — AMMONIUM LACTATE 1 APPLICATION: 17 LOTION TOPICAL at 08:05

## 2022-06-12 RX ADMIN — GABAPENTIN 400 MG: 400 CAPSULE ORAL at 21:14

## 2022-06-12 RX ADMIN — CLOZAPINE 200 MG: 200 TABLET ORAL at 08:04

## 2022-06-12 RX ADMIN — CLOZAPINE 200 MG: 200 TABLET ORAL at 21:14

## 2022-06-12 RX ADMIN — SENNOSIDES 8.6 MG: 8.6 TABLET, FILM COATED ORAL at 08:04

## 2022-06-12 RX ADMIN — NICOTINE POLACRILEX 2 MG: 2 GUM, CHEWING ORAL at 18:16

## 2022-06-12 RX ADMIN — ASPIRIN 81 MG CHEWABLE TABLET 81 MG: 81 TABLET CHEWABLE at 08:04

## 2022-06-12 RX ADMIN — LEVETIRACETAM 500 MG: 500 TABLET, FILM COATED ORAL at 21:14

## 2022-06-12 RX ADMIN — METFORMIN HYDROCHLORIDE 850 MG: 850 TABLET, FILM COATED ORAL at 16:48

## 2022-06-12 RX ADMIN — NICOTINE POLACRILEX 2 MG: 2 GUM, CHEWING ORAL at 13:32

## 2022-06-12 RX ADMIN — LEVETIRACETAM 500 MG: 500 TABLET, FILM COATED ORAL at 08:04

## 2022-06-12 RX ADMIN — NICOTINE POLACRILEX 2 MG: 2 GUM, CHEWING ORAL at 15:54

## 2022-06-12 RX ADMIN — GABAPENTIN 400 MG: 400 CAPSULE ORAL at 16:48

## 2022-06-12 RX ADMIN — ACETAMINOPHEN 325MG 975 MG: 325 TABLET ORAL at 17:11

## 2022-06-12 RX ADMIN — METFORMIN HYDROCHLORIDE 850 MG: 850 TABLET, FILM COATED ORAL at 08:04

## 2022-06-12 RX ADMIN — GABAPENTIN 400 MG: 400 CAPSULE ORAL at 08:05

## 2022-06-12 RX ADMIN — NICOTINE POLACRILEX 2 MG: 2 GUM, CHEWING ORAL at 08:05

## 2022-06-12 RX ADMIN — METOPROLOL SUCCINATE 50 MG: 50 TABLET, EXTENDED RELEASE ORAL at 08:04

## 2022-06-12 NOTE — PLAN OF CARE
Problem: Alteration in Thoughts and Perception  Goal: Verbalize thoughts and feelings  Description: Interventions:  - Promote a nonjudgmental and trusting relationship with the patient through active listening and therapeutic communication  - Assess patient's level of functioning, behavior and potential for risk  - Engage patient in 1 on 1 interactions  - Encourage patient to express fears, feelings, frustrations, and discuss symptoms    - [place] patient to reality, help patient recognize reality-based thinking   - Administer medications as ordered and assess for potential side effects  - Provide the patient education related to the signs and symptoms of the illness and desired effects of prescribed medications  Interventions:  - Assess and re-assess patient's lethality and potential for self-injury  - Engage patient in 1:1 interactions, daily, for a minimum of 15 minutes  - Encourage patient to express feelings, fears, frustrations, hopes  - Establish rapport/trust with patient   Interventions:  - Assess and re-assess patient's level of risk   - Engage patient in 1:1 interactions, daily, for a minimum of 15 minutes   - Encourage patient to express feelings, fears, frustrations, hopes   Interventions:  - Assess and re-assess patient's level of risk, every waking shift  - Engage patient in 1:1 interactions, daily, for a minimum of 15 minutes   - Allow patient to express feelings and frustrations in a safe and non-threatening manner   - Establish rapport/trust with patient   Outcome: Progressing  Goal: Agree to be compliant with medication regime, as prescribed and report medication side effects  Description: Interventions:  - Offer appropriate PRN medication and supervise ingestion; conduct AIMS, as needed   Outcome: Progressing     Problem: Ineffective Coping  Goal: Demonstrates healthy coping skills  Outcome: Progressing  Goal: Participates in unit activities  Description: Interventions:  - Provide therapeutic environment   - Provide required programming   - Redirect inappropriate behaviors   Outcome: Progressing  Goal: Understands least restrictive measures  Description: Interventions:  - Utilize least restrictive behavior  Outcome: Progressing     Problem: Risk for Self Injury/Neglect  Goal: Verbalize thoughts and feelings  Description: Interventions:  - Promote a nonjudgmental and trusting relationship with the patient through active listening and therapeutic communication  - Assess patient's level of functioning, behavior and potential for risk  - Engage patient in 1 on 1 interactions  - Encourage patient to express fears, feelings, frustrations, and discuss symptoms    - [place] patient to reality, help patient recognize reality-based thinking   - Administer medications as ordered and assess for potential side effects  - Provide the patient education related to the signs and symptoms of the illness and desired effects of prescribed medications  Interventions:  - Assess and re-assess patient's lethality and potential for self-injury  - Engage patient in 1:1 interactions, daily, for a minimum of 15 minutes  - Encourage patient to express feelings, fears, frustrations, hopes  - Establish rapport/trust with patient   Interventions:  - Assess and re-assess patient's level of risk   - Engage patient in 1:1 interactions, daily, for a minimum of 15 minutes   - Encourage patient to express feelings, fears, frustrations, hopes   Interventions:  - Assess and re-assess patient's level of risk, every waking shift  - Engage patient in 1:1 interactions, daily, for a minimum of 15 minutes   - Allow patient to express feelings and frustrations in a safe and non-threatening manner   - Establish rapport/trust with patient   Outcome: Progressing     Problem: Depression  Goal: Verbalize thoughts and feelings  Description: Interventions:  - Promote a nonjudgmental and trusting relationship with the patient through active listening and therapeutic communication  - Assess patient's level of functioning, behavior and potential for risk  - Engage patient in 1 on 1 interactions  - Encourage patient to express fears, feelings, frustrations, and discuss symptoms    - [place] patient to reality, help patient recognize reality-based thinking   - Administer medications as ordered and assess for potential side effects  - Provide the patient education related to the signs and symptoms of the illness and desired effects of prescribed medications  Interventions:  - Assess and re-assess patient's lethality and potential for self-injury  - Engage patient in 1:1 interactions, daily, for a minimum of 15 minutes  - Encourage patient to express feelings, fears, frustrations, hopes  - Establish rapport/trust with patient   Interventions:  - Assess and re-assess patient's level of risk   - Engage patient in 1:1 interactions, daily, for a minimum of 15 minutes   - Encourage patient to express feelings, fears, frustrations, hopes   Interventions:  - Assess and re-assess patient's level of risk, every waking shift  - Engage patient in 1:1 interactions, daily, for a minimum of 15 minutes   - Allow patient to express feelings and frustrations in a safe and non-threatening manner   - Establish rapport/trust with patient   Outcome: Progressing  Goal: Refrain from isolation  Description: Interventions:  - Develop a trusting relationship   - Encourage socialization   Outcome: Progressing     Problem: Anxiety  Goal: Anxiety is at manageable level  Description: Interventions:  - Assess and monitor patient's anxiety level  - Monitor for signs and symptoms (heart palpitations, chest pain, shortness of breath, headaches, nausea, feeling jumpy, restlessness, irritable, apprehensive)  - Collaborate with interdisciplinary team and initiate plan and interventions as ordered    - [place] patient to unit/surroundings  - Explain treatment plan  - Encourage participation in care  - Encourage verbalization of concerns/fears  - Identify coping mechanisms  - Assist in developing anxiety-reducing skills  - Administer/offer alternative therapies  - Limit or eliminate stimulants  Outcome: Progressing     Problem: Risk for Violence/Aggression Toward Others  Goal: Verbalize thoughts and feelings  Description: Interventions:  - Promote a nonjudgmental and trusting relationship with the patient through active listening and therapeutic communication  - Assess patient's level of functioning, behavior and potential for risk  - [place] patient in 1 on 1 interactions  - Encourage patient to express fears, feelings, frustrations, and discuss symptoms    - [place] patient to reality, help patient recognize reality-based thinking   - Administer medications as ordered and assess for potential side effects  - Provide the patient education related to the signs and symptoms of the illness and desired effects of prescribed medications  Interventions:  - Assess and re-assess patient's lethality and potential for self-injury  - Eng[place] patient in 1:1 interactions, daily, for a minimum of 15 minutes  - Encourage patient to express feelings, fears, frustrations, hopes  - Establish rapport/trust with patient   Interventions:  - Assess and re-assess patient's level of risk   - Engage patient in 1:1 interactions, daily, for a minimum of 15 minutes   - Encourage patient to express feelings, fears, frustrations, hopes   Interventions:  - Assess and re-assess patient's level of risk, every waking shift  - Engage patient in 1:1 interactions, daily, for a minimum of 15 minutes   - Allow patient to express feelings and frustrations in a safe and non-threatening manner   - Establish rapport/trust with patient   Outcome: Progressing

## 2022-06-12 NOTE — PLAN OF CARE
Problem: Alteration in Thoughts and Perception  Goal: Treatment Goal: Gain control of psychotic behaviors/thinking, reduce/eliminate presenting symptoms and demonstrate improved reality functioning upon discharge  Outcome: Progressing  Goal: Verbalize thoughts and feelings  Description: Interventions:  - Promote a nonjudgmental and trusting relationship with the patient through active listening and therapeutic communication  - Assess patient's level of functioning, behavior and potential for risk  - Engage patient in 1 on 1 interactions  - Encourage patient to express fears, feelings, frustrations, and discuss symptoms    - [place] patient to reality, help patient recognize reality-based thinking   - Administer medications as ordered and assess for potential side effects  - Provide the patient education related to the signs and symptoms of the illness and desired effects of prescribed medications  Interventions:  - Assess and re-assess patient's lethality and potential for self-injury  - Engage patient in 1:1 interactions, daily, for a minimum of 15 minutes  - Encourage patient to express feelings, fears, frustrations, hopes  - Establish rapport/trust with patient   Interventions:  - Assess and re-assess patient's level of risk   - Engage patient in 1:1 interactions, daily, for a minimum of 15 minutes   - Encourage patient to express feelings, fears, frustrations, hopes   Interventions:  - Assess and re-assess patient's level of risk, every waking shift  - Engage patient in 1:1 interactions, daily, for a minimum of 15 minutes   - Allow patient to express feelings and frustrations in a safe and non-threatening manner   - Establish rapport/trust with patient   Outcome: Progressing  Goal: Refrain from acting on delusional thinking/internal stimuli  Description: Interventions:  - Monitor patient closely, per order   - Utilize least restrictive measures   - Set reasonable limits, give positive feedback for acceptable - Administer medications as ordered and monitor of potential side effects  Outcome: Progressing  Goal: Agree to be compliant with medication regime, as prescribed and report medication side effects  Description: Interventions:  - Offer appropriate PRN medication and supervise ingestion; conduct AIMS, as needed   Outcome: Progressing  Goal: Recognize dysfunctional thoughts, communicate reality-based thoughts at the time of discharge  Description: Interventions:  - Provide medication and psycho-education to assist patient in compliance and developing insight into his/her illness   Outcome: Progressing

## 2022-06-12 NOTE — NURSING NOTE
Patient is compliant with medications and meals  Patient has been co-operative all day  No requests for pain medication on dayshift just his nictine Gum  He was pleasant and social today

## 2022-06-12 NOTE — NURSING NOTE
Simon Mediate  was in bed at shift change  Per Q 7 minute safety checks , pt appeared to sleep about 7 hours overnight  No behavior incidence

## 2022-06-13 PROCEDURE — 99232 SBSQ HOSP IP/OBS MODERATE 35: CPT

## 2022-06-13 RX ADMIN — CLOZAPINE 200 MG: 200 TABLET ORAL at 08:10

## 2022-06-13 RX ADMIN — LEVETIRACETAM 500 MG: 500 TABLET, FILM COATED ORAL at 08:10

## 2022-06-13 RX ADMIN — NICOTINE POLACRILEX 2 MG: 2 GUM, CHEWING ORAL at 08:10

## 2022-06-13 RX ADMIN — METFORMIN HYDROCHLORIDE 850 MG: 850 TABLET, FILM COATED ORAL at 16:41

## 2022-06-13 RX ADMIN — SENNOSIDES 8.6 MG: 8.6 TABLET, FILM COATED ORAL at 08:10

## 2022-06-13 RX ADMIN — METOPROLOL SUCCINATE 50 MG: 50 TABLET, EXTENDED RELEASE ORAL at 08:10

## 2022-06-13 RX ADMIN — ASPIRIN 81 MG CHEWABLE TABLET 81 MG: 81 TABLET CHEWABLE at 08:10

## 2022-06-13 RX ADMIN — METFORMIN HYDROCHLORIDE 850 MG: 850 TABLET, FILM COATED ORAL at 08:10

## 2022-06-13 RX ADMIN — GABAPENTIN 400 MG: 400 CAPSULE ORAL at 21:20

## 2022-06-13 RX ADMIN — GABAPENTIN 400 MG: 400 CAPSULE ORAL at 08:10

## 2022-06-13 RX ADMIN — GABAPENTIN 400 MG: 400 CAPSULE ORAL at 16:41

## 2022-06-13 RX ADMIN — NICOTINE POLACRILEX 2 MG: 2 GUM, CHEWING ORAL at 19:02

## 2022-06-13 RX ADMIN — LEVETIRACETAM 500 MG: 500 TABLET, FILM COATED ORAL at 21:20

## 2022-06-13 RX ADMIN — ACETAMINOPHEN 325MG 975 MG: 325 TABLET ORAL at 11:19

## 2022-06-13 RX ADMIN — NICOTINE POLACRILEX 2 MG: 2 GUM, CHEWING ORAL at 16:41

## 2022-06-13 RX ADMIN — CLOZAPINE 200 MG: 200 TABLET ORAL at 21:20

## 2022-06-13 RX ADMIN — NICOTINE POLACRILEX 2 MG: 2 GUM, CHEWING ORAL at 13:11

## 2022-06-13 NOTE — SOCIAL WORK
ANILA left with Susan Okeefe at [facility] to make follow up appointment in advance with Dr Mora Mcdaniel; requested call back

## 2022-06-13 NOTE — DISCHARGE INSTR - APPOINTMENTS
What you need to know about coronavirus disease 2019 (COVID-19)     What is coronavirus disease 2019 (COVID-19)? Coronavirus disease 2019 (COVID-19) is a respiratory illness that can spread from person to person  The virus that causes COVID-19 is a novel coronavirus that was first identified during an investigation into an outbreak in [place]  [place] people in [place]  get COVID[place]? Yes  COVID-19 is spreading from person to person in parts of the [place] [facility]  Risk of infection with COVID-19 is higher for people who are close contacts of someone known to have COVID-19, for example healthcare workers, or household members  Other people at higher risk for infection are those who live in or have recently been in an area with ongoing spread of COVID-19  Learn more about places with ongoing spread at   [employer]  html#geographic  Have there been cases of COVID[place] in [place] ?   Yes  The first case of COVID-19 in the [place] was reported on January 21, 2020  The current count of cases of COVID-19 in the [place] is available on [employer] at [facility]  How does COVID-19 spread? The virus that causes COVID-19 probably emerged from an animal source, but is now spreading from person to person  The virus is thought to spread mainly between people who are in close contact with one another (within about 6 feet) through respiratory droplets produced when an infected person coughs or sneezes  It also may be possible that a person can get COVID-19 by touching a surface or object that has the virus on it and then touching their own mouth, nose, or possibly their eyes, but this is not thought to be the main way the virus spreads  Learn what is known about the spread of newly emerged coronaviruses at [employer]       What are the symptoms of COVID-19? Patients with COVID-19 have had mild to severe respiratory illness with symptoms of  fever  cough  shortness of breath    What are severe complications from this virus? Some patients have pneumonia in both lungs, multi-organ failure and in some cases death  How can I help protect myself? People can help protect themselves from respiratory illness with everyday preventive actions  Avoid close contact with people who are sick  Avoid touching your eyes, nose, and mouth withunwashed hands  Wash your hands often with soap and water for at least 20 seconds  Use an alcohol-based hand  that    contains at least 60% alcohol if soap and water are not available  If you are sick, to keep from spreading respiratory illness to others, you should  Stay home when you are sick  Cover your cough or sneeze with a tissue, then throw the tissue in the trash  Clean and disinfect frequently touched objectsand surfaces  What should I do if I recently traveled from an area with ongoing spread of COVID-19? If you have traveled from an affected area, there may be restrictions on your movements for up to 2 weeks  If you develop symptoms during that period (fever, cough, trouble breathing), seek medical advice  Call the office of your health care provider before you go, and tell them about your travel and your symptoms  They will give you instructions on how to get care without exposing other people to your illness  While sick, avoid contact with people, don't go out and delay any travel to reduce the possibility of spreading illness to others  Is there a vaccine? There is currently no vaccine to protect against COVID-19  The best way to prevent infection is to take everyday preventive actions, like avoiding close contact with people who are sick and washing your hands often  Is there a treatment? There is no specific antiviral treatment for COVID-19  People with COVID-19 can seek medical care to helprelieve symptoms  For more information: www cdc gov/[id number] [id number] 03/03/2020     What to do if you are sick with coronavirus disease 2019 (COVID-19): If you are sick with COVID-19 or suspect you are infected with the virus that causes COVID-19, follow the steps below to help prevent the disease from spreading to people in your home and community  Stay home except to get medical care   You should restrict activities outside your home, except for getting medical care  Do not go to work, school, or public areas  Avoid using public transportation, ride-sharing, or taxis  Separate yourself from other people and animals in your home:    People: As much as possible, you should stay in a specific room and away from other people in your home  Also, you should use a separate bathroom, if available  Animals: Do not handle pets or other animals while sick  See COVID-19 and Animals for more information  Call ahead before visiting your doctor: If you have a medical appointment, call the healthcare provider and tell them that you have or may have COVID-19  This will help the healthcare provider's office take steps to keep other people from getting infected or exposed  Wear a facemask: You should wear a facemask when you are around other people (e g , sharing a room or vehicle) or pets and before you enter a healthcare provider's office  If you are not able to wear a facemask (for example, because it causes trouble breathing), then people who live with you should not stay in the same room with you, or they should wear a facemask if they enteryour room  Cover your coughs and sneezes:   Cover your mouth and nose with a tissue when you cough or sneeze   Throw used tissues in a lined trash can; immediately wash your hands with soap and water for at least 20 seconds or clean your hands with an alcohol-based hand  that contains at least 60 to 95% alcohol, covering all surfaces of your hands and rubbing them together until they feel dry  Soap and water should be used preferentially if hands are visibly dirty  Avoid sharing personal household items: You should not share dishes, drinking glasses, cups, eating utensils, towels, or bedding with other people or pets in your home  After using these items, they should be washed thoroughly with soap and water  Clean your hands often:  Wash your hands often with soap and water for at least 20 seconds  If soap and water are not available, clean your hands with an alcohol-based hand  that contains at least 60% alcohol, covering all surfaces of your hands and rubbing them together until they feel dry  Soap and water should be used preferentially if hands are visibly dirty  Avoid touching your eyes, nose, and mouth with unwashed hands  Clean all "high-touch" surfaces every day:  High touch surfaces include counters, tabletops, doorknobs, bathroom fixtures, toilets, phones, keyboards, tablets, and bedside tables  Also, clean any surfaces that may have blood, stool, or body fluids on them  Use a household cleaning spray or wipe, according to the label instructions  Labels contain instructions for safe and effective use of the cleaning product including precautions you should take when applying the product, such as wearing gloves and making sure you have good ventilation during use of the product  Monitor your symptoms:  Seek prompt medical attention if your illness is worsening (e g , difficulty breathing)  Before seeking care, call your healthcare provider and tell them that you have, or are being evaluated for, COVID-19  Put on a facemask before you enter the facility  These steps will help the healthcare provider's office to keep other people in the office or waiting room from getting infectedor exposed  Ask your healthcare provider to call the local or state health department   Persons who are placed under active monitoring or facilitated self-monitoring should follow instructions provided by their local health department or occupational health professionals, as appropriate  If you have a medical emergency and need to call 911, notify the dispatch personnel that you have, or are being evaluated for COVID-19  If possible, put on a facemask before emergency medical services arrive  Discontinuing home isolation:  Patients with confirmed COVID-19 should remain under home isolation precautions until the risk of secondary transmission to others is thought to be low  The decision to discontinue home isolation precautions should be made on a case-by-case basis, in consultation with healthcare providers and state and local health departments  For more information: www cdc gov/COVID19   CS [id number] 02/24/2020     Stay home when you are sick,except to get medical care  Wash your hands often with soap and water for at least 20 seconds  Cover your cough or sneeze with a tissue, then throw the tissue in the trash  Clean and disinfect frequently touched objects and surfaces  Avoid touching your eyes, nose, and mouth  STOP THE SPREAD OF GERMS  For more information: www cdc gov/COVID19 Avoid close contact with people who are sick  Help prevent the spread of respiratory diseases like COVID-19

## 2022-06-13 NOTE — NURSING NOTE
Salma Carr has been awake, alert, and visible intermittently out in the milieu  Pt ate 100% supper  Some socialization noted with select peers  Pt requested prn Nicotine gum and 2 mg po given at [id number] and 1816  Pt spoke of wanting to be discharged soon  Pt denies any depression, anxiety, a/v hallucinations, and has not verbalized any delusions  Pt requested prn Tylenol for left ankle pain #7/10  Tylenol 975 mg po prn given at 1711 and effective (1811-#4/10)  Pt attended and participated in coping skills group and wrap up group  Had snack with peers  Compliant with scheduled meds and cooperative with mouth checks

## 2022-06-13 NOTE — SOCIAL WORK
Aftercare appt with Dr Catrachito Emmanuel scheduled, populated in AVS  Awaiting response from OP therapist and information about [facility] / if need to re-register or if patient can be walk in

## 2022-06-13 NOTE — SOCIAL WORK
PC to [employer]  Spoke with patient's therapist Terry Fleming who provided additional information about patient as she knows him well and supported him while he was in their residential program  Terry Fleming stated when patient is not well, he tends to have more interpersonal conflict and assume others are targeting him when they are not, becomes sexually preoccupied and more impulsive  He usually does not acknowledge the need or helpfulness of medication and hence, struggles especially at home with motivation to engage in treatment  Terry Fleming stated she just needs a week or so in advance to schedule patient for a follow up appointment, though recommended an appointment made sooner with psychiatrist since his schedule is busier  Will follow up

## 2022-06-13 NOTE — PROGRESS NOTES
06/13/22 1201   Team Meeting   Meeting Type Daily Rounds   Initial Conference Date 06/13/22   Patient/Family Present   Patient Present No   Patient's Family Present No       Daily Rounds  Robert Campos MD, Salinas Ochoa RN, Hernan CYR, Bebo Oneill LSW  Case reviewed  3/7 groups  Cooperative, multiple somatic issues reported; asking for inhaler- medical to follow up  Still needs blood work, multiple attempts will try again; patient has been cooperative no issues

## 2022-06-13 NOTE — NURSING NOTE
Germaine Sow maintained on ongoing SAFE precaution without incident on this shift   Observed laying in bed with eyes closed, breath even and easy   Continuous q7 minutes rounding implemented   Schedule CBC;CK;Clozapine;Troponon; NT-BNP-PRO to be drawn this morning   No behavior noted   Will continue to monitor

## 2022-06-13 NOTE — DISCHARGE INSTR - OTHER ORDERS
You are being discharged back to your residence at:     [address] [facility] [address]    You have identified the following as risk factors: stopping your medications on your own (or forgetting to take them), increased stress which can be due to interpersonal conflict, isolating yourself from others, feeling more irritable and agitated, experiencing voices (auditory hallucinations) or other psychiatric symptoms you usually don't when you are on your medication and at baseline  You have identified the following as healthy coping skills: listening to music, going for walks, deep breathing and meditation, reading, praying, sticking to a schedule    The following resources are available to you:    [address] [facility] [address] Suicide Hotline: [address] [facility] White: [address]: [phone]   Warmline: ([phone])    Antelmorebecca Yee is a confidential 7 days/week telephone support service manned by trained mental health consumers  Warmline operates daily but is not able to accept calls between 2AM-6AM      Warmline provides support, a listening ear and can provide information about available services  Warmline specializes in the concerns of mental health consumers, their families and friends  However, we are also here for anyone who has a mental health concern, is confused about or just doesn't know anything about mental health or where to get information  To reach Antelmo Yee, call [phone] accepts calls between 6:00 AM to 10:00 AM and from 4:00 PM to 20:63 AM or click on the link to view additional information  [employer] 2-1-1: This is a toll free, confidential, 24-hour-a-day service which connects you to a community  in your area who can help you find services and resources that are available to you locally and provide critical services that can improve and save lives    Call: 211  Ale Barrett: https://marky-domingo [email or web address]    Substance Abuse Help  Find Help and Treatment  The National Helpline provides 24-hour free and confidential referrals and information about mental and/or substance use disorders, prevention, treatment, and recovery in [place] and [facility] ([place]  Aparna Alcocer ([id number])  TTY: [phone]     The [facility] Family-to-Family Education Program is a free 12-week (2 1/2 hours/week) course for families of individuals with severe brain disorders (mental illnesses)  The classes are taught by trained family members  All course materials are furnished at no cost to you  Below are some details  To register, e-mail [email or web address] or call [phone]  The curriculum focuses on schizophrenia, bipolar disorder (manic depression), clinical depression, panic disorders and obsessive-compulsive disorder (OCD)  The course discusses the clinical treatment of these illnesses and teaches the knowledge and skills that family members need to cope more effectively  Topics Include:  Learning about feelings, learning about facts   Schizophrenia, major depression and vonda: diagnosis and dealing with critical periods   Subtypes of depression and bipolar disorder, panic disorder and OCD; diagnosis and causes; sharing our stories   The biology of the brain/new research   Problem solving workshops   Medication review   Empathy workshop - what its like to have a brain disorder   Communication skills workshop   Self-care and relative groups   Rehabilitation, services available   Advocacy: fighting stigma   Review and certification ceremony    Apoc-av-Kkgf Education Course  The [employer] Education class is a ten week - two hours per week - experiential education course on the topic of recovery for any person with serious mental illness who is interested in establishing and maintaining wellness  The course uses a combination of lecture, interactive exercises, and structural group processes   The diversity of experience among participants affords for a lively dynamic that moves the course along  [employer]s Hgda-ei-Atmp Education class is offered free of charge to people who experience mental illness  You do not need to be a member of [employer] to take the course  Courses are taught by teams of trained mentors/peer-teachers who are themselves experienced at living well with mental illness  Below are some details  To register, call [phone] or e-mail [email or web address]  Sign up today! [address] [facility] group is for family members, caregivers, and loved ones of individuals living with the everyday challenges of mental illness  The leaders are family members in the same situation  Sessions take place in an intimate, confidential setting to allow families to share openly with each other  These support groups allow participants to learn from the experiences of other group members, share coping strategies, and offer each other encouragement and understanding  Inga Saha know that you are not alone  Drop in--no registration is necessary  Here are the times and locations  [facility] FOR UPDATED SERVICES)  [place]  Monthly: 3rd Monday, 7:00-8:30 pm  Yvon Koroma  50   Monthly: 4th Tuesday, 7:00-8:30 pm  [address] [facility]  Monthly: 1st Monday, 7:00-8:30 pm  [address], [address] [place] [address]         Monthly Support for Persons with Mental Illness  The Peer Support Group is a monthly meeting for individuals facing the challenges of recovering from severe and persistent mental illness  Depression, manic depression, schizophrenia, and general anxiety disorder are only a few of the diagnoses of individuals who have found a supportive place at our meetings      Our [place]  We are a fellowship of individuals who share a common goal of recovery and the ability to maintain mental and emotional stability  We help others and ourselves through sharing our experiences, strength and hope with each other  No matter how traumatic our past or how despairing our present may be, there is hope for a new day  Sessions take place in an intimate, confidential setting to allow individuals to share openly with each other  Clarisa Cunningham know that you are not alone  Drop in--no registration is necessary  Here are the times and locations  [place]  Monthly: 1st Monday, 7:00-8:30 pm  [facility]  [address] [facility] [place], [address]   [facility]  Monthly: 3rd Monday, 7:00-8:30 pm  [address], [address] --- Tues :00 - 2:30 pm   For Family Members --- Wed 2:00 - 3:30 pm   For Peers --- Thurs 6:00 - 7:30 pm   Come share support with those who understand  [place] for our [facility] [employer], held on [facility] led by 2 trained facilitators with lived experience, by emailing [email or web address] or calling           What is TIP? We are a professional support in your corner to reach your vision of a successful future  TIP is a specialized service aimed to address common hurdles encountered by young adults  The program is designed to help individuals reach their vision of a successful future by using a strength based approach that focuses on achievement and problem solving  Who does TIP serve? Dyan Huang people aged 16-26 trying to build a better life  What do we do? We help you set life goals and work together to make your vision of a successful future a reality  TIP focuses on five key areas:    Educational Opportunities    Employment and [address] Effectiveness and Wellbeing     How does the program work?    The program is driven by the voice of the individual in the program    Each young person will create a "Futures Plan" with their individual TIP Facilitator  The Futures Plan then becomes the road map to a successful independent future  As goals are achieved, the Futures Plan is revised and updated to accommodate new goals on the road to independence  You choose where we meet: at home, at school, at a coffee shop or in the community  Who is eligible? Ages 15-26    Valid Medical Assistance in [place], [place], or [employer] or Psychiatric Evaluation completed in the last 12 months     How do I get TIP? Call or text to make a referral for yourself or someone you know  [employer]   Bonnie Hunter / Shanon Valdez   [phone] / [phone]   Referrals and Inquires can be sent directly to:   [email or web address]     Check out our website for more information www [employer]  org/services/tip   Updated 7-     TIP Frequently Asked Questions     What age range does TIP cover? Ages 12 - 29  What goal areas can TIP help me with?   TIP covers goals in 5 different domains: Education, Employment, Living Situation, Community Life Functioning, and Personal Effectiveness & Well-Being  Check out our TIP Goal Areas sheet for some ideas of goals you can include on your Futures Plan with your facilitator  TIP uses a team delivered service - What does that mean? Each facilitator on the team has different strengths and resources that could be helpful to you  While you will have a primary facilitator, you will also meet other members of our team to build your network of support  How often will I be meeting with my TIP Facilitator? We typically meet once a week, but it can be more or less depending on your desired level of involvement  This may change as you have more goals to work towards  We also consider several factors including scheduling, availability of staff, group events, and goals  We encourage you to discuss your needs with your facilitator  Does TIP provide transportation and/or housing? TIP does not directly provide housing; however, we can help you connect with housing resources and apply for housing services in your county  While we do provide transportation, it is at the discretion of your facilitator  We encourage you to discuss a transportation plan with them  Transportation to group events will be provided if needed  Do my parents or other professionals need to be involved? TIP is a person-centered program which means that each individual take ownership of their own path  While we do not require parents or other professionals to be involved, we encourage all participants in TIP to identify people that are helpful and supportive in their life  What are group events? Are they mandatory? Group events are a good opportunity to connect with peers, use community resources and develop new skills to meet goals  These events are organized by the team of facilitators to include both recreational and skill building groups  No one is required to attend, and your facilitator will inform you about upcoming events based on your interest and willingness to participate  We are always open to new ideas so that we can organize events that best meet the need and interest of everyone in our program  Please be encouraged to discuss your ideas with us! Is there a way to contact someone from TIP after hours? Yes, there is an after-hours crisis line where a facilitator can be reached outside of normal business hours  This line can be used to touch base with a TIP facilitator in the event that you have a question, concern, or need and your primary facilitator cannot be reached    www accessservices  org/services/tip     TIP Goal Areas   Employment & Career    Part time/ full time employment assistance  Finding meaningful employment  Resume building & interview skills  Identifying short term jobs versus long term career options  Relationships with co-workers and supervisors  Supported employment (e g , job coaches, etc)  Transitional employment opportunities paid or unpaid, at a noncompetitive placement  Educational Opportunities    Bachelor's degree or beyond  Associates degree  Vocational or technical certification  High school completion or GED certificate  Workplace educational programs where placement is related to school/college enrollment  Learning new hobbies/ activities (i e  languages, music, sports)  Living Situation    Independent residence (e g , living in an apartment with roommate)  Residing with natural, adoptive, or foster family  Semi-independent living (e g   assists but does not live on-site)  Supported living (e g  supervised apartment with live-in mentor or on-site staff apartment complex)  Restrictive/ group home setting (e g  crisis unit, residential [place] center, half-way center)  Personal Effectiveness & Wellbeing   Interpersonal relationships: family, friends, & mentors    Relationship development & maintenance of friendships  Balance of independence & interdependency with family members  Dating skills & development/maintenance of intimate relationships  Maintenance of relationships with mentors & informal key players  www accessBespoke  org/services/tip   Emotional & behavioral wellbeing    Social skills (e g  positive feedback to others, acceptance of negative feedback, self-monitoring, self-evaluation)  Identify coping strategies  Management of mental health symptoms  Spiritual wellbeing  Self-management of medications & side-effects  Risk management  Self-determination    Social problem solving (e g  generate alternative options, make informed decisions)  Set goals & develop plans for achieving such goals  Identifying strengths  Advocate for one's rights & positions       Communication    Express one's ideas & feelings through speaking & listening  Reading & writing skills for learning, fun, & communication  Knowledge of information sources (e g , use of [employer], authorities, internet communication, & other resources)  Cyberspace safety (e g  revealing personal information, meeting contacts in person, use of credit cards online)  Physical health & wellbeing    Health care & fitness (e g , balance diet, physical activity)  Recognizing when to see a physician  Self-management of over-the-counter & prescription medications & possible side effects  Knowledge of sexual functioning & birth control  Ability to access medical & dental services  Community-Life Functioning   Daily living    Self-care  Maintenance of living space & personal possessions  Money management  Cooking & nutrition  Maintenance & security of personal & financial documents  Safety skills (e g  avoid dangerous situations, prevent victimization)  www [employer]  org/services/tip   Leisure activities    Entertaining one's self  Activities with others  Creating indoor & outdoor activities of interest & fun     Places of entertainment & fun  Safe & healthy activities (e g  Cyberspace safety precautions, safe routes for walking, biking, & driving at different times of the day, choice of friends)  Community participation    Mobility around the community  Access & use of relevant community agencies & resources  Citizenship responsibilities, knowledge of basic rights & responsibilities  Community social support (e g  peer groups, community organizations)  Access to legal services  Cultural & spiritual resources

## 2022-06-13 NOTE — PROGRESS NOTES
06/13/22 1325   Team Meeting   Meeting Type Tx Team Meeting   Initial Conference Date 06/13/22   Next Conference Date 06/20/22   Team Members Present   Team Members Present Physician;; Other (Discipline and Name); Nurse   Physician Team Member Natali Zaidi MD   Social Work Team Member Karlene ARANGO   Other (Discipline and Name) [facility] VIMAL   Patient/Family Present   Patient Present Yes   Patient's Family Present Yes   Family Relationship Parent   Parent Rosario         Treatment Team Summary  Patient present: yes  Family/ Supports present: mother Rosario, by phone  Self assessment: completed  Appearance / Presentation: no changes; was not using cane at the time and has limp when walking though reminded by staff to use cane; affect constricted; perseverative on discharge and tends to ask same questions repeatedly; somewhat disheveled  Symptoms reported: 'feeling anxious, difficulty focusing,'  Main barrier: "walking" (still reporting pain when ambulating)  Coping Skills: attending group, being respectful  Goal last week / accomplished: "leaving"  Goal for this week: attending groups  Learning medications: named a few medical medications and asked to share psychiatric, able to recall clozapine and neurontin  Reactions to medications: denied  Medical Issues: denied other than ankle pain  Self Care: no changes; showering, brushing teeth, and eating identified by patient  Medication changes: none discussed  Main topics discussed: patient asked several times [when he is leaving] and fixated on this, but was receptive to discussion about length of stay and purpose of EAC; he was encouraged to focus on his group attendance and take advantage of what the program offers; also encouraged to do his PT exercises and stated several times he feels perfectly fine to go home at this time   Pt's mother encouraged him to stay focused and engaged in the program  She had a few comments and questions that treatment team was able to address, namely more about patient's labs and medical    Treatment Plan Review: not today  Discharge Planning: to return home with family and follow up at [facility]

## 2022-06-13 NOTE — PLAN OF CARE
Problem: Alteration in Thoughts and Perception  Goal: Treatment Goal: Gain control of psychotic behaviors/thinking, reduce/eliminate presenting symptoms and demonstrate improved reality functioning upon discharge  Outcome: Progressing  Goal: Verbalize thoughts and feelings  Description: Interventions:  - Promote a nonjudgmental and trusting relationship with the patient through active listening and therapeutic communication  - Assess patient's level of functioning, behavior and potential for risk  - Engage patient in 1 on 1 interactions  - Encourage patient to express fears, feelings, frustrations, and discuss symptoms    - [place] patient to reality, help patient recognize reality-based thinking   - Administer medications as ordered and assess for potential side effects  - Provide the patient education related to the signs and symptoms of the illness and desired effects of prescribed medications  Interventions:  - Assess and re-assess patient's lethality and potential for self-injury  - Engage patient in 1:1 interactions, daily, for a minimum of 15 minutes  - Encourage patient to express feelings, fears, frustrations, hopes  - Establish rapport/trust with patient   Interventions:  - Assess and re-assess patient's level of risk   - Engage patient in 1:1 interactions, daily, for a minimum of 15 minutes   - Encourage patient to express feelings, fears, frustrations, hopes   Interventions:  - Assess and re-assess patient's level of risk, every waking shift  - Engage patient in 1:1 interactions, daily, for a minimum of 15 minutes   - Allow patient to express feelings and frustrations in a safe and non-threatening manner   - Establish rapport/trust with patient   Outcome: Progressing  Goal: Refrain from acting on delusional thinking/internal stimuli  Description: Interventions:  - Monitor patient closely, per order   - Utilize least restrictive measures   - Set reasonable limits, give positive feedback for acceptable - Administer medications as ordered and monitor of potential side effects  Outcome: Progressing  Goal: Agree to be compliant with medication regime, as prescribed and report medication side effects  Description: Interventions:  - Offer appropriate PRN medication and supervise ingestion; conduct AIMS, as needed   Outcome: Progressing  Goal: Recognize dysfunctional thoughts, communicate reality-based thoughts at the time of discharge  Description: Interventions:  - Provide medication and psycho-education to assist patient in compliance and developing insight into his/her illness   Outcome: Progressing     Problem: Ineffective Coping  Goal: Identifies ineffective coping skills  Outcome: Progressing  Goal: Identifies healthy coping skills  Outcome: Progressing  Goal: Demonstrates healthy coping skills  Outcome: Progressing  Goal: Participates in unit activities  Description: Interventions:  - Provide therapeutic environment   - Provide required programming   - Redirect inappropriate behaviors   Outcome: Progressing  Goal: Patient/Family participate in treatment and DC plans  Description: Interventions:  - Provide therapeutic environment  Outcome: Progressing  Goal: Patient/Family verbalizes awareness of resources  Outcome: Progressing  Goal: Understands least restrictive measures  Description: Interventions:  - Utilize least restrictive behavior  Outcome: Progressing  Goal: Free from restraint events  Description: - Utilize least restrictive measures   - Provide behavioral interventions   - Redirect inappropriate behaviors   Outcome: Progressing     Problem: Risk for Self Injury/Neglect  Goal: Verbalize thoughts and feelings  Description: Interventions:  - Promote a nonjudgmental and trusting relationship with the patient through active listening and therapeutic communication  - Assess patient's level of functioning, behavior and potential for risk  - Engage patient in 1 on 1 interactions  - Encourage patient to express fears, feelings, frustrations, and discuss symptoms    - [place] patient to reality, help patient recognize reality-based thinking   - Administer medications as ordered and assess for potential side effects  - Provide the patient education related to the signs and symptoms of the illness and desired effects of prescribed medications  Interventions:  - Assess and re-assess patient's lethality and potential for self-injury  - Engage patient in 1:1 interactions, daily, for a minimum of 15 minutes  - Encourage patient to express feelings, fears, frustrations, hopes  - Establish rapport/trust with patient   Interventions:  - Assess and re-assess patient's level of risk   - Engage patient in 1:1 interactions, daily, for a minimum of 15 minutes   - Encourage patient to express feelings, fears, frustrations, hopes   Interventions:  - Assess and re-assess patient's level of risk, every waking shift  - Engage patient in 1:1 interactions, daily, for a minimum of 15 minutes   - Allow patient to express feelings and frustrations in a safe and non-threatening manner   - Establish rapport/trust with patient   Outcome: Progressing  Goal: Treatment Goal: Remain safe during length of stay, learn and adopt new coping skills, and be free of self-injurious ideation, impulses and acts at the time of discharge  Outcome: Progressing  Goal: Refrain from harming self  Description: Interventions:  - Monitor patient closely, per order  - Develop a trusting relationship  - Supervise medication ingestion, monitor effects and side effects   Outcome: Progressing  Goal: Recognize maladaptive responses and adopt new coping mechanisms  Outcome: Progressing     Problem: Depression  Goal: Verbalize thoughts and feelings  Description: Interventions:  - Promote a nonjudgmental and trusting relationship with the patient through active listening and therapeutic communication  - Assess patient's level of functioning, behavior and potential for risk  - Engage patient in 1 on 1 interactions  - Encourage patient to express fears, feelings, frustrations, and discuss symptoms    - [place] patient to reality, help patient recognize reality-based thinking   - Administer medications as ordered and assess for potential side effects  - Provide the patient education related to the signs and symptoms of the illness and desired effects of prescribed medications  Interventions:  - Assess and re-assess patient's lethality and potential for self-injury  - Engage patient in 1:1 interactions, daily, for a minimum of 15 minutes  - Encourage patient to express feelings, fears, frustrations, hopes  - Establish rapport/trust with patient   Interventions:  - Assess and re-assess patient's level of risk   - Engage patient in 1:1 interactions, daily, for a minimum of 15 minutes   - Encourage patient to express feelings, fears, frustrations, hopes   Interventions:  - Assess and re-assess patient's level of risk, every waking shift  - Engage patient in 1:1 interactions, daily, for a minimum of 15 minutes   - Allow patient to express feelings and frustrations in a safe and non-threatening manner   - Establish rapport/trust with patient   Outcome: Progressing  Goal: Treatment Goal: Demonstrate behavioral control of depressive symptoms, verbalize feelings of improved mood/affect, and adopt new coping skills prior to discharge  Outcome: Progressing  Goal: Refrain from harming self  Description: Interventions:  - Monitor patient closely, per order   - Supervise medication ingestion, monitor effects and side effects   Outcome: Progressing  Goal: Refrain from isolation  Description: Interventions:  - Develop a trusting relationship   - Encourage socialization   Outcome: Progressing  Goal: Refrain from self-neglect  Outcome: Progressing     Problem: Anxiety  Goal: Anxiety is at manageable level  Description: Interventions:  - Assess and monitor patient's anxiety level     - Monitor for signs and symptoms (heart palpitations, chest pain, shortness of breath, headaches, nausea, feeling jumpy, restlessness, irritable, apprehensive)  - Collaborate with interdisciplinary team and initiate plan and interventions as ordered    - [place] patient to unit/surroundings  - Explain treatment plan  - Encourage participation in care  - Encourage verbalization of concerns/fears  - Identify coping mechanisms  - Assist in developing anxiety-reducing skills  - Administer/offer alternative therapies  - Limit or eliminate stimulants  Outcome: Progressing     Problem: Risk for Violence/Aggression Toward Others  Goal: Verbalize thoughts and feelings  Description: Interventions:  - Promote a nonjudgmental and trusting relationship with the patient through active listening and therapeutic communication  - Assess patient's level of functioning, behavior and potential for risk  - Engage patient in 1 on 1 interactions  - Encourage patient to express fears, feelings, frustrations, and discuss symptoms    - [place] patient to reality, help patient recognize reality-based thinking   - Administer medications as ordered and assess for potential side effects  - Provide the patient education related to the signs and symptoms of the illness and desired effects of prescribed medications  Interventions:  - Assess and re-assess patient's lethality and potential for self-injury  - Engage patient in 1:1 interactions, daily, for a minimum of 15 minutes  - Encourage patient to express feelings, fears, frustrations, hopes  - Establish rapport/trust with patient   Interventions:  - Assess and re-assess patient's level of risk   - Engage patient in 1:1 interactions, daily, for a minimum of 15 minutes   - Encourage patient to express feelings, fears, frustrations, hopes   Interventions:  - Assess and re-assess patient's level of risk, every waking shift  - Engage patient in 1:1 interactions, daily, for a minimum of 15 minutes   - Allow patient to express feelings and frustrations in a safe and non-threatening manner   - Establish rapport/trust with patient   Outcome: Progressing  Goal: Treatment Goal: Refrain from acts of violence/aggression during length of stay, and demonstrate improved impulse control at the time of discharge  Outcome: Progressing  Goal: Refrain from harming others  Outcome: Progressing  Goal: Refrain from destructive acts on the environment or property  Outcome: Progressing  Goal: Control angry outbursts  Description: Interventions:  - Monitor patient closely, per order  - Ensure early verbal de-escalation  - Monitor prn medication needs  - Set reasonable/therapeutic limits, outline behavioral expectations, and consequences   - Provide a non-threatening milieu, utilizing the least restrictive interventions   Outcome: Progressing  Goal: Identify appropriate positive anger management techniques  Description: Interventions:  - Offer anger management and coping skills groups   - Staff will provide positive feedback for appropriate anger control  Outcome: Progressing     Problem: DISCHARGE PLANNING - CARE MANAGEMENT  Goal: Discharge to post-acute care or home with appropriate resources  Description: INTERVENTIONS:  - Conduct assessment to determine patient/family and health care team treatment goals, and need for post-acute services based on payer coverage, community resources, and patient preferences, and barriers to discharge  - Address psychosocial, clinical, and financial barriers to discharge as identified in assessment in conjunction with the patient/family and health care team  - Arrange appropriate level of post-acute services according to patients   needs and preference and payer coverage in collaboration with the physician and health care team  - Communicate with and update the patient/family, physician, and health care team regarding progress on the discharge plan  - Arrange appropriate transportation to post-acute venues  Outcome: Progressing     Problem: SUBSTANCE USE/ABUSE  Goal: By discharge, will develop insight into their chemical dependency and sustain motivation to continue in recovery  Description: INTERVENTIONS:  - Attends all daily group sessions and scheduled AA groups  - Actively practices coping skills through participation in the therapeutic community and adherence to program rules  - Reviews and completes assignments from individual treatment plan  - Assist patient development of understanding of their personal cycle of addiction and relapse triggers  Outcome: Progressing  Goal: By discharge, patient will have ongoing treatment plan addressing chemical dependency  Description: INTERVENTIONS:  - Assist patient with resources and/or appointments for ongoing recovery based living  Outcome: Progressing

## 2022-06-13 NOTE — PROGRESS NOTES
06/13/22 0700   Activity/Group Checklist   Group Community meeting   Attendance Did not attend   Attendance Duration (min) 46-60   Affect/Mood RENUKA

## 2022-06-13 NOTE — NURSING NOTE
Patient is visible and social in the milieu  Attending groups  He expressed frustration this morning since he was not able to have his labs drawn but was reassured it will be attempted  Pt can be somatic at times  He reports feeling good  Denies depression or anxiety  Denies SI, HI, AVH  He remains complaint with medications  Does not report any unmet needs

## 2022-06-13 NOTE — PROGRESS NOTES
06/13/22 1100   Activity/Group Checklist   Group Wellness   Attendance Did not attend   Attendance Duration (min) 46-60   Affect/Mood RENUKA

## 2022-06-14 LAB
BASOPHILS # BLD AUTO: 0.04 THOUSANDS/ΜL (ref 0–0.1)
BASOPHILS # BLD AUTO: 0.04 THOUSANDS/ΜL (ref 0–0.1)
BASOPHILS NFR BLD AUTO: 1 % (ref 0–1)
BASOPHILS NFR BLD AUTO: 1 % (ref 0–1)
CARDIAC TROPONIN I PNL SERPL HS: <2 NG/L (ref 8–18)
CARDIAC TROPONIN I PNL SERPL HS: <2 NG/L (ref 8–18)
CK SERPL-CCNC: 56 U/L (ref 55–170)
CK SERPL-CCNC: 56 U/L (ref 55–170)
EOSINOPHIL # BLD AUTO: 0.24 THOUSAND/ΜL (ref 0–0.61)
EOSINOPHIL # BLD AUTO: 0.24 THOUSAND/ΜL (ref 0–0.61)
EOSINOPHIL NFR BLD AUTO: 3 % (ref 0–6)
EOSINOPHIL NFR BLD AUTO: 3 % (ref 0–6)
ERYTHROCYTE [DISTWIDTH] IN BLOOD BY AUTOMATED COUNT: 11.8 % (ref 11.6–15.1)
ERYTHROCYTE [DISTWIDTH] IN BLOOD BY AUTOMATED COUNT: 11.8 % (ref 11.6–15.1)
HCT VFR BLD AUTO: 45.6 % (ref 36.5–49.3)
HCT VFR BLD AUTO: 45.6 % (ref 36.5–49.3)
HGB BLD-MCNC: 15.1 G/DL (ref 12–17)
HGB BLD-MCNC: 15.1 G/DL (ref 12–17)
IMM GRANULOCYTES # BLD AUTO: 0.02 THOUSAND/UL (ref 0–0.2)
IMM GRANULOCYTES # BLD AUTO: 0.02 THOUSAND/UL (ref 0–0.2)
IMM GRANULOCYTES NFR BLD AUTO: 0 % (ref 0–2)
IMM GRANULOCYTES NFR BLD AUTO: 0 % (ref 0–2)
LYMPHOCYTES # BLD AUTO: 2.75 THOUSANDS/ΜL (ref 0.6–4.47)
LYMPHOCYTES # BLD AUTO: 2.75 THOUSANDS/ΜL (ref 0.6–4.47)
LYMPHOCYTES NFR BLD AUTO: 38 % (ref 14–44)
LYMPHOCYTES NFR BLD AUTO: 38 % (ref 14–44)
MCH RBC QN AUTO: 29.2 PG (ref 26.8–34.3)
MCH RBC QN AUTO: 29.2 PG (ref 26.8–34.3)
MCHC RBC AUTO-ENTMCNC: 33.1 G/DL (ref 31.4–37.4)
MCHC RBC AUTO-ENTMCNC: 33.1 G/DL (ref 31.4–37.4)
MCV RBC AUTO: 88 FL (ref 82–98)
MCV RBC AUTO: 88 FL (ref 82–98)
MONOCYTES # BLD AUTO: 0.74 THOUSAND/ΜL (ref 0.17–1.22)
MONOCYTES # BLD AUTO: 0.74 THOUSAND/ΜL (ref 0.17–1.22)
MONOCYTES NFR BLD AUTO: 10 % (ref 4–12)
MONOCYTES NFR BLD AUTO: 10 % (ref 4–12)
NEUTROPHILS # BLD AUTO: 3.43 THOUSANDS/ΜL (ref 1.85–7.62)
NEUTROPHILS # BLD AUTO: 3.43 THOUSANDS/ΜL (ref 1.85–7.62)
NEUTS SEG NFR BLD AUTO: 48 % (ref 43–75)
NEUTS SEG NFR BLD AUTO: 48 % (ref 43–75)
NRBC BLD AUTO-RTO: 0 /100 WBCS
NRBC BLD AUTO-RTO: 0 /100 WBCS
NT-PROBNP SERPL-MCNC: 14.9 PG/ML (ref 0–299)
NT-PROBNP SERPL-MCNC: 14.9 PG/ML (ref 0–299)
PLATELET # BLD AUTO: 394 THOUSANDS/UL (ref 149–390)
PLATELET # BLD AUTO: 394 THOUSANDS/UL (ref 149–390)
PMV BLD AUTO: 8.6 FL (ref 8.9–12.7)
PMV BLD AUTO: 8.6 FL (ref 8.9–12.7)
RBC # BLD AUTO: 5.18 MILLION/UL (ref 3.88–5.62)
RBC # BLD AUTO: 5.18 MILLION/UL (ref 3.88–5.62)
WBC # BLD AUTO: 7.22 THOUSAND/UL (ref 4.31–10.16)
WBC # BLD AUTO: 7.22 THOUSAND/UL (ref 4.31–10.16)

## 2022-06-14 PROCEDURE — 99232 SBSQ HOSP IP/OBS MODERATE 35: CPT

## 2022-06-14 PROCEDURE — 82550 ASSAY OF CK (CPK): CPT | Performed by: PSYCHIATRY & NEUROLOGY

## 2022-06-14 PROCEDURE — 80159 DRUG ASSAY CLOZAPINE: CPT | Performed by: PSYCHIATRY & NEUROLOGY

## 2022-06-14 PROCEDURE — 85025 COMPLETE CBC W/AUTO DIFF WBC: CPT | Performed by: PSYCHIATRY & NEUROLOGY

## 2022-06-14 PROCEDURE — 83880 ASSAY OF NATRIURETIC PEPTIDE: CPT | Performed by: PSYCHIATRY & NEUROLOGY

## 2022-06-14 PROCEDURE — 84484 ASSAY OF TROPONIN QUANT: CPT | Performed by: PSYCHIATRY & NEUROLOGY

## 2022-06-14 RX ADMIN — NICOTINE POLACRILEX 2 MG: 2 GUM, CHEWING ORAL at 11:09

## 2022-06-14 RX ADMIN — LEVETIRACETAM 500 MG: 500 TABLET, FILM COATED ORAL at 08:27

## 2022-06-14 RX ADMIN — GABAPENTIN 400 MG: 400 CAPSULE ORAL at 08:27

## 2022-06-14 RX ADMIN — METOPROLOL SUCCINATE 50 MG: 50 TABLET, EXTENDED RELEASE ORAL at 08:27

## 2022-06-14 RX ADMIN — LEVETIRACETAM 500 MG: 500 TABLET, FILM COATED ORAL at 21:05

## 2022-06-14 RX ADMIN — ASPIRIN 81 MG CHEWABLE TABLET 81 MG: 81 TABLET CHEWABLE at 08:27

## 2022-06-14 RX ADMIN — GABAPENTIN 400 MG: 400 CAPSULE ORAL at 17:00

## 2022-06-14 RX ADMIN — NICOTINE POLACRILEX 2 MG: 2 GUM, CHEWING ORAL at 15:36

## 2022-06-14 RX ADMIN — SENNOSIDES 8.6 MG: 8.6 TABLET, FILM COATED ORAL at 08:27

## 2022-06-14 RX ADMIN — METFORMIN HYDROCHLORIDE 850 MG: 850 TABLET, FILM COATED ORAL at 17:00

## 2022-06-14 RX ADMIN — METFORMIN HYDROCHLORIDE 850 MG: 850 TABLET, FILM COATED ORAL at 08:27

## 2022-06-14 RX ADMIN — NICOTINE POLACRILEX 2 MG: 2 GUM, CHEWING ORAL at 08:27

## 2022-06-14 RX ADMIN — CLOZAPINE 200 MG: 200 TABLET ORAL at 21:05

## 2022-06-14 RX ADMIN — CLOZAPINE 200 MG: 200 TABLET ORAL at 08:27

## 2022-06-14 RX ADMIN — GABAPENTIN 400 MG: 400 CAPSULE ORAL at 21:05

## 2022-06-14 RX ADMIN — NICOTINE POLACRILEX 2 MG: 2 GUM, CHEWING ORAL at 18:14

## 2022-06-14 NOTE — NURSING NOTE
John Cardona has been visible on the unit , attending select unit programming  He retreated to bed after dinner , needs frequent reminders of unit schedule  He refused 1800 dose of Lac-hydrin cream , and is noted to have refused last few doses  Pt was compliant with HS medications , offers no complaints

## 2022-06-14 NOTE — PROGRESS NOTES
06/14/22 1100   Activity/Group Checklist   Group Wellness   Attendance Did not attend   Attendance Duration (min) 46-60   Affect/Mood RENUKA

## 2022-06-14 NOTE — NURSING NOTE
Pt is medication and meal compliant with breakfast, but refused breakfast  Pt is visible in the milieu at times, social with select peers, but otherwise keeps to self and withdrawn to room  Pt denies s/s, flat during conversation and remains fixated on when he " can go home"  Pt states " Im good"  Pt currently resting in bed and offers no complaints at this time  Will continue to monitor

## 2022-06-14 NOTE — PROGRESS NOTES
06/14/22 1130   Team Meeting   Meeting Type Daily Rounds   Initial Conference Date 06/14/22   Patient/Family Present   Patient Present No   Patient's Family Present No       Daily Marvel Elaine MD, Freeman Funk, Benjamin Jensen RN, Prince ARANGO  Case reviewed  Labs done this morning  Medication and meal compliant  Intrusive at times  Cooperative

## 2022-06-14 NOTE — CMS CERTIFICATION NOTE
RECERTIFICATION Of Continued Inpatient Care  On or Before The 18th Day  Date Due: 6/39/07    I certify that inpatient psychiatric hospital services furnished since the previous certification or recertifcation were, and continue to be, medically necessary for either, treatment which could reasonably be expected to improve the patient's condition, diagnostic study and that the hospital records indicate that the services furnished were either intensive treatment services, admission and related services necessary for diagnostic study, or equivalent services      I estimate that the additional period of inpatient care will be 30 days or 4 week(s)    Joellen King MD  06/14/22

## 2022-06-14 NOTE — PROGRESS NOTES
Progress Note - Behavioral Health   Jenelle Don 24 y o  male MRN: [id number]  Unit/Bed#: [facility] 112-01 Encounter: [id number]    Assessment/Plan   Principal Problem:    Schizophrenia [facility])  Active Problems:    Medical clearance for psychiatric admission    Seizure disorder 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  Recommended Treatment:   Continue current medication regimen   - Clozapine   - Gabapentin   - Keppra  Encourage patient to perform PT exercises  Continue with group therapy, milieu therapy and occupational therapy  Continue frequent safety checks and vitals per unit protocol  Case discussed with treatment team   Continue with [facility] medical management as indicated  Continue coordinating with case management regarding disposition  Risks, benefits and possible side effects of Medications: Risks, benefits, and possible side effects of medications have previously been explained  No new medications at this time  ------------------------------------------------------------    Subjective: Per nursing report, John Albarado has been cooperative on the unit and compliant with medications  He had his labs completed this morning  Nursing believes that the patient is not actually performing his PT exercises as they have not witnessed him doing them although he says he has been  He attended 2 out of 7 groups yesterday  Today, John Albarado is consenting for safety on the unit   He was laying in bed asleep when I entered his room  He was easily arousable to a knock, and he was agreeable to talk although drowsy through conversation  He reports feeling "good, just tired" Luzma Shetty notes having slept well last night but still feels very tired this morning for what he reports to be an unknown reason  He denies nightmares, frequent, or early morning awakenings  When asked about his poor group attendance yesterday, he reported that he felt that it was wrong as he states he has been going  He only attended 2 out of 7 groups  Patient was encouraged to try to attend even more groups  He reports that his plans for today is to attend more groups, but he has no other plans  He did not mention possible discharge this morning most likely secondary to drowsiness  He was originally admitted to [facility] due to his mom feeling like "his meds were off," agitation, and homicidal ideation  The patient reports that he no longer feels this way and "feels just fine "  Luzma Shetty states having a diminished appetite still, but did reporting eating breakfast this morning where he had some dougherty  Luzma Shetty has been taking the medications as prescribed and reporting no side effects  He reports that he has not had any heart burn for a few days, and he really does not see it as an issue  He reports that his leg is still "aching " Patient reminded of the importance of actually doing the physical therapy exercises as nursing has not witnessed him doing any  He reports that he has been, but he is still scared of falling  No significant events occurred overnight      Not aggressive, self-injurious, or destructive on the unit    Psychiatric Review of Systems:  Behavior over the last 24 hours: unchanged  Sleep: normal  Appetite: adequate, decreased from baseline  Medication side effects: none verbalized  Medication compliance: good  Group attendance: 2 out of 7  Significant events: none    PRNs overnight: None   VS: Reviewed, within normal limits    Progress Toward Goals: Minimal improvement  Patient reports that he has been trying to go to groups, but his group attendance has not improved  He states that he has been doing his PT exercises, but nursing has not witnessed him doing them  Vital signs in last 24 hours:  Temp:  [97 5 °F (36 4 °C)-97 9 °F (36 6 °C)] 97 5 °F (36 4 °C)  HR:  [] 104  Resp:  [18-19] 18  BP: (122-130)/(69-74) 122/69    Mental Status Exam:  Appearance:  drowsy, minimal eye contact, appears stated age, casually dressed and appropriate grooming and hygiene, patient was asleep, but easy to arouse  He remained laying in bed and drowsy during conversation  Behavior:  calm, limited cooperativity and laying in bed   Motor: no abnormal movements, gait with slight limp while using cane   Speech:  spontaneous, normal rate, not pressured and coherent   Mood:  euthymic   Affect:  mood-congruent and euthymic, but blunted due to drowsiness   Thought Process:  organized, goal directed   Thought Content: paranoid ideation, intrusive thoughts, ruminating thoughts, ideas of reference  No current s/h thoughts, intent, or plans  No distorted body perceptions or phobias     Perceptual disturbances: denies current hallucinations and does not appear to be responding to internal stimuli at this time   Risk Potential: No active or passive suicidal or homicidal ideation was verbalized during interview   Cognition: oriented to self and situation and appears to be of average intelligence   Insight:  Improving   Judgment: Limited   Sensorium: Alert and awake  Orientation: Oriented x 4  Attention: Attention span and concentration are age appropriate   Intellect: Appears to be average intelligence  Motor activity: no abnormal movements    Current Medications:  Current Facility-Administered Medications   Medication Dose Route Frequency Provider Last Rate    acetaminophen  650 mg Oral Q6H PRN Harwood Pry, DO      acetaminophen  650 mg Oral Q4H PRN Marco Pry, DO  acetaminophen  975 mg Oral Q6H PRN Marylouise Ge, DO      aluminum-magnesium hydroxide-simethicone  30 mL Oral Q4H PRN Marylouise Ge, DO      ammonium lactate   Topical BID Crystaljs Coley, DPM      artificial tear  1 application Both Eyes Q6B PRN Marylouise Ge, DO      aspirin  81 mg Oral Daily Marylouise Ge, DO      benztropine  1 mg Intramuscular Q4H PRN Max 6/day Marylouise Ge, DO      benztropine  1 mg Oral Q4H PRN Max 6/day Marylouise Ge, DO      cloZAPine  200 mg Oral BID Marylouise Ge, DO      hydrOXYzine HCL  50 mg Oral Q6H PRN Max 4/day Marylouise Ge, DO      Or    diphenhydrAMINE  50 mg Intramuscular Q6H PRN Marylouise Ge, DO      gabapentin  400 mg Oral TID Marylouise Ge, DO      hydrOXYzine HCL  100 mg Oral Q6H PRN Max 4/day Marylouise Ge, DO      Or    LORazepam  2 mg Intramuscular Q6H PRN Marylouise Eg, DO      hydrOXYzine HCL  25 mg Oral Q6H PRN Max 4/day Marylouise Ge, DO      levETIRAcetam  500 mg Oral Q12H [address] Kashif A Prayson, DO      melatonin  9 mg Oral HS PRN Marylouise Ge, DO      metFORMIN  850 mg Oral BID With Meals Levon Fishman MD      metoprolol succinate  50 mg Oral Daily Marylouise Ge, DO      nicotine polacrilex  2 mg Oral Q2H PRN Marylouise Ge, DO      OLANZapine  10 mg Oral Q3H PRN Max 3/day Marylouise Ge, DO      Or    OLANZapine  10 mg Intramuscular Q3H PRN Max 3/day Marylouise Ge, DO      OLANZapine  5 mg Oral Q3H PRN Max 6/day Kashif A Prayson, DO      Or    OLANZapine  5 mg Intramuscular Q3H PRN Max 6/day Kashif A Prayson, DO      OLANZapine  2 5 mg Oral Q3H PRN Max 8/day Kashif A Prayson, DO      polyethylene glycol  17 g Oral Daily PRN Marylouise Ge, DO      propranolol  10 mg Oral Q8H PRN Marylouise Ge, DO      senna  1 tablet Oral Daily Margarita Rothman MD      senna-docusate sodium  1 tablet Oral Daily PRN Levon Fishman MD      traZODone  50 mg Oral HS PRN Mike Puls, DO         Behavioral Health Medications: all current active meds have been reviewed  Changes as in plan section above  Laboratory results:  I have personally reviewed all pertinent laboratory/tests results  Recent Results (from the past 48 hour(s))   NT-BNP PRO    Collection Time: 06/14/22  5:12 AM   Result Value Ref Range    NT-proBNP 14 9 0 - 299 pg/mL   High Sensitivity Troponin I Random    Collection Time: 06/14/22  5:12 AM   Result Value Ref Range    HS TnI random <2 (L) 8 - 18 ng/L   CK (with reflex to MB)    Collection Time: 06/14/22  5:12 AM   Result Value Ref Range    Total CK 56 55 - 170 U/L   CBC and differential    Collection Time: 06/14/22  5:12 AM   Result Value Ref Range    WBC 7 22 4 31 - 10 16 Thousand/uL    RBC 5 18 3 88 - 5 62 Million/uL    Hemoglobin 15 1 12 0 - 17 0 g/dL    Hematocrit 45 6 36 5 - 49 3 %    MCV 88 82 - 98 fL    MCH 29 2 26 8 - 34 3 pg    MCHC 33 1 31 4 - 37 4 g/dL    RDW 11 8 11 6 - 15 1 %    MPV 8 6 (L) 8 9 - 12 7 fL    Platelets 718 (H) 549 - 390 Thousands/uL    nRBC 0 /100 WBCs    Neutrophils Relative 48 43 - 75 %    Immat GRANS % 0 0 - 2 %    Lymphocytes Relative 38 14 - 44 %    Monocytes Relative 10 4 - 12 %    Eosinophils Relative 3 0 - 6 %    Basophils Relative 1 0 - 1 %    Neutrophils Absolute 3 43 1 85 - 7 62 Thousands/µL    Immature Grans Absolute 0 02 0 00 - 0 20 Thousand/uL    Lymphocytes Absolute 2 75 0 60 - 4 47 Thousands/µL    Monocytes Absolute 0 74 0 17 - 1 22 Thousand/µL    Eosinophils Absolute 0 24 0 00 - 0 61 Thousand/µL    Basophils Absolute 0 04 0 00 - 0 10 Thousands/µL        This note has been constructed using a voice recognition system  There may be translation, syntax, or grammatical errors  If you have any questions, please contact the dictating author      JOHNATHAN Sahni

## 2022-06-14 NOTE — NURSING NOTE
Aniya Nava was in bed at shift change  Per Q 7 minute safety checks , pt appeared to sleep about 6+ hours overnight  No behavior incidence  Labs drawn successfully this morning

## 2022-06-14 NOTE — PROGRESS NOTES
[facility] Group Note     06/14/22 1015   Activity/Group Checklist   Group Life Skills  (Budgeting a Meal)   Attendance Attended   Attendance Duration (min) 31-45   Interactions Interacted appropriately   Affect/Mood Appropriate;Calm   Goals Achieved Discussed coping strategies; Able to listen to others; Able to engage in interactions; Able to reflect/comment on own behavior;Able to recieve feedback; Able to give feedback to another

## 2022-06-14 NOTE — PLAN OF CARE
Problem: Alteration in Thoughts and Perception  Goal: Treatment Goal: Gain control of psychotic behaviors/thinking, reduce/eliminate presenting symptoms and demonstrate improved reality functioning upon discharge  Outcome: Progressing  Goal: Verbalize thoughts and feelings  Description: Interventions:  - Promote a nonjudgmental and trusting relationship with the patient through active listening and therapeutic communication  - Assess patient's level of functioning, behavior and potential for risk  - Engage patient in 1 on 1 interactions  - Encourage patient to express fears, feelings, frustrations, and discuss symptoms    - [place] patient to reality, help patient recognize reality-based thinking   - Administer medications as ordered and assess for potential side effects  - Provide the patient education related to the signs and symptoms of the illness and desired effects of prescribed medications  Interventions:  - Assess and re-assess patient's lethality and potential for self-injury  - Engage patient in 1:1 interactions, daily, for a minimum of 15 minutes  - Encourage patient to express feelings, fears, frustrations, hopes  - Establish rapport/trust with patient   Interventions:  - Assess and re-assess patient's level of risk   - Engage patient in 1:1 interactions, daily, for a minimum of 15 minutes   - Encourage patient to express feelings, fears, frustrations, hopes   Interventions:  - Assess and re-assess patient's level of risk, every waking shift  - Engage patient in 1:1 interactions, daily, for a minimum of 15 minutes   - Allow patient to express feelings and frustrations in a safe and non-threatening manner   - Establish rapport/trust with patient   Outcome: Not Progressing  Goal: Refrain from acting on delusional thinking/internal stimuli  Description: Interventions:  - Monitor patient closely, per order   - Utilize least restrictive measures   - Set reasonable limits, give positive feedback for acceptable   - Administer medications as ordered and monitor of potential side effects  Outcome: Progressing  Goal: Agree to be compliant with medication regime, as prescribed and report medication side effects  Description: Interventions:  - Offer appropriate PRN medication and supervise ingestion; conduct AIMS, as needed   Outcome: Progressing  Goal: Recognize dysfunctional thoughts, communicate reality-based thoughts at the time of discharge  Description: Interventions:  - Provide medication and psycho-education to assist patient in compliance and developing insight into his/her illness   Outcome: Progressing     Problem: Ineffective Coping  Goal: Identifies ineffective coping skills  Outcome: Not Progressing  Goal: Identifies healthy coping skills  Outcome: Progressing  Goal: Demonstrates healthy coping skills  Outcome: Progressing  Goal: Participates in unit activities  Description: Interventions:  - Provide therapeutic environment   - Provide required programming   - Redirect inappropriate behaviors   Outcome: Progressing  Goal: Patient/Family participate in treatment and DC plans  Description: Interventions:  - Provide therapeutic environment  Outcome: Progressing  Goal: Patient/Family verbalizes awareness of resources  Outcome: Progressing  Goal: Understands least restrictive measures  Description: Interventions:  - Utilize least restrictive behavior  Outcome: Progressing  Goal: Free from restraint events  Description: - Utilize least restrictive measures   - Provide behavioral interventions   - Redirect inappropriate behaviors   Outcome: Progressing     Problem: Ineffective Coping  Goal: Identifies ineffective coping skills  Outcome: Not Progressing  Goal: Identifies healthy coping skills  Outcome: Progressing  Goal: Demonstrates healthy coping skills  Outcome: Progressing  Goal: Participates in unit activities  Description: Interventions:  - Provide therapeutic environment   - Provide required programming - Redirect inappropriate behaviors   Outcome: Progressing  Goal: Patient/Family participate in treatment and DC plans  Description: Interventions:  - Provide therapeutic environment  Outcome: Progressing  Goal: Patient/Family verbalizes awareness of resources  Outcome: Progressing  Goal: Understands least restrictive measures  Description: Interventions:  - Utilize least restrictive behavior  Outcome: Progressing  Goal: Free from restraint events  Description: - Utilize least restrictive measures   - Provide behavioral interventions   - Redirect inappropriate behaviors   Outcome: Progressing     Problem: Risk for Self Injury/Neglect  Goal: Verbalize thoughts and feelings  Description: Interventions:  - Promote a nonjudgmental and trusting relationship with the patient through active listening and therapeutic communication  - Assess patient's level of functioning, behavior and potential for risk  - Engage patient in 1 on 1 interactions  - Encourage patient to express fears, feelings, frustrations, and discuss symptoms    - [place] patient to reality, help patient recognize reality-based thinking   - Administer medications as ordered and assess for potential side effects  - Provide the patient education related to the signs and symptoms of the illness and desired effects of prescribed medications  Interventions:  - Assess and re-assess patient's lethality and potential for self-injury  - Engage patient in 1:1 interactions, daily, for a minimum of 15 minutes  - Encourage patient to express feelings, fears, frustrations, hopes  - Establish rapport/trust with patient   Interventions:  - Assess and re-assess patient's level of risk   - Engage patient in 1:1 interactions, daily, for a minimum of 15 minutes   - Encourage patient to express feelings, fears, frustrations, hopes   Interventions:  - Assess and re-assess patient's level of risk, every waking shift  - Engage patient in 1:1 interactions, daily, for a minimum of 15 minutes   - Allow patient to express feelings and frustrations in a safe and non-threatening manner   - Establish rapport/trust with patient   Outcome: Not Progressing  Goal: Treatment Goal: Remain safe during length of stay, learn and adopt new coping skills, and be free of self-injurious ideation, impulses and acts at the time of discharge  Outcome: Progressing  Goal: Refrain from harming self  Description: Interventions:  - Monitor patient closely, per order  - Develop a trusting relationship  - Supervise medication ingestion, monitor effects and side effects   Outcome: Progressing  Goal: Recognize maladaptive responses and adopt new coping mechanisms  Outcome: Not Progressing     Problem: Depression  Goal: Verbalize thoughts and feelings  Description: Interventions:  - Promote a nonjudgmental and trusting relationship with the patient through active listening and therapeutic communication  - Assess patient's level of functioning, behavior and potential for risk  - Engage patient in 1 on 1 interactions  - Encourage patient to express fears, feelings, frustrations, and discuss symptoms    - [place] patient to reality, help patient recognize reality-based thinking   - Administer medications as ordered and assess for potential side effects  - Provide the patient education related to the signs and symptoms of the illness and desired effects of prescribed medications  Interventions:  - Assess and re-assess patient's lethality and potential for self-injury  - Engage patient in 1:1 interactions, daily, for a minimum of 15 minutes  - Encourage patient to express feelings, fears, frustrations, hopes  - Establish rapport/trust with patient   Interventions:  - Assess and re-assess patient's level of risk   - Engage patient in 1:1 interactions, daily, for a minimum of 15 minutes   - Encourage patient to express feelings, fears, frustrations, hopes   Interventions:  - Assess and re-assess patient's level of risk, every waking shift  - Engage patient in 1:1 interactions, daily, for a minimum of 15 minutes   - Allow patient to express feelings and frustrations in a safe and non-threatening manner   - Establish rapport/trust with patient   Outcome: Not Progressing  Goal: Treatment Goal: Demonstrate behavioral control of depressive symptoms, verbalize feelings of improved mood/affect, and adopt new coping skills prior to discharge  Outcome: Progressing  Goal: Refrain from harming self  Description: Interventions:  - Monitor patient closely, per order   - Supervise medication ingestion, monitor effects and side effects   Outcome: Progressing  Goal: Refrain from isolation  Description: Interventions:  - Develop a trusting relationship   - Encourage socialization   Outcome: Progressing  Goal: Refrain from self-neglect  Outcome: Progressing     Problem: Anxiety  Goal: Anxiety is at manageable level  Description: Interventions:  - Assess and monitor patient's anxiety level  - Monitor for signs and symptoms (heart palpitations, chest pain, shortness of breath, headaches, nausea, feeling jumpy, restlessness, irritable, apprehensive)  - Collaborate with interdisciplinary team and initiate plan and interventions as ordered    - [place] patient to unit/surroundings  - Explain treatment plan  - Encourage participation in care  - Encourage verbalization of concerns/fears  - Identify coping mechanisms  - Assist in developing anxiety-reducing skills  - Administer/offer alternative therapies  - Limit or eliminate stimulants  Outcome: Progressing     Problem: Risk for Violence/Aggression Toward Others  Goal: Verbalize thoughts and feelings  Description: Interventions:  - Promote a nonjudgmental and trusting relationship with the patient through active listening and therapeutic communication  - Assess patient's level of functioning, behavior and potential for risk  - Engage patient in 1 on 1 interactions  - Encourage patient to express fears, feelings, frustrations, and discuss symptoms    - [place] patient to reality, help patient recognize reality-based thinking   - Administer medications as ordered and assess for potential side effects  - Provide the patient education related to the signs and symptoms of the illness and desired effects of prescribed medications  Interventions:  - Assess and re-assess patient's lethality and potential for self-injury  - Engage patient in 1:1 interactions, daily, for a minimum of 15 minutes  - Encourage patient to express feelings, fears, frustrations, hopes  - Establish rapport/trust with patient   Interventions:  - Assess and re-assess patient's level of risk   - Engage patient in 1:1 interactions, daily, for a minimum of 15 minutes   - Encourage patient to express feelings, fears, frustrations, hopes   Interventions:  - Assess and re-assess patient's level of risk, every waking shift  - Engage patient in 1:1 interactions, daily, for a minimum of 15 minutes   - Allow patient to express feelings and frustrations in a safe and non-threatening manner   - Establish rapport/trust with patient   Outcome: Not Progressing  Goal: Treatment Goal: Refrain from acts of violence/aggression during length of stay, and demonstrate improved impulse control at the time of discharge  Outcome: Progressing  Goal: Refrain from harming others  Outcome: Progressing  Goal: Refrain from destructive acts on the environment or property  Outcome: Progressing  Goal: Control angry outbursts  Description: Interventions:  - Monitor patient closely, per order  - Ensure early verbal de-escalation  - Monitor prn medication needs  - Set reasonable/therapeutic limits, outline behavioral expectations, and consequences   - Provide a non-threatening milieu, utilizing the least restrictive interventions   Outcome: Progressing  Goal: Identify appropriate positive anger management techniques  Description: Interventions:  - Offer anger management and coping skills groups   - Staff will provide positive feedback for appropriate anger control  Outcome: Progressing     Problem: SUBSTANCE USE/ABUSE  Goal: By discharge, will develop insight into their chemical dependency and sustain motivation to continue in recovery  Description: INTERVENTIONS:  - Attends all daily group sessions and scheduled AA groups  - Actively practices coping skills through participation in the therapeutic community and adherence to program rules  - Reviews and completes assignments from individual treatment plan  - Assist patient development of understanding of their personal cycle of addiction and relapse triggers  Outcome: Not Progressing  Goal: By discharge, patient will have ongoing treatment plan addressing chemical dependency  Description: INTERVENTIONS:  - Assist patient with resources and/or appointments for ongoing recovery based living  Outcome: Not Progressing

## 2022-06-15 PROCEDURE — 99232 SBSQ HOSP IP/OBS MODERATE 35: CPT

## 2022-06-15 RX ADMIN — METFORMIN HYDROCHLORIDE 850 MG: 850 TABLET, FILM COATED ORAL at 16:50

## 2022-06-15 RX ADMIN — GABAPENTIN 400 MG: 400 CAPSULE ORAL at 08:04

## 2022-06-15 RX ADMIN — CLOZAPINE 200 MG: 200 TABLET ORAL at 21:02

## 2022-06-15 RX ADMIN — GABAPENTIN 400 MG: 400 CAPSULE ORAL at 16:50

## 2022-06-15 RX ADMIN — NICOTINE POLACRILEX 2 MG: 2 GUM, CHEWING ORAL at 18:45

## 2022-06-15 RX ADMIN — NICOTINE POLACRILEX 2 MG: 2 GUM, CHEWING ORAL at 08:04

## 2022-06-15 RX ADMIN — LEVETIRACETAM 500 MG: 500 TABLET, FILM COATED ORAL at 21:02

## 2022-06-15 RX ADMIN — NICOTINE POLACRILEX 2 MG: 2 GUM, CHEWING ORAL at 21:04

## 2022-06-15 RX ADMIN — METOPROLOL SUCCINATE 50 MG: 50 TABLET, EXTENDED RELEASE ORAL at 08:04

## 2022-06-15 RX ADMIN — GABAPENTIN 400 MG: 400 CAPSULE ORAL at 21:02

## 2022-06-15 RX ADMIN — ASPIRIN 81 MG CHEWABLE TABLET 81 MG: 81 TABLET CHEWABLE at 08:04

## 2022-06-15 RX ADMIN — METFORMIN HYDROCHLORIDE 850 MG: 850 TABLET, FILM COATED ORAL at 08:04

## 2022-06-15 RX ADMIN — LEVETIRACETAM 500 MG: 500 TABLET, FILM COATED ORAL at 08:04

## 2022-06-15 RX ADMIN — NICOTINE POLACRILEX 2 MG: 2 GUM, CHEWING ORAL at 14:10

## 2022-06-15 RX ADMIN — NICOTINE POLACRILEX 2 MG: 2 GUM, CHEWING ORAL at 11:50

## 2022-06-15 RX ADMIN — CLOZAPINE 200 MG: 200 TABLET ORAL at 08:04

## 2022-06-15 RX ADMIN — NICOTINE POLACRILEX 2 MG: 2 GUM, CHEWING ORAL at 16:50

## 2022-06-15 RX ADMIN — SENNOSIDES 8.6 MG: 8.6 TABLET, FILM COATED ORAL at 08:04

## 2022-06-15 NOTE — PLAN OF CARE
Problem: Alteration in Thoughts and Perception  Goal: Treatment Goal: Gain control of psychotic behaviors/thinking, reduce/eliminate presenting symptoms and demonstrate improved reality functioning upon discharge  Outcome: Progressing  Goal: Verbalize thoughts and feelings  Description: Interventions:  - Promote a nonjudgmental and trusting relationship with the patient through active listening and therapeutic communication  - Assess patient's level of functioning, behavior and potential for risk  - Engage patient in 1 on 1 interactions  - Encourage patient to express fears, feelings, frustrations, and discuss symptoms    - [place] patient to reality, help patient recognize reality-based thinking   - Administer medications as ordered and assess for potential side effects  - Provide the patient education related to the signs and symptoms of the illness and desired effects of prescribed medications  Interventions:  - Assess and re-assess patient's lethality and potential for self-injury  - Engage patient in 1:1 interactions, daily, for a minimum of 15 minutes  - Encourage patient to express feelings, fears, frustrations, hopes  - Establish rapport/trust with patient   Interventions:  - Assess and re-assess patient's level of risk   - Engage patient in 1:1 interactions, daily, for a minimum of 15 minutes   - Encourage patient to express feelings, fears, frustrations, hopes   Interventions:  - Assess and re-assess patient's level of risk, every waking shift  - Engage patient in 1:1 interactions, daily, for a minimum of 15 minutes   - Allow patient to express feelings and frustrations in a safe and non-threatening manner   - Establish rapport/trust with patient   Outcome: Progressing  Goal: Refrain from acting on delusional thinking/internal stimuli  Description: Interventions:  - Monitor patient closely, per order   - Utilize least restrictive measures   - Set reasonable limits, give positive feedback for acceptable - Administer medications as ordered and monitor of potential side effects  Outcome: Progressing  Goal: Agree to be compliant with medication regime, as prescribed and report medication side effects  Description: Interventions:  - Offer appropriate PRN medication and supervise ingestion; conduct AIMS, as needed   Outcome: Progressing  Goal: Recognize dysfunctional thoughts, communicate reality-based thoughts at the time of discharge  Description: Interventions:  - Provide medication and psycho-education to assist patient in compliance and developing insight into his/her illness   Outcome: Progressing     Problem: Ineffective Coping  Goal: Identifies ineffective coping skills  Outcome: Not Progressing  Goal: Identifies healthy coping skills  Outcome: Progressing  Goal: Demonstrates healthy coping skills  Outcome: Progressing  Goal: Participates in unit activities  Description: Interventions:  - Provide therapeutic environment   - Provide required programming   - Redirect inappropriate behaviors   Outcome: Progressing  Goal: Patient/Family participate in treatment and DC plans  Description: Interventions:  - Provide therapeutic environment  Outcome: Progressing  Goal: Patient/Family verbalizes awareness of resources  Outcome: Progressing  Goal: Understands least restrictive measures  Description: Interventions:  - Utilize least restrictive behavior  Outcome: Progressing  Goal: Free from restraint events  Description: - Utilize least restrictive measures   - Provide behavioral interventions   - Redirect inappropriate behaviors   Outcome: Progressing     Problem: Risk for Self Injury/Neglect  Goal: Verbalize thoughts and feelings  Description: Interventions:  - Promote a nonjudgmental and trusting relationship with the patient through active listening and therapeutic communication  - Assess patient's level of functioning, behavior and potential for risk  - Engage patient in 1 on 1 interactions  - Encourage patient to express fears, feelings, frustrations, and discuss symptoms    - [place] patient to reality, help patient recognize reality-based thinking   - Administer medications as ordered and assess for potential side effects  - Provide the patient education related to the signs and symptoms of the illness and desired effects of prescribed medications  Interventions:  - Assess and re-assess patient's lethality and potential for self-injury  - Engage patient in 1:1 interactions, daily, for a minimum of 15 minutes  - Encourage patient to express feelings, fears, frustrations, hopes  - Establish rapport/trust with patient   Interventions:  - Assess and re-assess patient's level of risk   - Engage patient in 1:1 interactions, daily, for a minimum of 15 minutes   - Encourage patient to express feelings, fears, frustrations, hopes   Interventions:  - Assess and re-assess patient's level of risk, every waking shift  - Engage patient in 1:1 interactions, daily, for a minimum of 15 minutes   - Allow patient to express feelings and frustrations in a safe and non-threatening manner   - Establish rapport/trust with patient   Outcome: Progressing  Goal: Treatment Goal: Remain safe during length of stay, learn and adopt new coping skills, and be free of self-injurious ideation, impulses and acts at the time of discharge  Outcome: Progressing  Goal: Refrain from harming self  Description: Interventions:  - Monitor patient closely, per order  - Develop a trusting relationship  - Supervise medication ingestion, monitor effects and side effects   Outcome: Progressing     Problem: Depression  Goal: Verbalize thoughts and feelings  Description: Interventions:  - Promote a nonjudgmental and trusting relationship with the patient through active listening and therapeutic communication  - Assess patient's level of functioning, behavior and potential for risk  - Engage patient in 1 on 1 interactions  - Encourage patient to express fears, feelings, frustrations, and discuss symptoms    - [place] patient to reality, help patient recognize reality-based thinking   - Administer medications as ordered and assess for potential side effects  - Provide the patient education related to the signs and symptoms of the illness and desired effects of prescribed medications  Interventions:  - Assess and re-assess patient's lethality and potential for self-injury  - Engage patient in 1:1 interactions, daily, for a minimum of 15 minutes  - Encourage patient to express feelings, fears, frustrations, hopes  - Establish rapport/trust with patient   Interventions:  - Assess and re-assess patient's level of risk   - Engage patient in 1:1 interactions, daily, for a minimum of 15 minutes   - Encourage patient to express feelings, fears, frustrations, hopes   Interventions:  - Assess and re-assess patient's level of risk, every waking shift  - Engage patient in 1:1 interactions, daily, for a minimum of 15 minutes   - Allow patient to express feelings and frustrations in a safe and non-threatening manner   - Establish rapport/trust with patient   Outcome: Progressing  Goal: Treatment Goal: Demonstrate behavioral control of depressive symptoms, verbalize feelings of improved mood/affect, and adopt new coping skills prior to discharge  Outcome: Progressing  Goal: Refrain from harming self  Description: Interventions:  - Monitor patient closely, per order   - Supervise medication ingestion, monitor effects and side effects   Outcome: Progressing  Goal: Refrain from isolation  Description: Interventions:  - Develop a trusting relationship   - Encourage socialization   Outcome: Progressing  Goal: Refrain from self-neglect  Outcome: Progressing     Problem: Anxiety  Goal: Anxiety is at manageable level  Description: Interventions:  - Assess and monitor patient's anxiety level     - Monitor for signs and symptoms (heart palpitations, chest pain, shortness of breath, headaches, nausea, feeling jumpy, restlessness, irritable, apprehensive)  - Collaborate with interdisciplinary team and initiate plan and interventions as ordered    - [place] patient to unit/surroundings  - Explain treatment plan  - Encourage participation in care  - Encourage verbalization of concerns/fears  - Identify coping mechanisms  - Assist in developing anxiety-reducing skills  - Administer/offer alternative therapies  - Limit or eliminate stimulants  Outcome: Progressing     Problem: Risk for Violence/Aggression Toward Others  Goal: Verbalize thoughts and feelings  Description: Interventions:  - Promote a nonjudgmental and trusting relationship with the patient through active listening and therapeutic communication  - Assess patient's level of functioning, behavior and potential for risk  - Engage patient in 1 on 1 interactions  - Encourage patient to express fears, feelings, frustrations, and discuss symptoms    - [place] patient to reality, help patient recognize reality-based thinking   - Administer medications as ordered and assess for potential side effects  - Provide the patient education related to the signs and symptoms of the illness and desired effects of prescribed medications  Interventions:  - Assess and re-assess patient's lethality and potential for self-injury  - Engage patient in 1:1 interactions, daily, for a minimum of 15 minutes  - Encourage patient to express feelings, fears, frustrations, hopes  - Establish rapport/trust with patient   Interventions:  - Assess and re-assess patient's level of risk   - Engage patient in 1:1 interactions, daily, for a minimum of 15 minutes   - Encourage patient to express feelings, fears, frustrations, hopes   Interventions:  - Assess and re-assess patient's level of risk, every waking shift  - Engage patient in 1:1 interactions, daily, for a minimum of 15 minutes   - Allow patient to express feelings and frustrations in a safe and non-threatening manner   - Establish rapport/trust with patient   Outcome: Progressing  Goal: Treatment Goal: Refrain from acts of violence/aggression during length of stay, and demonstrate improved impulse control at the time of discharge  Outcome: Progressing  Goal: Refrain from harming others  Outcome: Progressing  Goal: Refrain from destructive acts on the environment or property  Outcome: Progressing  Goal: Control angry outbursts  Description: Interventions:  - Monitor patient closely, per order  - Ensure early verbal de-escalation  - Monitor prn medication needs  - Set reasonable/therapeutic limits, outline behavioral expectations, and consequences   - Provide a non-threatening milieu, utilizing the least restrictive interventions   Outcome: Progressing  Goal: Identify appropriate positive anger management techniques  Description: Interventions:  - Offer anger management and coping skills groups   - Staff will provide positive feedback for appropriate anger control  Outcome: Progressing     Problem: SUBSTANCE USE/ABUSE  Goal: By discharge, will develop insight into their chemical dependency and sustain motivation to continue in recovery  Description: INTERVENTIONS:  - Attends all daily group sessions and scheduled AA groups  - Actively practices coping skills through participation in the therapeutic community and adherence to program rules  - Reviews and completes assignments from individual treatment plan  - Assist patient development of understanding of their personal cycle of addiction and relapse triggers  Outcome: Progressing  Goal: By discharge, patient will have ongoing treatment plan addressing chemical dependency  Description: INTERVENTIONS:  - Assist patient with resources and/or appointments for ongoing recovery based living  Outcome: Not Progressing

## 2022-06-15 NOTE — PROGRESS NOTES
06/15/22 1100   Activity/Group Checklist   Group Wellness   Attendance Attended   Attendance Duration (min) 46-60   Interactions Interacted appropriately   Affect/Mood Appropriate;Calm;Normal range   Goals Achieved Discussed coping strategies; Able to listen to others; Able to engage in interactions

## 2022-06-15 NOTE — NURSING NOTE
Pt is medication and meal compliant  Pt is visible in the milieu and social with select peers at meals  Pt denies s/s, but during conversation pt asked " is having K2 a felony, they used to sell it at the gas station, I did it like 6 years ago, but I dont want that stuff now"  Pt otherwise rests in bed or uses patient phone  Pt can be disorganized in conversation, but redirectable  Will continue to monitor

## 2022-06-15 NOTE — PROGRESS NOTES
06/15/22 1012   Team Meeting   Meeting Type Daily Rounds   Initial Conference Date 06/15/22   Patient/Family Present   Patient Present No   Patient's Family Present No       Daily Brady Breaux MD, Jen Chavez RN, Sage ARANGO  Case reviewed  Labs done yesterday  Wants to go home  5/9 groups  No changes  SW planning for July 1st [place]

## 2022-06-15 NOTE — NURSING NOTE
Patient is visible ambulating about the unit with his cane  He keeps to himself with little peer interaction   He states to writer "I am getting out of here in 2 weeks"; he remains fixated on this and not much else but behavior is not problematic; he is not at the nurses station frequently as he was in the past He brightens on approach, otherwise tends to have somewhat of a flat affect

## 2022-06-15 NOTE — NURSING NOTE
Bijan Loza is currently asleep in his bed  PRN nicotine gum was given to him yesterday at 0791,0553, and 1814  AM labs were done the morning of 6/14/22; abnormal increase in platelets (868) and decrease in MPV (8 6)  Medical is aware  No behaviors noted during this shift  q7 minute checks in place  Safety measures maintained  Will continue to monitor

## 2022-06-15 NOTE — PROGRESS NOTES
Progress Note - Behavioral Health   Shannon Walden 24 y o  male MRN: [id number]  Unit/Bed#: [facility] 112-01 Encounter: [id number]    Assessment/Plan   Principal Problem:    Schizophrenia [facility])  Active Problems:    Medical clearance for psychiatric admission    Seizure disorder [facility])    Traumatic brain injury [facility])    Brain lesion    History of cardiac radiofrequency ablation    History of fall    PTSD (post-traumatic stress disorder)    Review of systems: None reported  NO SEIZURES  Patient reports he has not had any more episodes of heartburn  His leg still hurts  But he reports that this has gotten a little better  He still fears falling and uses a cane to ambulate  Diagnosis: Schizophrenia  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  Recommended Treatment:   Continue current medication regimen   - Clozapine   - Gabapentin   - Keppra  Encourage patient to perform PT exercises  Continue with group therapy, milieu therapy and occupational therapy  Continue frequent safety checks and vitals per unit protocol  Case discussed with treatment team   Continue with SLIM medical management as indicated  Continue coordinating with case management regarding disposition  Risks, benefits and possible side effects of Medications: Risks, benefits, and possible side effects of medications have previously been explained  No new medications at this time  ------------------------------------------------------------    Subjective: Per nursing report, Luzma Shetty has been cooperative on the unit and compliant with medications  He had lab work performed  He remains fixated on his possible discharge in 2 weeks  He attended 5 out of 9 groups yesterday  Today, Luzma Shetty is consenting for safety on the unit  He was laying in bed sitting up with his cane when I entered his room to speak with him  He was agreeable to talk  He reports feeling "alright," and he feels optimistic and remains concentrated on his possible discharge in 2 weeks  He reports that he went to a bunch of groups yesterday to make sure he is able to be discharged in 2 weeks  He went to 5 out of 9 groups and reports that his plans for the day are to go to more groups  He reports that he has been performing his PT exercises and the worksheets that were given to him were sitting on the bedside unlike other mornings  He reports that his leg still bothers him, but he feels that it is "a little better " Elizabeth Perez notes having slept well  He reports that he woke up only once to use the bathroom, but denies any other frequent awakenings, nightmares, or early morning awakenings  Elizabeth Perez states having a diminished appetite, but states that he feels that it might just be the food here  He reports missing his mom's home cooked meals especially her roast beef and mashed potatoes  He was originally admitted to [facility] due to his mom feeling that "his meds were off," agitation, and homicidal ideation  He reports that he no longer feels this way  Elizabeth Perez has been taking the medications as prescribed and reporting no side effects  He reports he has not had any acid reflux episodes  He reported that he feels that the medication are working  He says that he will continue taking them outpatient because his mom wants him to although he feels like he does not really need them  He reports that he is "trying to be happier" and admits the medications do help with that  No significant events occurred overnight  Not aggressive, self-injurious, or destructive on the unit  Psychiatric Review of Systems:  Behavior over the last 24 hours: improved  Sleep: normal  Appetite: adequate, decreased from baseline  Medication side effects: none verbalized  Medication compliance: good  Group attendance: 5 out of 9  Significant events: none    PRNs overnight: None   VS: Reviewed, within normal limits    Progress Toward Goals: Demonstrates some slow improvement   He has been attending more groups and has clear goal of "being happier" he is working towards  He acknowledges that his medications help him to achieve this goal       Vital signs in last 24 hours:  Temp:  [97 4 °F (36 3 °C)-97 8 °F (36 6 °C)] 97 8 °F (36 6 °C)  HR:  [95-97] 95  Resp:  [18] 18  BP: (119-129)/(71-73) 129/71    Mental Status Exam:  Appearance:  alert, good eye contact, appears stated age, casually dressed and appropriate grooming and hygiene  He was laying in bed sitting up, dressed for the day, holding his cane  Behavior:  calm, cooperative and laying in bed   Motor: no abnormal movements, gait with slight limp while using a cane   Speech:  spontaneous, normal rate, not pressured, scant and coherent   Mood:  euthymic   Affect:  mood-congruent, constricted and euthymic   Thought Process:  organized, goal directed   Thought Content: paranoid ideation, intrusive thoughts, ruminating thoughts, ideas of reference  No current s/h thoughts, intent, or plans  No distorted body perceptions or phobias     Perceptual disturbances: denies current hallucinations and does not appear to be responding to internal stimuli at this time   Risk Potential: No active or passive suicidal or homicidal ideation was verbalized during interview   Cognition: oriented to person, place, time, and situation and appears to be of average intelligence   Insight:  Improving   Judgment: Limited   Sensorium: Alert and awake  Orientation: Oriented x 4  Attention: Attention span and concentration are age appropriate  Intellect: Appears to be average intelligence  Motor activity: no abnormal movements    Current Medications:  Current Facility-Administered Medications   Medication Dose Route Frequency Provider Last Rate    acetaminophen  650 mg Oral Q6H PRN Adelso Guallpa, DO      acetaminophen  650 mg Oral Q4H PRN Adelso Guallpa, DO      acetaminophen  975 mg Oral Q6H PRN Adelso Guallpa, DO      aluminum-magnesium hydroxide-simethicone  30 mL Oral Q4H PRN Sheldon Prather A Prayson, DO      ammonium lactate   Topical BID Kirby Sitter, DPM      artificial tear  1 application Both Eyes B5U PRN Sandria Grater, DO      aspirin  81 mg Oral Daily Sandria Grater, DO      benztropine  1 mg Intramuscular Q4H PRN Max 6/day Sandria Grater, DO      benztropine  1 mg Oral Q4H PRN Max 6/day Sandria Grater, DO      cloZAPine  200 mg Oral BID Sandria Grater, DO      hydrOXYzine HCL  50 mg Oral Q6H PRN Max 4/day Sandria Grater, DO      Or    diphenhydrAMINE  50 mg Intramuscular Q6H PRN Sandria Grater, DO      gabapentin  400 mg Oral TID Sandria Grater, DO      hydrOXYzine HCL  100 mg Oral Q6H PRN Max 4/day Sandria Grater, DO      Or    LORazepam  2 mg Intramuscular Q6H PRN Sandria Grater, DO      hydrOXYzine HCL  25 mg Oral Q6H PRN Max 4/day Sandria Grater, DO      levETIRAcetam  500 mg Oral Q12H [address] Kashif A Prayson, DO      melatonin  9 mg Oral HS PRN Sandria Grater, DO      metFORMIN  850 mg Oral BID With Meals Carie Levy MD      metoprolol succinate  50 mg Oral Daily Sandria Grater, DO      nicotine polacrilex  2 mg Oral Q2H PRN Sandria Grater, DO      OLANZapine  10 mg Oral Q3H PRN Max 3/day Sandria Grater, DO      Or    OLANZapine  10 mg Intramuscular Q3H PRN Max 3/day Sandria Grater, DO      OLANZapine  5 mg Oral Q3H PRN Max 6/day Kashif A Prayson, DO      Or    OLANZapine  5 mg Intramuscular Q3H PRN Max 6/day Kashif A Prayson, DO      OLANZapine  2 5 mg Oral Q3H PRN Max 8/day Kashif A Prayson, DO      polyethylene glycol  17 g Oral Daily PRN Sandria Grater, DO      propranolol  10 mg Oral Q8H PRN Sandria Grater, DO      senna  1 tablet Oral Daily Ally Quiroga MD      senna-docusate sodium  1 tablet Oral Daily PRN Carie Levy MD      traZODone  50 mg Oral HS PRN Sandria Grater, DO         Behavioral Health Medications: all current active meds have been reviewed   Changes as in plan section above  Laboratory results:  I have personally reviewed all pertinent laboratory/tests results  Recent Results (from the past 48 hour(s))   NT-BNP PRO    Collection Time: 06/14/22  5:12 AM   Result Value Ref Range    NT-proBNP 14 9 0 - 299 pg/mL   High Sensitivity Troponin I Random    Collection Time: 06/14/22  5:12 AM   Result Value Ref Range    HS TnI random <2 (L) 8 - 18 ng/L   CK (with reflex to MB)    Collection Time: 06/14/22  5:12 AM   Result Value Ref Range    Total CK 56 55 - 170 U/L   CBC and differential    Collection Time: 06/14/22  5:12 AM   Result Value Ref Range    WBC 7 22 4 31 - 10 16 Thousand/uL    RBC 5 18 3 88 - 5 62 Million/uL    Hemoglobin 15 1 12 0 - 17 0 g/dL    Hematocrit 45 6 36 5 - 49 3 %    MCV 88 82 - 98 fL    MCH 29 2 26 8 - 34 3 pg    MCHC 33 1 31 4 - 37 4 g/dL    RDW 11 8 11 6 - 15 1 %    MPV 8 6 (L) 8 9 - 12 7 fL    Platelets 377 (H) 207 - 390 Thousands/uL    nRBC 0 /100 WBCs    Neutrophils Relative 48 43 - 75 %    Immat GRANS % 0 0 - 2 %    Lymphocytes Relative 38 14 - 44 %    Monocytes Relative 10 4 - 12 %    Eosinophils Relative 3 0 - 6 %    Basophils Relative 1 0 - 1 %    Neutrophils Absolute 3 43 1 85 - 7 62 Thousands/µL    Immature Grans Absolute 0 02 0 00 - 0 20 Thousand/uL    Lymphocytes Absolute 2 75 0 60 - 4 47 Thousands/µL    Monocytes Absolute 0 74 0 17 - 1 22 Thousand/µL    Eosinophils Absolute 0 24 0 00 - 0 61 Thousand/µL    Basophils Absolute 0 04 0 00 - 0 10 Thousands/µL        This note has been constructed using a voice recognition system  There may be translation, syntax, or grammatical errors  If you have any questions, please contact the dictating author      JOHNATHAN Ibrahim

## 2022-06-15 NOTE — PROGRESS NOTES
06/15/22 0700   Activity/Group Checklist   Group Community meeting   Attendance Attended   Attendance Duration (min) 46-60   Interactions Interacted appropriately   Affect/Mood Appropriate;Calm;Normal range   Goals Achieved Able to engage in interactions; Able to listen to others

## 2022-06-16 PROCEDURE — 99232 SBSQ HOSP IP/OBS MODERATE 35: CPT

## 2022-06-16 RX ADMIN — ASPIRIN 81 MG CHEWABLE TABLET 81 MG: 81 TABLET CHEWABLE at 08:33

## 2022-06-16 RX ADMIN — NICOTINE POLACRILEX 2 MG: 2 GUM, CHEWING ORAL at 08:36

## 2022-06-16 RX ADMIN — LEVETIRACETAM 500 MG: 500 TABLET, FILM COATED ORAL at 08:33

## 2022-06-16 RX ADMIN — CLOZAPINE 200 MG: 200 TABLET ORAL at 21:23

## 2022-06-16 RX ADMIN — METFORMIN HYDROCHLORIDE 850 MG: 850 TABLET, FILM COATED ORAL at 08:33

## 2022-06-16 RX ADMIN — CLOZAPINE 200 MG: 200 TABLET ORAL at 08:33

## 2022-06-16 RX ADMIN — GABAPENTIN 400 MG: 400 CAPSULE ORAL at 08:33

## 2022-06-16 RX ADMIN — ACETAMINOPHEN 650 MG: 325 TABLET ORAL at 11:06

## 2022-06-16 RX ADMIN — SENNOSIDES 8.6 MG: 8.6 TABLET, FILM COATED ORAL at 08:33

## 2022-06-16 RX ADMIN — METFORMIN HYDROCHLORIDE 850 MG: 850 TABLET, FILM COATED ORAL at 17:12

## 2022-06-16 RX ADMIN — NICOTINE POLACRILEX 2 MG: 2 GUM, CHEWING ORAL at 15:35

## 2022-06-16 RX ADMIN — NICOTINE POLACRILEX 2 MG: 2 GUM, CHEWING ORAL at 17:58

## 2022-06-16 RX ADMIN — LEVETIRACETAM 500 MG: 500 TABLET, FILM COATED ORAL at 21:23

## 2022-06-16 RX ADMIN — GABAPENTIN 400 MG: 400 CAPSULE ORAL at 21:23

## 2022-06-16 RX ADMIN — GABAPENTIN 400 MG: 400 CAPSULE ORAL at 17:12

## 2022-06-16 RX ADMIN — NICOTINE POLACRILEX 2 MG: 2 GUM, CHEWING ORAL at 12:59

## 2022-06-16 RX ADMIN — AMMONIUM LACTATE: 17 LOTION TOPICAL at 08:33

## 2022-06-16 RX ADMIN — METOPROLOL SUCCINATE 50 MG: 50 TABLET, EXTENDED RELEASE ORAL at 08:33

## 2022-06-16 NOTE — PROGRESS NOTES
06/16/22 0846   Team Meeting   Meeting Type Daily Rounds   Team Members Present   Team Members Present Physician;Nurse;; Other (Discipline and Name)   Physician Team Member [id number] [facility] [facility] Team Member [facility]   Social Work Team Member [address] [id number]   Other (Discipline and Name) [place] [facility]   Patient/Family Present   Patient Present No   Patient's Family Present No     Patient focuses on drugs, denies symptoms, potential discharge

## 2022-06-16 NOTE — NURSING NOTE
Emily Todd ambulates about the unit with his cane  He is isolative to self but brightens on approach  He is happy about his upcomping discharge in 2 weeks   He is taking Nicotine Gum q2 hrs  He is cooperative and medication compliant

## 2022-06-16 NOTE — PLAN OF CARE
Problem: Alteration in Thoughts and Perception  Goal: Treatment Goal: Gain control of psychotic behaviors/thinking, reduce/eliminate presenting symptoms and demonstrate improved reality functioning upon discharge  Outcome: Progressing  Goal: Verbalize thoughts and feelings  Description: Interventions:  - Promote a nonjudgmental and trusting relationship with the patient through active listening and therapeutic communication  - Assess patient's level of functioning, behavior and potential for risk  - Engage patient in 1 on 1 interactions  - Encourage patient to express fears, feelings, frustrations, and discuss symptoms    - [place] patient to reality, help patient recognize reality-based thinking   - Administer medications as ordered and assess for potential side effects  - Provide the patient education related to the signs and symptoms of the illness and desired effects of prescribed medications  Interventions:  - Assess and re-assess patient's lethality and potential for self-injury  - Engage patient in 1:1 interactions, daily, for a minimum of 15 minutes  - Encourage patient to express feelings, fears, frustrations, hopes  - Establish rapport/trust with patient   Interventions:  - Assess and re-assess patient's level of risk   - Engage patient in 1:1 interactions, daily, for a minimum of 15 minutes   - Encourage patient to express feelings, fears, frustrations, hopes   Interventions:  - Assess and re-assess patient's level of risk, every waking shift  - Engage patient in 1:1 interactions, daily, for a minimum of 15 minutes   - Allow patient to express feelings and frustrations in a safe and non-threatening manner   - Establish rapport/trust with patient   Outcome: Not Progressing  Goal: Refrain from acting on delusional thinking/internal stimuli  Description: Interventions:  - Monitor patient closely, per order   - Utilize least restrictive measures   - Set reasonable limits, give positive feedback for acceptable   - Administer medications as ordered and monitor of potential side effects  Outcome: Progressing  Goal: Agree to be compliant with medication regime, as prescribed and report medication side effects  Description: Interventions:  - Offer appropriate PRN medication and supervise ingestion; conduct AIMS, as needed   Outcome: Progressing  Goal: Recognize dysfunctional thoughts, communicate reality-based thoughts at the time of discharge  Description: Interventions:  - Provide medication and psycho-education to assist patient in compliance and developing insight into his/her illness   Outcome: Not Progressing     Problem: Ineffective Coping  Goal: Identifies ineffective coping skills  Outcome: Not Progressing  Goal: Identifies healthy coping skills  Outcome: Progressing  Goal: Demonstrates healthy coping skills  Outcome: Progressing  Goal: Participates in unit activities  Description: Interventions:  - Provide therapeutic environment   - Provide required programming   - Redirect inappropriate behaviors   Outcome: Not Progressing  Goal: Patient/Family participate in treatment and DC plans  Description: Interventions:  - Provide therapeutic environment  Outcome: Progressing  Goal: Patient/Family verbalizes awareness of resources  Outcome: Progressing  Goal: Understands least restrictive measures  Description: Interventions:  - Utilize least restrictive behavior  Outcome: Progressing  Goal: Free from restraint events  Description: - Utilize least restrictive measures   - Provide behavioral interventions   - Redirect inappropriate behaviors   Outcome: Progressing     Problem: Risk for Self Injury/Neglect  Goal: Verbalize thoughts and feelings  Description: Interventions:  - Promote a nonjudgmental and trusting relationship with the patient through active listening and therapeutic communication  - Assess patient's level of functioning, behavior and potential for risk  - Engage patient in 1 on 1 interactions  - Encourage patient to express fears, feelings, frustrations, and discuss symptoms    - [place] patient to reality, help patient recognize reality-based thinking   - Administer medications as ordered and assess for potential side effects  - Provide the patient education related to the signs and symptoms of the illness and desired effects of prescribed medications  Interventions:  - Assess and re-assess patient's lethality and potential for self-injury  - Engage patient in 1:1 interactions, daily, for a minimum of 15 minutes  - Encourage patient to express feelings, fears, frustrations, hopes  - Establish rapport/trust with patient   Interventions:  - Assess and re-assess patient's level of risk   - Engage patient in 1:1 interactions, daily, for a minimum of 15 minutes   - Encourage patient to express feelings, fears, frustrations, hopes   Interventions:  - Assess and re-assess patient's level of risk, every waking shift  - Engage patient in 1:1 interactions, daily, for a minimum of 15 minutes   - Allow patient to express feelings and frustrations in a safe and non-threatening manner   - Establish rapport/trust with patient   Outcome: Not Progressing  Goal: Treatment Goal: Remain safe during length of stay, learn and adopt new coping skills, and be free of self-injurious ideation, impulses and acts at the time of discharge  Outcome: Progressing  Goal: Refrain from harming self  Description: Interventions:  - Monitor patient closely, per order  - Develop a trusting relationship  - Supervise medication ingestion, monitor effects and side effects   Outcome: Progressing  Goal: Recognize maladaptive responses and adopt new coping mechanisms  Outcome: Progressing     Problem: Depression  Goal: Verbalize thoughts and feelings  Description: Interventions:  - Promote a nonjudgmental and trusting relationship with the patient through active listening and therapeutic communication  - Assess patient's level of functioning, behavior and potential for risk  - Engage patient in 1 on 1 interactions  - Encourage patient to express fears, feelings, frustrations, and discuss symptoms    - [place] patient to reality, help patient recognize reality-based thinking   - Administer medications as ordered and assess for potential side effects  - Provide the patient education related to the signs and symptoms of the illness and desired effects of prescribed medications  Interventions:  - Assess and re-assess patient's lethality and potential for self-injury  - Engage patient in 1:1 interactions, daily, for a minimum of 15 minutes  - Encourage patient to express feelings, fears, frustrations, hopes  - Establish rapport/trust with patient   Interventions:  - Assess and re-assess patient's level of risk   - Engage patient in 1:1 interactions, daily, for a minimum of 15 minutes   - Encourage patient to express feelings, fears, frustrations, hopes   Interventions:  - Assess and re-assess patient's level of risk, every waking shift  - Engage patient in 1:1 interactions, daily, for a minimum of 15 minutes   - Allow patient to express feelings and frustrations in a safe and non-threatening manner   - Establish rapport/trust with patient   Outcome: Not Progressing  Goal: Treatment Goal: Demonstrate behavioral control of depressive symptoms, verbalize feelings of improved mood/affect, and adopt new coping skills prior to discharge  Outcome: Progressing  Goal: Refrain from harming self  Description: Interventions:  - Monitor patient closely, per order   - Supervise medication ingestion, monitor effects and side effects   Outcome: Progressing  Goal: Refrain from isolation  Description: Interventions:  - Develop a trusting relationship   - Encourage socialization   Outcome: Progressing  Goal: Refrain from self-neglect  Outcome: Progressing     Problem: Anxiety  Goal: Anxiety is at manageable level  Description: Interventions:  - Assess and monitor patient's anxiety level  - Monitor for signs and symptoms (heart palpitations, chest pain, shortness of breath, headaches, nausea, feeling jumpy, restlessness, irritable, apprehensive)  - Collaborate with interdisciplinary team and initiate plan and interventions as ordered    - [place] patient to unit/surroundings  - Explain treatment plan  - Encourage participation in care  - Encourage verbalization of concerns/fears  - Identify coping mechanisms  - Assist in developing anxiety-reducing skills  - Administer/offer alternative therapies  - Limit or eliminate stimulants  Outcome: Progressing     Problem: Risk for Violence/Aggression Toward Others  Goal: Verbalize thoughts and feelings  Description: Interventions:  - Promote a nonjudgmental and trusting relationship with the patient through active listening and therapeutic communication  - Assess patient's level of functioning, behavior and potential for risk  - Engage patient in 1 on 1 interactions  - Encourage patient to express fears, feelings, frustrations, and discuss symptoms    - [place] patient to reality, help patient recognize reality-based thinking   - Administer medications as ordered and assess for potential side effects  - Provide the patient education related to the signs and symptoms of the illness and desired effects of prescribed medications  Interventions:  - Assess and re-assess patient's lethality and potential for self-injury  - Engage patient in 1:1 interactions, daily, for a minimum of 15 minutes  - Encourage patient to express feelings, fears, frustrations, hopes  - Establish rapport/trust with patient   Interventions:  - Assess and re-assess patient's level of risk   - Engage patient in 1:1 interactions, daily, for a minimum of 15 minutes   - Encourage patient to express feelings, fears, frustrations, hopes   Interventions:  - Assess and re-assess patient's level of risk, every waking shift  - Engage patient in 1:1 interactions, daily, for a minimum of 15 minutes - Allow patient to express feelings and frustrations in a safe and non-threatening manner   - Establish rapport/trust with patient   Outcome: Not Progressing  Goal: Treatment Goal: Refrain from acts of violence/aggression during length of stay, and demonstrate improved impulse control at the time of discharge  Outcome: Progressing  Goal: Refrain from harming others  Outcome: Progressing  Goal: Refrain from destructive acts on the environment or property  Outcome: Progressing  Goal: Control angry outbursts  Description: Interventions:  - Monitor patient closely, per order  - Ensure early verbal de-escalation  - Monitor prn medication needs  - Set reasonable/therapeutic limits, outline behavioral expectations, and consequences   - Provide a non-threatening milieu, utilizing the least restrictive interventions   Outcome: Progressing  Goal: Identify appropriate positive anger management techniques  Description: Interventions:  - Offer anger management and coping skills groups   - Staff will provide positive feedback for appropriate anger control  Outcome: Progressing     Problem: SUBSTANCE USE/ABUSE  Goal: By discharge, will develop insight into their chemical dependency and sustain motivation to continue in recovery  Description: INTERVENTIONS:  - Attends all daily group sessions and scheduled AA groups  - Actively practices coping skills through participation in the therapeutic community and adherence to program rules  - Reviews and completes assignments from individual treatment plan  - Assist patient development of understanding of their personal cycle of addiction and relapse triggers  Outcome: Progressing  Goal: By discharge, patient will have ongoing treatment plan addressing chemical dependency  Description: INTERVENTIONS:  - Assist patient with resources and/or appointments for ongoing recovery based living  Outcome: Not Progressing

## 2022-06-16 NOTE — NURSING NOTE
Luzma Shetty is currently asleep in his bed  PRN nicotine gum was given to him last at 2104  No behaviors noted during this shift  Pt is discharging 7/1/22 and is happy about it  q7 minute checks in place  Safety measures maintained  Will continue to monitor

## 2022-06-16 NOTE — NURSING NOTE
Lavona Due is mostly isolative to self and room  Minimal peer interaction noted  Denies all psych symptoms at this time  Pt c/o left leg pain 4/10 PRN tylenol 650 mg  administered at 1106  Compliant with meds/meals  Consumed 25% of breakfast, 100% lunch

## 2022-06-16 NOTE — PROGRESS NOTES
06/16/22 1100   Activity/Group Checklist   Group Wellness   Attendance Attended   Attendance Duration (min) Greater than 60   Interactions Interacted appropriately   Affect/Mood Appropriate;Calm;Normal range   Goals Achieved Identified feelings; Discussed coping strategies; Able to listen to others; Able to engage in interactions; Able to self-disclose

## 2022-06-16 NOTE — PROGRESS NOTES
06/16/22 0700   Activity/Group Checklist   Group Community meeting   Attendance Did not attend   Attendance Duration (min) 31-45   Affect/Mood RENUKA

## 2022-06-16 NOTE — NURSING NOTE
Alia Palm ambulates about the unit with a cane  He is minimally social with very few peers  He mostly keeps to himself  He is superficially pleasant  He is calm and cooperative  He is at the nurses station about q 2 hours for Nicotine Gum He asked for something for pain saying his LE  pain was #9  Writer pulled Tylenol but patient went to his room and never came back as writer was giving medications about the unit  He did not mention pain again and did not receive the Tylenol

## 2022-06-16 NOTE — PROGRESS NOTES
Progress Note - Behavioral Health   Estil Breath 24 y o  male MRN: [id number]  Unit/Bed#: [facility] 112-01 Encounter: [id number]    Assessment/Plan   Principal Problem:    Schizophrenia [facility])  Active Problems:    Medical clearance for psychiatric admission    Seizure disorder [facility])    Traumatic brain injury [facility])    Brain lesion    History of cardiac radiofrequency ablation    History of fall    PTSD (post-traumatic stress disorder)    Review of systems: None reported  NO SEIZURES  Patient denies no more heartburn  His legs still hurts after his break requiring ORIF, but he reports that it is "not too bad " He still fears falling and uses a cane to ambulate  Diagnosis: Schizophrenia  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  Recommended Treatment:   Continue current medication regimen:   - Clozapine   - Gabapentin   -Keppra  Encourage patient to continue PT exercises  Continue with group therapy, milieu therapy and occupational therapy  Continue frequent safety checks and vitals per unit protocol  Case discussed with treatment team   Continue with SLIM medical management as indicated  Continue coordinating with case management regarding disposition  Risks, benefits and possible side effects of Medications: Risks, benefits, and possible side effects of medications have previously been explained  No new medications at this time  ------------------------------------------------------------    Subjective: Per nursing report, Leon Arriola has been cooperative on the unit and compliant with medications  He received a haircut yesterday and told nursing that he was happy about his upcoming discharge  Nursing also reports that he was asking about K2 and whether that was a felony although he does not plan on using it  He attended 3 of 7 groups yesterday  Today, Leon Arriola is consenting for safety on the unit  He was laying in bed asleep when I entered the room   He was easily awakeable and agreeable to talk although he was drowsy during conversation  He reports feeling "alright " He reports that he has been trying to attend more groups and said that he attended 4 although nursing only reported that he attended 3 out of 7 groups  He reports that he is happy to be discharged soon, and he has been working on being "happier" still  He was originally admitted to [facility] due to his mom feeling that his "meds were off," agitated, and homicidal ideation  He reports that he is no longer having these symptoms  Anila Poole notes having slept well  He denies nightmares, frequent or early awakening  Anila Poole states having a diminished appetite still, but states that it is because he "does not like the food they serve here " When asked if he would be going to breakfast after our conversation, he reported that he did not plan to due to not liking what they usually give for breakfast here  Anila Poole has been taking the medications as prescribed and reporting no side effects  He reports that he has not had any other episodes of heartburn  He says that his leg still bothers him, but it is "not that bad " He reports that he has been doing his PT exercises although the worksheets were no longer on his bedside table  He had no significant events overnight  Not aggressive, self-injurious, or destructive on the unit  Psychiatric Review of Systems:  Behavior over the last 24 hours: improved  Sleep: normal  Appetite: adequate, decreased from baseline  Medication side effects: none verbalized  Medication compliance: good  Group attendance: 3 out of 7  Significant events: None    PRNs overnight: None   VS: Reviewed, within normal limits    Progress Toward Goals: Demonstrates some slow improvement   He has been less fixated on discharge and has a goal of wanting to be "happier"    Vital signs in last 24 hours:  Temp:  [97 3 °F (36 3 °C)-97 5 °F (36 4 °C)] 97 3 °F (36 3 °C)  HR:  [101-116] 101  Resp:  [18] 18  BP: (122-126)/(72-78) 122/78    Mental Status Exam:  Appearance:  alert, minimal eye contact, appears stated age, casually dressed and appropriate grooming and hygiene Patient laying in bed having just been asleep  He now has a shaved head from a haircut he received yesterday  Behavior:  calm, limited cooperativity and laying in bed   Motor: no abnormal movements, gait with slight limp while using a cane   Speech:  spontaneous, normal rate, not pressured, scant and coherent   Mood:  euthymic   Affect:  mood-congruent, constricted and euthymic   Thought Process:  organized, goal directed   Thought Content: paranoid ideation, intrusive thoughts, ruminating thoughts, ideas of reference  No current s/h thoughts, intent, or plans  No distorted body perceptions or phobias     Perceptual disturbances: denies current hallucinations and does not appear to be responding to internal stimuli at this time   Risk Potential: No active or passive suicidal or homicidal ideation was verbalized during interview   Cognition: oriented to person, place, time, and situation and appears to be of average intelligence   Insight:  Improving   Judgment: Limited   Sensorium: Alert and awake  Orientation x 4  Attention: Attention span and concentration are age appropriate  Intellect: Appears to be average intelligence  Motor activity: No abnormal movements    Current Medications:  Current Facility-Administered Medications   Medication Dose Route Frequency Provider Last Rate    acetaminophen  650 mg Oral Q6H PRN Maricruz [place], DO      acetaminophen  650 mg Oral Q4H PRN Maricruz [place], DO      acetaminophen  975 mg Oral Q6H PRN Maricruz [place], DO      aluminum-magnesium hydroxide-simethicone  30 mL Oral Q4H PRN Maricruz [place], DO      ammonium lactate   Topical BID Sherol Giovanni, DPM      artificial tear  1 application Both Eyes U2M PRN Maricruz [place], DO      aspirin  81 mg Oral Daily Kashif Agosto, DO      benztropine  1 mg Intramuscular Q4H PRN Max 6/day Rommie Yosi, DO      benztropine  1 mg Oral Q4H PRN Max 6/day Rommie Yosi, DO      cloZAPine  200 mg Oral BID Rommie Yosi, DO      hydrOXYzine HCL  50 mg Oral Q6H PRN Max 4/day Rommie Yosi, DO      Or    diphenhydrAMINE  50 mg Intramuscular Q6H PRN Rommie Yosi, DO      gabapentin  400 mg Oral TID Rommie Yosi, DO      hydrOXYzine HCL  100 mg Oral Q6H PRN Max 4/day Rommie Yosi, DO      Or    LORazepam  2 mg Intramuscular Q6H PRN Rommie Yosi, DO      hydrOXYzine HCL  25 mg Oral Q6H PRN Max 4/day Rommie Yosi, DO      levETIRAcetam  500 mg Oral Q12H [address] Kashif A Prayson, DO      melatonin  9 mg Oral HS PRN Rommie Yosi, DO      metFORMIN  850 mg Oral BID With Meals Lourdes Johnson MD      metoprolol succinate  50 mg Oral Daily Rommie Yosi, DO      nicotine polacrilex  2 mg Oral Q2H PRN Rommie Yosi, DO      OLANZapine  10 mg Oral Q3H PRN Max 3/day Rommie Yosi, DO      Or    OLANZapine  10 mg Intramuscular Q3H PRN Max 3/day Rommie Yosi, DO      OLANZapine  5 mg Oral Q3H PRN Max 6/day Kashif A Prayson, DO      Or    OLANZapine  5 mg Intramuscular Q3H PRN Max 6/day Kashif A Prayson, DO      OLANZapine  2 5 mg Oral Q3H PRN Max 8/day Kashif A Prayson, DO      polyethylene glycol  17 g Oral Daily PRN Rommie Yosi, DO      propranolol  10 mg Oral Q8H PRN Rommie Yosi, DO      senna  1 tablet Oral Daily Christal Mane MD      senna-docusate sodium  1 tablet Oral Daily PRN Lourdes Johnson MD      traZODone  50 mg Oral HS PRN Rommie Yosi, DO         Behavioral Health Medications: all current active meds have been reviewed  Changes as in plan section above  Laboratory results:  I have personally reviewed all pertinent laboratory/tests results  No results found for this or any previous visit (from the past 48 hour(s))  This note has been constructed using a voice recognition system   There may be translation, syntax, or grammatical errors  If you have any questions, please contact the dictating author      JOHNATHAN Rogel

## 2022-06-17 LAB
CLOZAPINE SERPL-MCNC: 410 NG/ML (ref 350–650)
CLOZAPINE SERPL-MCNC: 410 NG/ML (ref 350–650)
CLOZAPINE+NOR SERPL-MCNC: 723 NG/ML
CLOZAPINE+NOR SERPL-MCNC: 723 NG/ML
NORCLOZAPINE SERPL-MCNC: 313 NG/ML
NORCLOZAPINE SERPL-MCNC: 313 NG/ML

## 2022-06-17 PROCEDURE — 99232 SBSQ HOSP IP/OBS MODERATE 35: CPT

## 2022-06-17 RX ADMIN — METOPROLOL SUCCINATE 50 MG: 50 TABLET, EXTENDED RELEASE ORAL at 08:45

## 2022-06-17 RX ADMIN — CLOZAPINE 200 MG: 200 TABLET ORAL at 21:09

## 2022-06-17 RX ADMIN — METFORMIN HYDROCHLORIDE 850 MG: 850 TABLET, FILM COATED ORAL at 08:45

## 2022-06-17 RX ADMIN — LEVETIRACETAM 500 MG: 500 TABLET, FILM COATED ORAL at 20:32

## 2022-06-17 RX ADMIN — NICOTINE POLACRILEX 2 MG: 2 GUM, CHEWING ORAL at 18:20

## 2022-06-17 RX ADMIN — LEVETIRACETAM 500 MG: 500 TABLET, FILM COATED ORAL at 08:45

## 2022-06-17 RX ADMIN — NICOTINE POLACRILEX 2 MG: 2 GUM, CHEWING ORAL at 11:37

## 2022-06-17 RX ADMIN — GABAPENTIN 400 MG: 400 CAPSULE ORAL at 17:19

## 2022-06-17 RX ADMIN — ASPIRIN 81 MG CHEWABLE TABLET 81 MG: 81 TABLET CHEWABLE at 08:45

## 2022-06-17 RX ADMIN — CLOZAPINE 200 MG: 200 TABLET ORAL at 08:45

## 2022-06-17 RX ADMIN — GABAPENTIN 400 MG: 400 CAPSULE ORAL at 20:33

## 2022-06-17 RX ADMIN — SENNOSIDES 8.6 MG: 8.6 TABLET, FILM COATED ORAL at 08:45

## 2022-06-17 RX ADMIN — GABAPENTIN 400 MG: 400 CAPSULE ORAL at 08:45

## 2022-06-17 RX ADMIN — NICOTINE POLACRILEX 2 MG: 2 GUM, CHEWING ORAL at 20:32

## 2022-06-17 RX ADMIN — METFORMIN HYDROCHLORIDE 850 MG: 850 TABLET, FILM COATED ORAL at 17:19

## 2022-06-17 RX ADMIN — NICOTINE POLACRILEX 2 MG: 2 GUM, CHEWING ORAL at 08:45

## 2022-06-17 NOTE — PROGRESS NOTES
06/17/22 1114   Team Meeting   Meeting Type Daily Rounds   Initial Conference Date 06/17/22   Patient/Family Present   Patient Present No   Patient's Family Present No       Daily Valencia Bernal MD, Lucita Bo, Nadir Mejia RN, Henry ARANGO  Case reviewed  5/8 groups  Tylenol given for leg pain  Slept  No behavioral issues  Medication compliant   Plan for DC back home on July 1st

## 2022-06-17 NOTE — NURSING NOTE
Sanna Lira has been visible intermittently out in the milieu  Pt needed redirection multiple times as he continued to come to the nurses station with multiple request for Nicorette gum even though pt had just received it  Pt informed he could have gum every 2 hours and continued to request gum from different nurses stating "oh I forgot " Pt has also been fixated on discharge date asking if he was still leaving on 7/1  Compliant with meds/meals  Denies all psych symptoms currently  Consumed 100% of breakfast, refused lunch, and 100% of dinner  Continue to monitor

## 2022-06-17 NOTE — NURSING NOTE
Jorge Munroe slept all night  No behaviors noted during this shift  q7 minute checks in place  Safety measures maintained  Will continue to monitor

## 2022-06-17 NOTE — PROGRESS NOTES
Progress Note - Behavioral Health   Beth Raya 24 y o  male MRN: [id number]  Unit/Bed#: [facility] 112-01 Encounter: [id number]    Assessment/Plan   Principal Problem:    Schizophrenia [facility])  Active Problems:    Medical clearance for psychiatric admission    Seizure disorder [facility])    Traumatic brain injury [facility])    Brain lesion    History of cardiac radiofrequency ablation    History of fall    PTSD (post-traumatic stress disorder)    Review of systems: None reported  NO SEIZURES  Patient denies any new episodes of acid reflux  He reports that his leg still hurts at times, but it feels better, and he "is getting feeling back in it " Had PRN Tylenol yesterday for leg pain  Uses a cane to ambulate  Diagnosis: Schizophrenia  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  Recommended Treatment:   Continue current medication regimen:   - Clozapine   - Gabapentin   - Keppra  Encourage pt to continue PT exercises  Continue with group therapy, milieu therapy and occupational therapy  Continue frequent safety checks and vitals per unit protocol  Case discussed with treatment team   Continue with [facility] medical management as indicated  Continue coordinating with case management regarding disposition  Risks, benefits and possible side effects of Medications: Risks, benefits, and possible side effects of medications have previously been explained  No new medications at this time  ------------------------------------------------------------    Subjective: Per nursing report, Nohemy Fierro has been cooperative on the unit and compliant with medications  He attended 5 out of 8 groups yesterday and reported that he is "bored" and wants to go home  He received Tylenol yesterday for leg pain due to his healing leg that required ORIF several weeks ago  Today, Nohemy Fierro is consenting for safety on the unit  He was sitting on the edge of the bed holding his cane when I approached the patient   He was agreeable to talk and very pleasant and talkative this morning  He reports feeling "alright " He reports that he has been going to groups because "there is nothing else to do here," and he was told that he had to attend at least 50% in order to be discharged  He attended 5 out of 8 groups yesterday, and he plans on attending more today  He reports that he also plans to keep studying for his GED  Today, he expressed regret that he dropped out of high school in the beginning of 11th grade due to smoking so much weed as he feels as though he "would not be here" if he had taken school seriously  He expressed some frustration trying to teach himself to get his GED because he reports not being good at math or chemistry  He was originally admitted to [facility] because of his mother feeling that his "meds were off," agitation, and homicidal ideation  He reports that he no longer feels this way, and he feels that his medications are helping  He reports that he feels that the medications make him feel better, but still insists that he does not think he actually needs them  He reports that he will keep taking his medications outpatient because his mom wants him to  He expressed that his main hesitation with taking his medication has to do with the restrictions on him "smoking weed, drinking, smoking cigarettes, and not even being able to do ecstasy " When prompted, he admitted feeling happier on the medication and being well regulated and less agitated is probably worth giving up those things  Placido Molina notes having slept well  He reported no nightmares, frequent, or early awakenings  Placido Molina states having a diminshed appetite  He reports that it would probably be better "if he smoked weed," but then said he was joking  Placido Molina has been taking the medications as prescribed and reporting no side effects  He reports that he has not had any more episodes of heartburn   He reports that his legs still hurt him sometimes, but he feels like it has been feeling better and he reports "getting more feeling in it " He reports he has been doing his PT exercises  No significant events overnight  Not aggressive, self-injurious, or destructive on the unit  Psychiatric Review of Systems:  Behavior over the last 24 hours: improved  Sleep: normal  Appetite: adequate, decreased from baseline  Medication side effects: none verbalized  Medication compliance: good  Group attendance: 5 out of 8 groups  Significant events: None    PRNs overnight: Tylenol PRN for leg pain   VS: Reviewed, within normal limits    Progress Toward Goals: Demonstrates slow improvement; more insightful about his life choices  He has been attending more groups  Vital signs in last 24 hours:  Temp:  [97 7 °F (36 5 °C)-97 8 °F (36 6 °C)] 97 8 °F (36 6 °C)  HR:  [102-104] 104  Resp:  [16-18] 18  BP: (121-127)/(80-86) 121/86    Mental Status Exam:  Appearance:  alert, good eye contact, appears stated age, casually dressed and appropriate grooming and hygiene  Patient sitting on the edge of the bed, relaxed, holding his cane in his hands  Behavior:  calm, cooperative and sitting comfortably   Motor: no abnormal movements, gait with slight limp while using a cane   Speech:  spontaneous, normal rate, not pressured, normal volume and coherent   Mood:  euthymic   Affect:  mood-congruent and euthymic   Thought Process:  organized, goal directed   Thought Content: paranoid ideation, intrusive thoughts, ruminating thoughts, ideas of reference  No current s/h thoughts, intent, or plans  No distorted body perceptions or phobias     Perceptual disturbances: denies current hallucinations and does not appear to be responding to internal stimuli at this time   Risk Potential: No active or passive suicidal or homicidal ideation was verbalized during interview   Cognition: oriented to person, place, time, and situation and appears to be of average intelligence   Insight:  Improving   Judgment: Limited   Sensorium: Alert and awake  Orientation: Oriented x 4  Attention: Attention span and concentration are age appropriate  Intellect: Appears to be average intelligence  Motor activity: No abnormal movements    Current Medications:  Current Facility-Administered Medications   Medication Dose Route Frequency Provider Last Rate    acetaminophen  650 mg Oral Q6H PRN Vitor Medicus, DO      acetaminophen  650 mg Oral Q4H PRN Vitor Medicus, DO      acetaminophen  975 mg Oral Q6H PRN Vitor Medicus, DO      aluminum-magnesium hydroxide-simethicone  30 mL Oral Q4H PRN Vitor Medicus, DO      ammonium lactate   Topical BID Sophia Pole, DPM      artificial tear  1 application Both Eyes W6L PRN Vitor Medicus, DO      aspirin  81 mg Oral Daily Vitor Medicus, DO      benztropine  1 mg Intramuscular Q4H PRN Max 6/day Vitor Medicus, DO      benztropine  1 mg Oral Q4H PRN Max 6/day Vitor Medicus, DO      cloZAPine  200 mg Oral BID Vitor Medicus, DO      hydrOXYzine HCL  50 mg Oral Q6H PRN Max 4/day Vitor Medicus, DO      Or    diphenhydrAMINE  50 mg Intramuscular Q6H PRN Vitor Medicus, DO      gabapentin  400 mg Oral TID Vitor Medicus, DO      hydrOXYzine HCL  100 mg Oral Q6H PRN Max 4/day Vitor Medicus, DO      Or    LORazepam  2 mg Intramuscular Q6H PRN Vitor Medicus, DO      hydrOXYzine HCL  25 mg Oral Q6H PRN Max 4/day Vitor Medicus, DO      levETIRAcetam  500 mg Oral Q12H [address] Kashif A Prayson, DO      melatonin  9 mg Oral HS PRN Vitor Medicus, DO      metFORMIN  850 mg Oral BID With Meals Leyla Buck MD      metoprolol succinate  50 mg Oral Daily Vitor Medicus, DO      nicotine polacrilex  2 mg Oral Q2H PRN Vitor Medicus, DO      OLANZapine  10 mg Oral Q3H PRN Max 3/day Vitor Medicus, DO      Or    OLANZapine  10 mg Intramuscular Q3H PRN Max 3/day Vitor Medicus, DO      OLANZapine  5 mg Oral Q3H PRN Max 6/day Vitor Medicus, DO      Or   [place] OLANZapine  5 mg Intramuscular Q3H PRN Max 6/day Marylouise Ge, DO      OLANZapine  2 5 mg Oral Q3H PRN Max 8/day Marylouise Ge, DO      polyethylene glycol  17 g Oral Daily PRN Marylouise Ge, DO      propranolol  10 mg Oral Q8H PRN Marylouise Ge, DO      senna  1 tablet Oral Daily Margarita Rothman MD      senna-docusate sodium  1 tablet Oral Daily PRN Levon Fishman MD      traZODone  50 mg Oral HS PRN Marylouise Eg, DO         Behavioral Health Medications: all current active meds have been reviewed  Changes as in plan section above  Laboratory results:  I have personally reviewed all pertinent laboratory/tests results  No results found for this or any previous visit (from the past 48 hour(s))  This note has been constructed using a voice recognition system  There may be translation, syntax, or grammatical errors  If you have any questions, please contact the dictating author      JOHNATHAN Dejesus

## 2022-06-18 PROCEDURE — 99232 SBSQ HOSP IP/OBS MODERATE 35: CPT | Performed by: PHYSICIAN ASSISTANT

## 2022-06-18 RX ADMIN — NICOTINE POLACRILEX 2 MG: 2 GUM, CHEWING ORAL at 16:47

## 2022-06-18 RX ADMIN — LEVETIRACETAM 500 MG: 500 TABLET, FILM COATED ORAL at 21:12

## 2022-06-18 RX ADMIN — METOPROLOL SUCCINATE 50 MG: 50 TABLET, EXTENDED RELEASE ORAL at 08:17

## 2022-06-18 RX ADMIN — NICOTINE POLACRILEX 2 MG: 2 GUM, CHEWING ORAL at 11:09

## 2022-06-18 RX ADMIN — CLOZAPINE 200 MG: 200 TABLET ORAL at 08:17

## 2022-06-18 RX ADMIN — NICOTINE POLACRILEX 2 MG: 2 GUM, CHEWING ORAL at 18:47

## 2022-06-18 RX ADMIN — ASPIRIN 81 MG CHEWABLE TABLET 81 MG: 81 TABLET CHEWABLE at 08:17

## 2022-06-18 RX ADMIN — AMMONIUM LACTATE 1 APPLICATION: 17 LOTION TOPICAL at 08:20

## 2022-06-18 RX ADMIN — SENNOSIDES 8.6 MG: 8.6 TABLET, FILM COATED ORAL at 08:18

## 2022-06-18 RX ADMIN — METFORMIN HYDROCHLORIDE 850 MG: 850 TABLET, FILM COATED ORAL at 17:14

## 2022-06-18 RX ADMIN — CLOZAPINE 200 MG: 200 TABLET ORAL at 21:12

## 2022-06-18 RX ADMIN — NICOTINE POLACRILEX 2 MG: 2 GUM, CHEWING ORAL at 08:38

## 2022-06-18 RX ADMIN — POLYETHYLENE GLYCOL 3350 17 G: 17 POWDER, FOR SOLUTION ORAL at 17:55

## 2022-06-18 RX ADMIN — METFORMIN HYDROCHLORIDE 850 MG: 850 TABLET, FILM COATED ORAL at 08:17

## 2022-06-18 RX ADMIN — NICOTINE POLACRILEX 2 MG: 2 GUM, CHEWING ORAL at 13:38

## 2022-06-18 RX ADMIN — LEVETIRACETAM 500 MG: 500 TABLET, FILM COATED ORAL at 08:18

## 2022-06-18 RX ADMIN — GABAPENTIN 400 MG: 400 CAPSULE ORAL at 21:12

## 2022-06-18 RX ADMIN — ACETAMINOPHEN 325MG 975 MG: 325 TABLET ORAL at 12:20

## 2022-06-18 RX ADMIN — NICOTINE POLACRILEX 2 MG: 2 GUM, CHEWING ORAL at 21:12

## 2022-06-18 RX ADMIN — GABAPENTIN 400 MG: 400 CAPSULE ORAL at 08:18

## 2022-06-18 RX ADMIN — GABAPENTIN 400 MG: 400 CAPSULE ORAL at 17:14

## 2022-06-18 NOTE — PLAN OF CARE
Problem: Alteration in Thoughts and Perception  Goal: Treatment Goal: Gain control of psychotic behaviors/thinking, reduce/eliminate presenting symptoms and demonstrate improved reality functioning upon discharge  Outcome: Progressing  Goal: Verbalize thoughts and feelings  Description: Interventions:  - Promote a nonjudgmental and trusting relationship with the patient through active listening and therapeutic communication  - Assess patient's level of functioning, behavior and potential for risk  - Engage patient in 1 on 1 interactions  - Encourage patient to express fears, feelings, frustrations, and discuss symptoms    - [place] patient to reality, help patient recognize reality-based thinking   - Administer medications as ordered and assess for potential side effects  - Provide the patient education related to the signs and symptoms of the illness and desired effects of prescribed medications  Interventions:  - Assess and re-assess patient's lethality and potential for self-injury  - Engage patient in 1:1 interactions, daily, for a minimum of 15 minutes  - Encourage patient to express feelings, fears, frustrations, hopes  - Establish rapport/trust with patient   Interventions:  - Assess and re-assess patient's level of risk   - Engage patient in 1:1 interactions, daily, for a minimum of 15 minutes   - Encourage patient to express feelings, fears, frustrations, hopes   Interventions:  - Assess and re-assess patient's level of risk, every waking shift  - Engage patient in 1:1 interactions, daily, for a minimum of 15 minutes   - Allow patient to express feelings and frustrations in a safe and non-threatening manner   - Establish rapport/trust with patient   Outcome: Progressing  Goal: Refrain from acting on delusional thinking/internal stimuli  Description: Interventions:  - Monitor patient closely, per order   - Utilize least restrictive measures   - Set reasonable limits, give positive feedback for acceptable - Administer medications as ordered and monitor of potential side effects  Outcome: Progressing  Goal: Agree to be compliant with medication regime, as prescribed and report medication side effects  Description: Interventions:  - Offer appropriate PRN medication and supervise ingestion; conduct AIMS, as needed   Outcome: Progressing  Goal: Recognize dysfunctional thoughts, communicate reality-based thoughts at the time of discharge  Description: Interventions:  - Provide medication and psycho-education to assist patient in compliance and developing insight into his/her illness   Outcome: Progressing

## 2022-06-18 NOTE — NURSING NOTE
Patient remains compliant with medication and meals  Patient is fixated on being discharged  Patient is to be discharged soon by the beginning of  July  He can  Get up set because he wants it to be sooner  He does attends groups and taty some socialiation with select peers

## 2022-06-18 NOTE — PLAN OF CARE
Problem: Alteration in Thoughts and Perception  Goal: Verbalize thoughts and feelings  Description: Interventions:  - Promote a nonjudgmental and trusting relationship with the patient through active listening and therapeutic communication  - Assess patient's level of functioning, behavior and potential for risk  - Engage patient in 1 on 1 interactions  - Encourage patient to express fears, feelings, frustrations, and discuss symptoms    - [place] patient to reality, help patient recognize reality-based thinking   - Administer medications as ordered and assess for potential side effects  - Provide the patient education related to the signs and symptoms of the illness and desired effects of prescribed medications  Interventions:  - Assess and re-assess patient's lethality and potential for self-injury  - Engage patient in 1:1 interactions, daily, for a minimum of 15 minutes  - Encourage patient to express feelings, fears, frustrations, hopes  - Establish rapport/trust with patient   Interventions:  - Assess and re-assess patient's level of risk   - Engage patient in 1:1 interactions, daily, for a minimum of 15 minutes   - Encourage patient to express feelings, fears, frustrations, hopes   Interventions:  - Assess and re-assess patient's level of risk, every waking shift  - Engage patient in 1:1 interactions, daily, for a minimum of 15 minutes   - Allow patient to express feelings and frustrations in a safe and non-threatening manner   - Establish rapport/trust with patient   Outcome: Progressing  Goal: Agree to be compliant with medication regime, as prescribed and report medication side effects  Description: Interventions:  - Offer appropriate PRN medication and supervise ingestion; conduct AIMS, as needed   Outcome: Progressing  Goal: Recognize dysfunctional thoughts, communicate reality-based thoughts at the time of discharge  Description: Interventions:  - Provide medication and psycho-education to assist patient in compliance and developing insight into his/her illness   Outcome: Progressing     Problem: Ineffective Coping  Goal: Participates in unit activities  Description: Interventions:  - Provide therapeutic environment   - Provide required programming   - Redirect inappropriate behaviors   Outcome: Progressing     Problem: Risk for Self Injury/Neglect  Goal: Verbalize thoughts and feelings  Description: Interventions:  - Promote a nonjudgmental and trusting relationship with the patient through active listening and therapeutic communication  - Assess patient's level of functioning, behavior and potential for risk  - Engage patient in 1 on 1 interactions  - Encourage patient to express fears, feelings, frustrations, and discuss symptoms    - [place] patient to reality, help patient recognize reality-based thinking   - Administer medications as ordered and assess for potential side effects  - Provide the patient education related to the signs and symptoms of the illness and desired effects of prescribed medications  Interventions:  - Assess and re-assess patient's lethality and potential for self-injury  - Engage patient in 1:1 interactions, daily, for a minimum of 15 minutes  - Encourage patient to express feelings, fears, frustrations, hopes  - Establish rapport/trust with patient   Interventions:  - Assess and re-assess patient's level of risk   - Engage patient in 1:1 interactions, daily, for a minimum of 15 minutes   - Encourage patient to express feelings, fears, frustrations, hopes   Interventions:  - Assess and re-assess patient's level of risk, every waking shift  - Engage patient in 1:1 interactions, daily, for a minimum of 15 minutes   - Allow patient to express feelings and frustrations in a safe and non-threatening manner   - Establish rapport/trust with patient   Outcome: Progressing  Goal: Refrain from harming self  Description: Interventions:  - Monitor patient closely, per order  - Develop a trusting relationship  - Supervise medication ingestion, monitor effects and side effects   Outcome: Progressing     Problem: Depression  Goal: Verbalize thoughts and feelings  Description: Interventions:  - Promote a nonjudgmental and trusting relationship with the patient through active listening and therapeutic communication  - Assess patient's level of functioning, behavior and potential for risk  - Engage patient in 1 on 1 interactions  - Encourage patient to express fears, feelings, frustrations, and discuss symptoms    - [place] patient to reality, help patient recognize reality-based thinking   - Administer medications as ordered and assess for potential side effects  - Provide the patient education related to the signs and symptoms of the illness and desired effects of prescribed medications  Interventions:  - Assess and re-assess patient's lethality and potential for self-injury  - Engage patient in 1:1 interactions, daily, for a minimum of 15 minutes  - Encourage patient to express feelings, fears, frustrations, hopes  - Establish rapport/trust with patient   Interventions:  - Assess and re-assess patient's level of risk   - Engage patient in 1:1 interactions, daily, for a minimum of 15 minutes   - Encourage patient to express feelings, fears, frustrations, hopes   Interventions:  - Assess and re-assess patient's level of risk, every waking shift  - Engage patient in 1:1 interactions, daily, for a minimum of 15 minutes   - Allow patient to express feelings and frustrations in a safe and non-threatening manner   - Establish rapport/trust with patient   Outcome: Progressing  Goal: Refrain from isolation  Description: Interventions:  - Develop a trusting relationship   - Encourage socialization   Outcome: Progressing     Problem: Risk for Violence/Aggression Toward Others  Goal: Verbalize thoughts and feelings  Description: Interventions:  - Promote a nonjudgmental and trusting relationship with the patient through active listening and therapeutic communication  - Assess patient's level of functioning, behavior and potential for risk  - Engage patient in 1 on 1 interactions  - Encourage patient to express fears, feelings, frustrations, and discuss symptoms    - [place] patient to reality, help patient recognize reality-based thinking   - Administer medications as ordered and assess for potential side effects  - Provide the patient education related to the signs and symptoms of the illness and desired effects of prescribed medications  Interventions:  - Assess and re-assess patient's lethality and potential for self-injury  - Engage patient in 1:1 interactions, daily, for a minimum of 15 minutes  - Encourage patient to express feelings, fears, frustrations, hopes  - Establish rapport/trust with patient   Interventions:  - Assess and re-assess patient's level of risk   - Engage patient in 1:1 interactions, daily, for a minimum of 15 minutes   - Encourage patient to express feelings, fears, frustrations, hopes   Interventions:  - Assess and re-assess patient's level of risk, every waking shift  - Engage patient in 1:1 interactions, daily, for a minimum of 15 minutes   - Allow patient to express feelings and frustrations in a safe and non-threatening manner   - Establish rapport/trust with patient   Outcome: Progressing

## 2022-06-18 NOTE — PROGRESS NOTES
06/18/22 1015   Activity/Group Checklist   Group Other (Comment)  (OPEN STUDIO Art Therapy/Social Group, Free-Expression)   Attendance Attended   Attendance Duration (min) Greater than 60   Interactions Interacted appropriately   Affect/Mood Appropriate   Goals Achieved Able to listen to others; Able to engage in interactions

## 2022-06-18 NOTE — NURSING NOTE
Leon Arriola was visible at times on the unit  He was cooperative with waiting for his nicorette gum  He continuous to be fixated on discharge  He asked "can you see about me leaving sooner? I have been going to groups and taking my med " gum was given at 2032  He didn't not attend group this evening

## 2022-06-18 NOTE — PROGRESS NOTES
Progress Note - Geoff 24 y o  male MRN: [id number]   Unit/Bed#: [facility] 112-01 Encounter: [id number]    Behavior over the last 24 hours: slowly improving  Nestora Pages is seen and evaluated today  Per nursing, patient has been intermittently visible on the unit  He needed redirection multiple times for multiple requests at nursing station  He is fixated on discharge  He he is compliant with meals and medication, did refuse lunch yesterday  Today he is found lying in bed, he states that his mood is good  He presents as constricted, with poor eye contact  He tells this writer that he does not need any help, he is ready for discharge and feels that he should no longer be in the hospital   When asked about his medications, he states I guess my meds are okay  He denies any side effects with the medications  He admits to feeling a little tired today, but states that he did get up for breakfast   He denies depression or anxiety, denies SI/HI/AH/VH  He denies any physical complaints today  Later, he approaches this writer on the unit and apologizes for being so short with the interview  He states that he was feeling tired, but adds that he does still feel that he is ready for discharge and and tells this writer that he is going to be discharged most likely "very soon"  Sleep: normal  Appetite: normal  Medication side effects: No   ROS: no complaints, all other systems are negative    Mental Status Evaluation:    Appearance:  Casually dressed, adequately groomed, laying in bed   Behavior:  Calm, minimally cooperative, dismissive   Motor: no abnormal movements, gait with slight limp while using a cane   Speech:  Normal rate, rhythm, scant   Mood:  "good"   Affect:  Constricted    Thought Process:  organized, goal directed   Thought Content: No paranoid delusions elicited on interview  No current s/h thoughts, intent, or plans  No distorted body perceptions or phobias  Perceptual disturbances: denies current hallucinations and does not appear to be responding to internal stimuli at this time   Risk Potential: No active or passive suicidal or homicidal ideation was verbalized during interview   Cognition: oriented to person, place, time, and situation and appears to be of average intelligence   Insight:  Improving   Judgment: Limited       Vital signs in last 24 hours:    Temp:  [97 7 °F (36 5 °C)-97 8 °F (36 6 °C)] 97 7 °F (36 5 °C)  HR:  [104-118] 118  Resp:  [18] 18  BP: (121-132)/(82-86) 132/82    Laboratory results: I have personally reviewed all pertinent laboratory/tests results    Results from the past 24 hours: No results found for this or any previous visit (from the past 24 hour(s))  Progress Toward Goals: progressing    Assessment/Plan   Principal Problem:    Schizophrenia ([facility] )  Active Problems:    Medical clearance for psychiatric admission    Seizure disorder [facility])    Traumatic brain injury ([facility] )    Brain lesion    History of cardiac radiofrequency ablation    History of fall    PTSD (post-traumatic stress disorder)      Recommended Treatment:     Planned medication and treatment changes:     All current active medications have been reviewed  Encourage group therapy, milieu therapy and occupational therapy  Behavioral Health checks every 7 minutes  Continue current medications:    Current Facility-Administered Medications   Medication Dose Route Frequency Provider Last Rate    acetaminophen  650 mg Oral Q6H PRN Yvone Hobbs, DO      acetaminophen  650 mg Oral Q4H PRN Yvone Joshua, DO      acetaminophen  975 mg Oral Q6H PRN Yvone Hobbs, DO      aluminum-magnesium hydroxide-simethicone  30 mL Oral Q4H PRN Yvone Joshua, DO      ammonium lactate   Topical BID Adalberto Kennedy DPM      artificial tear  1 application Both Eyes L8Y PRN Yvone Hobbs, DO      aspirin  81 mg Oral Daily Kashif Agosto,       benztropine  1 mg Intramuscular Q4H PRN Max 6/day Haig Parker, DO      benztropine  1 mg Oral Q4H PRN Max 6/day [facility]      cloZAPine  200 mg Oral BID Haig Parker, DO      hydrOXYzine HCL  50 mg Oral Q6H PRN Max 4/day [facility], DO      Or    diphenhydrAMINE  50 mg Intramuscular Q6H PRN [facility], DO      gabapentin  400 mg Oral TID Haig Parker, DO      hydrOXYzine HCL  100 mg Oral Q6H PRN Max 4/day [facility], DO      Or    LORazepam  2 mg Intramuscular Q6H PRN [facility] Parker, DO      hydrOXYzine HCL  25 mg Oral Q6H PRN Max 4/day [facility] Parker, DO      levETIRAcetam  500 mg Oral Q12H [facility] Hai Parker, DO      melatonin  9 mg Oral HS PRN [facility] Fermin[facility]arker, DO      metFORMIN  850 mg Oral BID With Meals Edyta Zuniga MD      metoprolol succinate  50 mg Oral Daily Haimary FerminParker, DO      nicotine polacrilex  2 mg Oral Q2H PRN [facility], DO      OLANZapine  10 mg Oral Q3H PRN Max 3/day [facility] Stewart, DO      Or    OLANZapine  10 mg Intramuscular Q3H PRN Max 3/day [facility] Stewart, DO      OLANZapine  5 mg Oral Q3H PRN Max 6/day [facility] Parker, DO      Or    OLANZapine  5 mg Intramuscular Q3H PRN Max 6/day Kashif A Prayson, DO      OLANZapine  2 5 mg Oral Q3H PRN Max 8/day Kashif A Prayson, DO      polyethylene glycol  17 g Oral Daily PRN [facility] Parker, DO      propranolol  10 mg Oral Q8H PRN [facility] Parker, DO      senna  1 tablet Oral Daily Susie Gutierrez MD      senna-docusate sodium  1 tablet Oral Daily PRN Edyta Zuniga MD      traZODone  50 mg Oral HS PRN [facility] Stewart, DO       Risks / Benefits of Treatment:    Risks, benefits, and possible side effects of medications explained to patient and patient verbalizes understanding and agreement for treatment  Counseling / Coordination of Care:    Patient's progress discussed with staff in treatment team meeting    Medications, treatment progress and treatment plan reviewed with patient      Marcella Donaldson PA-C 06/17/22

## 2022-06-18 NOTE — NURSING NOTE
Nohemy Fierro has been sleeping since shift change and continues to do so at this time  Seven minute patient checks maintained  No issues noted

## 2022-06-19 PROCEDURE — 99232 SBSQ HOSP IP/OBS MODERATE 35: CPT | Performed by: PHYSICIAN ASSISTANT

## 2022-06-19 RX ADMIN — NICOTINE POLACRILEX 2 MG: 2 GUM, CHEWING ORAL at 17:12

## 2022-06-19 RX ADMIN — CLOZAPINE 200 MG: 200 TABLET ORAL at 08:57

## 2022-06-19 RX ADMIN — ALUMINUM HYDROXIDE, MAGNESIUM HYDROXIDE, AND SIMETHICONE 30 ML: 200; 200; 20 SUSPENSION ORAL at 10:01

## 2022-06-19 RX ADMIN — LEVETIRACETAM 500 MG: 500 TABLET, FILM COATED ORAL at 21:10

## 2022-06-19 RX ADMIN — METOPROLOL SUCCINATE 50 MG: 50 TABLET, EXTENDED RELEASE ORAL at 08:57

## 2022-06-19 RX ADMIN — LEVETIRACETAM 500 MG: 500 TABLET, FILM COATED ORAL at 08:57

## 2022-06-19 RX ADMIN — GABAPENTIN 400 MG: 400 CAPSULE ORAL at 17:11

## 2022-06-19 RX ADMIN — AMMONIUM LACTATE: 17 LOTION TOPICAL at 08:59

## 2022-06-19 RX ADMIN — NICOTINE POLACRILEX 2 MG: 2 GUM, CHEWING ORAL at 08:57

## 2022-06-19 RX ADMIN — GABAPENTIN 400 MG: 400 CAPSULE ORAL at 21:10

## 2022-06-19 RX ADMIN — CLOZAPINE 200 MG: 200 TABLET ORAL at 21:10

## 2022-06-19 RX ADMIN — METFORMIN HYDROCHLORIDE 850 MG: 850 TABLET, FILM COATED ORAL at 08:59

## 2022-06-19 RX ADMIN — NICOTINE POLACRILEX 2 MG: 2 GUM, CHEWING ORAL at 14:10

## 2022-06-19 RX ADMIN — ASPIRIN 81 MG CHEWABLE TABLET 81 MG: 81 TABLET CHEWABLE at 08:56

## 2022-06-19 RX ADMIN — SENNOSIDES 8.6 MG: 8.6 TABLET, FILM COATED ORAL at 08:57

## 2022-06-19 RX ADMIN — GABAPENTIN 400 MG: 400 CAPSULE ORAL at 08:57

## 2022-06-19 RX ADMIN — METFORMIN HYDROCHLORIDE 850 MG: 850 TABLET, FILM COATED ORAL at 17:11

## 2022-06-19 RX ADMIN — ACETAMINOPHEN 650 MG: 325 TABLET ORAL at 16:01

## 2022-06-19 RX ADMIN — NICOTINE POLACRILEX 2 MG: 2 GUM, CHEWING ORAL at 19:17

## 2022-06-19 NOTE — PROGRESS NOTES
Progress Note - Geoff 24 y o  male MRN: [id number]   Unit/Bed#: [facility] 112-01 Encounter: [id number]    Behavior over the last 24 hours: jim Albarado is seen and evaluated today  Per nursing, patient remains compliant with meals and medications  He is fixated on being discharged  He does attend some groups, socializes with some peers  He presented to the nursing station requesting multiple medications for bowel regimen, including milk of magnesium and Senokot  He eventually took MiraLax  Per nursing, it is unclear whether or not patient even required a medication for his bowels at the time  Today he is preparing to go to group, he agrees to meet with this writer, though appears irritated  He is constricted, demonstrates poor eye contact, and appears irritated with writer's questions  When asked about mood, he states everything is fine  He perseverates on wanting to be discharged as soon as possible, states that he is tired of waiting around  He states that he is supposed to start with a new [employer] group, but is not sure why discharge is not happening until July 1  He states that he wants to get back to work, wants the get back to studying for his GED  When writer asks what he would like to pursue after finishing his GED, he becomes irritated in says that he want to become a , but with his broken left leg, he will not be able to do that career anymore  Writer offers reassurance, reminds patient that 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 Mirtha Leonardo states that the interview has concluded, but assures him that if he has further concerns or questions, writer will be available all day  Sleep: normal  Appetite: normal  Medication side effects: No   ROS: no complaints, all other systems are negative    Mental Status Evaluation:  Appearance:  Casually dressed, adequately groomed, walks with a cane   Behavior:  Calm,cooperative, dismissive, irritable edge   Motor: no abnormal movements, gait with slight limp while using a cane   Speech:  Normal rate, rhythm, does not want to talk   Mood:  "good"   Affect:  Constricted, irritable   Thought Process:  Perseverative, negative thinking   Thought Content: No paranoid delusions elicited on interview  No current s/h thoughts, intent, or plans  No distorted body perceptions or phobias  Perceptual disturbances: denies current hallucinations and does not appear to be responding to internal stimuli at this time   Risk Potential: No active or passive suicidal or homicidal ideation was verbalized during interview   Cognition: oriented to person, place, time, and situation and appears to be of average intelligence   Insight:  limited   Judgment: Limited         Vital signs in last 24 hours:    Temp:  [97 2 °F (36 2 °C)-97 7 °F (36 5 °C)] 97 2 °F (36 2 °C)  HR:  [] 76  Resp:  [18] 18  BP: (128-160)/(64-90) 128/64    Laboratory results: I have personally reviewed all pertinent laboratory/tests results    Results from the past 24 hours: No results found for this or any previous visit (from the past 24 hour(s))      Progress Toward Goals: progressing    Assessment/Plan   Principal Problem:    Schizophrenia ([facility] )  Active Problems:    Medical clearance for psychiatric admission    Seizure disorder [facility])    Traumatic brain injury ([facility] )    Brain lesion    History of cardiac radiofrequency ablation    History of fall    PTSD (post-traumatic stress disorder)      Recommended Treatment: Planned medication and treatment changes: All current active medications have been reviewed  Encourage group therapy, milieu therapy and occupational therapy  Behavioral Health checks every 7 minutes   Weekly cbc w/diff for Clozaril monitoring  Next level for 6/21/22    Continue current medications:    Current Facility-Administered Medications   Medication Dose Route Frequency Provider Last Rate    acetaminophen  650 mg Oral Q6H PRN Bernell Setting, DO      acetaminophen  650 mg Oral Q4H PRN Bernell Setting, DO      acetaminophen  975 mg Oral Q6H PRN Bernell Setting, DO      aluminum-magnesium hydroxide-simethicone  30 mL Oral Q4H PRN Bernell Setting, DO      ammonium lactate   Topical BID Bre Branch, DPM      artificial tear  1 application Both Eyes I1M PRN Bernell Setting, DO      aspirin  81 mg Oral Daily Bernell Setting, DO      benztropine  1 mg Intramuscular Q4H PRN Max 6/day Bernell Setting, DO      benztropine  1 mg Oral Q4H PRN Max 6/day Bernell Setting, DO      cloZAPine  200 mg Oral BID Bernell Setting, DO      hydrOXYzine HCL  50 mg Oral Q6H PRN Max 4/day Bernell Setting, DO      Or    diphenhydrAMINE  50 mg Intramuscular Q6H PRN Bernell Setting, DO      gabapentin  400 mg Oral TID Bernell Setting, DO      hydrOXYzine HCL  100 mg Oral Q6H PRN Max 4/day Bernell Setting, DO      Or    LORazepam  2 mg Intramuscular Q6H PRN Bernell Setting, DO      hydrOXYzine HCL  25 mg Oral Q6H PRN Max 4/day Bernell Setting, DO      levETIRAcetam  500 mg Oral Q12H [address] Kashif HELADIO Agosto, DO      melatonin  9 mg Oral HS PRN Bernell Setting, DO      metFORMIN  850 mg Oral BID With Meals Joan Parra MD      metoprolol succinate  50 mg Oral Daily Bernell Setting, DO      nicotine polacrilex  2 mg Oral Q2H PRN Bernell Setting, DO      OLANZapine  10 mg Oral Q3H PRN Max 3/day Bernell Setting, DO      Or    OLANZapine  10 mg Intramuscular Q3H PRN Max 3/day Derrill Babinski Prayson, DO      OLANZapine  5 mg Oral Q3H PRN Max 6/day Sandria Grater, DO      Or    OLANZapine  5 mg Intramuscular Q3H PRN Max 6/day Sandria Grater, DO      OLANZapine  2 5 mg Oral Q3H PRN Max 8/day Sandria Grater, DO      polyethylene glycol  17 g Oral Daily PRN Sandria Grater, DO      propranolol  10 mg Oral Q8H PRN Sandria Grater, DO      senna  1 tablet Oral Daily Ally Quiroga MD      senna-docusate sodium  1 tablet Oral Daily PRN Carie Levy MD      traZODone  50 mg Oral HS PRN Sandria Grater, DO       Risks / Benefits of Treatment:    Risks, benefits, and possible side effects of medications explained to patient and patient verbalizes understanding and agreement for treatment  Counseling / Coordination of Care:    Patient's progress discussed with staff in treatment team meeting  Medications, treatment progress and treatment plan reviewed with patient      Marcella Donaldson PA-C 06/18/22

## 2022-06-19 NOTE — NURSING NOTE
Jessica Conroy has been visible intermittently in the milieu  Denies anxiety and depression  Minimal interaction with peers  Able to make needs known  He was at the nurses station several times with medication requests  When it was not time for one pill he would ask for another pill  Nicotine gum was given at [phone], 1847 and 2112  He was requesting Milk of Magnesia, which he does not have ordered  Did not want Senokot  Took Miralax at [phone]  Was asking about Tylenol at [phone] for 7/10 left ankle pain, but it was not time  Later stated that he did not need it  Ambulated without use of his cane  Ate 75% of his meal  Took all medication that was scheduled, except for Lac Hydrin lotion  Talks about being "discharged in 2 weeks"  Attended Evening group  Continue to monitor  Precautions maintained

## 2022-06-19 NOTE — NURSING NOTE
Rodo was in bed with her eyes closed, resting comfortably, with no signs of distress while being monitored throughout the night on Q 7 minute rounds

## 2022-06-19 NOTE — NURSING NOTE
Nessa Chapa continues to be compliant with medication and meals  Patient is social  With select peers  He continues ti be fixated on discharge He was told several times by  Every staff his plan and when his discharge date is possible  But he continues to ask the same question every day

## 2022-06-19 NOTE — PLAN OF CARE
Problem: Alteration in Thoughts and Perception  Goal: Treatment Goal: Gain control of psychotic behaviors/thinking, reduce/eliminate presenting symptoms and demonstrate improved reality functioning upon discharge  Outcome: Progressing  Goal: Verbalize thoughts and feelings  Description: Interventions:  - Promote a nonjudgmental and trusting relationship with the patient through active listening and therapeutic communication  - Assess patient's level of functioning, behavior and potential for risk  - Engage patient in 1 on 1 interactions  - Encourage patient to express fears, feelings, frustrations, and discuss symptoms    - [place] patient to reality, help patient recognize reality-based thinking   - Administer medications as ordered and assess for potential side effects  - Provide the patient education related to the signs and symptoms of the illness and desired effects of prescribed medications  Interventions:  - Assess and re-assess patient's lethality and potential for self-injury  - Engage patient in 1:1 interactions, daily, for a minimum of 15 minutes  - Encourage patient to express feelings, fears, frustrations, hopes  - Establish rapport/trust with patient   Interventions:  - Assess and re-assess patient's level of risk   - Engage patient in 1:1 interactions, daily, for a minimum of 15 minutes   - Encourage patient to express feelings, fears, frustrations, hopes   Interventions:  - Assess and re-assess patient's level of risk, every waking shift  - Engage patient in 1:1 interactions, daily, for a minimum of 15 minutes   - Allow patient to express feelings and frustrations in a safe and non-threatening manner   - Establish rapport/trust with patient   Outcome: Not Progressing  Goal: Refrain from acting on delusional thinking/internal stimuli  Description: Interventions:  - Monitor patient closely, per order   - Utilize least restrictive measures   - Set reasonable limits, give positive feedback for acceptable   - Administer medications as ordered and monitor of potential side effects  Outcome: Progressing  Goal: Agree to be compliant with medication regime, as prescribed and report medication side effects  Description: Interventions:  - Offer appropriate PRN medication and supervise ingestion; conduct AIMS, as needed   Outcome: Progressing  Goal: Recognize dysfunctional thoughts, communicate reality-based thoughts at the time of discharge  Description: Interventions:  - Provide medication and psycho-education to assist patient in compliance and developing insight into his/her illness   Outcome: Progressing     Problem: Ineffective Coping  Goal: Identifies ineffective coping skills  Outcome: Not Progressing  Goal: Identifies healthy coping skills  Outcome: Progressing  Goal: Demonstrates healthy coping skills  Outcome: Progressing  Goal: Participates in unit activities  Description: Interventions:  - Provide therapeutic environment   - Provide required programming   - Redirect inappropriate behaviors   Outcome: Not Progressing  Goal: Patient/Family participate in treatment and DC plans  Description: Interventions:  - Provide therapeutic environment  Outcome: Progressing  Goal: Patient/Family verbalizes awareness of resources  Outcome: Progressing  Goal: Understands least restrictive measures  Description: Interventions:  - Utilize least restrictive behavior  Outcome: Progressing     Problem: Risk for Self Injury/Neglect  Goal: Verbalize thoughts and feelings  Description: Interventions:  - Promote a nonjudgmental and trusting relationship with the patient through active listening and therapeutic communication  - Assess patient's level of functioning, behavior and potential for risk  - Engage patient in 1 on 1 interactions  - Encourage patient to express fears, feelings, frustrations, and discuss symptoms    - [place] patient to reality, help patient recognize reality-based thinking   - Administer medications as ordered and assess for potential side effects  - Provide the patient education related to the signs and symptoms of the illness and desired effects of prescribed medications  Interventions:  - Assess and re-assess patient's lethality and potential for self-injury  - Engage patient in 1:1 interactions, daily, for a minimum of 15 minutes  - Encourage patient to express feelings, fears, frustrations, hopes  - Establish rapport/trust with patient   Interventions:  - Assess and re-assess patient's level of risk   - Engage patient in 1:1 interactions, daily, for a minimum of 15 minutes   - Encourage patient to express feelings, fears, frustrations, hopes   Interventions:  - Assess and re-assess patient's level of risk, every waking shift  - Engage patient in 1:1 interactions, daily, for a minimum of 15 minutes   - Allow patient to express feelings and frustrations in a safe and non-threatening manner   - Establish rapport/trust with patient   Outcome: Not Progressing  Goal: Treatment Goal: Remain safe during length of stay, learn and adopt new coping skills, and be free of self-injurious ideation, impulses and acts at the time of discharge  Outcome: Not Progressing  Goal: Refrain from harming self  Description: Interventions:  - Monitor patient closely, per order  - Develop a trusting relationship  - Supervise medication ingestion, monitor effects and side effects   Outcome: Progressing  Goal: Recognize maladaptive responses and adopt new coping mechanisms  Outcome: Not Progressing     Problem: Anxiety  Goal: Anxiety is at manageable level  Description: Interventions:  - Assess and monitor patient's anxiety level  - Monitor for signs and symptoms (heart palpitations, chest pain, shortness of breath, headaches, nausea, feeling jumpy, restlessness, irritable, apprehensive)  - Collaborate with interdisciplinary team and initiate plan and interventions as ordered    - [place] patient to unit/surroundings  - Explain treatment plan  - Encourage participation in care  - Encourage verbalization of concerns/fears  - Identify coping mechanisms  - Assist in developing anxiety-reducing skills  - Administer/offer alternative therapies  - Limit or eliminate stimulants  Outcome: Progressing     Problem: Risk for Violence/Aggression Toward Others  Goal: Verbalize thoughts and feelings  Description: Interventions:  - Promote a nonjudgmental and trusting relationship with the patient through active listening and therapeutic communication  - Assess patient's level of functioning, behavior and potential for risk  - Engage patient in 1 on 1 interactions  - Encourage patient to express fears, feelings, frustrations, and discuss symptoms    - [place] patient to reality, help patient recognize reality-based thinking   - Administer medications as ordered and assess for potential side effects  - Provide the patient education related to the signs and symptoms of the illness and desired effects of prescribed medications  Interventions:  - Assess and re-assess patient's lethality and potential for self-injury  - Engage patient in 1:1 interactions, daily, for a minimum of 15 minutes  - Encourage patient to express feelings, fears, frustrations, hopes  - Establish rapport/trust with patient   Interventions:  - Assess and re-assess patient's level of risk   - Engage patient in 1:1 interactions, daily, for a minimum of 15 minutes   - Encourage patient to express feelings, fears, frustrations, hopes   Interventions:  - Assess and re-assess patient's level of risk, every waking shift  - Engage patient in 1:1 interactions, daily, for a minimum of 15 minutes   - Allow patient to express feelings and frustrations in a safe and non-threatening manner   - Establish rapport/trust with patient   Outcome: Not Progressing  Goal: Treatment Goal: Refrain from acts of violence/aggression during length of stay, and demonstrate improved impulse control at the time of discharge  Outcome: Progressing  Goal: Refrain from harming others  Outcome: Progressing  Goal: Refrain from destructive acts on the environment or property  Outcome: Progressing  Goal: Control angry outbursts  Description: Interventions:  - Monitor patient closely, per order  - Ensure early verbal de-escalation  - Monitor prn medication needs  - Set reasonable/therapeutic limits, outline behavioral expectations, and consequences   - Provide a non-threatening milieu, utilizing the least restrictive interventions   Outcome: Progressing  Goal: Identify appropriate positive anger management techniques  Description: Interventions:  - Offer anger management and coping skills groups   - Staff will provide positive feedback for appropriate anger control  Outcome: Progressing

## 2022-06-19 NOTE — PLAN OF CARE
Problem: Alteration in Thoughts and Perception  Goal: Treatment Goal: Gain control of psychotic behaviors/thinking, reduce/eliminate presenting symptoms and demonstrate improved reality functioning upon discharge  Outcome: Progressing  Goal: Verbalize thoughts and feelings  Description: Interventions:  - Promote a nonjudgmental and trusting relationship with the patient through active listening and therapeutic communication  - Assess patient's level of functioning, behavior and potential for risk  - Engage patient in 1 on 1 interactions  - Encourage patient to express fears, feelings, frustrations, and discuss symptoms    - [place] patient to reality, help patient recognize reality-based thinking   - Administer medications as ordered and assess for potential side effects  - Provide the patient education related to the signs and symptoms of the illness and desired effects of prescribed medications  Interventions:  - Assess and re-assess patient's lethality and potential for self-injury  - Engage patient in 1:1 interactions, daily, for a minimum of 15 minutes  - Encourage patient to express feelings, fears, frustrations, hopes  - Establish rapport/trust with patient   Interventions:  - Assess and re-assess patient's level of risk   - Engage patient in 1:1 interactions, daily, for a minimum of 15 minutes   - Encourage patient to express feelings, fears, frustrations, hopes   Interventions:  - Assess and re-assess patient's level of risk, every waking shift  - Engage patient in 1:1 interactions, daily, for a minimum of 15 minutes   - Allow patient to express feelings and frustrations in a safe and non-threatening manner   - Establish rapport/trust with patient   Outcome: Progressing  Goal: Refrain from acting on delusional thinking/internal stimuli  Description: Interventions:  - Monitor patient closely, per order   - Utilize least restrictive measures   - Set reasonable limits, give positive feedback for acceptable - Administer medications as ordered and monitor of potential side effects  Outcome: Progressing  Goal: Agree to be compliant with medication regime, as prescribed and report medication side effects  Description: Interventions:  - Offer appropriate PRN medication and supervise ingestion; conduct AIMS, as needed   Outcome: Progressing  Goal: Recognize dysfunctional thoughts, communicate reality-based thoughts at the time of discharge  Description: Interventions:  - Provide medication and psycho-education to assist patient in compliance and developing insight into his/her illness   Outcome: Progressing

## 2022-06-20 PROCEDURE — 99232 SBSQ HOSP IP/OBS MODERATE 35: CPT

## 2022-06-20 RX ADMIN — NICOTINE POLACRILEX 2 MG: 2 GUM, CHEWING ORAL at 16:29

## 2022-06-20 RX ADMIN — METFORMIN HYDROCHLORIDE 850 MG: 850 TABLET, FILM COATED ORAL at 17:36

## 2022-06-20 RX ADMIN — ACETAMINOPHEN 650 MG: 325 TABLET ORAL at 15:46

## 2022-06-20 RX ADMIN — METOPROLOL SUCCINATE 50 MG: 50 TABLET, EXTENDED RELEASE ORAL at 08:53

## 2022-06-20 RX ADMIN — SENNOSIDES 8.6 MG: 8.6 TABLET, FILM COATED ORAL at 08:53

## 2022-06-20 RX ADMIN — METFORMIN HYDROCHLORIDE 850 MG: 850 TABLET, FILM COATED ORAL at 08:53

## 2022-06-20 RX ADMIN — NICOTINE POLACRILEX 2 MG: 2 GUM, CHEWING ORAL at 12:04

## 2022-06-20 RX ADMIN — NICOTINE POLACRILEX 2 MG: 2 GUM, CHEWING ORAL at 18:22

## 2022-06-20 RX ADMIN — CLOZAPINE 200 MG: 200 TABLET ORAL at 21:05

## 2022-06-20 RX ADMIN — NICOTINE POLACRILEX 2 MG: 2 GUM, CHEWING ORAL at 08:53

## 2022-06-20 RX ADMIN — GABAPENTIN 400 MG: 400 CAPSULE ORAL at 08:53

## 2022-06-20 RX ADMIN — LEVETIRACETAM 500 MG: 500 TABLET, FILM COATED ORAL at 21:05

## 2022-06-20 RX ADMIN — GABAPENTIN 400 MG: 400 CAPSULE ORAL at 21:05

## 2022-06-20 RX ADMIN — GABAPENTIN 400 MG: 400 CAPSULE ORAL at 17:36

## 2022-06-20 RX ADMIN — CLOZAPINE 200 MG: 200 TABLET ORAL at 08:53

## 2022-06-20 RX ADMIN — LEVETIRACETAM 500 MG: 500 TABLET, FILM COATED ORAL at 08:53

## 2022-06-20 RX ADMIN — ASPIRIN 81 MG CHEWABLE TABLET 81 MG: 81 TABLET CHEWABLE at 08:53

## 2022-06-20 NOTE — PROGRESS NOTES
06/20/22 1100   Activity/Group Checklist   Group Wellness   Attendance Attended   Attendance Duration (min) 46-60   Interactions Interacted appropriately   Affect/Mood Appropriate;Calm;Constricted   Goals Achieved Able to listen to others; Able to engage in interactions; Discussed self-esteem issues; Able to self-disclose

## 2022-06-20 NOTE — PROGRESS NOTES
Progress Note - Behavioral Health   Ary Bridges 24 y o  male MRN: [id number]  Unit/Bed#: [facility] 112-01 Encounter: [id number]    Assessment/Plan   Principal Problem:    Schizophrenia [facility])  Active Problems:    Medical clearance for psychiatric admission    Seizure disorder [facility])    Traumatic brain injury ([address] )    Brain lesion    History of cardiac radiofrequency ablation    History of fall    PTSD (post-traumatic stress disorder)    ROS: None reported  NO SEIZURES  Patient denies any new episodes of acid reflux  He reports that his leg feels much better but at times he still gets scared that it will give out  Uses a cane to ambulate  Diagnosis: Schizophrenia  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  Recommended Treatment:   Continue current medication regimen:   -Clozapine   -Gabapentin    -Keppra  Encouraged pt to continue PT exercises  Continue with group therapy, milieu therapy and occupational therapy  Continue frequent safety checks and vitals per unit protocol  Case discussed with treatment team   Continue with [facility] medical management as indicated  Continue coordinating with case management regarding disposition  Risks, benefits and possible side effects of Medications: Risks, benefits, and possible side effects of medications have previously been explained  No new medications at this time  ------------------------------------------------------------    Subjective: Per nursing report, Peter Robbins has been cooperative on the unit and compliant with medications  He remains very discharged focused  He received Tylenol PRN for leg pain  He attended 3 out of 7 groups on Friday  Today, Peter Robbins is consenting for safety on the unit  He was in group and agreed that it was okay to go back to his room to talk  He reports feeling "alright " He states that he is looking forward to discharge, and he feels that 10 days is such a long time to wait  He remains very focused on discharge   He reports that he has been attending about 1/2 of groups, but he still feels very bored on the unit  He attended 3 out of 7 groups on Friday  Patient was encouraged to do other activities he said he enjoys too like reading the bible and studying for his GED  He was reminded that it is important to continue to attend groups and get as much out of the programs offered, while he is still here for the next 10 days  He reports that his plans for the day are to continue to go to groups and maybe read his bible  He was originally admitted to [facility] because the patient's mom felt "his medications were off," agitation, and homicidal ideation  He reports that he no longer having these thoughts and "just wants to go home " Piotr Bartlett notes having slept well with no nightmares, frequent awakenings, or early awakenings  iPotr Bartlett states having a diminshed appetite, which he believes "smoking weed" would improve  He reports that he does not want Dr Benjy King to know about his diminished appetite because he feels it may delay his discharge  Piotr Bartlett has been taking the medications as prescribed and reporting no side effects  He reports that he has not had any new episodes of heartburn  His leg feels better, and he states he has been doing the PT exercises  He still feels frightened at time that it may give out on him  No significant events overnight  Not aggressive, self injurious, or destructive on the unit    Psychiatric Review of Systems:  Behavior over the last 24 hours: unchanged  Sleep: normal  Appetite: adequate, decreased from baseline  Medication side effects: none verbalized  Medication compliance: good  Group attendance: 3 out of 7  Significant events: none    PRNs overnight: Tylenol for leg pain  VS: Reviewed, within normal limits    Progress Toward Goals: Minimal improvement patient remained fixated on discharge during today's conversation      Vital signs in last 24 hours:  Temp:  [98 °F (36 7 °C)] 98 °F (36 7 °C)  HR:  [98] 98  Resp: [18] 18  BP: (120)/(70) 120/70    Mental Status Exam:  Appearance:  alert, good eye contact, appears stated age, casually dressed and appropriate grooming and hygiene  Patient was in group, and we then walked back to his room where he sat on the edge of the bed comfortably  Behavior:  calm and cooperative   Motor: no abnormal movements, gait with slight limp while using a cane   Speech:  spontaneous, normal rate, not pressured, normal volume and coherent   Mood:  euthymic   Affect:  mood-congruent and euthymic   Thought Process:  organized, goal directed   Thought Content: paranoid ideation, intrusive thoughts, ruminating thoughts, ideas of reference  No current s/h thoughts, intent, or plans  No distorted body perceptions or phobias     Perceptual disturbances: denies current hallucinations and does not appear to be responding to internal stimuli at this time   Risk Potential: No active or passive suicidal or homicidal ideation was verbalized during interview   Cognition: oriented to person, place, time, and situation and appears to be of average intelligence   Insight:  Limited   Judgment: Limited   Sensorium: alert and awake  Orientation: oriented x 4  Attention: Attention span and concentration are age appropriate  Intellect: Appears to be average intelligence    Current Medications:  Current Facility-Administered Medications   Medication Dose Route Frequency Provider Last Rate    acetaminophen  650 mg Oral Q6H PRN Asia Barrett, DO      acetaminophen  650 mg Oral Q4H PRN Asia Barrett, DO      acetaminophen  975 mg Oral Q6H PRN Asia Barrett, DO      aluminum-magnesium hydroxide-simethicone  30 mL Oral Q4H PRN Asia Barrett,       ammonium lactate   Topical BID Ramonia Wasatch, DPM      artificial tear  1 application Both Eyes P9K PRN Asia Barrett,       aspirin  81 mg Oral Daily Asia Barrett, DO      benztropine  1 mg Intramuscular Q4H PRN Max 6/day Asia Barrett,   benztropine  1 mg Oral Q4H PRN Max 6/day Verner Pickering, DO      cloZAPine  200 mg Oral BID Verner Pickering, DO      hydrOXYzine HCL  50 mg Oral Q6H PRN Max 4/day Verner Pickering, DO      Or    diphenhydrAMINE  50 mg Intramuscular Q6H PRN Verner Pickering, DO      gabapentin  400 mg Oral TID Verner Pickering, DO      hydrOXYzine HCL  100 mg Oral Q6H PRN Max 4/day Verner Pickering, DO      Or    LORazepam  2 mg Intramuscular Q6H PRN Verner Pickering, DO      hydrOXYzine HCL  25 mg Oral Q6H PRN Max 4/day Verner Pickering, DO      levETIRAcetam  500 mg Oral Q12H [address] Kashif A Prayson, DO      melatonin  9 mg Oral HS PRN Verner Pickering, DO      metFORMIN  850 mg Oral BID With Meals Alysia Velez MD      metoprolol succinate  50 mg Oral Daily Verner Pickering, DO      nicotine polacrilex  2 mg Oral Q2H PRN Verner Pickering, DO      OLANZapine  10 mg Oral Q3H PRN Max 3/day Verner Pickering, DO      Or    OLANZapine  10 mg Intramuscular Q3H PRN Max 3/day Verner Pickering, DO      OLANZapine  5 mg Oral Q3H PRN Max 6/day Kashif A Prayson, DO      Or    OLANZapine  5 mg Intramuscular Q3H PRN Max 6/day Kashif A Prayson, DO      OLANZapine  2 5 mg Oral Q3H PRN Max 8/day Kashif A Prayson, DO      polyethylene glycol  17 g Oral Daily PRN Verner Pickering, DO      propranolol  10 mg Oral Q8H PRN Verner Pickering, DO      senna  1 tablet Oral Daily Felix Salas MD      senna-docusate sodium  1 tablet Oral Daily PRN Alysia Velez MD      traZODone  50 mg Oral HS PRN Verner Pickering, DO         Behavioral Health Medications: all current active meds have been reviewed  Changes as in plan section above  Laboratory results:  I have personally reviewed all pertinent laboratory/tests results  No results found for this or any previous visit (from the past 48 hour(s))  This note has been constructed using a voice recognition system   There may be translation, syntax, or grammatical errors  If you have any questions, please contact the dictating author      JOHNATHAN Ibrahim

## 2022-06-20 NOTE — NURSING NOTE
Patient is visible on the unit  He is forgetful  He asks for something and then will go to his room and not be around to take what it was he had asked for  He received 650 mg Tylenol for #6 headache at 1600 which brought pain to #2 within the hour  He has poor insight   He talks about going home "and relaxing" stating "I'm going to take it easy" He is social with select peers but is mostly self isolative

## 2022-06-20 NOTE — NURSING NOTE
Patient sleeping in his bed  Breathing adequately  No physical or emotion distress observed  Will continue to monitor

## 2022-06-20 NOTE — PROGRESS NOTES
06/20/22 1116   Team Meeting   Meeting Type Daily Rounds   Initial Conference Date 06/20/22   Patient/Family Present   Patient Present No   Patient's Family Present No       Daily Rounds  Jeramy Fregoso MD, Merlinda Picker, Bernie Libman, Smale CPS, Patrick ARANGO  Case reviewed  Encouraged to go to groups and did improve Friday (3/7 groups)  Tylenol used for leg pain  Fixated on discharge  Plan to go home July 1st with OP follow up at [facility]

## 2022-06-20 NOTE — PROGRESS NOTES
06/20/22 1338   Team Meeting   Meeting Type Tx Team Meeting   Initial Conference Date 06/20/22   Next Conference Date 06/27/22   Team Members Present   Team Members Present Physician;; Other (Discipline and Name)   Physician Team Member Ezio He MD   Nursing Team Member Benjamin Jensen RN   Social Work Team Member Kenroy ARANGO   Other (Discipline and Name) Becca CPS   Patient/Family Present   Patient Present Yes   Patient's Family Present Yes   Family Relationship Parent   Parent Carlos People       Patient participated in his treatment team this morning  He is cooperative, with slight irritable edge but overall pleasant and fixated on discharge  He tends to ask the same questions repeatedly and also appears distracted during conversation  He reported he has not had any issues with his medications or medical concerns, and has awareness of the plan to return to [facility] for OP services, where he intends on transitioning to the partial program up to five days a week  Patient's mother also part of the call, and asked a few questions related to his treatment that were addressed by psychiatrist      Patient has not had any behavioral issues, nor has had SI/HI or self harming, nor aggression towards others  He does not appear to be responding to stimuli or paranoid  He lacks insight into his mental health but also has moments where he demonstrates some interest in wanting to know about his diagnosis and medications  DC plan for July 1st with OP at [facility], which includes medication management, group and individual therapy

## 2022-06-20 NOTE — PLAN OF CARE
Problem: Alteration in Thoughts and Perception  Goal: Treatment Goal: Gain control of psychotic behaviors/thinking, reduce/eliminate presenting symptoms and demonstrate improved reality functioning upon discharge  Outcome: Progressing  Goal: Verbalize thoughts and feelings  Description: Interventions:  - Promote a nonjudgmental and trusting relationship with the patient through active listening and therapeutic communication  - Assess patient's level of functioning, behavior and potential for risk  - Engage patient in 1 on 1 interactions  - Encourage patient to express fears, feelings, frustrations, and discuss symptoms    - [place] patient to reality, help patient recognize reality-based thinking   - Administer medications as ordered and assess for potential side effects  - Provide the patient education related to the signs and symptoms of the illness and desired effects of prescribed medications  Interventions:  - Assess and re-assess patient's lethality and potential for self-injury  - Engage patient in 1:1 interactions, daily, for a minimum of 15 minutes  - Encourage patient to express feelings, fears, frustrations, hopes  - Establish rapport/trust with patient   Interventions:  - Assess and re-assess patient's level of risk   - Engage patient in 1:1 interactions, daily, for a minimum of 15 minutes   - Encourage patient to express feelings, fears, frustrations, hopes   Interventions:  - Assess and re-assess patient's level of risk, every waking shift  - Engage patient in 1:1 interactions, daily, for a minimum of 15 minutes   - Allow patient to express feelings and frustrations in a safe and non-threatening manner   - Establish rapport/trust with patient   Outcome: Not Progressing  Goal: Refrain from acting on delusional thinking/internal stimuli  Description: Interventions:  - Monitor patient closely, per order   - Utilize least restrictive measures   - Set reasonable limits, give positive feedback for acceptable   - Administer medications as ordered and monitor of potential side effects  Outcome: Progressing  Goal: Agree to be compliant with medication regime, as prescribed and report medication side effects  Description: Interventions:  - Offer appropriate PRN medication and supervise ingestion; conduct AIMS, as needed   Outcome: Progressing  Goal: Recognize dysfunctional thoughts, communicate reality-based thoughts at the time of discharge  Description: Interventions:  - Provide medication and psycho-education to assist patient in compliance and developing insight into his/her illness   Outcome: Not Progressing     Problem: Ineffective Coping  Goal: Identifies ineffective coping skills  Outcome: Not Progressing  Goal: Identifies healthy coping skills  Outcome: Not Progressing  Goal: Demonstrates healthy coping skills  Outcome: Progressing  Goal: Participates in unit activities  Description: Interventions:  - Provide therapeutic environment   - Provide required programming   - Redirect inappropriate behaviors   Outcome: Progressing  Goal: Patient/Family participate in treatment and DC plans  Description: Interventions:  - Provide therapeutic environment  Outcome: Progressing  Goal: Patient/Family verbalizes awareness of resources  Outcome: Not Progressing  Goal: Understands least restrictive measures  Description: Interventions:  - Utilize least restrictive behavior  Outcome: Progressing  Goal: Free from restraint events  Description: - Utilize least restrictive measures   - Provide behavioral interventions   - Redirect inappropriate behaviors   Outcome: Progressing     Problem: Risk for Self Injury/Neglect  Goal: Verbalize thoughts and feelings  Description: Interventions:  - Promote a nonjudgmental and trusting relationship with the patient through active listening and therapeutic communication  - Assess patient's level of functioning, behavior and potential for risk  - Engage patient in 1 on 1 interactions  - Encourage patient to express fears, feelings, frustrations, and discuss symptoms    - [place] patient to reality, help patient recognize reality-based thinking   - Administer medications as ordered and assess for potential side effects  - Provide the patient education related to the signs and symptoms of the illness and desired effects of prescribed medications  Interventions:  - Assess and re-assess patient's lethality and potential for self-injury  - Engage patient in 1:1 interactions, daily, for a minimum of 15 minutes  - Encourage patient to express feelings, fears, frustrations, hopes  - Establish rapport/trust with patient   Interventions:  - Assess and re-assess patient's level of risk   - Engage patient in 1:1 interactions, daily, for a minimum of 15 minutes   - Encourage patient to express feelings, fears, frustrations, hopes   Interventions:  - Assess and re-assess patient's level of risk, every waking shift  - Engage patient in 1:1 interactions, daily, for a minimum of 15 minutes   - Allow patient to express feelings and frustrations in a safe and non-threatening manner   - Establish rapport/trust with patient   Outcome: Not Progressing  Goal: Treatment Goal: Remain safe during length of stay, learn and adopt new coping skills, and be free of self-injurious ideation, impulses and acts at the time of discharge  Outcome: Progressing  Goal: Refrain from harming self  Description: Interventions:  - Monitor patient closely, per order  - Develop a trusting relationship  - Supervise medication ingestion, monitor effects and side effects   Outcome: Progressing  Goal: Recognize maladaptive responses and adopt new coping mechanisms  Outcome: Not Progressing     Problem: Depression  Goal: Verbalize thoughts and feelings  Description: Interventions:  - Promote a nonjudgmental and trusting relationship with the patient through active listening and therapeutic communication  - Assess patient's level of functioning, behavior and potential for risk  - Engage patient in 1 on 1 interactions  - Encourage patient to express fears, feelings, frustrations, and discuss symptoms    - [place] patient to reality, help patient recognize reality-based thinking   - Administer medications as ordered and assess for potential side effects  - Provide the patient education related to the signs and symptoms of the illness and desired effects of prescribed medications  Interventions:  - Assess and re-assess patient's lethality and potential for self-injury  - Engage patient in 1:1 interactions, daily, for a minimum of 15 minutes  - Encourage patient to express feelings, fears, frustrations, hopes  - Establish rapport/trust with patient   Interventions:  - Assess and re-assess patient's level of risk   - Engage patient in 1:1 interactions, daily, for a minimum of 15 minutes   - Encourage patient to express feelings, fears, frustrations, hopes   Interventions:  - Assess and re-assess patient's level of risk, every waking shift  - Engage patient in 1:1 interactions, daily, for a minimum of 15 minutes   - Allow patient to express feelings and frustrations in a safe and non-threatening manner   - Establish rapport/trust with patient   Outcome: Not Progressing  Goal: Treatment Goal: Demonstrate behavioral control of depressive symptoms, verbalize feelings of improved mood/affect, and adopt new coping skills prior to discharge  Outcome: Not Progressing  Goal: Refrain from harming self  Description: Interventions:  - Monitor patient closely, per order   - Supervise medication ingestion, monitor effects and side effects   Outcome: Progressing  Goal: Refrain from isolation  Description: Interventions:  - Develop a trusting relationship   - Encourage socialization   Outcome: Not Progressing  Goal: Refrain from self-neglect  Outcome: Progressing     Problem: Anxiety  Goal: Anxiety is at manageable level  Description: Interventions:  - Assess and monitor patient's anxiety level  - Monitor for signs and symptoms (heart palpitations, chest pain, shortness of breath, headaches, nausea, feeling jumpy, restlessness, irritable, apprehensive)  - Collaborate with interdisciplinary team and initiate plan and interventions as ordered    - [place] patient to unit/surroundings  - Explain treatment plan  - Encourage participation in care  - Encourage verbalization of concerns/fears  - Identify coping mechanisms  - Assist in developing anxiety-reducing skills  - Administer/offer alternative therapies  - Limit or eliminate stimulants  Outcome: Progressing     Problem: Risk for Violence/Aggression Toward Others  Goal: Verbalize thoughts and feelings  Description: Interventions:  - Promote a nonjudgmental and trusting relationship with the patient through active listening and therapeutic communication  - Assess patient's level of functioning, behavior and potential for risk  - Engage patient in 1 on 1 interactions  - Encourage patient to express fears, feelings, frustrations, and discuss symptoms    - [place] patient to reality, help patient recognize reality-based thinking   - Administer medications as ordered and assess for potential side effects  - Provide the patient education related to the signs and symptoms of the illness and desired effects of prescribed medications  Interventions:  - Assess and re-assess patient's lethality and potential for self-injury  - Engage patient in 1:1 interactions, daily, for a minimum of 15 minutes  - Encourage patient to express feelings, fears, frustrations, hopes  - Establish rapport/trust with patient   Interventions:  - Assess and re-assess patient's level of risk   - Engage patient in 1:1 interactions, daily, for a minimum of 15 minutes   - Encourage patient to express feelings, fears, frustrations, hopes   Interventions:  - Assess and re-assess patient's level of risk, every waking shift  - Engage patient in 1:1 interactions, daily, for a minimum of 15 minutes   - Allow patient to express feelings and frustrations in a safe and non-threatening manner   - Establish rapport/trust with patient   Outcome: Not Progressing  Goal: Treatment Goal: Refrain from acts of violence/aggression during length of stay, and demonstrate improved impulse control at the time of discharge  Outcome: Progressing  Goal: Refrain from harming others  Outcome: Progressing  Goal: Refrain from destructive acts on the environment or property  Outcome: Progressing  Goal: Control angry outbursts  Description: Interventions:  - Monitor patient closely, per order  - Ensure early verbal de-escalation  - Monitor prn medication needs  - Set reasonable/therapeutic limits, outline behavioral expectations, and consequences   - Provide a non-threatening milieu, utilizing the least restrictive interventions   Outcome: Progressing  Goal: Identify appropriate positive anger management techniques  Description: Interventions:  - Offer anger management and coping skills groups   - Staff will provide positive feedback for appropriate anger control  Outcome: Progressing     Problem: SUBSTANCE USE/ABUSE  Goal: By discharge, will develop insight into their chemical dependency and sustain motivation to continue in recovery  Description: INTERVENTIONS:  - Attends all daily group sessions and scheduled AA groups  - Actively practices coping skills through participation in the therapeutic community and adherence to program rules  - Reviews and completes assignments from individual treatment plan  - Assist patient development of understanding of their personal cycle of addiction and relapse triggers  Outcome: Progressing  Goal: By discharge, patient will have ongoing treatment plan addressing chemical dependency  Description: INTERVENTIONS:  - Assist patient with resources and/or appointments for ongoing recovery based living  Outcome: Not Progressing

## 2022-06-21 PROCEDURE — 99232 SBSQ HOSP IP/OBS MODERATE 35: CPT

## 2022-06-21 RX ADMIN — NICOTINE POLACRILEX 2 MG: 2 GUM, CHEWING ORAL at 08:58

## 2022-06-21 RX ADMIN — ACETAMINOPHEN 325MG 975 MG: 325 TABLET ORAL at 13:30

## 2022-06-21 RX ADMIN — CLOZAPINE 200 MG: 200 TABLET ORAL at 21:04

## 2022-06-21 RX ADMIN — GABAPENTIN 400 MG: 400 CAPSULE ORAL at 08:08

## 2022-06-21 RX ADMIN — CLOZAPINE 200 MG: 200 TABLET ORAL at 08:08

## 2022-06-21 RX ADMIN — METFORMIN HYDROCHLORIDE 850 MG: 850 TABLET, FILM COATED ORAL at 08:08

## 2022-06-21 RX ADMIN — GABAPENTIN 400 MG: 400 CAPSULE ORAL at 21:04

## 2022-06-21 RX ADMIN — ASPIRIN 81 MG CHEWABLE TABLET 81 MG: 81 TABLET CHEWABLE at 08:08

## 2022-06-21 RX ADMIN — SENNOSIDES 8.6 MG: 8.6 TABLET, FILM COATED ORAL at 08:08

## 2022-06-21 RX ADMIN — GABAPENTIN 400 MG: 400 CAPSULE ORAL at 17:12

## 2022-06-21 RX ADMIN — LEVETIRACETAM 500 MG: 500 TABLET, FILM COATED ORAL at 08:08

## 2022-06-21 RX ADMIN — NICOTINE POLACRILEX 2 MG: 2 GUM, CHEWING ORAL at 17:12

## 2022-06-21 RX ADMIN — LEVETIRACETAM 500 MG: 500 TABLET, FILM COATED ORAL at 21:04

## 2022-06-21 RX ADMIN — METFORMIN HYDROCHLORIDE 850 MG: 850 TABLET, FILM COATED ORAL at 17:12

## 2022-06-21 RX ADMIN — NICOTINE POLACRILEX 2 MG: 2 GUM, CHEWING ORAL at 19:49

## 2022-06-21 RX ADMIN — METOPROLOL SUCCINATE 50 MG: 50 TABLET, EXTENDED RELEASE ORAL at 08:08

## 2022-06-21 RX ADMIN — NICOTINE POLACRILEX 2 MG: 2 GUM, CHEWING ORAL at 14:19

## 2022-06-21 RX ADMIN — NICOTINE POLACRILEX 2 MG: 2 GUM, CHEWING ORAL at 11:10

## 2022-06-21 RX ADMIN — AMMONIUM LACTATE: 17 LOTION TOPICAL at 08:08

## 2022-06-21 NOTE — PROGRESS NOTES
[facility] Group Note     06/21/22 1015   Activity/Group Checklist   Group Life Skills  (Change - Personal Serenity Prayer)   Attendance Attended   Attendance Duration (min) 31-45   Interactions Interacted appropriately   Affect/Mood Appropriate;Calm   Goals Achieved Identified feelings; Identified triggers; Discussed coping strategies; Discussed discharge plans; Able to listen to others; Able to engage in interactions; Able to reflect/comment on own behavior;Able to self-disclose; Able to recieve feedback; Able to give feedback to another

## 2022-06-21 NOTE — NURSING NOTE
Late Note for 6/20/2022: the pt was alert and oriented X4, he was calm, cooperative and pleasant during assessment and medication administration  The pt denied pain and refused his lotion for his feet  The pt denied SI and HI  The pt did not receive any prns other than Nicotine gum which he requested several times during the day shift  The pt asked this writer several times and other staff how many groups he attended  on the past Friday  The pt was encouraged to attend all the groups, he attended some today

## 2022-06-21 NOTE — NURSING NOTE
Luzma Shetyt was in bed at shift change  Per Q 7 minute safety checks, pt appeared to sleep about 7 hours overnight  No behavior incidence this shift

## 2022-06-21 NOTE — PROGRESS NOTES
06/21/22 1140   Team Meeting   Meeting Type Daily Rounds   Initial Conference Date 06/21/22   Patient/Family Present   Patient Present No   Patient's Family Present No       Daily Rounds  Giuliana Mosley MD, [facility] [place] RN, Seda Cowan [place], [place] CPS  Case reviewed  4/7 groups  Good appetite  Tylenol prn for ankle pain  Discharge plan for 7/1 to return home

## 2022-06-21 NOTE — PROGRESS NOTES
06/21/22 0700   Activity/Group Checklist   Group Community meeting   Attendance Did not attend   Attendance Duration (min) 46-60   Affect/Mood [place]

## 2022-06-21 NOTE — PLAN OF CARE
Problem: Alteration in Thoughts and Perception  Goal: Verbalize thoughts and feelings  Description: Interventions:  - Promote a nonjudgmental and trusting relationship with the patient through active listening and therapeutic communication  - Assess patient's level of functioning, behavior and potential for risk  - Engage patient in 1 on 1 interactions  - Encourage patient to express fears, feelings, frustrations, and discuss symptoms    - [place] patient to reality, help patient recognize reality-based thinking   - Administer medications as ordered and assess for potential side effects  - Provide the patient education related to the signs and symptoms of the illness and desired effects of prescribed medications  Interventions:  - Assess and re-assess patient's lethality and potential for self-injury  - Engage patient in 1:1 interactions, daily, for a minimum of 15 minutes  - Encourage patient to express feelings, fears, frustrations, hopes  - Establish rapport/trust with patient   Interventions:  - Assess and re-assess patient's level of risk   - Engage patient in 1:1 interactions, daily, for a minimum of 15 minutes   - Encourage patient to express feelings, fears, frustrations, hopes   Interventions:  - Assess and re-assess patient's level of risk, every waking shift  - Engage patient in 1:1 interactions, daily, for a minimum of 15 minutes   - Allow patient to express feelings and frustrations in a safe and non-threatening manner   - Establish rapport/trust with patient   Outcome: Progressing     Problem: Ineffective Coping  Goal: Identifies healthy coping skills  Outcome: Progressing  Goal: Participates in unit activities  Description: Interventions:  - Provide therapeutic environment   - Provide required programming   - Redirect inappropriate behaviors   Outcome: Progressing  Goal: Patient/Family participate in treatment and DC plans  Description: Interventions:  - Provide therapeutic environment  Outcome: Progressing     Problem: Risk for Self Injury/Neglect  Goal: Verbalize thoughts and feelings  Description: Interventions:  - Promote a nonjudgmental and trusting relationship with the patient through active listening and therapeutic communication  - Assess patient's level of functioning, behavior and potential for risk  - Engage patient in 1 on 1 interactions  - Encourage patient to express fears, feelings, frustrations, and discuss symptoms    - [place] patient to reality, help patient recognize reality-based thinking   - Administer medications as ordered and assess for potential side effects  - Provide the patient education related to the signs and symptoms of the illness and desired effects of prescribed medications  Interventions:  - Assess and re-assess patient's lethality and potential for self-injury  - Engage patient in 1:1 interactions, daily, for a minimum of 15 minutes  - Encourage patient to express feelings, fears, frustrations, hopes  - Establish rapport/trust with patient   Interventions:  - Assess and re-assess patient's level of risk   - Engage patient in 1:1 interactions, daily, for a minimum of 15 minutes   - Encourage patient to express feelings, fears, frustrations, hopes   Interventions:  - Assess and re-assess patient's level of risk, every waking shift  - Engage patient in 1:1 interactions, daily, for a minimum of 15 minutes   - Allow patient to express feelings and frustrations in a safe and non-threatening manner   - Establish rapport/trust with patient   Outcome: Progressing     Problem: Depression  Goal: Verbalize thoughts and feelings  Description: Interventions:  - Promote a nonjudgmental and trusting relationship with the patient through active listening and therapeutic communication  - Assess patient's level of functioning, behavior and potential for risk  - Engage patient in 1 on 1 interactions  - Encourage patient to express fears, feelings, frustrations, and discuss symptoms    - [place] patient to reality, help patient recognize reality-based thinking   - Administer medications as ordered and assess for potential side effects  - Provide the patient education related to the signs and symptoms of the illness and desired effects of prescribed medications  Interventions:  - Assess and re-assess patient's lethality and potential for self-injury  - Engage patient in 1:1 interactions, daily, for a minimum of 15 minutes  - Encourage patient to express feelings, fears, frustrations, hopes  - Establish rapport/trust with patient   Interventions:  - Assess and re-assess patient's level of risk   - Engage patient in 1:1 interactions, daily, for a minimum of 15 minutes   - Encourage patient to express feelings, fears, frustrations, hopes   Interventions:  - Assess and re-assess patient's level of risk, every waking shift  - Engage patient in 1:1 interactions, daily, for a minimum of 15 minutes   - Allow patient to express feelings and frustrations in a safe and non-threatening manner   - Establish rapport/trust with patient   Outcome: Progressing    Patient and writer met face to face and completed Peer Assessment  Patient states he feels he is ready for discharge because "I did so much in the program and I'm the best I'm going to be " Patient believes that completing a WRAP Plan, continuing to go to groups and preparing for discharge will strengthen his recovery more  Symptoms that Patient identified as a hindrance to community living are HI's and his aggressive behaviors  Patient listed listening to music, talking to people, staying clean, hanging with his friends and playing video games as his coping skills  Patient seems to have no difficulty in Independent Living Skills/ Tasks  Patient has been given a [employer] which he will work on independently  Writer will review plan with patient upon it's completion

## 2022-06-21 NOTE — PLAN OF CARE
Problem: Alteration in Thoughts and Perception  Goal: Treatment Goal: Gain control of psychotic behaviors/thinking, reduce/eliminate presenting symptoms and demonstrate improved reality functioning upon discharge  Outcome: Progressing  Goal: Verbalize thoughts and feelings  Description: Interventions:  - Promote a nonjudgmental and trusting relationship with the patient through active listening and therapeutic communication  - Assess patient's level of functioning, behavior and potential for risk  - Engage patient in 1 on 1 interactions  - Encourage patient to express fears, feelings, frustrations, and discuss symptoms    - [place] patient to reality, help patient recognize reality-based thinking   - Administer medications as ordered and assess for potential side effects  - Provide the patient education related to the signs and symptoms of the illness and desired effects of prescribed medications  Interventions:  - Assess and re-assess patient's lethality and potential for self-injury  - Engage patient in 1:1 interactions, daily, for a minimum of 15 minutes  - Encourage patient to express feelings, fears, frustrations, hopes  - Establish rapport/trust with patient   Interventions:  - Assess and re-assess patient's level of risk   - Engage patient in 1:1 interactions, daily, for a minimum of 15 minutes   - Encourage patient to express feelings, fears, frustrations, hopes   Interventions:  - Assess and re-assess patient's level of risk, every waking shift  - Engage patient in 1:1 interactions, daily, for a minimum of 15 minutes   - Allow patient to express feelings and frustrations in a safe and non-threatening manner   - Establish rapport/trust with patient   Outcome: Progressing  Goal: Refrain from acting on delusional thinking/internal stimuli  Description: Interventions:  - Monitor patient closely, per order   - Utilize least restrictive measures   - Set reasonable limits, give positive feedback for acceptable - Administer medications as ordered and monitor of potential side effects  Outcome: Progressing  Goal: Agree to be compliant with medication regime, as prescribed and report medication side effects  Description: Interventions:  - Offer appropriate PRN medication and supervise ingestion; conduct AIMS, as needed   Outcome: Progressing  Goal: Recognize dysfunctional thoughts, communicate reality-based thoughts at the time of discharge  Description: Interventions:  - Provide medication and psycho-education to assist patient in compliance and developing insight into his/her illness   Outcome: Progressing     Problem: Ineffective Coping  Goal: Identifies ineffective coping skills  Outcome: Progressing  Goal: Identifies healthy coping skills  Outcome: Progressing  Goal: Demonstrates healthy coping skills  Outcome: Progressing  Goal: Participates in unit activities  Description: Interventions:  - Provide therapeutic environment   - Provide required programming   - Redirect inappropriate behaviors   Outcome: Progressing  Goal: Patient/Family participate in treatment and DC plans  Description: Interventions:  - Provide therapeutic environment  Outcome: Progressing  Goal: Patient/Family verbalizes awareness of resources  Outcome: Progressing  Goal: Understands least restrictive measures  Description: Interventions:  - Utilize least restrictive behavior  Outcome: Progressing  Goal: Free from restraint events  Description: - Utilize least restrictive measures   - Provide behavioral interventions   - Redirect inappropriate behaviors   Outcome: Progressing     Problem: Risk for Self Injury/Neglect  Goal: Verbalize thoughts and feelings  Description: Interventions:  - Promote a nonjudgmental and trusting relationship with the patient through active listening and therapeutic communication  - Assess patient's level of functioning, behavior and potential for risk  - Engage patient in 1 on 1 interactions  - Encourage patient to express fears, feelings, frustrations, and discuss symptoms    - [place] patient to reality, help patient recognize reality-based thinking   - Administer medications as ordered and assess for potential side effects  - Provide the patient education related to the signs and symptoms of the illness and desired effects of prescribed medications  Interventions:  - Assess and re-assess patient's lethality and potential for self-injury  - Engage patient in 1:1 interactions, daily, for a minimum of 15 minutes  - Encourage patient to express feelings, fears, frustrations, hopes  - Establish rapport/trust with patient   Interventions:  - Assess and re-assess patient's level of risk   - Engage patient in 1:1 interactions, daily, for a minimum of 15 minutes   - Encourage patient to express feelings, fears, frustrations, hopes   Interventions:  - Assess and re-assess patient's level of risk, every waking shift  - Engage patient in 1:1 interactions, daily, for a minimum of 15 minutes   - Allow patient to express feelings and frustrations in a safe and non-threatening manner   - Establish rapport/trust with patient   Outcome: Progressing  Goal: Treatment Goal: Remain safe during length of stay, learn and adopt new coping skills, and be free of self-injurious ideation, impulses and acts at the time of discharge  Outcome: Progressing  Goal: Refrain from harming self  Description: Interventions:  - Monitor patient closely, per order  - Develop a trusting relationship  - Supervise medication ingestion, monitor effects and side effects   Outcome: Progressing  Goal: Recognize maladaptive responses and adopt new coping mechanisms  Outcome: Progressing     Problem: Depression  Goal: Verbalize thoughts and feelings  Description: Interventions:  - Promote a nonjudgmental and trusting relationship with the patient through active listening and therapeutic communication  - Assess patient's level of functioning, behavior and potential for risk  - Engage patient in 1 on 1 interactions  - Encourage patient to express fears, feelings, frustrations, and discuss symptoms    - [place] patient to reality, help patient recognize reality-based thinking   - Administer medications as ordered and assess for potential side effects  - Provide the patient education related to the signs and symptoms of the illness and desired effects of prescribed medications  Interventions:  - Assess and re-assess patient's lethality and potential for self-injury  - Engage patient in 1:1 interactions, daily, for a minimum of 15 minutes  - Encourage patient to express feelings, fears, frustrations, hopes  - Establish rapport/trust with patient   Interventions:  - Assess and re-assess patient's level of risk   - Engage patient in 1:1 interactions, daily, for a minimum of 15 minutes   - Encourage patient to express feelings, fears, frustrations, hopes   Interventions:  - Assess and re-assess patient's level of risk, every waking shift  - Engage patient in 1:1 interactions, daily, for a minimum of 15 minutes   - Allow patient to express feelings and frustrations in a safe and non-threatening manner   - Establish rapport/trust with patient   Outcome: Progressing  Goal: Treatment Goal: Demonstrate behavioral control of depressive symptoms, verbalize feelings of improved mood/affect, and adopt new coping skills prior to discharge  Outcome: Progressing  Goal: Refrain from harming self  Description: Interventions:  - Monitor patient closely, per order   - Supervise medication ingestion, monitor effects and side effects   Outcome: Progressing  Goal: Refrain from isolation  Description: Interventions:  - Develop a trusting relationship   - Encourage socialization   Outcome: Progressing  Goal: Refrain from self-neglect  Outcome: Progressing     Problem: Anxiety  Goal: Anxiety is at manageable level  Description: Interventions:  - Assess and monitor patient's anxiety level     - Monitor for signs and symptoms (heart palpitations, chest pain, shortness of breath, headaches, nausea, feeling jumpy, restlessness, irritable, apprehensive)  - Collaborate with interdisciplinary team and initiate plan and interventions as ordered    - [place] patient to unit/surroundings  - Explain treatment plan  - Encourage participation in care  - Encourage verbalization of concerns/fears  - Identify coping mechanisms  - Assist in developing anxiety-reducing skills  - Administer/offer alternative therapies  - Limit or eliminate stimulants  Outcome: Progressing     Problem: Risk for Violence/Aggression Toward Others  Goal: Verbalize thoughts and feelings  Description: Interventions:  - Promote a nonjudgmental and trusting relationship with the patient through active listening and therapeutic communication  - Assess patient's level of functioning, behavior and potential for risk  - Engage patient in 1 on 1 interactions  - Encourage patient to express fears, feelings, frustrations, and discuss symptoms    - [place] patient to reality, help patient recognize reality-based thinking   - Administer medications as ordered and assess for potential side effects  - Provide the patient education related to the signs and symptoms of the illness and desired effects of prescribed medications  Interventions:  - Assess and re-assess patient's lethality and potential for self-injury  - Engage patient in 1:1 interactions, daily, for a minimum of 15 minutes  - Encourage patient to express feelings, fears, frustrations, hopes  - Establish rapport/trust with patient   Interventions:  - Assess and re-assess patient's level of risk   - Engage patient in 1:1 interactions, daily, for a minimum of 15 minutes   - Encourage patient to express feelings, fears, frustrations, hopes   Interventions:  - Assess and re-assess patient's level of risk, every waking shift  - Engage patient in 1:1 interactions, daily, for a minimum of 15 minutes   - Allow patient to express feelings and frustrations in a safe and non-threatening manner   - Establish rapport/trust with patient   Outcome: Progressing  Goal: Treatment Goal: Refrain from acts of violence/aggression during length of stay, and demonstrate improved impulse control at the time of discharge  Outcome: Progressing  Goal: Refrain from harming others  Outcome: Progressing  Goal: Refrain from destructive acts on the environment or property  Outcome: Progressing  Goal: Control angry outbursts  Description: Interventions:  - Monitor patient closely, per order  - Ensure early verbal de-escalation  - Monitor prn medication needs  - Set reasonable/therapeutic limits, outline behavioral expectations, and consequences   - Provide a non-threatening milieu, utilizing the least restrictive interventions   Outcome: Progressing  Goal: Identify appropriate positive anger management techniques  Description: Interventions:  - Offer anger management and coping skills groups   - Staff will provide positive feedback for appropriate anger control  Outcome: Progressing     Problem: DISCHARGE PLANNING - CARE MANAGEMENT  Goal: Discharge to post-acute care or home with appropriate resources  Description: INTERVENTIONS:  - Conduct assessment to determine patient/family and health care team treatment goals, and need for post-acute services based on payer coverage, community resources, and patient preferences, and barriers to discharge  - Address psychosocial, clinical, and financial barriers to discharge as identified in assessment in conjunction with the patient/family and health care team  - Arrange appropriate level of post-acute services according to patients   needs and preference and payer coverage in collaboration with the physician and health care team  - Communicate with and update the patient/family, physician, and health care team regarding progress on the discharge plan  - Arrange appropriate transportation to post-acute venues  Outcome: Progressing     Problem: SUBSTANCE USE/ABUSE  Goal: By discharge, will develop insight into their chemical dependency and sustain motivation to continue in recovery  Description: INTERVENTIONS:  - Attends all daily group sessions and scheduled AA groups  - Actively practices coping skills through participation in the therapeutic community and adherence to program rules  - Reviews and completes assignments from individual treatment plan  - Assist patient development of understanding of their personal cycle of addiction and relapse triggers  Outcome: Progressing  Goal: By discharge, patient will have ongoing treatment plan addressing chemical dependency  Description: INTERVENTIONS:  - Assist patient with resources and/or appointments for ongoing recovery based living  Outcome: Progressing

## 2022-06-21 NOTE — PROGRESS NOTES
06/21/22 1100   Activity/Group Checklist   Group Wellness   Attendance Attended   Attendance Duration (min) 46-60   Interactions Interacted appropriately   Affect/Mood Appropriate;Calm;Normal range   Goals Achieved Identified feelings; Discussed discharge plans; Able to listen to others; Able to engage in interactions; Able to self-disclose      06/21/22 1100   Activity/Group Checklist   Group Wellness   Attendance Attended   Attendance Duration (min) 46-60   Interactions Interacted appropriately   Affect/Mood Appropriate;Calm;Normal range   Goals Achieved Identified feelings; Discussed discharge plans; Able to listen to others; Able to engage in interactions; Able to self-disclose

## 2022-06-21 NOTE — PROGRESS NOTES
Progress Note - Behavioral Health   Monique Reis 24 y o  male MRN: [id number]  Unit/Bed#: [facility] 112-01 Encounter: [id number]    Assessment/Plan   Principal Problem:    Schizophrenia [facility])  Active Problems:    Medical clearance for psychiatric admission    Seizure disorder [facility])    Traumatic brain injury ([address] )    Brain lesion    History of cardiac radiofrequency ablation    History of fall    PTSD (post-traumatic stress disorder)    ROS: None reported  NO SEIZURES  Pt denies any new episodes of acid reflux  He reports that his leg now feels good enough to bear weight to ambulate without his cane  He reports that he does not feel like it is going to give out anymore  Diagnosis: Schizophrenia  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  Recommended Treatment:   Continue current medication regimen:   - Clozapine   - Gabapentin   - Keppra  Encourage pt to continue PT exercises and ambulation to strengthen   Continue with group therapy, milieu therapy and occupational therapy  Continue frequent safety checks and vitals per unit protocol  Case discussed with treatment team   Continue with [facility] medical management as indicated  Continue coordinating with case management regarding disposition  Risks, benefits and possible side effects of Medications: Risks, benefits, and possible side effects of medications have previously been explained  No new medications at this time  ------------------------------------------------------------    Subjective: Per nursing report, Santos Watson has been cooperative on the unit and compliant with medications  He received Tylenol PRN yesterday for leg pain  He attended 4 out of 7 groups yesterday  Today, Santos Watson is consenting for safety on the unit  Pt was waiting for nursing to get him nicotine gum, but was agreeable to talk  He was less talkative this morning with more scant replies   He reports feeling "alright " He is still very fixated on discharge reporting that he wants to leave and that he is only attending groups because he has to, so he can leave on time  He reports this morning that he "doesn't get the point of groups," and he "just wants to leave " He attended 4 out of 7 groups yesterday  The patient was reminded that it is important that he gets what he can out of groups to learn coping skills and life skills, so that he does not end up back in the hospital after discharge  He was originally admitted to [facility] because his mother felt "his medications were off," increased agitation, and homicidal agitation  He reports that he no longer has those symptoms and feels "just fine " Ravin De Luna notes having slept well  He denies nightmares, frequent, or early awakenings  Ravin De Luna states having a diminished appetite, and he only at a banana for breakfast  Ravin De Luna has been taking the medications as prescribed and reporting no side effects  He reports he has not had anymore heart burn  He was walking in the hallway without his cane this morning, ad he reports that he feels like he no longer needs it because his leg feels much better although it still pains him at times  He reports he has been doing the PT exercises and plans to not use his cane anymore to further strengthen it  No significant events overnight  Not self-injurious, aggressive, or destructive on the unit    Psychiatric Review of Systems:  Behavior over the last 24 hours: unchanged  Sleep: normal  Appetite: adequate, decreased from baseline  Medication side effects: none verbalized  Medication compliance: good  Group attendance: 4 out of 7  Significant events: none    PRNs overnight: Tylenol PRN leg pain   VS: Reviewed, within normal limits    Progress Toward Goals: Minimal improvement still very fixated on discharge although he is complying to the minimum 50% group requirement      Vital signs in last 24 hours:  Temp:  [97 2 °F (36 2 °C)-97 5 °F (36 4 °C)] 97 5 °F (36 4 °C)  HR:  [] 95  Resp:  [18] 18  BP: (118-121)/(72-83) 121/72    Mental Status Exam:  Appearance:  alert, minimal eye contact, appears stated age, casually dressed and appropriate grooming and hygiene he was walking in the hallway without his cane this morning, clean shaven head   Behavior:  calm, limited cooperativity and guarded   Motor: no abnormal movements, gait with slight limp no longer using cane   Speech:  spontaneous, slow, normal volume, scant and coherent   Mood:  euthymic   Affect:  mood-congruent, constricted and euthymic   Thought Process:  organized, goal directed   Thought Content: paranoid ideation, intrusive thoughts, ruminating thoughts, ideas of reference no current s/h thoughts, intent, or plans  No distorted body perceptions or phobias     Perceptual disturbances: denies current hallucinations and does not appear to be responding to internal stimuli at this time   Risk Potential: No active or passive suicidal or homicidal ideation was verbalized during interview   Cognition: oriented to person, place, time, and situation and appears to be of average intelligence   Insight:  Limited   Judgment: Limited   Sensorium: Alert and awake  Orientation: oriented x 4  Attention: attention span and concentration are age appropriate  Intellect: Appears to be average intelligence    Current Medications:  Current Facility-Administered Medications   Medication Dose Route Frequency Provider Last Rate    acetaminophen  650 mg Oral Q6H PRN Darrold Booty, DO      acetaminophen  650 mg Oral Q4H PRN Darrold Booty, DO      acetaminophen  975 mg Oral Q6H PRN Darrold Booty, DO      aluminum-magnesium hydroxide-simethicone  30 mL Oral Q4H PRN Darrold Booty, DO      ammonium lactate   Topical BID Pegge Oppenheim, DPM      artificial tear  1 application Both Eyes L1L PRN Darrold Booty, DO      aspirin  81 mg Oral Daily Darrold Booty, DO      benztropine  1 mg Intramuscular Q4H PRN Max 6/day Darrold Booty, DO      benztropine 1 mg Oral Q4H PRN Max 6/day Marylouise Ge, DO      cloZAPine  200 mg Oral BID Marylouise Ge, DO      hydrOXYzine HCL  50 mg Oral Q6H PRN Max 4/day Marylouise Ge, DO      Or    diphenhydrAMINE  50 mg Intramuscular Q6H PRN Marylouise Ge, DO      gabapentin  400 mg Oral TID Marylouise Ge, DO      hydrOXYzine HCL  100 mg Oral Q6H PRN Max 4/day Marylouise Ge, DO      Or    LORazepam  2 mg Intramuscular Q6H PRN Marylouise Ge, DO      hydrOXYzine HCL  25 mg Oral Q6H PRN Max 4/day Marylouise Ge, DO      levETIRAcetam  500 mg Oral Q12H [address] Kashif A Prayson, DO      melatonin  9 mg Oral HS PRN Marylouise Ge, DO      metFORMIN  850 mg Oral BID With Meals Levon Fishman MD      metoprolol succinate  50 mg Oral Daily Marylouise Ge, DO      nicotine polacrilex  2 mg Oral Q2H PRN Marylouise Ge, DO      OLANZapine  10 mg Oral Q3H PRN Max 3/day Marylouise Ge, DO      Or    OLANZapine  10 mg Intramuscular Q3H PRN Max 3/day Marylouise Ge, DO      OLANZapine  5 mg Oral Q3H PRN Max 6/day Kashif A Prayson, DO      Or    OLANZapine  5 mg Intramuscular Q3H PRN Max 6/day Kashif A Prayson, DO      OLANZapine  2 5 mg Oral Q3H PRN Max 8/day Kashif A Prayson, DO      polyethylene glycol  17 g Oral Daily PRN Marylouise Ge, DO      propranolol  10 mg Oral Q8H PRN Marylouise Ge, DO      senna  1 tablet Oral Daily Margarita Rothman MD      senna-docusate sodium  1 tablet Oral Daily PRN Levon Fishman MD      traZODone  50 mg Oral HS PRN Marylouise Ge, DO         Behavioral Health Medications: all current active meds have been reviewed  Changes as in plan section above  Laboratory results:  I have personally reviewed all pertinent laboratory/tests results  No results found for this or any previous visit (from the past 48 hour(s))  This note has been constructed using a voice recognition system  There may be translation, syntax, or grammatical errors   If you have any questions, please contact the dictating author      JOHNATHAN Morales

## 2022-06-21 NOTE — NURSING NOTE
John Cardona c/o l ankle pain #6 and received tylenol 650 mg at 1530 which was reduced to #2 within the hour  He is seen often walking without his cane  He was encouraged to use this and to do the exercises that Physical Therapy had provided as well  He is at the [facility] about Q 2 hours asking for Nicotine Gum  He is self isolative and does not socialize with peers  While on the phone with his mother he asked if he had medication changes and writer said "no"  Johnwil Sowameena said that his mother was told he has not been taking his medications  Manan Rajput Writer told him I don't know why she would say that; that is not what has been reported

## 2022-06-22 PROCEDURE — 99232 SBSQ HOSP IP/OBS MODERATE 35: CPT | Performed by: PSYCHIATRY & NEUROLOGY

## 2022-06-22 RX ADMIN — ASPIRIN 81 MG CHEWABLE TABLET 81 MG: 81 TABLET CHEWABLE at 08:44

## 2022-06-22 RX ADMIN — SENNOSIDES 8.6 MG: 8.6 TABLET, FILM COATED ORAL at 08:44

## 2022-06-22 RX ADMIN — GABAPENTIN 400 MG: 400 CAPSULE ORAL at 16:28

## 2022-06-22 RX ADMIN — NICOTINE POLACRILEX 2 MG: 2 GUM, CHEWING ORAL at 12:43

## 2022-06-22 RX ADMIN — GABAPENTIN 400 MG: 400 CAPSULE ORAL at 08:44

## 2022-06-22 RX ADMIN — LEVETIRACETAM 500 MG: 500 TABLET, FILM COATED ORAL at 08:44

## 2022-06-22 RX ADMIN — CLOZAPINE 200 MG: 200 TABLET ORAL at 21:19

## 2022-06-22 RX ADMIN — LEVETIRACETAM 500 MG: 500 TABLET, FILM COATED ORAL at 21:19

## 2022-06-22 RX ADMIN — GABAPENTIN 400 MG: 400 CAPSULE ORAL at 21:19

## 2022-06-22 RX ADMIN — NICOTINE POLACRILEX 2 MG: 2 GUM, CHEWING ORAL at 18:56

## 2022-06-22 RX ADMIN — METOPROLOL SUCCINATE 50 MG: 50 TABLET, EXTENDED RELEASE ORAL at 08:44

## 2022-06-22 RX ADMIN — NICOTINE POLACRILEX 2 MG: 2 GUM, CHEWING ORAL at 07:47

## 2022-06-22 RX ADMIN — CLOZAPINE 200 MG: 200 TABLET ORAL at 08:44

## 2022-06-22 RX ADMIN — METFORMIN HYDROCHLORIDE 850 MG: 850 TABLET, FILM COATED ORAL at 08:44

## 2022-06-22 RX ADMIN — METFORMIN HYDROCHLORIDE 850 MG: 850 TABLET, FILM COATED ORAL at 16:28

## 2022-06-22 NOTE — PROGRESS NOTES
06/22/22 1029   Team Meeting   Meeting Type Daily Rounds   Initial Conference Date 06/22/22   Patient/Family Present   Patient Present No   Patient's Family Present No       DAILY Kailyn Henry MD, Noemi Sandhoff LPN, Julio Cesar Stover CPS     Case reviewed  Tylenol given for left ankle pain  Fixated on discharge  No behavioral issues; medication compliant, social with select peers, visible and cooperative

## 2022-06-22 NOTE — NURSING NOTE
Patient was withdrawn to his room and out for meals  Denies all psych s/s  No complaints offered  No behavioral issues noted  Had 25% for breakfast and 100% for lunch  Attended 1/3 groups  Cooperative and compliant with all his medications except ammonium lactate  Safety checks ongoing

## 2022-06-22 NOTE — PROGRESS NOTES
Progress Note - Behavioral Health   Sharla Clements 24 y o  male MRN: [id number]  [facility] Encounter: [id number]    Assessment/Plan   Principal Problem:    Schizophrenia [facility])  Active Problems:    Medical clearance for psychiatric admission    Seizure disorder [facility])    Traumatic brain injury ([address] )    Brain lesion    History of cardiac radiofrequency ablation    History of fall    PTSD (post-traumatic stress disorder)    ROS: None reported  NO SEIZURES  Leg is still painful at times, but he feels that he can bear weight on it now without using his cane, and he no longer been carrying his cane around the unit with him  Diagnosis: Schizophrenia  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  Recommended Treatment:   Continue current medication regimen:   - Clozapine   - Gabapentin   - Keppra  Encouraged pt to continue PT exercises and ambulation to strengthen  Continue with group therapy, milieu therapy and occupational therapy  Continue frequent safety checks and vitals per unit protocol  Case discussed with treatment team   Continue with SLIM medical management as indicated  Continue coordinating with case management regarding disposition  Risks, benefits and possible side effects of Medications: Risks, benefits, and possible side effects of medications have previously been explained  No new medications at this time  ------------------------------------------------------------    Subjective: Per nursing report, Placido Molina has been cooperative on the unit and compliant with medications  He remains fixated on discharge, forgetful, and needy  He received tylenol PRN 1330 for ankle pain  He attended 3 out of 7 groups  Today, Placido Molina is consenting for safety on the unit  He was waiting in the hallway for water from the nursing station, but was agreeable to talk  Affect was more constricted and blunted this morning with very scant replies  He remains fixated on his July 1st, discharge   He reports feeling "okay " He states that he has been attending groups, so that he can get out of here  He reports that he thought he attended 4 out of 7, but nursing only had 3 out of 7, and he was wondering why that was  I explained that it might be that he left a group early  He reports that he will try to attend half of groups again today  He was originally admitted to [facility] due to his mom feeling "his medications were off," agitation, and homicidal ideation  He reports that he no longer feels this way  John Albarado notes having slept well  John Albarado states having a diminished appetite, but did report eating breakfast this morning  John Albarado has been taking the medications as prescribed and reporting no side effects  He reports that he has has no acid reflux  He states that his leg still bothers him at times, but he now feels comfortable enough to walk on it without using his cane  He reports that he has been doing his PT exercises, and he will try not to use his cane anymore to strengthen his leg more  No significant events overnight  Not self-injurious, aggressive, or destructive on the unit  Psychiatric Review of Systems:  Behavior over the last 24 hours: unchanged  Sleep: normal  Appetite: adequate, decreased from baseline  Medication side effects: none verbalized  Medication compliance: good  Group attendance: 3 out of 7 groups  Significant events: None    PRNs overnight: Tylenol at 1330 for leg pain   VS: Reviewed, within normal limits    Progress Toward Goals: No improvement evident  Patient remains fixated on discharge  Short, scant replies this morning during interaction  Vital signs in last 24 hours:  Temp:  [97 5 °F (36 4 °C)] 97 5 °F (36 4 °C)  HR:  [] 89  Resp:  [17-18] 17  BP: (134-148)/(81-83) 148/82    Mental Status Exam:  Appearance:  alert, minimal eye contact, appears stated age, casually dressed and appropriate grooming and hygiene   He was standing in the hallway waiting for water without his cane   Behavior:  calm, limited cooperativity and guarded   Motor: no abnormal movements   Speech:  spontaneous, slow, normal volume, scant and coherent   Mood:  euthymic   Affect:  mood-congruent, constricted, blunted and euthymic   Thought Process:  organized, goal directed   Thought Content: paranoid ideation, intrusive thoughts, ruminating thoughts, ideas of reference  No current s/h thoughts, intent, or plans  No distorted body perceptions or phobias     Perceptual disturbances: denies current hallucinations and does not appear to be responding to internal stimuli at this time   Risk Potential: No active or passive suicidal or homicidal ideation was verbalized during interview   Cognition: oriented to person, place, time, and situation and appears to be of average intelligence   Insight:  Limited   Judgment: Limited   Sensorium: Alert and awake  Orientation: Oriented x 4  Attention: Attention span and concentration are age appropriate  Intellect: Appears to be average intelligence    Current Medications:  Current Facility-Administered Medications   Medication Dose Route Frequency Provider Last Rate    acetaminophen  650 mg Oral Q6H PRN Milus Bald, DO      acetaminophen  650 mg Oral Q4H PRN Milus Bald, DO      acetaminophen  975 mg Oral Q6H PRN Milus Bald, DO      aluminum-magnesium hydroxide-simethicone  30 mL Oral Q4H PRN Milus Bald, DO      ammonium lactate   Topical BID Pegge Ax, DPM      artificial tear  1 application Both Eyes N1T PRN Milus Bald, DO      aspirin  81 mg Oral Daily Milus Bald, DO      benztropine  1 mg Intramuscular Q4H PRN Max 6/day Milus Bald, DO      benztropine  1 mg Oral Q4H PRN Max 6/day Milus Bald, DO      cloZAPine  200 mg Oral BID Liz Burkitt Prayson, DO      hydrOXYzine HCL  50 mg Oral Q6H PRN Max 4/day Kashif Agosto, DO      Or    diphenhydrAMINE  50 mg Intramuscular Q6H PRN Milus Bald, DO      gabapentin  400 mg Oral TID Asia Barrett, DO      hydrOXYzine HCL  100 mg Oral Q6H PRN Max 4/day Asia Barrett, DO      Or    LORazepam  2 mg Intramuscular Q6H PRN Asia Barrett, DO      hydrOXYzine HCL  25 mg Oral Q6H PRN Max 4/day Asia Barrett, DO      levETIRAcetam  500 mg Oral Q12H [facility] Asia Barrett, DO      melatonin  9 mg Oral HS PRN Asia Barrett, DO      metFORMIN  850 mg Oral BID With Meals Eleanor Arvizu MD      metoprolol succinate  50 mg Oral Daily Asia Barrett, DO      nicotine polacrilex  2 mg Oral Q2H PRN Asia Barrett, DO      OLANZapine  10 mg Oral Q3H PRN Max 3/day Asia Barrett, DO      Or    OLANZapine  10 mg Intramuscular Q3H PRN Max 3/day Asia Barrett, DO      OLANZapine  5 mg Oral Q3H PRN Max 6/day Kashif A Prayson, DO      Or    OLANZapine  5 mg Intramuscular Q3H PRN Max 6/day Kashif A Prayson, DO      OLANZapine  2 5 mg Oral Q3H PRN Max 8/day Kashif A Prayson, DO      polyethylene glycol  17 g Oral Daily PRN Asia Barrett, DO      propranolol  10 mg Oral Q8H PRN Asia Barrett, DO      senna  1 tablet Oral Daily Mariama Milton MD      senna-docusate sodium  1 tablet Oral Daily PRN Eleanor Arvizu MD      traZODone  50 mg Oral HS PRN Asia Barrett, DO         Behavioral Health Medications: all current active meds have been reviewed  Changes as in plan section above  Laboratory results:  I have personally reviewed all pertinent laboratory/tests results  No results found for this or any previous visit (from the past 48 hour(s))  This note has been constructed using a voice recognition system  There may be translation, syntax, or grammatical errors  If you have any questions, please contact the dictating author      JOHNATHAN Schneider

## 2022-06-22 NOTE — PROGRESS NOTES
06/22/22 0700   Activity/Group Checklist   Group Community meeting   Attendance Attended   Attendance Duration (min) 46-60   Interactions Interacted appropriately   Affect/Mood Appropriate;Normal range   Goals Achieved Able to engage in interactions; Able to listen to others

## 2022-06-22 NOTE — NURSING NOTE
Pt was pleasant and cooperative, continues to demonstrate poor memory and insight, constantly asked staff how many groups he had attended today  Pt would attend groups briefly and walk out after 5 minutes, then ask if he received credit for group  Disoriented and disorganized at times  Demonstrated poor appetite today, ate 50% of lunch and dinner, refused breakfast   Compliant with scheduled medications  Tylenol given @1330 for left ankle pain, positive effect  Will continue to monitor for safety and support

## 2022-06-22 NOTE — PLAN OF CARE
1. Caller Name: Lauren                                         Call Back Number: [phone] (home)       Patient approves a detailed voicemail message: no    2. What are the patient's symptoms (location & severity)? Possible upper respiratory infection    3. Is this a new symptom Yes    4. When did it start? 3 days ago    5. Action taken per Active Symptom Guide: patient refused both urgent care or same-day    6. Patient does not agree to recommended action per active symptom guide. Patient was advised again of recommendation and verbalized understanding.                    Problem: Risk for Self Injury/Neglect  Goal: Verbalize thoughts and feelings  Description: Interventions:  - Promote a nonjudgmental and trusting relationship with the patient through active listening and therapeutic communication  - Assess patient's level of functioning, behavior and potential for risk  - Engage patient in 1 on 1 interactions  - Encourage patient to express fears, feelings, frustrations, and discuss symptoms    - Jber patient to reality, help patient recognize reality-based thinking   - Administer medications as ordered and assess for potential side effects  - Provide the patient education related to the signs and symptoms of the illness and desired effects of prescribed medications  Interventions:  - Assess and re-assess patient's lethality and potential for self-injury  - Engage patient in 1:1 interactions, daily, for a minimum of 15 minutes  - Encourage patient to express feelings, fears, frustrations, hopes  - Establish rapport/trust with patient   Interventions:  - Assess and re-assess patient's level of risk   - Engage patient in 1:1 interactions, daily, for a minimum of 15 minutes   - Encourage patient to express feelings, fears, frustrations, hopes   Interventions:  - Assess and re-assess patient's level of risk, every waking shift  - Engage patient in 1:1 interactions, daily, for a minimum of 15 minutes   - Allow patient to express feelings and frustrations in a safe and non-threatening manner   - Establish rapport/trust with patient   Outcome: Progressing  Goal: Treatment Goal: Remain safe during length of stay, learn and adopt new coping skills, and be free of self-injurious ideation, impulses and acts at the time of discharge  Outcome: Progressing  Goal: Refrain from harming self  Description: Interventions:  - Monitor patient closely, per order  - Develop a trusting relationship  - Supervise medication ingestion, monitor effects and side effects   Outcome: Progressing  Goal: Recognize maladaptive responses and adopt new coping mechanisms  Outcome: Progressing

## 2022-06-22 NOTE — PROGRESS NOTES
06/22/22 1100   Activity/Group Checklist   Group Wellness   Attendance Did not attend   Attendance Duration (min) 46-60   Affect/Mood RENUKA

## 2022-06-22 NOTE — NURSING NOTE
Maricruz Osorio slept all night  No behaviors noted during this shift  q7 minute checks in place per order  Safety measures maintained  Will continue to monitor

## 2022-06-23 PROCEDURE — 99232 SBSQ HOSP IP/OBS MODERATE 35: CPT | Performed by: PSYCHIATRY & NEUROLOGY

## 2022-06-23 RX ORDER — AMMONIUM LACTATE 12 G/100G
LOTION TOPICAL 2 TIMES DAILY PRN
Status: DISCONTINUED | OUTPATIENT
Start: 2022-06-23 | End: 2022-07-01 | Stop reason: HOSPADM

## 2022-06-23 RX ADMIN — CLOZAPINE 200 MG: 200 TABLET ORAL at 08:06

## 2022-06-23 RX ADMIN — NICOTINE POLACRILEX 2 MG: 2 GUM, CHEWING ORAL at 13:48

## 2022-06-23 RX ADMIN — NICOTINE POLACRILEX 2 MG: 2 GUM, CHEWING ORAL at 11:43

## 2022-06-23 RX ADMIN — GABAPENTIN 400 MG: 400 CAPSULE ORAL at 21:09

## 2022-06-23 RX ADMIN — METFORMIN HYDROCHLORIDE 850 MG: 850 TABLET, FILM COATED ORAL at 08:06

## 2022-06-23 RX ADMIN — ACETAMINOPHEN 650 MG: 325 TABLET ORAL at 15:52

## 2022-06-23 RX ADMIN — LEVETIRACETAM 500 MG: 500 TABLET, FILM COATED ORAL at 21:09

## 2022-06-23 RX ADMIN — NICOTINE POLACRILEX 2 MG: 2 GUM, CHEWING ORAL at 15:52

## 2022-06-23 RX ADMIN — GABAPENTIN 400 MG: 400 CAPSULE ORAL at 08:06

## 2022-06-23 RX ADMIN — ASPIRIN 81 MG CHEWABLE TABLET 81 MG: 81 TABLET CHEWABLE at 08:06

## 2022-06-23 RX ADMIN — METOPROLOL SUCCINATE 50 MG: 50 TABLET, EXTENDED RELEASE ORAL at 08:06

## 2022-06-23 RX ADMIN — LEVETIRACETAM 500 MG: 500 TABLET, FILM COATED ORAL at 08:06

## 2022-06-23 RX ADMIN — NICOTINE POLACRILEX 2 MG: 2 GUM, CHEWING ORAL at 18:12

## 2022-06-23 RX ADMIN — NICOTINE POLACRILEX 2 MG: 2 GUM, CHEWING ORAL at 08:06

## 2022-06-23 RX ADMIN — SENNOSIDES 8.6 MG: 8.6 TABLET, FILM COATED ORAL at 08:06

## 2022-06-23 RX ADMIN — METFORMIN HYDROCHLORIDE 850 MG: 850 TABLET, FILM COATED ORAL at 15:53

## 2022-06-23 RX ADMIN — GABAPENTIN 400 MG: 400 CAPSULE ORAL at 15:53

## 2022-06-23 RX ADMIN — CLOZAPINE 200 MG: 200 TABLET ORAL at 21:09

## 2022-06-23 NOTE — PLAN OF CARE
Problem: Alteration in Thoughts and Perception  Goal: Treatment Goal: Gain control of psychotic behaviors/thinking, reduce/eliminate presenting symptoms and demonstrate improved reality functioning upon discharge  Outcome: Progressing  Goal: Verbalize thoughts and feelings  Description: Interventions:  - Promote a nonjudgmental and trusting relationship with the patient through active listening and therapeutic communication  - Assess patient's level of functioning, behavior and potential for risk  - Engage patient in 1 on 1 interactions  - Encourage patient to express fears, feelings, frustrations, and discuss symptoms    - [place] patient to reality, help patient recognize reality-based thinking   - Administer medications as ordered and assess for potential side effects  - Provide the patient education related to the signs and symptoms of the illness and desired effects of prescribed medications  Interventions:  - Assess and re-assess patient's lethality and potential for self-injury  - Engage patient in 1:1 interactions, daily, for a minimum of 15 minutes  - Encourage patient to express feelings, fears, frustrations, hopes  - Establish rapport/trust with patient   Interventions:  - Assess and re-assess patient's level of risk   - Engage patient in 1:1 interactions, daily, for a minimum of 15 minutes   - Encourage patient to express feelings, fears, frustrations, hopes   Interventions:  - Assess and re-assess patient's level of risk, every waking shift  - Engage patient in 1:1 interactions, daily, for a minimum of 15 minutes   - Allow patient to express feelings and frustrations in a safe and non-threatening manner   - Establish rapport/trust with patient   Outcome: Progressing  Goal: Refrain from acting on delusional thinking/internal stimuli  Description: Interventions:  - Monitor patient closely, per order   - Utilize least restrictive measures   - Set reasonable limits, give positive feedback for acceptable - Administer medications as ordered and monitor of potential side effects  Outcome: Progressing  Goal: Agree to be compliant with medication regime, as prescribed and report medication side effects  Description: Interventions:  - Offer appropriate PRN medication and supervise ingestion; conduct AIMS, as needed   Outcome: Progressing  Goal: Recognize dysfunctional thoughts, communicate reality-based thoughts at the time of discharge  Description: Interventions:  - Provide medication and psycho-education to assist patient in compliance and developing insight into his/her illness   Outcome: Progressing     Problem: Ineffective Coping  Goal: Identifies ineffective coping skills  Outcome: Progressing  Goal: Identifies healthy coping skills  Outcome: Progressing  Goal: Demonstrates healthy coping skills  Outcome: Progressing  Goal: Participates in unit activities  Description: Interventions:  - Provide therapeutic environment   - Provide required programming   - Redirect inappropriate behaviors   Outcome: Progressing  Goal: Patient/Family participate in treatment and DC plans  Description: Interventions:  - Provide therapeutic environment  Outcome: Progressing  Goal: Patient/Family verbalizes awareness of resources  Outcome: Progressing  Goal: Understands least restrictive measures  Description: Interventions:  - Utilize least restrictive behavior  Outcome: Progressing  Goal: Free from restraint events  Description: - Utilize least restrictive measures   - Provide behavioral interventions   - Redirect inappropriate behaviors   Outcome: Progressing     Problem: Risk for Self Injury/Neglect  Goal: Verbalize thoughts and feelings  Description: Interventions:  - Promote a nonjudgmental and trusting relationship with the patient through active listening and therapeutic communication  - Assess patient's level of functioning, behavior and potential for risk  - Engage patient in 1 on 1 interactions  - Encourage patient to express fears, feelings, frustrations, and discuss symptoms    - [place] patient to reality, help patient recognize reality-based thinking   - Administer medications as ordered and assess for potential side effects  - Provide the patient education related to the signs and symptoms of the illness and desired effects of prescribed medications  Interventions:  - Assess and re-assess patient's lethality and potential for self-injury  - Engage patient in 1:1 interactions, daily, for a minimum of 15 minutes  - Encourage patient to express feelings, fears, frustrations, hopes  - Establish rapport/trust with patient   Interventions:  - Assess and re-assess patient's level of risk   - Engage patient in 1:1 interactions, daily, for a minimum of 15 minutes   - Encourage patient to express feelings, fears, frustrations, hopes   Interventions:  - Assess and re-assess patient's level of risk, every waking shift  - Engage patient in 1:1 interactions, daily, for a minimum of 15 minutes   - Allow patient to express feelings and frustrations in a safe and non-threatening manner   - Establish rapport/trust with patient   Outcome: Progressing  Goal: Treatment Goal: Remain safe during length of stay, learn and adopt new coping skills, and be free of self-injurious ideation, impulses and acts at the time of discharge  Outcome: Progressing  Goal: Refrain from harming self  Description: Interventions:  - Monitor patient closely, per order  - Develop a trusting relationship  - Supervise medication ingestion, monitor effects and side effects   Outcome: Progressing  Goal: Recognize maladaptive responses and adopt new coping mechanisms  Outcome: Progressing     Problem: Depression  Goal: Verbalize thoughts and feelings  Description: Interventions:  - Promote a nonjudgmental and trusting relationship with the patient through active listening and therapeutic communication  - Assess patient's level of functioning, behavior and potential for risk  - Engage patient in 1 on 1 interactions  - Encourage patient to express fears, feelings, frustrations, and discuss symptoms    - [place] patient to reality, help patient recognize reality-based thinking   - Administer medications as ordered and assess for potential side effects  - Provide the patient education related to the signs and symptoms of the illness and desired effects of prescribed medications  Interventions:  - Assess and re-assess patient's lethality and potential for self-injury  - Engage patient in 1:1 interactions, daily, for a minimum of 15 minutes  - Encourage patient to express feelings, fears, frustrations, hopes  - Establish rapport/trust with patient   Interventions:  - Assess and re-assess patient's level of risk   - Engage patient in 1:1 interactions, daily, for a minimum of 15 minutes   - Encourage patient to express feelings, fears, frustrations, hopes   Interventions:  - Assess and re-assess patient's level of risk, every waking shift  - Engage patient in 1:1 interactions, daily, for a minimum of 15 minutes   - Allow patient to express feelings and frustrations in a safe and non-threatening manner   - Establish rapport/trust with patient   Outcome: Progressing  Goal: Treatment Goal: Demonstrate behavioral control of depressive symptoms, verbalize feelings of improved mood/affect, and adopt new coping skills prior to discharge  Outcome: Progressing  Goal: Refrain from harming self  Description: Interventions:  - Monitor patient closely, per order   - Supervise medication ingestion, monitor effects and side effects   Outcome: Progressing  Goal: Refrain from isolation  Description: Interventions:  - Develop a trusting relationship   - Encourage socialization   Outcome: Progressing  Goal: Refrain from self-neglect  Outcome: Progressing     Problem: Depression  Goal: Verbalize thoughts and feelings  Description: Interventions:  - Promote a nonjudgmental and trusting relationship with the patient through active listening and therapeutic communication  - Assess patient's level of functioning, behavior and potential for risk  - Engage patient in 1 on 1 interactions  - Encourage patient to express fears, feelings, frustrations, and discuss symptoms    - [place] patient to reality, help patient recognize reality-based thinking   - Administer medications as ordered and assess for potential side effects  - Provide the patient education related to the signs and symptoms of the illness and desired effects of prescribed medications  Interventions:  - Assess and re-assess patient's lethality and potential for self-injury  - Engage patient in 1:1 interactions, daily, for a minimum of 15 minutes  - Encourage patient to express feelings, fears, frustrations, hopes  - Establish rapport/trust with patient   Interventions:  - Assess and re-assess patient's level of risk   - Engage patient in 1:1 interactions, daily, for a minimum of 15 minutes   - Encourage patient to express feelings, fears, frustrations, hopes   Interventions:  - Assess and re-assess patient's level of risk, every waking shift  - Engage patient in 1:1 interactions, daily, for a minimum of 15 minutes   - Allow patient to express feelings and frustrations in a safe and non-threatening manner   - Establish rapport/trust with patient   Outcome: Progressing  Goal: Treatment Goal: Demonstrate behavioral control of depressive symptoms, verbalize feelings of improved mood/affect, and adopt new coping skills prior to discharge  Outcome: Progressing  Goal: Refrain from harming self  Description: Interventions:  - Monitor patient closely, per order   - Supervise medication ingestion, monitor effects and side effects   Outcome: Progressing  Goal: Refrain from isolation  Description: Interventions:  - Develop a trusting relationship   - Encourage socialization   Outcome: Progressing  Goal: Refrain from self-neglect  Outcome: Progressing     Problem: Anxiety  Goal: Anxiety is at manageable level  Description: Interventions:  - Assess and monitor patient's anxiety level  - Monitor for signs and symptoms (heart palpitations, chest pain, shortness of breath, headaches, nausea, feeling jumpy, restlessness, irritable, apprehensive)  - Collaborate with interdisciplinary team and initiate plan and interventions as ordered    - [place] patient to unit/surroundings  - Explain treatment plan  - Encourage participation in care  - Encourage verbalization of concerns/fears  - Identify coping mechanisms  - Assist in developing anxiety-reducing skills  - Administer/offer alternative therapies  - Limit or eliminate stimulants  Outcome: Progressing     Problem: Risk for Violence/Aggression Toward Others  Goal: Verbalize thoughts and feelings  Description: Interventions:  - Promote a nonjudgmental and trusting relationship with the patient through active listening and therapeutic communication  - Assess patient's level of functioning, behavior and potential for risk  - Engage patient in 1 on 1 interactions  - Encourage patient to express fears, feelings, frustrations, and discuss symptoms    - [place] patient to reality, help patient recognize reality-based thinking   - Administer medications as ordered and assess for potential side effects  - Provide the patient education related to the signs and symptoms of the illness and desired effects of prescribed medications  Interventions:  - Assess and re-assess patient's lethality and potential for self-injury  - Engage patient in 1:1 interactions, daily, for a minimum of 15 minutes  - Encourage patient to express feelings, fears, frustrations, hopes  - Establish rapport/trust with patient   Interventions:  - Assess and re-assess patient's level of risk   - Engage patient in 1:1 interactions, daily, for a minimum of 15 minutes   - Encourage patient to express feelings, fears, frustrations, hopes   Interventions:  - Assess and re-assess patient's level of risk, every waking shift  - Engage patient in 1:1 interactions, daily, for a minimum of 15 minutes   - Allow patient to express feelings and frustrations in a safe and non-threatening manner   - Establish rapport/trust with patient   Outcome: Progressing  Goal: Treatment Goal: Refrain from acts of violence/aggression during length of stay, and demonstrate improved impulse control at the time of discharge  Outcome: Progressing  Goal: Refrain from harming others  Outcome: Progressing  Goal: Refrain from destructive acts on the environment or property  Outcome: Progressing  Goal: Control angry outbursts  Description: Interventions:  - Monitor patient closely, per order  - Ensure early verbal de-escalation  - Monitor prn medication needs  - Set reasonable/therapeutic limits, outline behavioral expectations, and consequences   - Provide a non-threatening milieu, utilizing the least restrictive interventions   Outcome: Progressing  Goal: Identify appropriate positive anger management techniques  Description: Interventions:  - Offer anger management and coping skills groups   - Staff will provide positive feedback for appropriate anger control  Outcome: Progressing     Problem: DISCHARGE PLANNING - CARE MANAGEMENT  Goal: Discharge to post-acute care or home with appropriate resources  Description: INTERVENTIONS:  - Conduct assessment to determine patient/family and health care team treatment goals, and need for post-acute services based on payer coverage, community resources, and patient preferences, and barriers to discharge  - Address psychosocial, clinical, and financial barriers to discharge as identified in assessment in conjunction with the patient/family and health care team  - Arrange appropriate level of post-acute services according to patients   needs and preference and payer coverage in collaboration with the physician and health care team  - Communicate with and update the patient/family, physician, and health care team regarding progress on the discharge plan  - Arrange appropriate transportation to post-acute venues  Outcome: Progressing     Problem: SUBSTANCE USE/ABUSE  Goal: By discharge, will develop insight into their chemical dependency and sustain motivation to continue in recovery  Description: INTERVENTIONS:  - Attends all daily group sessions and scheduled AA groups  - Actively practices coping skills through participation in the therapeutic community and adherence to program rules  - Reviews and completes assignments from individual treatment plan  - Assist patient development of understanding of their personal cycle of addiction and relapse triggers  Outcome: Progressing  Goal: By discharge, patient will have ongoing treatment plan addressing chemical dependency  Description: INTERVENTIONS:  - Assist patient with resources and/or appointments for ongoing recovery based living  Outcome: Progressing

## 2022-06-23 NOTE — PROGRESS NOTES
06/23/22 0700   Activity/Group Checklist   Group Community meeting   Attendance Did not attend   Attendance Duration (min) 46-60   Affect/Mood REN[place]A

## 2022-06-23 NOTE — NURSING NOTE
Pt is present on the milieu and social with select peers  He consumed 50% of breakfast and 25% of lunch  Took his medications without incidence  Nicorette gum given at 0806, 1143, and 1348  Refused his lac hydrin lotion  Denied all psychiatric symptoms  No behavioral issues

## 2022-06-23 NOTE — NURSING NOTE
Leon Arriola is currently asleep in his room  No behaviors noted  q7 minute checks in place  Safety maintained  Will continue to monitor

## 2022-06-23 NOTE — PROGRESS NOTES
[facility] Group Note     06/23/22 1300   Activity/Group Checklist   Group Life Skills  (Teamwork and Communication)   Attendance Attended   Attendance Duration (min) 0-15  (left very early into the group)   Interactions Interacted appropriately  (verbally scant with limited participation)   Affect/Mood Appropriate;Calm   Goals Achieved Able to listen to others; Able to engage in interactions; Able to recieve feedback; Able to give feedback to another

## 2022-06-23 NOTE — PROGRESS NOTES
06/23/22 1115   Team Meeting   Meeting Type Daily Rounds   Initial Conference Date 06/23/22   Patient/Family Present   Patient Present No   Patient's Family Present No       DAILY Debbie Samuels MD, Chloe Alexander, [place], [place] CPS  Case reviewed  Akil Blevins to go home and be discharged  Refused lack hydrin cream  Visible, social with select peers  Attended 4/7 groups   DC plan for Friday July 1st

## 2022-06-23 NOTE — NURSING NOTE
Patient visible on unit  Pleasant and cooperative  Medication list reviewed with him in order to tell his Mother the current medications hes on  Appetite good  Denies suicidal ideations  Attends groups  Charmayne Flores for discharge  Conversation appropriate  Preoccupied with using Nicotine gum  He stated, he chews it because he is bored  Redirected

## 2022-06-23 NOTE — PROGRESS NOTES
Progress Note - Behavioral Health   Theora  24 y o  male MRN: [id number]  Unit/Bed#: [facility] 112-01 Encounter: [id number]    Assessment/Plan   Principal Problem:    Schizophrenia [facility])  Active Problems:    Medical clearance for psychiatric admission    Seizure disorder [facility])    Traumatic brain injury ([address] )    Brain lesion    History of cardiac radiofrequency ablation    History of fall    PTSD (post-traumatic stress disorder)    ROS: None reported  NO SEIZURES  The patient was using his cane again today because he reports that his leg was bothering him a little more this morning from walking without it the last two days  Diagnosis: Schizophrenia  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  Recommended Treatment:   Continue current medication regimen:   - Clozapine   - Gabapentin   - Keppra  Encouraged pt to continue PT exercises and ambulation to strengthen  Continue with group therapy, milieu therapy and occupational therapy  Continue frequent safety checks and vitals per unit protocol  Case discussed with treatment team   Continue with SLIM medical management as indicated  Continue coordinating with case management regarding disposition  Risks, benefits and possible side effects of Medications: Risks, benefits, and possible side effects of medications have previously been explained  No new medications at this time  ------------------------------------------------------------    Subjective: Per nursing report, Dary Yanez has been cooperative on the unit and compliant with medications  Fixated on discharge July 1st, and his nicotine gum yesterday  Attended 4 out of 7 groups yesterday  Today, Dary Yanez is consenting for safety on the unit  He was on his way to group on the patio but was willing to talk before going out  He reports feeling "alright" this morning  He was using his cane to walk out to the patio, which he has not been using for the last two days   When asked about it, he reports that his leg felt tired and hurt a little bit more this morning than it did the last couple of days, so he figured that he would use it  He reports that he has been performing his PT exercises  Patient attended 4 out of 7 groups yesterday  When complimented on his achievement of attending half, he replied "I have to in order to get out of here " He also asked again to ensure that it was still planned for him to leave on July 1st  Patient was originally admitted to [facility] because his mother reported that she felt "his medications were off," agitation, and homicidal ideation  He reports that he no longer feels this way, and that he is "still feeling just fine" and "ready to get out of here " Santos Watson notes having slept well  Santos Watson states having an "alright" appetite rather his usual compliant of his appetite being diminished, and he reports he ate breakfast this morning  Santos Watson has been taking the medications as prescribed and reporting no side effects  He denies having any episodes of acid reflux  No significant event overnight  Not self-injurious, aggressive, or destructive on the unit  Psychiatric Review of Systems:  Behavior over the last 24 hours: unchanged  Sleep: normal  Appetite: adequate  Medication side effects: none verbalized  Medication compliance: good  Group attendance: 4 out of 7 groups  Significant events: None    PRNs overnight: None  VS: Reviewed, within normal limits    Progress Toward Goals: No improvement, still remains fixated on July 1st discharge  Vital signs in last 24 hours:  Temp:  [97 2 °F (36 2 °C)-97 6 °F (36 4 °C)] 97 6 °F (36 4 °C)  HR:  [] 101  Resp:  [18-20] 20  BP: (124-127)/(66-97) 127/97    Mental Status Exam:  Appearance:  alert, minimal eye contact, appears stated age, casually dressed and appropriate grooming and hygiene   He was walking on his way to the [employer] for group with his cane    Behavior:  calm, limited cooperativity and guarded   Motor: no abnormal movements   Speech:  spontaneous, slow, normal volume, scant and coherent   Mood:  euthymic   Affect:  mood-congruent, constricted and euthymic   Thought Process:  organized, goal directed   Thought Content: paranoid ideation, intrusive thoughts, ruminating thoughts, ideas of reference  No current s/h thoughts, intent, or plans  No distorted body perceptions or phobias     Perceptual disturbances: denies current hallucinations and does not appear to be responding to internal stimuli at this time   Risk Potential: No active or passive suicidal or homicidal ideation was verbalized during interview   Cognition: oriented to person, place, time, and situation and appears to be of average intelligence   Insight:  Limited   Judgment: Limited   Sensorium: Alert and awake  Orientation: Oriented x 4  Attention: Attention span and concentration are age appropriate  Intellect: Appears to be average intelligence    Current Medications:  Current Facility-Administered Medications   Medication Dose Route Frequency Provider Last Rate    acetaminophen  650 mg Oral Q6H PRN Atlanta Potters, DO      acetaminophen  650 mg Oral Q4H PRN Vinicius Potters, DO      acetaminophen  975 mg Oral Q6H PRN Vinicius Potters, DO      aluminum-magnesium hydroxide-simethicone  30 mL Oral Q4H PRN Vinicius Potters, DO      ammonium lactate   Topical BID PRN Maral Dee MD      artificial tear  1 application Both Eyes Z0L PRN Vinicius Potters, DO      aspirin  81 mg Oral Daily Vinicius Potters, DO      benztropine  1 mg Intramuscular Q4H PRN Max 6/day Vinicius Potters, DO      benztropine  1 mg Oral Q4H PRN Max 6/day [place] Potters, DO      cloZAPine  200 mg Oral BID [place] Potters, DO      hydrOXYzine HCL  50 mg Oral Q6H PRN Max 4/day Vinicius Potters, DO      Or    diphenhydrAMINE  50 mg Intramuscular Q6H PRN Vinicius Potters, DO      gabapentin  400 mg Oral TID Vinicius Potters, DO      hydrOXYzine HCL  100 mg Oral Q6H PRN Max 4/day [place] Hart, DO      Or    LORazepam  2 mg Intramuscular Q6H PRN [place] Marietta, DO      hydrOXYzine HCL  25 mg Oral Q6H PRN Max 4/day [place] Marietta, DO      levETIRAcetam  500 mg Oral Q12H [facility] [place] Marietta, DO      melatonin  9 mg Oral HS PRN [place] Hart, DO      metFORMIN  850 mg Oral BID With Meals Elsie Ballesteros MD      metoprolol succinate  50 mg Oral Daily [place] Hart, DO      nicotine polacrilex  2 mg Oral Q2H PRN [place] Marietta, DO      OLANZapine  10 mg Oral Q3H PRN Max 3/day [place] Marietta, DO      Or    OLANZapine  10 mg Intramuscular Q3H PRN Max 3/day [place] Marietta, DO      OLANZapine  5 mg Oral Q3H PRN Max 6/day Kashif A Prayson, DO      Or    OLANZapine  5 mg Intramuscular Q3H PRN Max 6/day Kashif A Prayson, DO      OLANZapine  2 5 mg Oral Q3H PRN Max 8/day Kashif A Prayson, DO      polyethylene glycol  17 g Oral Daily PRN [place] Hart, DO      propranolol  10 mg Oral Q8H PRN [place] Marietta, DO      senna  1 tablet Oral Daily Bryant Rivero MD      senna-docusate sodium  1 tablet Oral Daily PRN Elsie Ballesteros MD      traZODone  50 mg Oral HS PRN [place] Hart, DO         Behavioral Health Medications: all current active meds have been reviewed  Changes as in plan section above  Laboratory results:  I have personally reviewed all pertinent laboratory/tests results  No results found for this or any previous visit (from the past 48 hour(s))  This note has been constructed using a voice recognition system  There may be translation, syntax, or grammatical errors  If you have any questions, please contact the dictating author      JOHNATHAN Wagner

## 2022-06-23 NOTE — SOCIAL WORK
pc to new vitae OP; found out that Bisi austin runs the partial program, her contact is   [phone] ext [phone] / [email or web address]  Fax for building- [phone]    Left VM with her with request for call back to schedule patient for partial program upon his return home    will likely start him off with half days or only 2-3 days a week to have him transition, as previously discussed with he and his mother

## 2022-06-24 PROCEDURE — 99232 SBSQ HOSP IP/OBS MODERATE 35: CPT | Performed by: PSYCHIATRY & NEUROLOGY

## 2022-06-24 RX ORDER — PROPRANOLOL HYDROCHLORIDE 10 MG/1
10 TABLET ORAL EVERY 8 HOURS PRN
Qty: 15 TABLET | Refills: 0 | Status: SHIPPED | OUTPATIENT
Start: 2022-06-24

## 2022-06-24 RX ORDER — SENNA PLUS 8.6 MG/1
1 TABLET ORAL DAILY
Qty: 30 TABLET | Refills: 0 | Status: SHIPPED | OUTPATIENT
Start: 2022-06-24

## 2022-06-24 RX ORDER — METOPROLOL SUCCINATE 50 MG/1
50 TABLET, EXTENDED RELEASE ORAL DAILY
Qty: 30 TABLET | Refills: 0 | Status: SHIPPED | OUTPATIENT
Start: 2022-06-25

## 2022-06-24 RX ORDER — AMOXICILLIN 250 MG
1 CAPSULE ORAL DAILY PRN
Qty: 30 TABLET | Refills: 0 | Status: SHIPPED | OUTPATIENT
Start: 2022-06-24

## 2022-06-24 RX ORDER — LANOLIN ALCOHOL/MO/W.PET/CERES
3 CREAM (GRAM) TOPICAL
Qty: 90 TABLET | Refills: 0 | Status: SHIPPED | OUTPATIENT
Start: 2022-06-24

## 2022-06-24 RX ORDER — ACETAMINOPHEN 325 MG/1
650 TABLET ORAL EVERY 6 HOURS PRN
Qty: 30 TABLET | Refills: 0 | Status: SHIPPED | OUTPATIENT
Start: 2022-06-24

## 2022-06-24 RX ORDER — GABAPENTIN 400 MG/1
400 CAPSULE ORAL 3 TIMES DAILY
Qty: 90 CAPSULE | Refills: 0 | Status: SHIPPED | OUTPATIENT
Start: 2022-06-24

## 2022-06-24 RX ORDER — AMMONIUM LACTATE 12 G/100G
LOTION TOPICAL 2 TIMES DAILY PRN
Qty: 400 G | Refills: 0 | Status: SHIPPED | OUTPATIENT
Start: 2022-06-24

## 2022-06-24 RX ORDER — TRAZODONE HYDROCHLORIDE 50 MG/1
50 TABLET ORAL
Qty: 15 TABLET | Refills: 0 | Status: SHIPPED | OUTPATIENT
Start: 2022-06-24

## 2022-06-24 RX ORDER — HYDROXYZINE 50 MG/1
50 TABLET, FILM COATED ORAL EVERY 6 HOURS PRN
Qty: 30 TABLET | Refills: 0 | Status: SHIPPED | OUTPATIENT
Start: 2022-06-24

## 2022-06-24 RX ORDER — POLYETHYLENE GLYCOL 3350 17 G/17G
17 POWDER, FOR SOLUTION ORAL DAILY PRN
Qty: 15 EACH | Refills: 0 | Status: SHIPPED | OUTPATIENT
Start: 2022-06-24

## 2022-06-24 RX ORDER — ASPIRIN 81 MG/1
81 TABLET, CHEWABLE ORAL DAILY
Qty: 30 TABLET | Refills: 0 | Status: SHIPPED | OUTPATIENT
Start: 2022-06-25

## 2022-06-24 RX ORDER — MINERAL OIL AND PETROLATUM 150; 830 MG/G; MG/G
1 OINTMENT OPHTHALMIC
Qty: 5 G | Refills: 0 | Status: SHIPPED | OUTPATIENT
Start: 2022-06-24

## 2022-06-24 RX ORDER — CLOZAPINE 200 MG/1
200 TABLET ORAL 2 TIMES DAILY
Qty: 14 TABLET | Refills: 3 | Status: SHIPPED | OUTPATIENT
Start: 2022-06-24

## 2022-06-24 RX ORDER — LEVETIRACETAM 500 MG/1
500 TABLET ORAL EVERY 12 HOURS SCHEDULED
Qty: 60 TABLET | Refills: 0 | Status: SHIPPED | OUTPATIENT
Start: 2022-06-24

## 2022-06-24 RX ADMIN — LEVETIRACETAM 500 MG: 500 TABLET, FILM COATED ORAL at 08:18

## 2022-06-24 RX ADMIN — METFORMIN HYDROCHLORIDE 850 MG: 850 TABLET, FILM COATED ORAL at 08:18

## 2022-06-24 RX ADMIN — GABAPENTIN 400 MG: 400 CAPSULE ORAL at 08:18

## 2022-06-24 RX ADMIN — NICOTINE POLACRILEX 2 MG: 2 GUM, CHEWING ORAL at 17:08

## 2022-06-24 RX ADMIN — NICOTINE POLACRILEX 2 MG: 2 GUM, CHEWING ORAL at 14:12

## 2022-06-24 RX ADMIN — CLOZAPINE 200 MG: 200 TABLET ORAL at 08:18

## 2022-06-24 RX ADMIN — NICOTINE POLACRILEX 2 MG: 2 GUM, CHEWING ORAL at 08:19

## 2022-06-24 RX ADMIN — CLOZAPINE 200 MG: 200 TABLET ORAL at 21:05

## 2022-06-24 RX ADMIN — GABAPENTIN 400 MG: 400 CAPSULE ORAL at 21:05

## 2022-06-24 RX ADMIN — GABAPENTIN 400 MG: 400 CAPSULE ORAL at 17:08

## 2022-06-24 RX ADMIN — NICOTINE POLACRILEX 2 MG: 2 GUM, CHEWING ORAL at 19:43

## 2022-06-24 RX ADMIN — SENNOSIDES 8.6 MG: 8.6 TABLET, FILM COATED ORAL at 08:19

## 2022-06-24 RX ADMIN — METOPROLOL SUCCINATE 50 MG: 50 TABLET, EXTENDED RELEASE ORAL at 08:12

## 2022-06-24 RX ADMIN — ASPIRIN 81 MG CHEWABLE TABLET 81 MG: 81 TABLET CHEWABLE at 08:18

## 2022-06-24 RX ADMIN — METFORMIN HYDROCHLORIDE 850 MG: 850 TABLET, FILM COATED ORAL at 17:08

## 2022-06-24 RX ADMIN — LEVETIRACETAM 500 MG: 500 TABLET, FILM COATED ORAL at 21:05

## 2022-06-24 NOTE — PROGRESS NOTES
Progress Note - Behavioral Health   Jenelle Don 24 y o  male MRN: [id number]  Unit/Bed#: [facility] 112-01 Encounter: [id number]    Assessment/Plan   Principal Problem:    Schizophrenia [facility])  Active Problems:    Medical clearance for psychiatric admission    Seizure disorder [facility])    Traumatic brain injury ([address] )    Brain lesion    History of cardiac radiofrequency ablation    History of fall    PTSD (post-traumatic stress disorder)    ROS: None reported  NO SEIZURES  Patient reports his leg feels better after resting it for a day  He was not using his cane today  Diagnosis: Schizophrenia  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  Recommended Treatment:   Continue current medication regimen:   - Clozapine   - Gabapentin   - Keppra  Encouraged pt to continue PT exercises and ambulation to increase strength   Continue with group therapy, milieu therapy and occupational therapy  Continue frequent safety checks and vitals per unit protocol  Case discussed with treatment team   Continue with SLIM medical management as indicated  Continue coordinating with case management regarding disposition  Risks, benefits and possible side effects of Medications: Risks, benefits, and possible side effects of medications have previously been explained  No new medications at this time  ------------------------------------------------------------    Subjective: Per nursing report, John Albarado has been cooperative on the unit and compliant with medications  Tylenol given at 1552 for his leg pain  He attended 5 out of 7 groups yesterday  Today, John Albarado is consenting for safety on the unit  He was sitting in the dining area and was agreeable to talk  He reports feeling "alright " He confirms that he is looking forward to his discharge July 1st, but does not appear as fixated on his discharge today  His mood was more blunted today   When asked what he was looking forward to after discharge, he said "I don't know " John Albarado notes having slept poorly last night  He denies nightmares, frequent, or early awakenings, but reports that he still "does not feel rested today " Renan Salmeron states having an "okay" appetite and ate breakfast  He was originally admitted to [facility] because his mom felt that "his medications were off," homicidal ideation, and agitation  He reports that he is not longer having these symptoms  Renan Salmeron has been taking the medications as prescribed and reporting no side effects  He denies any new episodes of acid reflux  He was without his cane again today  He reports that after using his cane again for a day, it felt better today to be able to walk on it  He reports that he has been doing his PT exercises  No significant events overnight  Not self-injurious, aggressive, or destructive on the unit  Psychiatric Review of Systems:  Behavior over the last 24 hours: unchanged  Sleep: Feels "not rested"  Appetite: adequate  Medication side effects: none verbalized  Medication compliance: good  Group attendance: 5 out of 7 groups  Significant events: None    PRNs overnight: Tylenol at 1552 for leg pain   VS: Reviewed, within normal limits    Progress Toward Goals: No obvious improvement  Patient more guarded with blunted affect  Vital signs in last 24 hours:  Temp:  [97 6 °F (36 4 °C)-97 8 °F (36 6 °C)] 97 6 °F (36 4 °C)  HR:  [] 92  Resp:  [18] 18  BP: (113-124)/(73-81) 113/73    Mental Status Exam:  Appearance:  alert, minimal eye contact, appears stated age, casually dressed and appropriate grooming and hygiene   Behavior:  calm, limited cooperativity and guarded   Motor: no abnormal movements, walking with limp without cane today   Speech:  spontaneous, slow, not pressured, normal volume, scant and coherent   Mood:  euthymic   Affect:  mood-congruent and constricted   Thought Process:  organized, goal directed   Thought Content: paranoid ideation, intrusive thoughts, ruminating thoughts, ideas of reference  No current s/h thoughts, intent, or plans  No distorted body perceptions or phobias      Perceptual disturbances: denies current hallucinations and does not appear to be responding to internal stimuli at this time   Risk Potential: No active or passive suicidal or homicidal ideation was verbalized during interview   Cognition: oriented to person, place, time, and situation and appears to be of average intelligence   Insight:  Limited   Judgment: Limited   Sensorium: Alert and awake  Orientation: Oriented x 4  Attention: Attention span and concentration are age appropriate  Intellect: Appears to be average intelligence    Current Medications:  Current Facility-Administered Medications   Medication Dose Route Frequency Provider Last Rate    acetaminophen  650 mg Oral Q6H PRN Marylouise Ge, DO      acetaminophen  650 mg Oral Q4H PRN Marylouise Ge, DO      acetaminophen  975 mg Oral Q6H PRN Marylouise Ge, DO      aluminum-magnesium hydroxide-simethicone  30 mL Oral Q4H PRN Marylouise Ge, DO      ammonium lactate   Topical BID PRN Levon Fishman MD      artificial tear  1 application Both Eyes E0G PRN Marylouise Ge, DO      aspirin  81 mg Oral Daily Marylouise Ge, DO      benztropine  1 mg Intramuscular Q4H PRN Max 6/day Marylouise Ge, DO      benztropine  1 mg Oral Q4H PRN Max 6/day Marylouise Ge, DO      cloZAPine  200 mg Oral BID Marylouise Ge, DO      hydrOXYzine HCL  50 mg Oral Q6H PRN Max 4/day Marylouise Ge, DO      Or    diphenhydrAMINE  50 mg Intramuscular Q6H PRN Marylouise Ge, DO      gabapentin  400 mg Oral TID Marylouise Ge, DO      hydrOXYzine HCL  100 mg Oral Q6H PRN Max 4/day Marylouise Ge, DO      Or    LORazepam  2 mg Intramuscular Q6H PRN Marylouise Ge, DO      hydrOXYzine HCL  25 mg Oral Q6H PRN Max 4/day Kashif Iversonon, DO      levETIRAcetam  500 mg Oral Q12H [facility] Kashif Agosto, DO      melatonin  9 mg Oral HS PRN Selestine Peaches Prayson, DO      metFORMIN  850 mg Oral BID With Meals Mayte Osorio MD      metoprolol succinate  50 mg Oral Daily [place] Apple, DO      nicotine polacrilex  2 mg Oral Q2H PRN [place] Apple, DO      OLANZapine  10 mg Oral Q3H PRN Max 3/day Carmen Apple, DO      Or    OLANZapine  10 mg Intramuscular Q3H PRN Max 3/day Carmen Apple, DO      OLANZapine  5 mg Oral Q3H PRN Max 6/day Kashif A Prayson, DO      Or    OLANZapine  5 mg Intramuscular Q3H PRN Max 6/day Kashif A Prayson, DO      OLANZapine  2 5 mg Oral Q3H PRN Max 8/day Kashif A Prayson, DO      polyethylene glycol  17 g Oral Daily PRN Carmen Apple, DO      propranolol  10 mg Oral Q8H PRN [place] Apple, DO      senna  1 tablet Oral Daily Marcial Oneill MD      senna-docusate sodium  1 tablet Oral Daily PRN Mayte Osorio MD      traZODone  50 mg Oral HS PRN [place] Apple, DO         Behavioral Health Medications: all current active meds have been reviewed  Changes as in plan section above  Laboratory results:  I have personally reviewed all pertinent laboratory/tests results  No results found for this or any previous visit (from the past 48 hour(s))  This note has been constructed using a voice recognition system  There may be translation, syntax, or grammatical errors  If you have any questions, please contact the dictating author      JOHNATHAN Goodrich

## 2022-06-24 NOTE — PLAN OF CARE
Problem: Alteration in Thoughts and Perception  Goal: Treatment Goal: Gain control of psychotic behaviors/thinking, reduce/eliminate presenting symptoms and demonstrate improved reality functioning upon discharge  Outcome: Progressing  Goal: Verbalize thoughts and feelings  Description: Interventions:  - Promote a nonjudgmental and trusting relationship with the patient through active listening and therapeutic communication  - Assess patient's level of functioning, behavior and potential for risk  - Engage patient in 1 on 1 interactions  - Encourage patient to express fears, feelings, frustrations, and discuss symptoms    - [place] patient to reality, help patient recognize reality-based thinking   - Administer medications as ordered and assess for potential side effects  - Provide the patient education related to the signs and symptoms of the illness and desired effects of prescribed medications  Interventions:  - Assess and re-assess patient's lethality and potential for self-injury  - Engage patient in 1:1 interactions, daily, for a minimum of 15 minutes  - Encourage patient to express feelings, fears, frustrations, hopes  - Establish rapport/trust with patient   Interventions:  - Assess and re-assess patient's level of risk   - Engage patient in 1:1 interactions, daily, for a minimum of 15 minutes   - Encourage patient to express feelings, fears, frustrations, hopes   Interventions:  - Assess and re-assess patient's level of risk, every waking shift  - Engage patient in 1:1 interactions, daily, for a minimum of 15 minutes   - Allow patient to express feelings and frustrations in a safe and non-threatening manner   - Establish rapport/trust with patient   Outcome: Progressing  Goal: Refrain from acting on delusional thinking/internal stimuli  Description: Interventions:  - Monitor patient closely, per order   - Utilize least restrictive measures   - Set reasonable limits, give positive feedback for acceptable - Administer medications as ordered and monitor of potential side effects  Outcome: Progressing  Goal: Agree to be compliant with medication regime, as prescribed and report medication side effects  Description: Interventions:  - Offer appropriate PRN medication and supervise ingestion; conduct AIMS, as needed   Outcome: Progressing  Goal: Recognize dysfunctional thoughts, communicate reality-based thoughts at the time of discharge  Description: Interventions:  - Provide medication and psycho-education to assist patient in compliance and developing insight into his/her illness   Outcome: Progressing     Problem: Ineffective Coping  Goal: Understands least restrictive measures  Description: Interventions:  - Utilize least restrictive behavior  Outcome: Completed  Goal: Free from restraint events  Description: - Utilize least restrictive measures   - Provide behavioral interventions   - Redirect inappropriate behaviors   Outcome: Completed     Problem: Risk for Self Injury/Neglect  Goal: Verbalize thoughts and feelings  Description: Interventions:  - Promote a nonjudgmental and trusting relationship with the patient through active listening and therapeutic communication  - Assess patient's level of functioning, behavior and potential for risk  - Engage patient in 1 on 1 interactions  - Encourage patient to express fears, feelings, frustrations, and discuss symptoms    - [place] patient to reality, help patient recognize reality-based thinking   - Administer medications as ordered and assess for potential side effects  - Provide the patient education related to the signs and symptoms of the illness and desired effects of prescribed medications  Interventions:  - Assess and re-assess patient's lethality and potential for self-injury  - Engage patient in 1:1 interactions, daily, for a minimum of 15 minutes  - Encourage patient to express feelings, fears, frustrations, hopes  - Establish rapport/trust with patient Interventions:  - Assess and re-assess patient's level of risk   - Engage patient in 1:1 interactions, daily, for a minimum of 15 minutes   - Encourage patient to express feelings, fears, frustrations, hopes   Interventions:  - Assess and re-assess patient's level of risk, every waking shift  - Engage patient in 1:1 interactions, daily, for a minimum of 15 minutes   - Allow patient to express feelings and frustrations in a safe and non-threatening manner   - Establish rapport/trust with patient   Outcome: Progressing  Goal: Treatment Goal: Remain safe during length of stay, learn and adopt new coping skills, and be free of self-injurious ideation, impulses and acts at the time of discharge  Outcome: Progressing  Goal: Refrain from harming self  Description: Interventions:  - Monitor patient closely, per order  - Develop a trusting relationship  - Supervise medication ingestion, monitor effects and side effects   Outcome: Completed  Goal: Recognize maladaptive responses and adopt new coping mechanisms  Outcome: Progressing     Problem: Depression  Goal: Verbalize thoughts and feelings  Description: Interventions:  - Promote a nonjudgmental and trusting relationship with the patient through active listening and therapeutic communication  - Assess patient's level of functioning, behavior and potential for risk  - Engage patient in 1 on 1 interactions  - Encourage patient to express fears, feelings, frustrations, and discuss symptoms    - [place] patient to reality, help patient recognize reality-based thinking   - Administer medications as ordered and assess for potential side effects  - Provide the patient education related to the signs and symptoms of the illness and desired effects of prescribed medications  Interventions:  - Assess and re-assess patient's lethality and potential for self-injury  - Engage patient in 1:1 interactions, daily, for a minimum of 15 minutes  - Encourage patient to express feelings, fears, frustrations, hopes  - Establish rapport/trust with patient   Interventions:  - Assess and re-assess patient's level of risk   - Engage patient in 1:1 interactions, daily, for a minimum of 15 minutes   - Encourage patient to express feelings, fears, frustrations, hopes   Interventions:  - Assess and re-assess patient's level of risk, every waking shift  - Engage patient in 1:1 interactions, daily, for a minimum of 15 minutes   - Allow patient to express feelings and frustrations in a safe and non-threatening manner   - Establish rapport/trust with patient   Outcome: Progressing  Goal: Refrain from harming self  Description: Interventions:  - Monitor patient closely, per order   - Supervise medication ingestion, monitor effects and side effects   Outcome: Completed     Problem: Anxiety  Goal: Anxiety is at manageable level  Description: Interventions:  - Assess and monitor patient's anxiety level  - Monitor for signs and symptoms (heart palpitations, chest pain, shortness of breath, headaches, nausea, feeling jumpy, restlessness, irritable, apprehensive)  - Collaborate with interdisciplinary team and initiate plan and interventions as ordered    - [place] patient to unit/surroundings  - Explain treatment plan  - Encourage participation in care  - Encourage verbalization of concerns/fears  - Identify coping mechanisms  - Assist in developing anxiety-reducing skills  - Administer/offer alternative therapies  - Limit or eliminate stimulants  Outcome: Progressing     Problem: Risk for Violence/Aggression Toward Others  Goal: Verbalize thoughts and feelings  Description: Interventions:  - Promote a nonjudgmental and trusting relationship with the patient through active listening and therapeutic communication  - Assess patient's level of functioning, behavior and potential for risk  - Engage patient in 1 on 1 interactions  - Encourage patient to express fears, feelings, frustrations, and discuss symptoms    - [place] patient to reality, help patient recognize reality-based thinking   - Administer medications as ordered and assess for potential side effects  - Provide the patient education related to the signs and symptoms of the illness and desired effects of prescribed medications  Interventions:  - Assess and re-assess patient's lethality and potential for self-injury  - Engage patient in 1:1 interactions, daily, for a minimum of 15 minutes  - Encourage patient to express feelings, fears, frustrations, hopes  - Establish rapport/trust with patient   Interventions:  - Assess and re-assess patient's level of risk   - Engage patient in 1:1 interactions, daily, for a minimum of 15 minutes   - Encourage patient to express feelings, fears, frustrations, hopes   Interventions:  - Assess and re-assess patient's level of risk, every waking shift  - Engage patient in 1:1 interactions, daily, for a minimum of 15 minutes   - Allow patient to express feelings and frustrations in a safe and non-threatening manner   - Establish rapport/trust with patient   Outcome: Progressing

## 2022-06-24 NOTE — PROGRESS NOTES
Progress Note - Behavioral Health     Santi Haskins 24 y o  male MRN: [id number]  Unit/Bed#: [facility] 112-01 Encounter: [id number]    Santi Haskins was seen for continuing care and reviewed with treatment team   Pt was in bed on my approach, stated he is "In bed, I just feel like I need to rest my mind "  He then said that he was just bored because there is nothing to do  He has his GED book on his nightstand but has not been using it  He was otherwise generally scant in conversation and denied depression, SI, HI, anxiety, or psychotic symptoms  No somatic complaints  Per EMR and floor team, the Pt has been medication compliant and fixed on discharge  He is attending some therapeutic groups with appropriate behavior among peers  No report of any aggressive outbursts  He is intermittently visible in the milieu and selectively social   He was isolative to his room for most of the day yesterday, and then became more active in the afternoon and evening  He needs prompting to use his cane during ambulation      Sleep:Good  Appetite: Good   Medication side effects: None per Pt    ROS: No complaints per Pt, (All ROS Negative)    Labs/Tests: Reviewed    Mental Status Evaluation:  Appearance:  Dressed, clean, Poor eye contact   Behavior:  Calm, cooperative, pleasant with a mild edge, guarded   Speech:  Scant but clear with normal rate and volume when he did speak   Mood:  Seems mildly anxious and dysphoric though he denied all symptoms   Affect:  Constricted   Thought Process:  Organized, Goal directed, [place], Preoccupied with discharge but not perseverative during this interview   Associations: [place] associations   Thought Content:  No verbalized delusions   Perceptual Disturbances: Pt denies any hallucinations or paranoia and does not appear to be responding to internal stimuli   Risk Potential: Pt presently denies SI or HI    Sensorium:  Self, birthday, Place, Day of the week, Month, Year   Memory:  short term memory grossly intact   Consciousness:  alert, awake   Attention: Good   Insight:  Limited   Judgment: Limited   Gait/Station: Steady gait   Motor Activity: No abnormal movements     Vitals:    06/24/22 0712 06/24/22 1527 06/25/22 0700 06/25/22 1500   BP: 113/73 110/66 127/82 132/72   BP Location: Left arm Right arm Left arm Right arm   Pulse: 92 104 100 (!) 113   Resp: 18 17 16 17   Temp: 97 6 °F (36 4 °C) 97 9 °F (36 6 °C) (!) 97 °F (36 1 °C) (!) 97 1 °F (36 2 °C)   TempSrc: Temporal Temporal Skin Temporal   SpO2:   97%    Weight: 116 kg (255 lb)      Height:           Admission on 05/25/2022   Component Date Value    Ventricular Rate 05/25/2022 101     Atrial Rate 05/25/2022 101     SC Interval 05/25/2022 152     QRSD Interval 05/25/2022 96     QT Interval 05/25/2022 344     QTC Interval 05/25/2022 446     P Axis 05/25/2022 23     QRS Axis 05/25/2022 17     T Wave Axis 05/25/2022 15     WBC 05/27/2022 9 04     RBC 05/27/2022 5 09     Hemoglobin 05/27/2022 14 8     Hematocrit 05/27/2022 46 1     MCV 05/27/2022 91     MCH 05/27/2022 29 1     MCHC 05/27/2022 32 1     RDW 05/27/2022 12 0     MPV 05/27/2022 9 1     Platelets 22/03/7896 274     nRBC 05/27/2022 0     Neutrophils Relative 05/27/2022 59     Immat GRANS % 05/27/2022 1     Lymphocytes Relative 05/27/2022 27     Monocytes Relative 05/27/2022 9     Eosinophils Relative 05/27/2022 3     Basophils Relative 05/27/2022 1     Neutrophils Absolute 05/27/2022 5 42     Immature Grans Absolute 05/27/2022 0 05     Lymphocytes Absolute 05/27/2022 2 42     Monocytes Absolute 05/27/2022 0 85     Eosinophils Absolute 05/27/2022 0 25     Basophils Absolute 05/27/2022 0 05     Sodium 05/27/2022 139     Potassium 05/27/2022 3 9     Chloride 05/27/2022 106     CO2 05/27/2022 22     ANION GAP 05/27/2022 11     BUN 05/27/2022 13     Creatinine 05/27/2022 0 67 (A)    Glucose 05/27/2022 94     Glucose, Fasting 05/27/2022 94     Calcium 05/27/2022 9 2     AST 05/27/2022 20     ALT 05/27/2022 16     Alkaline Phosphatase 05/27/2022 141 (A)    Total Protein 05/27/2022 7 4     Albumin 05/27/2022 4 3     Total Bilirubin 05/27/2022 0 56     eGFR 05/27/2022 133     Hemoglobin A1C 05/27/2022 5 0     EAG 05/27/2022 97     Cholesterol 05/27/2022 181     Triglycerides 05/27/2022 210 (A)    HDL, Direct 05/27/2022 28 (A)    LDL Calculated 05/27/2022 111     Non-HDL-Chol (CHOL-HDL) 05/27/2022 153     RPR 05/27/2022 Non-Reactive     TSH 3RD GENERATON 05/27/2022 1 640     Clozapine Lvl 06/04/2022 459     NorClozapine 06/04/2022 289     Total(Cloz+Norcloz) 06/04/2022 748     WBC 06/07/2022 8 51     RBC 06/07/2022 5 41     Hemoglobin 06/07/2022 15 8     Hematocrit 06/07/2022 48 1     MCV 06/07/2022 89     MCH 06/07/2022 29 2     MCHC 06/07/2022 32 8     RDW 06/07/2022 12 0     MPV 06/07/2022 9 1     Platelets 58/45/9147 435 (A)    nRBC 06/07/2022 0     Neutrophils Relative 06/07/2022 60     Immat GRANS % 06/07/2022 0     Lymphocytes Relative 06/07/2022 28     Monocytes Relative 06/07/2022 9     Eosinophils Relative 06/07/2022 2     Basophils Relative 06/07/2022 1     Neutrophils Absolute 06/07/2022 5 06     Immature Grans Absolute 06/07/2022 0 02     Lymphocytes Absolute 06/07/2022 2 42     Monocytes Absolute 06/07/2022 0 77     Eosinophils Absolute 06/07/2022 0 20     Basophils Absolute 06/07/2022 0 04     Ventricular Rate 06/07/2022 98     Atrial Rate 06/07/2022 98     [place] Interval 06/07/2022 148     QRSD Interval 06/07/2022 92     QT Interval 06/07/2022 352     QTC Interval 06/07/2022 449     P Axis 06/07/2022 28     QRS Axis 06/07/2022 19     T Wave Axis 06/07/2022 14     Ventricular Rate 06/07/2022 95     Atrial Rate 06/07/2022 95     [place] Interval 06/07/2022 144     QRSD Interval 06/07/2022 94     QT Interval 06/07/2022 354     QTC Interval 06/07/2022 444     P Axis 06/07/2022 31     QRS Axis 06/07/2022 23     T Wave Axis 06/07/2022 14     NT-proBNP 06/14/2022 14 9     HS TnI random 06/14/2022 <2 (A)    Clozapine Lvl 06/14/2022 410     NorClozapine 06/14/2022 313     Total(Cloz+Norcloz) 06/14/2022 723     Total CK 06/14/2022 56     WBC 06/14/2022 7 22     RBC 06/14/2022 5 18     Hemoglobin 06/14/2022 15 1     Hematocrit 06/14/2022 45 6     MCV 06/14/2022 88     MCH 06/14/2022 29 2     MCHC 06/14/2022 33 1     RDW 06/14/2022 11 8     MPV 06/14/2022 8 6 (A)    Platelets 95/08/6715 394 (A)    nRBC 06/14/2022 0     Neutrophils Relative 06/14/2022 48     Immat GRANS % 06/14/2022 0     Lymphocytes Relative 06/14/2022 38     Monocytes Relative 06/14/2022 10     Eosinophils Relative 06/14/2022 3     Basophils Relative 06/14/2022 1     Neutrophils Absolute 06/14/2022 3 43     Immature Grans Absolute 06/14/2022 0 02     Lymphocytes Absolute 06/14/2022 2 75     Monocytes Absolute 06/14/2022 0 74     Eosinophils Absolute 06/14/2022 0 24     Basophils Absolute 06/14/2022 0 04          Progress Toward Goals:  Based on today's interview and review of prior notes, Pt has made gradual progress  Clozapine is within therapeutic range  Continue the present medication regimen unchanged  Per primary team, his discharge is anticipated for 7/1/2022 provided Pt remains stable  Assessment/Plan   Principal Problem:    Schizophrenia ([facility] [address] )  Active Problems:    Medical clearance for psychiatric admission    Seizure disorder [facility])    Traumatic brain injury ([facility] [address] )    Brain lesion    History of cardiac radiofrequency ablation    History of fall    PTSD (post-traumatic stress disorder)      Recommended Treatment: Continue with pharmacotherapy, group therapy, milieu therapy and occupational therapy    The patient will be maintained on the following medications:  Current Facility-Administered Medications   Medication Dose Route Frequency Provider Last Rate    acetaminophen  650 mg Oral Q6H PRN Kel Carter, DO      acetaminophen  650 mg Oral Q4H PRN Kandice Louis, DO      acetaminophen  975 mg Oral Q6H PRN Kandice Louis, DO      aluminum-magnesium hydroxide-simethicone  30 mL Oral Q4H PRN Kandice Louis, DO      ammonium lactate   Topical BID PRN Flakita Diaz MD      artificial tear  1 application Both Eyes N7S PRN Kandice Louis, DO      aspirin  81 mg Oral Daily Kandice Louis, DO      benztropine  1 mg Intramuscular Q4H PRN Max 6/day Kandice Louis, DO      benztropine  1 mg Oral Q4H PRN Max 6/day Kandice Louis, DO      cloZAPine  200 mg Oral BID Kandice Louis, DO      hydrOXYzine HCL  50 mg Oral Q6H PRN Max 4/day Kandice Louis, DO      Or    diphenhydrAMINE  50 mg Intramuscular Q6H PRN Kandice Louis, DO      gabapentin  400 mg Oral TID Kandice Louis, DO      hydrOXYzine HCL  100 mg Oral Q6H PRN Max 4/day Kandice Louis, DO      Or    LORazepam  2 mg Intramuscular Q6H PRN Kandice Louis, DO      hydrOXYzine HCL  25 mg Oral Q6H PRN Max 4/day Kandice Louis, DO      levETIRAcetam  500 mg Oral Q12H [address] Kashif A Prayson, DO      melatonin  9 mg Oral HS PRN Kandice Louis, DO      metFORMIN  850 mg Oral BID With Meals Flakita Diaz MD      metoprolol succinate  50 mg Oral Daily Kandice Louis, DO      nicotine polacrilex  2 mg Oral Q2H PRN Kandice Louis, DO      OLANZapine  10 mg Oral Q3H PRN Max 3/day Kandice Louis, DO      Or    OLANZapine  10 mg Intramuscular Q3H PRN Max 3/day Kandice Louis, DO      OLANZapine  5 mg Oral Q3H PRN Max 6/day Kashif A Prayson, DO      Or    OLANZapine  5 mg Intramuscular Q3H PRN Max 6/day Kashif A Prayson, DO      OLANZapine  2 5 mg Oral Q3H PRN Max 8/day Kashif A Prayson, DO      polyethylene glycol  17 g Oral Daily PRN Kandice Louis, DO      propranolol  10 mg Oral Q8H PRN Kandice Louis, DO      senna  1 tablet Oral Daily Isaac Valdez MD      senna-docusate sodium  1 tablet Oral Daily PRN Levon Fishman MD      traZODone  50 mg Oral HS PRN Edna Carolina, DO         Risks, benefits and possible side effects of Medications:   Risks, benefits, and possible side effects of medications explained to patient and patient verbalizes understanding

## 2022-06-24 NOTE — NURSING NOTE
Pt pleasant, calm, and cooperative  Visible in milieu for majority of evening, attended all unit groups and activities  Remained free of any behavioral issues, did not report any paranoia, anxiety, or hallucinations  Remains forgetful, poor insight, disoriented at times  Demonstrated good appetite, compliant with all scheduled medication  Reported 6/10 left ankle pain, 650mg tylenol given @1552 with positive effect  Will continue to monitor for safety and support

## 2022-06-24 NOTE — NURSING NOTE
Joanna Crowley slept all night  No behaviors noted  q7 minute checks in place  Safety measures maintained  Will continue to monitor

## 2022-06-24 NOTE — NURSING NOTE
Pt is isolative to self and room today  He reported feeling tired  Refused breakfast and consumed 100% of lunch  Took his medications without incidence  Nicorette gum given at 0819 and 1412  Denied all psychiatric symptoms  No behavioral issues

## 2022-06-24 NOTE — PROGRESS NOTES
06/24/22 0931   Team Meeting   Meeting Type Daily Rounds   Initial Conference Date 06/24/22   Patient/Family Present   Patient Present No   Patient's Family Present No       DAILY Court Gowers MD, Jen RN, Robert ARANGO  Case reviewed  Tylenol given for left ankle pain- effective  Pleasant, cooperative  Nicotine gum given  Discharge July 1st (home)  5/7 groups

## 2022-06-25 PROCEDURE — 99232 SBSQ HOSP IP/OBS MODERATE 35: CPT | Performed by: PHYSICIAN ASSISTANT

## 2022-06-25 RX ADMIN — LEVETIRACETAM 500 MG: 500 TABLET, FILM COATED ORAL at 21:07

## 2022-06-25 RX ADMIN — GABAPENTIN 400 MG: 400 CAPSULE ORAL at 21:07

## 2022-06-25 RX ADMIN — NICOTINE POLACRILEX 2 MG: 2 GUM, CHEWING ORAL at 17:45

## 2022-06-25 RX ADMIN — CLOZAPINE 200 MG: 200 TABLET ORAL at 08:20

## 2022-06-25 RX ADMIN — METOPROLOL SUCCINATE 50 MG: 50 TABLET, EXTENDED RELEASE ORAL at 08:20

## 2022-06-25 RX ADMIN — ASPIRIN 81 MG CHEWABLE TABLET 81 MG: 81 TABLET CHEWABLE at 08:19

## 2022-06-25 RX ADMIN — SENNOSIDES 8.6 MG: 8.6 TABLET, FILM COATED ORAL at 08:20

## 2022-06-25 RX ADMIN — GABAPENTIN 400 MG: 400 CAPSULE ORAL at 08:20

## 2022-06-25 RX ADMIN — NICOTINE POLACRILEX 2 MG: 2 GUM, CHEWING ORAL at 12:37

## 2022-06-25 RX ADMIN — METFORMIN HYDROCHLORIDE 850 MG: 850 TABLET, FILM COATED ORAL at 17:13

## 2022-06-25 RX ADMIN — CLOZAPINE 200 MG: 200 TABLET ORAL at 21:07

## 2022-06-25 RX ADMIN — GABAPENTIN 400 MG: 400 CAPSULE ORAL at 17:13

## 2022-06-25 RX ADMIN — NICOTINE POLACRILEX 2 MG: 2 GUM, CHEWING ORAL at 08:42

## 2022-06-25 RX ADMIN — NICOTINE POLACRILEX 2 MG: 2 GUM, CHEWING ORAL at 15:45

## 2022-06-25 RX ADMIN — METFORMIN HYDROCHLORIDE 850 MG: 850 TABLET, FILM COATED ORAL at 08:19

## 2022-06-25 RX ADMIN — LEVETIRACETAM 500 MG: 500 TABLET, FILM COATED ORAL at 08:20

## 2022-06-25 RX ADMIN — NICOTINE POLACRILEX 2 MG: 2 GUM, CHEWING ORAL at 20:05

## 2022-06-25 NOTE — PLAN OF CARE
Problem: Alteration in Thoughts and Perception  Goal: Agree to be compliant with medication regime, as prescribed and report medication side effects  Description: Interventions:  - Offer appropriate PRN medication and supervise ingestion; conduct AIMS, as needed   Outcome: Progressing     Problem: Ineffective Coping  Goal: Participates in unit activities  Description: Interventions:  - Provide therapeutic environment   - Provide required programming   - Redirect inappropriate behaviors   Outcome: Not Progressing

## 2022-06-25 NOTE — NURSING NOTE
Pt pleasant, calm, and cooperative  Visible in milieu for majority of evening, attended majority of groups and activities  Remained free of any behavioral issues, did not report any paranoia, anxiety, or hallucinations  Remains forgetful, poor insight, disoriented at times  Demonstrated good appetite, compliant with all scheduled medication  Did not report any ankle pain and ambulated without use of cane  Will continue to monitor for safety and support

## 2022-06-25 NOTE — NURSING NOTE
Chrissy Valladares was isolative to his room/self most of the morning  Denies anxiety, depression and voices  Able to make needs known to staff  Did not come out in the AM for his medication  This writer took medications to his bedside and he swallowed all of them  Needed encouragement to come out for breakfast (ate 75%)  Did not eat lunch  Stated that "the food is disgusting here"  Did not attend any groups this AM  More visible in the afternoon  Made a phone call  Did not use his cane when ambulating  No mention of pain in his ankle  Had Nicotine gum at 0842 and 1237  Mentioned about going home soon  Continue to monitor  Precautions maintained

## 2022-06-25 NOTE — NURSING NOTE
Started caring for [employer] at 0300  No issues or behaviors  He has been asleep  Respirations easy and non labored  Continue to monitor  Precautions maintained

## 2022-06-25 NOTE — NURSING NOTE
Alert, cooperative and visible intermittently  Consumed 50% of dinner  Took all medication without prompting  Nicotine gum administered as ordered  Maintained on safe precautions without incident

## 2022-06-25 NOTE — NURSING NOTE
Patient sleeping in his bed  Breathing adequately without difficulty  No emotional or physical distress observed  Will continue to monitor

## 2022-06-25 NOTE — PROGRESS NOTES
Progress Note - Behavioral Health     Malu Ramon 24 y o  male MRN: [id number]  [facility] Encounter: [id number]    Malu Ramon was seen for continuing care and reviewed with treatment team   Pt was in bed on approach earlier today but cooperative and a bit friendlier  He reported feeling "Pretty good, everything's good" and did not spontaneously talk about his medications  He presently denies any depression, SI, HI, anxiety, or psychotic symptoms and does not complain of any somatic symptoms  He maintains his goals to "Get a GED, go to program, study, talk to my friends, you know, stay busy, get a job "  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, however he has hope of being a  or potentially doing stocking/shelving  He looks forward to eventual discharge home with an ACT team and plans to attend [facility] for therapeutic support in the future  Per EMR and floor team, Pt has been calm and medication compliant  He tends to be more active in the afternoons and evenings, but this morning he was more visible and selectively social   He attended multiple groups with appropriate behavior among peers  No report of any behavioral outbursts through the weekend      Sleep:Good  Appetite: Good   Medication side effects: None per Pt    ROS: No complaints per Pt, (All ROS Negative)    Labs/Tests: Reviewed    Mental Status Evaluation:  Appearance:  Dressed, clean, Better eye contact today   Behavior:  Calm, cooperative, pleasant   Speech:  Generally pretty scant, but clear with normal rate and volume when he did speak   Mood:  Euthymic   Affect:  Constricted   Thought Process:  Organized, Goal directed, [place], Preoccupied with discharge but not perseverative   Associations: [place] associations   Thought Content:  No verbalized delusions   Perceptual Disturbances: Pt denies any hallucinations or paranoia and does not appear to be responding to internal stimuli Risk Potential: Pt presently denies SI or HI    Sensorium:  Self, birthday, Place, Day of the week, Month, Year   Memory:  short term memory grossly intact   Consciousness:  alert, awake   Attention: Good   Insight:  Limited   Judgment: Limited   Gait/Station: Normal gait/station   Motor Activity: No abnormal movements     Vitals:    06/25/22 0700 06/25/22 1500 06/26/22 0729 06/26/22 1500   BP: 127/82 132/72 130/68 133/79   BP Location: Left arm Right arm Right arm Right arm   Pulse: 100 (!) 113 95 (!) 110   Resp: 16 17 18 17   Temp: (!) 97 °F (36 1 °C) (!) 97 1 °F (36 2 °C) 98 2 °F (36 8 °C) (!) 97 4 °F (36 3 °C)   TempSrc: Skin Temporal Skin Temporal   SpO2: 97%      Weight:       Height:           Admission on 05/25/2022   Component Date Value    Ventricular Rate 05/25/2022 101     Atrial Rate 05/25/2022 101     NM Interval 05/25/2022 152     QRSD Interval 05/25/2022 96     QT Interval 05/25/2022 344     QTC Interval 05/25/2022 446     P Axis 05/25/2022 23     QRS Axis 05/25/2022 17     T Wave Axis 05/25/2022 15     WBC 05/27/2022 9 04     RBC 05/27/2022 5 09     Hemoglobin 05/27/2022 14 8     Hematocrit 05/27/2022 46 1     MCV 05/27/2022 91     MCH 05/27/2022 29 1     MCHC 05/27/2022 32 1     RDW 05/27/2022 12 0     MPV 05/27/2022 9 1     Platelets 05/59/3156 274     nRBC 05/27/2022 0     Neutrophils Relative 05/27/2022 59     Immat GRANS % 05/27/2022 1     Lymphocytes Relative 05/27/2022 27     Monocytes Relative 05/27/2022 9     Eosinophils Relative 05/27/2022 3     Basophils Relative 05/27/2022 1     Neutrophils Absolute 05/27/2022 5 42     Immature Grans Absolute 05/27/2022 0 05     Lymphocytes Absolute 05/27/2022 2 42     Monocytes Absolute 05/27/2022 0 85     Eosinophils Absolute 05/27/2022 0 25     Basophils Absolute 05/27/2022 0 05     Sodium 05/27/2022 139     Potassium 05/27/2022 3 9     Chloride 05/27/2022 106     CO2 05/27/2022 22     ANION GAP 05/27/2022 11  BUN 05/27/2022 13     Creatinine 05/27/2022 0 67 (A)    Glucose 05/27/2022 94     Glucose, Fasting 05/27/2022 94     Calcium 05/27/2022 9 2     AST 05/27/2022 20     ALT 05/27/2022 16     Alkaline Phosphatase 05/27/2022 141 (A)    Total Protein 05/27/2022 7 4     Albumin 05/27/2022 4 3     Total Bilirubin 05/27/2022 0 56     eGFR 05/27/2022 133     Hemoglobin A1C 05/27/2022 5 0     EAG 05/27/2022 97     Cholesterol 05/27/2022 181     Triglycerides 05/27/2022 210 (A)    HDL, Direct 05/27/2022 28 (A)    LDL Calculated 05/27/2022 111     Non-HDL-Chol (CHOL-HDL) 05/27/2022 153     RPR 05/27/2022 Non-Reactive     TSH 3RD GENERATON 05/27/2022 1 640     Clozapine Lvl 06/04/2022 459     NorClozapine 06/04/2022 289     Total(Cloz+Norcloz) 06/04/2022 748     WBC 06/07/2022 8 51     RBC 06/07/2022 5 41     Hemoglobin 06/07/2022 15 8     Hematocrit 06/07/2022 48 1     MCV 06/07/2022 89     MCH 06/07/2022 29 2     MCHC 06/07/2022 32 8     RDW 06/07/2022 12 0     MPV 06/07/2022 9 1     Platelets 24/75/4820 435 (A)    nRBC 06/07/2022 0     Neutrophils Relative 06/07/2022 60     Immat GRANS % 06/07/2022 0     Lymphocytes Relative 06/07/2022 28     Monocytes Relative 06/07/2022 9     Eosinophils Relative 06/07/2022 2     Basophils Relative 06/07/2022 1     Neutrophils Absolute 06/07/2022 5 06     Immature Grans Absolute 06/07/2022 0 02     Lymphocytes Absolute 06/07/2022 2 42     Monocytes Absolute 06/07/2022 0 77     Eosinophils Absolute 06/07/2022 0 20     Basophils Absolute 06/07/2022 0 04     Ventricular Rate 06/07/2022 98     Atrial Rate 06/07/2022 98     NV Interval 06/07/2022 148     QRSD Interval 06/07/2022 92     QT Interval 06/07/2022 352     QTC Interval 06/07/2022 449     P Axis 06/07/2022 28     QRS Axis 06/07/2022 19     T Wave Axis 06/07/2022 14     Ventricular Rate 06/07/2022 95     Atrial Rate 06/07/2022 95     NV Interval 06/07/2022 144     QRSD Interval 06/07/2022 94     QT Interval 06/07/2022 354     QTC Interval 06/07/2022 444     P Axis 06/07/2022 31     QRS Axis 06/07/2022 19     T Wave Axis 06/07/2022 14     NT-proBNP 06/14/2022 14 9     HS TnI random 06/14/2022 <2 (A)    Clozapine Lvl 06/14/2022 410     NorClozapine 06/14/2022 313     Total(Cloz+Norcloz) 06/14/2022 723     Total CK 06/14/2022 56     WBC 06/14/2022 7 22     RBC 06/14/2022 5 18     Hemoglobin 06/14/2022 15 1     Hematocrit 06/14/2022 45 6     MCV 06/14/2022 88     MCH 06/14/2022 29 2     MCHC 06/14/2022 33 1     RDW 06/14/2022 11 8     MPV 06/14/2022 8 6 (A)    Platelets 62/75/1068 394 (A)    nRBC 06/14/2022 0     Neutrophils Relative 06/14/2022 48     Immat GRANS % 06/14/2022 0     Lymphocytes Relative 06/14/2022 38     Monocytes Relative 06/14/2022 10     Eosinophils Relative 06/14/2022 3     Basophils Relative 06/14/2022 1     Neutrophils Absolute 06/14/2022 3 43     Immature Grans Absolute 06/14/2022 0 02     Lymphocytes Absolute 06/14/2022 2 75     Monocytes Absolute 06/14/2022 0 74     Eosinophils Absolute 06/14/2022 0 24     Basophils Absolute 06/14/2022 0 04        Labwork:   I have personally reviewed all pertinent laboratory/tests results    Most Recent Labs:   Lab Results   Component Value Date    WBC 7 22 06/14/2022    RBC 5 18 06/14/2022    HGB 15 1 06/14/2022    HCT 45 6 06/14/2022     (H) 06/14/2022    RDW 11 8 06/14/2022    NEUTROABS 3 43 06/14/2022    SODIUM 139 05/27/2022    K 3 9 05/27/2022     05/27/2022    CO2 22 05/27/2022    BUN 13 05/27/2022    CREATININE 0 67 (L) 05/27/2022    GLUC 94 05/27/2022    GLUF 94 05/27/2022    CALCIUM 9 2 05/27/2022    AST 20 05/27/2022    ALT 16 05/27/2022    ALKPHOS 141 (H) 05/27/2022    TP 7 4 05/27/2022    ALB 4 3 05/27/2022    TBILI 0 56 05/27/2022    CHOLESTEROL 181 05/27/2022    HDL 28 (L) 05/27/2022    TRIG 210 (H) 05/27/2022    LDLCALC 111 05/27/2022    [place] 153 05/27/2022 [id number] 1 640 05/27/2022    RPR Non-Reactive 05/27/2022    HGBA1C 5 0 05/27/2022    EAG 97 05/27/2022     Clozapine:   Lab Results   Component Value Date    CLOZAPINE 410 06/14/2022    NCLOZIP 313 06/14/2022     EKG   Lab Results   Component Value Date    VENTRATE 98 06/07/2022    ATRIALRATE 98 06/07/2022    PRINT 148 06/07/2022    QRSDINT 92 06/07/2022    QTINT 352 06/07/2022    QTCINT 449 06/07/2022    PAXIS 28 06/07/2022    QRSAXIS 19 06/07/2022    TWAVEAXIS 14 06/07/2022     Cardiac Profile   Lab Results   Component Value Date    CKTOTAL 56 06/14/2022          Progress Toward Goals:  Based on today's interview and review of prior notes, Pt has made gradual progress and remained stable through the weekend  Continue the present medication regimen    Per primary team, his discharge is anticipated for 7/1/2022 provided Pt remains stable  Assessment/Plan   Principal Problem:    Schizophrenia ([facility] [address] )  Active Problems:    Medical clearance for psychiatric admission    Seizure disorder [facility])    Traumatic brain injury ([facility] [address] )    Brain lesion    History of cardiac radiofrequency ablation    History of fall    PTSD (post-traumatic stress disorder)      Recommended Treatment: Continue with pharmacotherapy, group therapy, milieu therapy and occupational therapy    The patient will be maintained on the following medications:  Current Facility-Administered Medications   Medication Dose Route Frequency Provider Last Rate    acetaminophen  650 mg Oral Q6H PRN Asia Barrett, DO      acetaminophen  650 mg Oral Q4H PRN Asia Barrett, DO      acetaminophen  975 mg Oral Q6H PRN Asia Barrett, DO      aluminum-magnesium hydroxide-simethicone  30 mL Oral Q4H PRN Asia Barrett DO      ammonium lactate   Topical BID PRN Eleanor Arvizu MD      artificial tear  1 application Both Eyes B9T PRN Asia Barrett DO      aspirin  81 mg Oral Daily Kashif Agosto,       benztropine  1 mg Intramuscular Q4H PRN Max 6/day Vitor Medicus, DO      benztropine  1 mg Oral Q4H PRN Max 6/day Vitor Medicus, DO      cloZAPine  200 mg Oral BID Vitor Medicus, DO      hydrOXYzine HCL  50 mg Oral Q6H PRN Max 4/day Vitor Medicus, DO      Or    diphenhydrAMINE  50 mg Intramuscular Q6H PRN Vitor Medicus, DO      gabapentin  400 mg Oral TID Vitor Medicus, DO      hydrOXYzine HCL  100 mg Oral Q6H PRN Max 4/day Vitor Medicus, DO      Or    LORazepam  2 mg Intramuscular Q6H PRN Vitor Medicus, DO      hydrOXYzine HCL  25 mg Oral Q6H PRN Max 4/day Vitor Medicus, DO      levETIRAcetam  500 mg Oral Q12H [address] Vitor Medicus, DO      melatonin  9 mg Oral HS PRN Vitor Medicus, DO      metFORMIN  850 mg Oral BID With Meals Leyla Buck MD      metoprolol succinate  50 mg Oral Daily Vitor Medicus, DO      nicotine polacrilex  2 mg Oral Q2H PRN Vitor Medicus, DO      OLANZapine  10 mg Oral Q3H PRN Max 3/day Vitor Medicus, DO      Or    OLANZapine  10 mg Intramuscular Q3H PRN Max 3/day Vitor Medicus, DO      OLANZapine  5 mg Oral Q3H PRN Max 6/day Vitor Medicus, DO      Or    OLANZapine  5 mg Intramuscular Q3H PRN Max 6/day Vitor Medicus, DO      OLANZapine  2 5 mg Oral Q3H PRN Max 8/day Vitor Medicus, DO      polyethylene glycol  17 g Oral Daily PRN Vitor Medicus, DO      propranolol  10 mg Oral Q8H PRN Vitor Medicus, DO      senna  1 tablet Oral Daily Kelley Fong MD      senna-docusate sodium  1 tablet Oral Daily PRN Leyla Buck MD      traZODone  50 mg Oral HS PRN Vitor Medicus, DO         Risks, benefits and possible side effects of Medications:   Risks, benefits, and possible side effects of medications explained to patient and patient verbalizes understanding

## 2022-06-26 PROCEDURE — 99232 SBSQ HOSP IP/OBS MODERATE 35: CPT | Performed by: PHYSICIAN ASSISTANT

## 2022-06-26 RX ADMIN — NICOTINE POLACRILEX 2 MG: 2 GUM, CHEWING ORAL at 20:24

## 2022-06-26 RX ADMIN — ASPIRIN 81 MG CHEWABLE TABLET 81 MG: 81 TABLET CHEWABLE at 08:24

## 2022-06-26 RX ADMIN — CLOZAPINE 200 MG: 200 TABLET ORAL at 21:13

## 2022-06-26 RX ADMIN — SENNOSIDES 8.6 MG: 8.6 TABLET, FILM COATED ORAL at 08:24

## 2022-06-26 RX ADMIN — NICOTINE POLACRILEX 2 MG: 2 GUM, CHEWING ORAL at 08:24

## 2022-06-26 RX ADMIN — NICOTINE POLACRILEX 2 MG: 2 GUM, CHEWING ORAL at 18:27

## 2022-06-26 RX ADMIN — METFORMIN HYDROCHLORIDE 850 MG: 850 TABLET, FILM COATED ORAL at 08:24

## 2022-06-26 RX ADMIN — METOPROLOL SUCCINATE 50 MG: 50 TABLET, EXTENDED RELEASE ORAL at 08:23

## 2022-06-26 RX ADMIN — GABAPENTIN 400 MG: 400 CAPSULE ORAL at 17:15

## 2022-06-26 RX ADMIN — CLOZAPINE 200 MG: 200 TABLET ORAL at 08:24

## 2022-06-26 RX ADMIN — NICOTINE POLACRILEX 2 MG: 2 GUM, CHEWING ORAL at 11:32

## 2022-06-26 RX ADMIN — LEVETIRACETAM 500 MG: 500 TABLET, FILM COATED ORAL at 08:24

## 2022-06-26 RX ADMIN — NICOTINE POLACRILEX 2 MG: 2 GUM, CHEWING ORAL at 13:33

## 2022-06-26 RX ADMIN — GABAPENTIN 400 MG: 400 CAPSULE ORAL at 21:13

## 2022-06-26 RX ADMIN — METFORMIN HYDROCHLORIDE 850 MG: 850 TABLET, FILM COATED ORAL at 17:15

## 2022-06-26 RX ADMIN — LEVETIRACETAM 500 MG: 500 TABLET, FILM COATED ORAL at 21:13

## 2022-06-26 RX ADMIN — NICOTINE POLACRILEX 2 MG: 2 GUM, CHEWING ORAL at 15:43

## 2022-06-26 RX ADMIN — GABAPENTIN 400 MG: 400 CAPSULE ORAL at 08:24

## 2022-06-26 RX ADMIN — AMMONIUM LACTATE 1 APPLICATION: 12 LOTION TOPICAL at 21:48

## 2022-06-26 NOTE — PLAN OF CARE
Problem: Alteration in Thoughts and Perception  Goal: Treatment Goal: Gain control of psychotic behaviors/thinking, reduce/eliminate presenting symptoms and demonstrate improved reality functioning upon discharge  Outcome: Progressing  Goal: Verbalize thoughts and feelings  Description: Interventions:  - Promote a nonjudgmental and trusting relationship with the patient through active listening and therapeutic communication  - Assess patient's level of functioning, behavior and potential for risk  - Engage patient in 1 on 1 interactions  - Encourage patient to express fears, feelings, frustrations, and discuss symptoms    - [place] patient to reality, help patient recognize reality-based thinking   - Administer medications as ordered and assess for potential side effects  - Provide the patient education related to the signs and symptoms of the illness and desired effects of prescribed medications  Interventions:  - Assess and re-assess patient's lethality and potential for self-injury  - Engage patient in 1:1 interactions, daily, for a minimum of 15 minutes  - Encourage patient to express feelings, fears, frustrations, hopes  - Establish rapport/trust with patient   Interventions:  - Assess and re-assess patient's level of risk   - Engage patient in 1:1 interactions, daily, for a minimum of 15 minutes   - Encourage patient to express feelings, fears, frustrations, hopes   Interventions:  - Assess and re-assess patient's level of risk, every waking shift  - Engage patient in 1:1 interactions, daily, for a minimum of 15 minutes   - Allow patient to express feelings and frustrations in a safe and non-threatening manner   - Establish rapport/trust with patient   Outcome: Not Progressing  Goal: Refrain from acting on delusional thinking/internal stimuli  Description: Interventions:  - Monitor patient closely, per order   - Utilize least restrictive measures   - Set reasonable limits, give positive feedback for acceptable   - Administer medications as ordered and monitor of potential side effects  Outcome: Progressing  Goal: Agree to be compliant with medication regime, as prescribed and report medication side effects  Description: Interventions:  - Offer appropriate PRN medication and supervise ingestion; conduct AIMS, as needed   Outcome: Progressing  Goal: Recognize dysfunctional thoughts, communicate reality-based thoughts at the time of discharge  Description: Interventions:  - Provide medication and psycho-education to assist patient in compliance and developing insight into his/her illness   Outcome: Progressing     Problem: Ineffective Coping  Goal: Identifies ineffective coping skills  Outcome: Not Progressing  Goal: Identifies healthy coping skills  Outcome: Not Progressing  Goal: Demonstrates healthy coping skills  Outcome: Progressing  Goal: Participates in unit activities  Description: Interventions:  - Provide therapeutic environment   - Provide required programming   - Redirect inappropriate behaviors   Outcome: Progressing  Goal: Patient/Family participate in treatment and DC plans  Description: Interventions:  - Provide therapeutic environment  Outcome: Progressing  Goal: Patient/Family verbalizes awareness of resources  Outcome: Not Progressing     Problem: Risk for Self Injury/Neglect  Goal: Verbalize thoughts and feelings  Description: Interventions:  - Promote a nonjudgmental and trusting relationship with the patient through active listening and therapeutic communication  - Assess patient's level of functioning, behavior and potential for risk  - Engage patient in 1 on 1 interactions  - Encourage patient to express fears, feelings, frustrations, and discuss symptoms    - [place] patient to reality, help patient recognize reality-based thinking   - Administer medications as ordered and assess for potential side effects  - Provide the patient education related to the signs and symptoms of the illness and desired effects of prescribed medications  Interventions:  - Assess and re-assess patient's lethality and potential for self-injury  - Engage patient in 1:1 interactions, daily, for a minimum of 15 minutes  - Encourage patient to express feelings, fears, frustrations, hopes  - Establish rapport/trust with patient   Interventions:  - Assess and re-assess patient's level of risk   - Engage patient in 1:1 interactions, daily, for a minimum of 15 minutes   - Encourage patient to express feelings, fears, frustrations, hopes   Interventions:  - Assess and re-assess patient's level of risk, every waking shift  - Engage patient in 1:1 interactions, daily, for a minimum of 15 minutes   - Allow patient to express feelings and frustrations in a safe and non-threatening manner   - Establish rapport/trust with patient   Outcome: Not Progressing  Goal: Treatment Goal: Remain safe during length of stay, learn and adopt new coping skills, and be free of self-injurious ideation, impulses and acts at the time of discharge  Outcome: Progressing  Goal: Recognize maladaptive responses and adopt new coping mechanisms  Outcome: Not Progressing     Problem: Depression  Goal: Verbalize thoughts and feelings  Description: Interventions:  - Promote a nonjudgmental and trusting relationship with the patient through active listening and therapeutic communication  - Assess patient's level of functioning, behavior and potential for risk  - Engage patient in 1 on 1 interactions  - Encourage patient to express fears, feelings, frustrations, and discuss symptoms    - [place] patient to reality, help patient recognize reality-based thinking   - Administer medications as ordered and assess for potential side effects  - Provide the patient education related to the signs and symptoms of the illness and desired effects of prescribed medications  Interventions:  - Assess and re-assess patient's lethality and potential for self-injury  - Engage patient in 1:1 interactions, daily, for a minimum of 15 minutes  - Encourage patient to express feelings, fears, frustrations, hopes  - Establish rapport/trust with patient   Interventions:  - Assess and re-assess patient's level of risk   - Engage patient in 1:1 interactions, daily, for a minimum of 15 minutes   - Encourage patient to express feelings, fears, frustrations, hopes   Interventions:  - Assess and re-assess patient's level of risk, every waking shift  - Engage patient in 1:1 interactions, daily, for a minimum of 15 minutes   - Allow patient to express feelings and frustrations in a safe and non-threatening manner   - Establish rapport/trust with patient   Outcome: Not Progressing  Goal: Treatment Goal: Demonstrate behavioral control of depressive symptoms, verbalize feelings of improved mood/affect, and adopt new coping skills prior to discharge  Outcome: Progressing  Goal: Refrain from isolation  Description: Interventions:  - Develop a trusting relationship   - Encourage socialization   Outcome: Progressing  Goal: Refrain from self-neglect  Outcome: Progressing     Problem: Anxiety  Goal: Anxiety is at manageable level  Description: Interventions:  - Assess and monitor patient's anxiety level  - Monitor for signs and symptoms (heart palpitations, chest pain, shortness of breath, headaches, nausea, feeling jumpy, restlessness, irritable, apprehensive)  - Collaborate with interdisciplinary team and initiate plan and interventions as ordered    - [place] patient to unit/surroundings  - Explain treatment plan  - Encourage participation in care  - Encourage verbalization of concerns/fears  - Identify coping mechanisms  - Assist in developing anxiety-reducing skills  - Administer/offer alternative therapies  - Limit or eliminate stimulants  Outcome: Progressing     Problem: Risk for Violence/Aggression Toward Others  Goal: Verbalize thoughts and feelings  Description: Interventions:  - Promote a nonjudgmental and trusting relationship with the patient through active listening and therapeutic communication  - Assess patient's level of functioning, behavior and potential for risk  - Engage patient in 1 on 1 interactions  - Encourage patient to express fears, feelings, frustrations, and discuss symptoms    - [place] patient to reality, help patient recognize reality-based thinking   - Administer medications as ordered and assess for potential side effects  - Provide the patient education related to the signs and symptoms of the illness and desired effects of prescribed medications  Interventions:  - Assess and re-assess patient's lethality and potential for self-injury  - Engage patient in 1:1 interactions, daily, for a minimum of 15 minutes  - Encourage patient to express feelings, fears, frustrations, hopes  - Establish rapport/trust with patient   Interventions:  - Assess and re-assess patient's level of risk   - Engage patient in 1:1 interactions, daily, for a minimum of 15 minutes   - Encourage patient to express feelings, fears, frustrations, hopes   Interventions:  - Assess and re-assess patient's level of risk, every waking shift  - Engage patient in 1:1 interactions, daily, for a minimum of 15 minutes   - Allow patient to express feelings and frustrations in a safe and non-threatening manner   - Establish rapport/trust with patient   Outcome: Not Progressing  Goal: Treatment Goal: Refrain from acts of violence/aggression during length of stay, and demonstrate improved impulse control at the time of discharge  Outcome: Progressing  Goal: Refrain from harming others  Outcome: Progressing  Goal: Refrain from destructive acts on the environment or property  Outcome: Progressing  Goal: Control angry outbursts  Description: Interventions:  - Monitor patient closely, per order  - Ensure early verbal de-escalation  - Monitor prn medication needs  - Set reasonable/therapeutic limits, outline behavioral expectations, and consequences   - Provide a non-threatening milieu, utilizing the least restrictive interventions   Outcome: Progressing  Goal: Identify appropriate positive anger management techniques  Description: Interventions:  - Offer anger management and coping skills groups   - Staff will provide positive feedback for appropriate anger control  Outcome: Progressing     Problem: SUBSTANCE USE/ABUSE  Goal: By discharge, will develop insight into their chemical dependency and sustain motivation to continue in recovery  Description: INTERVENTIONS:  - Attends all daily group sessions and scheduled AA groups  - Actively practices coping skills through participation in the therapeutic community and adherence to program rules  - Reviews and completes assignments from individual treatment plan  - Assist patient development of understanding of their personal cycle of addiction and relapse triggers  Outcome: Not Progressing  Goal: By discharge, patient will have ongoing treatment plan addressing chemical dependency  Description: INTERVENTIONS:  - Assist patient with resources and/or appointments for ongoing recovery based living  Outcome: Not Progressing

## 2022-06-26 NOTE — CMS CERTIFICATION NOTE
Certification Statement -  Meredith Patterson  :1995  MRN: [id number]    [address] Room / Bed: [address] 982-49 Encounter: [id number]    I certify that Meredith Patterson requires further inpatient hospitalization beyond 20 days due to Schizoaffective DO      Obed Messina MD     Date: 2022  Time: 5:17 PM

## 2022-06-26 NOTE — NURSING NOTE
Ravin Annamarie has been more visible in the milieu today  Social with select peers  Able to make needs known to staff  Denies anxiety and depression  Brighter affect  Has not been using his cane  Ate 100% of breakfast and 25% of lunch  Took all pills without difficulty  Attended journal group and Spirituality (on the deck)  Had Nicotine gum at 0824, 1132 and 1333  Went out on the deck for [employer] this afternoon  Continue to monitor  Precautions maintained

## 2022-06-26 NOTE — NURSING NOTE
Trent Sosa was in bed at shift change  Per Q 7 minute safety checks , pt appeared to sleep about 7 hours overnight  No behavior incidence this shift

## 2022-06-27 PROCEDURE — 99232 SBSQ HOSP IP/OBS MODERATE 35: CPT | Performed by: PSYCHIATRY & NEUROLOGY

## 2022-06-27 RX ADMIN — NICOTINE POLACRILEX 2 MG: 2 GUM, CHEWING ORAL at 13:51

## 2022-06-27 RX ADMIN — GABAPENTIN 400 MG: 400 CAPSULE ORAL at 08:46

## 2022-06-27 RX ADMIN — METFORMIN HYDROCHLORIDE 850 MG: 850 TABLET, FILM COATED ORAL at 17:17

## 2022-06-27 RX ADMIN — CLOZAPINE 200 MG: 200 TABLET ORAL at 21:09

## 2022-06-27 RX ADMIN — METFORMIN HYDROCHLORIDE 850 MG: 850 TABLET, FILM COATED ORAL at 08:46

## 2022-06-27 RX ADMIN — ASPIRIN 81 MG CHEWABLE TABLET 81 MG: 81 TABLET CHEWABLE at 08:46

## 2022-06-27 RX ADMIN — METOPROLOL SUCCINATE 50 MG: 50 TABLET, EXTENDED RELEASE ORAL at 08:46

## 2022-06-27 RX ADMIN — GABAPENTIN 400 MG: 400 CAPSULE ORAL at 17:17

## 2022-06-27 RX ADMIN — LEVETIRACETAM 500 MG: 500 TABLET, FILM COATED ORAL at 08:46

## 2022-06-27 RX ADMIN — NICOTINE POLACRILEX 2 MG: 2 GUM, CHEWING ORAL at 19:35

## 2022-06-27 RX ADMIN — CLOZAPINE 200 MG: 200 TABLET ORAL at 08:46

## 2022-06-27 RX ADMIN — NICOTINE POLACRILEX 2 MG: 2 GUM, CHEWING ORAL at 08:45

## 2022-06-27 RX ADMIN — GABAPENTIN 400 MG: 400 CAPSULE ORAL at 21:09

## 2022-06-27 RX ADMIN — SENNOSIDES 8.6 MG: 8.6 TABLET, FILM COATED ORAL at 08:46

## 2022-06-27 RX ADMIN — NICOTINE POLACRILEX 2 MG: 2 GUM, CHEWING ORAL at 17:34

## 2022-06-27 RX ADMIN — LEVETIRACETAM 500 MG: 500 TABLET, FILM COATED ORAL at 21:09

## 2022-06-27 NOTE — SOCIAL WORK
Spoke with patient's mother at length about DC planning prior to his treatment team meeting  She later joined by phone during team meeting to ask a few questions related to discharge and OP  SW will follow up with her to collaborate on DC plans, since she will likely be scheduling a DC meeting with [employer] staff

## 2022-06-27 NOTE — PLAN OF CARE
Problem: Alteration in Thoughts and Perception  Goal: Verbalize thoughts and feelings  Description: Interventions:  - Promote a nonjudgmental and trusting relationship with the patient through active listening and therapeutic communication  - Assess patient's level of functioning, behavior and potential for risk  - Engage patient in 1 on 1 interactions  - Encourage patient to express fears, feelings, frustrations, and discuss symptoms    - [place] patient to reality, help patient recognize reality-based thinking   - Administer medications as ordered and assess for potential side effects  - Provide the patient education related to the signs and symptoms of the illness and desired effects of prescribed medications  Interventions:  - Assess and re-assess patient's lethality and potential for self-injury  - Engage patient in 1:1 interactions, daily, for a minimum of 15 minutes  - Encourage patient to express feelings, fears, frustrations, hopes  - Establish rapport/trust with patient   Interventions:  - Assess and re-assess patient's level of risk   - Engage patient in 1:1 interactions, daily, for a minimum of 15 minutes   - Encourage patient to express feelings, fears, frustrations, hopes   Interventions:  - Assess and re-assess patient's level of risk, every waking shift  - Engage patient in 1:1 interactions, daily, for a minimum of 15 minutes   - Allow patient to express feelings and frustrations in a safe and non-threatening manner   - Establish rapport/trust with patient   Outcome: Progressing     Problem: Ineffective Coping  Goal: Participates in unit activities  Description: Interventions:  - Provide therapeutic environment   - Provide required programming   - Redirect inappropriate behaviors   Outcome: Progressing      Patient completed Goal Card for the week of 6/27/22    Goals: Plan on going home              Shower daily              Eating

## 2022-06-27 NOTE — PROGRESS NOTES
06/27/22 1100   Activity/Group Checklist   Group Wellness   Attendance Attended   Attendance Duration (min) 46-60   Interactions Interacted appropriately   Affect/Mood Appropriate;Calm;Normal range   Goals Achieved Identified feelings; Able to engage in interactions; Able to listen to others; Able to self-disclose

## 2022-06-27 NOTE — NURSING NOTE
Pt is medication and meal compliant  Pt is visible in the milieu and social with select peers, but for most of shift rested in room  Pt denies s/s and offers no complaints at this time  Pt reports happiness to " just get out of here and go home"  Will continue to monitor

## 2022-06-27 NOTE — NURSING NOTE
Thusmike Koehler appears to be asleep and resting quietly and comfortably and in no apparent distress when monitored on Q 7 minute rounds   Will continue to monitor

## 2022-06-27 NOTE — PROGRESS NOTES
06/27/22 1152   Team Meeting   Meeting Type Daily Rounds   Initial Conference Date 06/27/22   Patient/Family Present   Patient Present No   Patient's Family Present No       Daily Annie Ayala MD, Pneny Cosme RN, [place], [place] CPS  Case reviewed  No behavioral issues  Medication compliant  Focused on discharge (plan for dc this Friday) 4/8 groups

## 2022-06-27 NOTE — PLAN OF CARE
Problem: Risk for Self Injury/Neglect  Goal: Verbalize thoughts and feelings  Description: Interventions:  - Promote a nonjudgmental and trusting relationship with the patient through active listening and therapeutic communication  - Assess patient's level of functioning, behavior and potential for risk  - Engage patient in 1 on 1 interactions  - Encourage patient to express fears, feelings, frustrations, and discuss symptoms    - [place] patient to reality, help patient recognize reality-based thinking   - Administer medications as ordered and assess for potential side effects  - Provide the patient education related to the signs and symptoms of the illness and desired effects of prescribed medications  Interventions:  - Assess and re-assess patient's lethality and potential for self-injury  - Engage patient in 1:1 interactions, daily, for a minimum of 15 minutes  - Encourage patient to express feelings, fears, frustrations, hopes  - Establish rapport/trust with patient   Interventions:  - Assess and re-assess patient's level of risk   - Engage patient in 1:1 interactions, daily, for a minimum of 15 minutes   - Encourage patient to express feelings, fears, frustrations, hopes   Interventions:  - Assess and re-assess patient's level of risk, every waking shift  - Engage patient in 1:1 interactions, daily, for a minimum of 15 minutes   - Allow patient to express feelings and frustrations in a safe and non-threatening manner   - Establish rapport/trust with patient   Outcome: Progressing  Goal: Treatment Goal: Remain safe during length of stay, learn and adopt new coping skills, and be free of self-injurious ideation, impulses and acts at the time of discharge  Outcome: Progressing  Goal: Recognize maladaptive responses and adopt new coping mechanisms  Outcome: Progressing

## 2022-06-27 NOTE — PROGRESS NOTES
Progress Note - Behavioral Health   Flaco Romano 24 y o  male MRN: [id number]  Unit/Bed#: [facility] 112-01 Encounter: [id number]    Assessment/Plan   Principal Problem:    Schizophrenia [facility])  Active Problems:    Medical clearance for psychiatric admission    Seizure disorder [facility])    Traumatic brain injury ([address] )    Brain lesion    History of cardiac radiofrequency ablation    History of fall    PTSD (post-traumatic stress disorder)    ROS: Patient reports no complaints this morning  NO SEIZURES  Reports that his leg is feeling much better, and he is not walking with his cane today  Diagnosis: Schizophrenia  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  Recommended Treatment:   Continue current treatment regimen:   -Clozapine   -Gabapentin   -Keppra  Encourage patient to continue performing PT exercises and continue ambulation  Planned discharge for Friday, July 1st  Continue with group therapy, milieu therapy and occupational therapy  Continue frequent safety checks and vitals per unit protocol  Case discussed with treatment team   Continue with [facility] medical management as indicated  Continue coordinating with case management regarding disposition  Risks, benefits and possible side effects of Medications: Risks, benefits, and possible side effects of medications have previously been explained  No new medications at this time  ------------------------------------------------------------    Subjective: Per nursing report, Ravin De Luna has been cooperative on the unit and compliant with medications  Nursing reports that the patient is still needy and often forgetful  Anticipated discharge Friday, July 1st  Attended 4 out of 7 groups on Friday  Today, Ravin De Luna is consenting for safety on the unit  He was standing by the nurses station, but was agreeable to talk  He reports feeling "alright " He remained fixated on discharge this morning after the team meeting   He asked multiple times about the plans for his day program post discharge and about the meeting his mother had with them tomorrow at 10 am  Patient expressed concern that this meeting would hold up his discharge  He also reported that he was only attending groups because he had to to be discharge and he "did not get the point," and he did not think they were helpful  He also questioned his medications saying he "felt fine," and he did not know why he needed them  He reports that his plans for the day are to continue to attend groups because he has to  He attended 4 out of 7 groups on Friday  He was [facility] admitted to [facility] for agitation, homicidal ideation, and his mother felt "his medications were off " He no longer reports having any of these symptoms  He Luzma Shetty notes having slept well  He reports no nightmares, frequent or early morning awakenings  Luzma Shetty states having a diminished appetite  He thinks that it has to do with the food here  Luzma Shetty has been taking the medications as prescribed and reporting no side effects  He denies any episodes of acid reflux  He reports that his leg feels better today, and he was ambulating without his cane  He reports that he has been performing his PT exercises  No significant events overnight  Not self-injurious, aggressive, or destructive on the unit  Psychiatric Review of Systems:  Behavior over the last 24 hours: unchanged  Sleep: normal  Appetite: adequate, decreased from baseline  Medication side effects: none verbalized  Medication compliance: good  Group attendance: Good  Significant events: None    PRNs overnight: None   VS: Reviewed, within normal limits    Progress Toward Goals: Minimal improvement  Remains fixated on discharge and reports that he does not see the point of groups, and he is only attending them because he has to   Patient has limited insight and reports that he still does not understand why he needs medications because he "feels fine"    Vital signs in last 24 hours:  Temp:  [97 4 °F (36 3 °C)-97 9 °F (36 6 °C)] 97 9 °F (36 6 °C)  HR:  [] 92  Resp:  [17-18] 18  BP: (133-142)/(79-84) 142/84    Mental Status Exam:  Appearance:  alert, minimal eye contact, appears stated age, casually dressed and appropriate grooming and hygiene   Behavior:  calm, limited cooperativity and guarded   Motor: no abnormal movements, walking with limp without cane   Speech:  spontaneous, slow, normal volume, scant and coherent   Mood:  euthymic   Affect:  mood-congruent, constricted and euthymic   Thought Process:  organized, goal directed   Thought Content: paranoid ideation, intrusive thoughts, ruminating thoughts, ideas of reference  No current s/h thoughts, intent, or plans  No distorted body perceptions or obsessions/compulsions      Perceptual disturbances: denies current hallucinations and does not appear to be responding to internal stimuli at this time   Risk Potential: No active or passive suicidal or homicidal ideation was verbalized during interview   Cognition: oriented to person, place, time, and situation and appears to be of average intelligence   Insight:  Limited   Judgment: Limited   Sensorium: Alert and awake  Orientation: Oriented x 4  Attention: Attention span and concentration are age appropriate  Intellect: Appears to be average intelligence    Current Medications:  Current Facility-Administered Medications   Medication Dose Route Frequency Provider Last Rate    acetaminophen  650 mg Oral Q6H PRN Usman Smolder, DO      acetaminophen  650 mg Oral Q4H PRN Usman Smolder, DO      acetaminophen  975 mg Oral Q6H PRN Usman Smolder, DO      aluminum-magnesium hydroxide-simethicone  30 mL Oral Q4H PRN Usman Smolder, DO      ammonium lactate   Topical BID PRN Tramaine Soliman MD      artificial tear  1 application Both Eyes T6L PRN Usman Smolder, DO      aspirin  81 mg Oral Daily Usman Asmitaer, DO      benztropine  1 mg Intramuscular Q4H PRN Max 6/day Usman Alvarez DO      benztropine  1 mg Oral Q4H PRN Max 6/day Usman Smolder, DO      cloZAPine  200 mg Oral BID Usman Smolder, DO      hydrOXYzine HCL  50 mg Oral Q6H PRN Max 4/day Usman Smolder, DO      Or    diphenhydrAMINE  50 mg Intramuscular Q6H PRN Usman Smolder, DO      gabapentin  400 mg Oral TID Usman Smolder, DO      hydrOXYzine HCL  100 mg Oral Q6H PRN Max 4/day Usman Smolder, DO      Or    LORazepam  2 mg Intramuscular Q6H PRN Usman Smolder, DO      hydrOXYzine HCL  25 mg Oral Q6H PRN Max 4/day Usman Smolder, DO      levETIRAcetam  500 mg Oral Q12H [address] Kashif A Prayson, DO      melatonin  9 mg Oral HS PRN Usman Smolder, DO      metFORMIN  850 mg Oral BID With Meals Tramaine Soliman MD      metoprolol succinate  50 mg Oral Daily Usman Smolder, DO      nicotine polacrilex  2 mg Oral Q2H PRN Usman Smolder, DO      OLANZapine  10 mg Oral Q3H PRN Max 3/day Usman Smolder, DO      Or    OLANZapine  10 mg Intramuscular Q3H PRN Max 3/day Usman Smolder, DO      OLANZapine  5 mg Oral Q3H PRN Max 6/day Kashif A Prayson, DO      Or    OLANZapine  5 mg Intramuscular Q3H PRN Max 6/day Kashif A Prayson, DO      OLANZapine  2 5 mg Oral Q3H PRN Max 8/day Kashif A Prayson, DO      polyethylene glycol  17 g Oral Daily PRN Usman Smolder, DO      propranolol  10 mg Oral Q8H PRN Usman Smolder, DO      senna  1 tablet Oral Daily Mihir Lima MD      senna-docusate sodium  1 tablet Oral Daily PRN Tramaine Soliman MD      traZODone  50 mg Oral HS PRN Usman Smolder, DO         Behavioral Health Medications: all current active meds have been reviewed  Changes as in plan section above  Laboratory results:  I have personally reviewed all pertinent laboratory/tests results  No results found for this or any previous visit (from the past 48 hour(s))  This note has been constructed using a voice recognition system   There may be translation, syntax, or grammatical errors  If you have any questions, please contact the dictating author      JOHNATHAN Dalton

## 2022-06-27 NOTE — PROGRESS NOTES
06/27/22 0700   Activity/Group Checklist   Group Community meeting   Attendance Did not attend   Attendance Duration (min) 46-60   Affect/Mood RENUKA

## 2022-06-27 NOTE — NURSING NOTE
Yuliana Cantor is cooperative and pleasant  He is forgetful and needs frequent reminders that he has already asked for something, gotten something or whatever  He frequently requests nicotine gum an hour after getting it, many times, I suspect, out of boredom  He ambulates about the unit without the cane   There were no complaints of pain

## 2022-06-27 NOTE — NURSING NOTE
Patient was withdrawn to his room and out for dinner and to make his needs known to staff  Denies all psych s/s  No complaints offered  Had 0% for dinner  Attended 3/3 evening groups  Nicotine gum given at [address] [place] [address] and 1935  Cooperative and compliant with all his medications   Safety checks ongoing

## 2022-06-28 PROCEDURE — 99232 SBSQ HOSP IP/OBS MODERATE 35: CPT

## 2022-06-28 RX ADMIN — ASPIRIN 81 MG CHEWABLE TABLET 81 MG: 81 TABLET CHEWABLE at 08:35

## 2022-06-28 RX ADMIN — LEVETIRACETAM 500 MG: 500 TABLET, FILM COATED ORAL at 08:35

## 2022-06-28 RX ADMIN — NICOTINE POLACRILEX 2 MG: 2 GUM, CHEWING ORAL at 19:35

## 2022-06-28 RX ADMIN — NICOTINE POLACRILEX 2 MG: 2 GUM, CHEWING ORAL at 13:50

## 2022-06-28 RX ADMIN — GABAPENTIN 400 MG: 400 CAPSULE ORAL at 21:01

## 2022-06-28 RX ADMIN — METFORMIN HYDROCHLORIDE 850 MG: 850 TABLET, FILM COATED ORAL at 16:36

## 2022-06-28 RX ADMIN — METFORMIN HYDROCHLORIDE 850 MG: 850 TABLET, FILM COATED ORAL at 08:35

## 2022-06-28 RX ADMIN — CLOZAPINE 200 MG: 200 TABLET ORAL at 08:35

## 2022-06-28 RX ADMIN — SENNOSIDES 8.6 MG: 8.6 TABLET, FILM COATED ORAL at 08:35

## 2022-06-28 RX ADMIN — METOPROLOL SUCCINATE 50 MG: 50 TABLET, EXTENDED RELEASE ORAL at 08:35

## 2022-06-28 RX ADMIN — GABAPENTIN 400 MG: 400 CAPSULE ORAL at 16:35

## 2022-06-28 RX ADMIN — CLOZAPINE 200 MG: 200 TABLET ORAL at 21:01

## 2022-06-28 RX ADMIN — LEVETIRACETAM 500 MG: 500 TABLET, FILM COATED ORAL at 21:01

## 2022-06-28 RX ADMIN — GABAPENTIN 400 MG: 400 CAPSULE ORAL at 08:35

## 2022-06-28 NOTE — NURSING NOTE
Pt is medication and meal compliant with breakfast, but refused lunch  Pt denies s/s and remains in bed most of shift  Pt is quiet and seclusive  Pt offers no complaints at this time  Will continue to monitor

## 2022-06-28 NOTE — PLAN OF CARE
Problem: Alteration in Thoughts and Perception  Goal: Verbalize thoughts and feelings  Description: Interventions:  - Promote a nonjudgmental and trusting relationship with the patient through active listening and therapeutic communication  - Assess patient's level of functioning, behavior and potential for risk  - Engage patient in 1 on 1 interactions  - Encourage patient to express fears, feelings, frustrations, and discuss symptoms    - [place] patient to reality, help patient recognize reality-based thinking   - Administer medications as ordered and assess for potential side effects  - Provide the patient education related to the signs and symptoms of the illness and desired effects of prescribed medications  Interventions:  - Assess and re-assess patient's lethality and potential for self-injury  - Engage patient in 1:1 interactions, daily, for a minimum of 15 minutes  - Encourage patient to express feelings, fears, frustrations, hopes  - Establish rapport/trust with patient   Interventions:  - Assess and re-assess patient's level of risk   - Engage patient in 1:1 interactions, daily, for a minimum of 15 minutes   - Encourage patient to express feelings, fears, frustrations, hopes   Interventions:  - Assess and re-assess patient's level of risk, every waking shift  - Engage patient in 1:1 interactions, daily, for a minimum of 15 minutes   - Allow patient to express feelings and frustrations in a safe and non-threatening manner   - Establish rapport/trust with patient   Outcome: Adequate for Discharge     Problem: Ineffective Coping  Goal: Identifies healthy coping skills  Outcome: Adequate for Discharge     Problem: Risk for Violence/Aggression Toward Others  Goal: Verbalize thoughts and feelings  Description: Interventions:  - Promote a nonjudgmental and trusting relationship with the patient through active listening and therapeutic communication  - Assess patient's level of functioning, behavior and potential for risk  - Engage patient in 1 on 1 interactions  - Encourage patient to express fears, feelings, frustrations, and discuss symptoms    - [place] patient to reality, help patient recognize reality-based thinking   - Administer medications as ordered and assess for potential side effects  - Provide the patient education related to the signs and symptoms of the illness and desired effects of prescribed medications  Interventions:  - Assess and re-assess patient's lethality and potential for self-injury  - Engage patient in 1:1 interactions, daily, for a minimum of 15 minutes  - Encourage patient to express feelings, fears, frustrations, hopes  - Establish rapport/trust with patient   Interventions:  - Assess and re-assess patient's level of risk   - Engage patient in 1:1 interactions, daily, for a minimum of 15 minutes   - Encourage patient to express feelings, fears, frustrations, hopes   Interventions:  - Assess and re-assess patient's level of risk, every waking shift  - Engage patient in 1:1 interactions, daily, for a minimum of 15 minutes   - Allow patient to express feelings and frustrations in a safe and non-threatening manner   - Establish rapport/trust with patient   Outcome: Adequate for Discharge    Patient and writer met face to face and reviewed and revised WRAP Plan  Relapse Prevention Plan also completed  Patient was able to identify coping skills, triggers, warning signs and community supports  Patient signed all documents and copies will be given and D/C  Documents scanned and given to CM/Therapist for final CSP meeting

## 2022-06-28 NOTE — PROGRESS NOTES
06/28/22 0700   Activity/Group Checklist   Group Community meeting   Attendance Did not attend   Attendance Duration (min) 31-45   Affect/Mood RENUKA

## 2022-06-28 NOTE — PROGRESS NOTES
06/28/22 1100   Activity/Group Checklist   Group Wellness   Attendance Did not attend   Attendance Duration (min) 46-60   Affect/Mood RENUKA

## 2022-06-28 NOTE — PROGRESS NOTES
Progress Note - Behavioral Health   Monique Reis 24 y o  male MRN: [id number]  [facility] Encounter: [id number]    Assessment/Plan   Principal Problem:    Schizophrenia [facility])  Active Problems:    Medical clearance for psychiatric admission    Seizure disorder [facility])    Traumatic brain injury ([address] )    Brain lesion    History of cardiac radiofrequency ablation    History of fall    PTSD (post-traumatic stress disorder)    ROS: Patient is a little groggy during encounter as he was about to take a nap after his meeting  He reports that his leg still hurts but it is feeling slightly better; he still occasionally uses a cane to walk  No seizures  No hallucinations  Diagnosis: Schizophrenia  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  Recommended Treatment:   Continue current treatment regimen:   -Clozapine    -Gabapentin   -Keppra   Encourage patient to continue with physical therapy exercises and continue with ambulation  Planned discharge is Friday, July 1st   Continue with group therapy, milieu therapy and occupational therapy  Continue frequent safety checks and vitals per unit protocol  Case discussed with treatment team   Continue with SLIM medical management as indicated  Continue coordinating with case management regarding disposition  Risks, benefits and possible side effects of Medications: Risks, benefits, and possible side effects of medications have previously been explained  No new medications at this time  ------------------------------------------------------------    Subjective: Per nursing report, Santos Watson has been cooperative on the unit and compliant with medications  Nursing reports that the patient is still needy, somatic, and forgetful  His anticipated discharge is Friday, July 1st  He attended 4 out of 8 groups on Monday  Today, Santos Watson is consenting for safety on the unit  Patient was lying in bed after a meeting   He reports feeling "fine " He mentioned his discharge on Friday, July 1st, and said that he is having no issues at all  Roni Donaldson notes having slept well last night but still wants to take a nap  He reports he attended groups on Monday and plans to attend more groups today, although he missed the first one due to his meeting  Roni Donaldson states having a decent appetite  He only ate a little for dinner on Monday night because he said he was feeling full from all the snacks he had eaten throughout the day  He said he had a normal breakfast and that he has no concerns about his appetite today  Roni Donaldson has been taking the medications as prescribed and reporting no side effects  He did not question the need for medications today  He was initially admitted to [facility] for agitation, homicidal ideation, and his mother felt that his medications "were off"  He no longer reports these symptoms and states he feels fine  He denies any episodes of acid reflux  His leg is feeling better but he still reports some pain  He states that today he used his cane to ambulate but he does not use it all the time  He has been performing PT exercises and ambulating as well  No significant events overnight  Not self-injurious, aggressive, or destructive on the unit  Psychiatric Review of Systems:  Behavior over the last 24 hours: unchanged  Sleep: normal  Appetite: adequate  Medication side effects: none verbalized  Group attendance: Good   Significant events: None     PRNs overnight: None   VS: Reviewed, within normal limits    Progress Toward Goals: Patient shows some improvement as he is attending groups without much reluctance  He still is awaiting his discharge on Friday, July 1st  He has limited insight but he did not complain much about his medications or attending groups today         Vital signs in last 24 hours:  Temp:  [97 6 °F (36 4 °C)-97 7 °F (36 5 °C)] 97 6 °F (36 4 °C)  HR:  [] 93  Resp:  [16-18] 18  BP: (125-149)/(73-79) 149/79    Mental Status Exam:  Appearance:  drowsy, minimal eye contact, appears stated age, casually dressed and appropriate grooming and hygiene   Behavior:  calm, cooperative and laying in bed   Motor: no abnormal movements, normal gait and balance and walking with a limp still, occasionally using a cane  Speech:  spontaneous, clear, slow, normal volume and coherent   Mood:  euthymic   Affect:  mood-congruent, appropriate range and euthymic   Thought Process:  Organized, logical, goal-directed   Thought Content: no verbalized delusions or overt paranoia, paranoid ideation, intrusive thoughts, ruminating thoughts, ideas of reference  No current s/h thoughts, intent, or plans  No distorted body perceptions or obsessions/compulsions      Perceptual disturbances: no reported hallucinations and does not appear to be responding to internal stimuli at this time   Risk Potential: No active or passive suicidal or homicidal ideation was verbalized during interview   Cognition: oriented to self and situation and appears to be of average intelligence   Insight:  Limited   Judgment: Limited   Sensorium Alert, awake but groggy    Orientation Oriented x 4    Attention Attention span and concentration are age appropriate    Intellect  Appears to be average intelligence      Current Medications:  Current Facility-Administered Medications   Medication Dose Route Frequency Provider Last Rate    acetaminophen  650 mg Oral Q6H PRN Dianna Snuffer, DO      acetaminophen  650 mg Oral Q4H PRN Dianna Snuffer, DO      acetaminophen  975 mg Oral Q6H PRN Dianna Snuffer, DO      aluminum-magnesium hydroxide-simethicone  30 mL Oral Q4H PRN Dianna Snuffer, DO      ammonium lactate   Topical BID PRN Sunil Barrientos MD      artificial tear  1 application Both Eyes Z1Q PRN Dianna Snuffer, DO      aspirin  81 mg Oral Daily Dianna Snuffer, DO      benztropine  1 mg Intramuscular Q4H PRN Max 6/day Kashif Agosto, DO      benztropine  1 mg Oral Q4H PRN Max 6/day Ivan Lofty, DO      cloZAPine  200 mg Oral BID Ivan Lofty, DO      hydrOXYzine HCL  50 mg Oral Q6H PRN Max 4/day Ivan Lofty, DO      Or    diphenhydrAMINE  50 mg Intramuscular Q6H PRN Ivan Lofty, DO      gabapentin  400 mg Oral TID Ivan Lofty, DO      hydrOXYzine HCL  100 mg Oral Q6H PRN Max 4/day Ivan Lofty, DO      Or    LORazepam  2 mg Intramuscular Q6H PRN Ivan Lofty, DO      hydrOXYzine HCL  25 mg Oral Q6H PRN Max 4/day Ivan Lofty, DO      levETIRAcetam  500 mg Oral Q12H [address] Kashif A Prayson, DO      melatonin  9 mg Oral HS PRN Ivan Lofty, DO      metFORMIN  850 mg Oral BID With Meals aTe Espinal MD      metoprolol succinate  50 mg Oral Daily Ivan Lofty, DO      nicotine polacrilex  2 mg Oral Q2H PRN Ivan Lofty, DO      OLANZapine  10 mg Oral Q3H PRN Max 3/day Ivan Lofty, DO      Or    OLANZapine  10 mg Intramuscular Q3H PRN Max 3/day Ivan Lofty, DO      OLANZapine  5 mg Oral Q3H PRN Max 6/day Kashif A Prayson, DO      Or    OLANZapine  5 mg Intramuscular Q3H PRN Max 6/day Kashif A Prayson, DO      OLANZapine  2 5 mg Oral Q3H PRN Max 8/day Kashif A Prayson, DO      polyethylene glycol  17 g Oral Daily PRN Ivan Lofty, DO      propranolol  10 mg Oral Q8H PRN Ivan Lofty, DO      senna  1 tablet Oral Daily Myrene Fothergill, MD      senna-docusate sodium  1 tablet Oral Daily PRN Tae Espinal MD      traZODone  50 mg Oral HS PRN Ivan Lofty, DO         Behavioral Health Medications: all current active meds have been reviewed  Changes as in plan section above  Laboratory results:  I have personally reviewed all pertinent laboratory/tests results  No results found for this or any previous visit (from the past 48 hour(s))  This note has been constructed using a voice recognition system  There may be translation, syntax, or grammatical errors   If you have any questions, please contact the dictating author      Meka Polo

## 2022-06-28 NOTE — PROGRESS NOTES
06/28/22 1548   Team Meeting   Meeting Type Daily Rounds   Initial Conference Date 06/28/22   Patient/Family Present   Patient Present No   Patient's Family Present No       Daily Rounds  Yas Spann MD, Mahala Cummins, Bridgette Osgood RN, Penny RN, Bev Horne CPS  Case reviewed  Somatic, has multiple requests  DC plan for Friday  SW has meeting with pt and [facility] Vit[facility] this morning to prep for discharge  4/8 groups

## 2022-06-29 PROCEDURE — 99232 SBSQ HOSP IP/OBS MODERATE 35: CPT

## 2022-06-29 RX ADMIN — LEVETIRACETAM 500 MG: 500 TABLET, FILM COATED ORAL at 21:16

## 2022-06-29 RX ADMIN — CLOZAPINE 200 MG: 200 TABLET ORAL at 21:16

## 2022-06-29 RX ADMIN — GABAPENTIN 400 MG: 400 CAPSULE ORAL at 17:06

## 2022-06-29 RX ADMIN — METFORMIN HYDROCHLORIDE 850 MG: 850 TABLET, FILM COATED ORAL at 08:31

## 2022-06-29 RX ADMIN — METOPROLOL SUCCINATE 50 MG: 50 TABLET, EXTENDED RELEASE ORAL at 08:33

## 2022-06-29 RX ADMIN — NICOTINE POLACRILEX 2 MG: 2 GUM, CHEWING ORAL at 14:22

## 2022-06-29 RX ADMIN — NICOTINE POLACRILEX 2 MG: 2 GUM, CHEWING ORAL at 08:30

## 2022-06-29 RX ADMIN — ASPIRIN 81 MG CHEWABLE TABLET 81 MG: 81 TABLET CHEWABLE at 08:31

## 2022-06-29 RX ADMIN — GABAPENTIN 400 MG: 400 CAPSULE ORAL at 21:16

## 2022-06-29 RX ADMIN — GABAPENTIN 400 MG: 400 CAPSULE ORAL at 08:30

## 2022-06-29 RX ADMIN — CLOZAPINE 200 MG: 200 TABLET ORAL at 08:31

## 2022-06-29 RX ADMIN — METFORMIN HYDROCHLORIDE 850 MG: 850 TABLET, FILM COATED ORAL at 17:06

## 2022-06-29 RX ADMIN — LEVETIRACETAM 500 MG: 500 TABLET, FILM COATED ORAL at 08:31

## 2022-06-29 RX ADMIN — NICOTINE POLACRILEX 2 MG: 2 GUM, CHEWING ORAL at 11:55

## 2022-06-29 RX ADMIN — SENNOSIDES 8.6 MG: 8.6 TABLET, FILM COATED ORAL at 08:31

## 2022-06-29 RX ADMIN — NICOTINE POLACRILEX 2 MG: 2 GUM, CHEWING ORAL at 18:32

## 2022-06-29 NOTE — PROGRESS NOTES
06/29/22 1100   Activity/Group Checklist   Group Wellness   Attendance Attended   Attendance Duration (min) 46-60   Interactions Interacted appropriately   Affect/Mood Appropriate;Calm;Normal range   Goals Achieved Identified feelings; Able to listen to others; Able to engage in interactions

## 2022-06-29 NOTE — NURSING NOTE
Pt was awake, alert and visible intermittently on the unit  Pt was socially isolative at times  Nicotine gum given at 1935  Did not attend nursing group no wrap up group  Had evening snack  Cooperative and compliant with all his medications  No behavioral issues

## 2022-06-29 NOTE — PROGRESS NOTES
06/29/22 0700   Activity/Group Checklist   Group Community meeting   Attendance Did not attend   Attendance Duration (min) 46-60   Interactions Did not interact   Affect/Mood Appropriate   Goals Achieved Able to listen to others

## 2022-06-29 NOTE — NURSING NOTE
Patient is compliant with medication and meals He did not attend any groups yesterday  He is not into doing anything at this time  He is to be discharged  On Friday He is to be discharged to home

## 2022-06-29 NOTE — PLAN OF CARE
Problem: Alteration in Thoughts and Perception  Goal: Verbalize thoughts and feelings  Description: Interventions:  - Promote a nonjudgmental and trusting relationship with the patient through active listening and therapeutic communication  - Assess patient's level of functioning, behavior and potential for risk  - Engage patient in 1 on 1 interactions  - Encourage patient to express fears, feelings, frustrations, and discuss symptoms    - Martin patient to reality, help patient recognize reality-based thinking   - Administer medications as ordered and assess for potential side effects  - Provide the patient education related to the signs and symptoms of the illness and desired effects of prescribed medications  Interventions:  - Assess and re-assess patient's lethality and potential for self-injury  - Engage patient in 1:1 interactions, daily, for a minimum of 15 minutes  - Encourage patient to express feelings, fears, frustrations, hopes  - Establish rapport/trust with patient   Interventions:  - Assess and re-assess patient's level of risk   - Engage patient in 1:1 interactions, daily, for a minimum of 15 minutes   - Encourage patient to express feelings, fears, frustrations, hopes   Interventions:  - Assess and re-assess patient's level of risk, every waking shift  - Engage patient in 1:1 interactions, daily, for a minimum of 15 minutes   - Allow patient to express feelings and frustrations in a safe and non-threatening manner   - Establish rapport/trust with patient   Outcome: Progressing  Goal: Refrain from acting on delusional thinking/internal stimuli  Description: Interventions:  - Monitor patient closely, per order   - Utilize least restrictive measures   - Set reasonable limits, give positive feedback for acceptable   - Administer medications as ordered and monitor of potential side effects  Outcome: Progressing  Goal: Agree to be compliant with medication regime, as prescribed and report medication side effects  Description: Interventions:  - Offer appropriate PRN medication and supervise ingestion; conduct AIMS, as needed   Outcome: Progressing  Goal: Recognize dysfunctional thoughts, communicate reality-based thoughts at the time of discharge  Description: Interventions:  - Provide medication and psycho-education to assist patient in compliance and developing insight into his/her illness   Outcome: Progressing

## 2022-06-29 NOTE — PROGRESS NOTES
Progress Note - Behavioral Health   Collette Santana 24 y o  male MRN: [id number]  [facility]-01 Encounter: [id number]    Assessment/Plan   Principal Problem:    Schizophrenia [facility])  Active Problems:    Medical clearance for psychiatric admission    Seizure disorder [facility])    Traumatic brain injury ([address] )    Brain lesion    History of cardiac radiofrequency ablation    History of fall    PTSD (post-traumatic stress disorder)    ROS: Patient reports no complaints this morning  His leg feels fine and he is not walking with a cane; he displays a slight limp  No seizures  No hallucinations  Diagnosis: Schizophrenia  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  Recommended Treatment:   Continue current treatment regimen:    -Clozapine    -Gabapentin   -Keppra   Encourage patient to continue PT exercises and continue ambulation  Planned discharge for Friday, July 1st    Continue with group therapy, milieu therapy and occupational therapy  Continue frequent safety checks and vitals per unit protocol  Case discussed with treatment team   Continue with SLIM medical management as indicated  Continue coordinating with case management regarding disposition  Risks, benefits and possible side effects of Medications: Risks, benefits, and possible side effects of medications have previously been explained  No new medications at this time  ------------------------------------------------------------    Subjective: Per nursing report, Renan Salmeron has been cooperative on the unit and compliant with medications  Patient is awaiting his discharge on Friday, July 1st  He attended 0 out of 7 groups yesterday but he claimed that he attended 2-3 groups  He states that he does not see a reason in attending groups if he is leaving Friday  He was informed by the nurses that walking in and walking out does not count as attendance for group meetings  Today, Renan Salmeron is consenting for safety on the unit   He was laying in his bed and was agreeable to talk, although he said there is nothing to talk about  He reports feeling "alright " Elizabeth Perez notes having "fine" sleep  Elizabeth Perez states having a "fine" appetite; he states that he had a normal dinner and was able to recall what he ate  He did not eat any breakfast and said that the reason for that was because he will eat a snack soon anyway  He stated that he was awaiting the doctor to confirm that his discharge date is Friday as he is eager to leave  He stated "there is nothing wrong with me" and expresses the desire to be discharged soon  He attended 0 of 7 groups yesterday but claimed that he attended 2-3 groups; nursing informed him that walking in and out of a meeting does not count as attendance  He said that he was going to attend groups today  He stated that he does not see the point in attending groups since there is nothing wrong with him and he will be discharged soon anyway  He was initially admitted to [facility] for agitation, homicidal ideation, and his mother felt "his medications were off"  He no longer reports having any of these symptoms  Elizabeth Perez has been  taking the medications as prescribed and reporting no side effects  He denies any episodes of acid reflux  He stated that his leg feels fine today and he has been ambulating without his cane  He reports that he continues to perform PT exercises  No significant events overnight  Not self-injurious, aggressive, or destructive on the unit  Psychiatric Review of Systems:  Behavior over the last 24 hours: unchanged  Sleep: normal  Appetite: adequate, decreased from baseline  Medication side effects: none verbalized   Medication compliance: good   Group attendance: Good   Significant events: None    PRNs overnight: None   VS: Reviewed, within normal limits    Progress Toward Goals: Minimal improvement  Patient is fixated on discharge and reports that he does not see the point of attending groups   He did not attend any groups today  Patient has limited insight because he stated that there is nothing wrong with him, but he said that he is taking his medications and has been doing fine on them  Vital signs in last 24 hours:  Temp:  [97 4 °F (36 3 °C)-97 5 °F (36 4 °C)] 97 5 °F (36 4 °C)  HR:  [] 92  Resp:  [18] 18  BP: (130-134)/(77-79) 134/77     Mental Status Exam:  Appearance:  alert, some eye contact, appears stated age, casually dressed and appropriate grooming and hygiene   Behavior:  calm, limited cooperativity and guarded   Motor: no abnormal movements and walking with a slight limp but not using a cane  Speech:  spontaneous, slow, normal volume, scant and coherent   Mood:  euthymic   Affect:  mood-congruent, constricted and euthymic   Thought Process:  organized, goal directed   Thought Content: paranoid ideation, intrusive thoughts, ruminating thoughts, ideas of reference, No current s/h thoughts, intent, or plans  No distorted body perceptions or obsessions/compulsions      Perceptual disturbances: denies current hallucinations and does not appear to be responding to internal stimuli at this time   Risk Potential: No active or passive suicidal or homicidal ideation was verbalized during interview   Cognition: oriented to person, place, time, and situation and appears to be of average intelligence   Insight:  Limited   Judgment: Limited     Current Medications:  Current Facility-Administered Medications   Medication Dose Route Frequency Provider Last Rate    acetaminophen  650 mg Oral Q6H PRN Milus Bald, DO      acetaminophen  650 mg Oral Q4H PRN Milus Bald, DO      acetaminophen  975 mg Oral Q6H PRN Milus Bald, DO      aluminum-magnesium hydroxide-simethicone  30 mL Oral Q4H PRN Milus Bald, DO      ammonium lactate   Topical BID PRN Prerna Youssef MD      artificial tear  1 application Both Eyes A7D PRN Milus Bald, DO      aspirin  81 mg Oral Daily Marifer LEIJA Prayson, DO      benztropine  1 mg Intramuscular Q4H PRN Max 6/day Asia Barrett, DO      benztropine  1 mg Oral Q4H PRN Max 6/day Asia Barrett, DO      cloZAPine  200 mg Oral BID Asia Barrett, DO      hydrOXYzine HCL  50 mg Oral Q6H PRN Max 4/day Asia Barrett, DO      Or    diphenhydrAMINE  50 mg Intramuscular Q6H PRN Asia Barrett, DO      gabapentin  400 mg Oral TID Asia Barrett, DO      hydrOXYzine HCL  100 mg Oral Q6H PRN Max 4/day Asia Barrett, DO      Or    LORazepam  2 mg Intramuscular Q6H PRN Asia Barrett, DO      hydrOXYzine HCL  25 mg Oral Q6H PRN Max 4/day Asia Barrett, DO      levETIRAcetam  500 mg Oral Q12H [facility] Kashif A Prayson, DO      melatonin  9 mg Oral HS PRN Asia Barrett, DO      metFORMIN  850 mg Oral BID With Meals Eleanor Arvizu MD      metoprolol succinate  50 mg Oral Daily Asia Barrett, DO      nicotine polacrilex  2 mg Oral Q2H PRN Asia Barrett, DO      OLANZapine  10 mg Oral Q3H PRN Max 3/day Asia Barrett, DO      Or    OLANZapine  10 mg Intramuscular Q3H PRN Max 3/day Asia Barrett, DO      OLANZapine  5 mg Oral Q3H PRN Max 6/day Kashif A Prayson, DO      Or    OLANZapine  5 mg Intramuscular Q3H PRN Max 6/day Kashif A Prayson, DO      OLANZapine  2 5 mg Oral Q3H PRN Max 8/day Kashif A Prayson, DO      polyethylene glycol  17 g Oral Daily PRN Asia Barrett, DO      propranolol  10 mg Oral Q8H PRN Asia Barrett, DO      senna  1 tablet Oral Daily Mariama Milton MD      senna-docusate sodium  1 tablet Oral Daily PRN Eleanor Arvizu MD      traZODone  50 mg Oral HS PRN Asia Carrollan, DO         Behavioral Health Medications: all current active meds have been reviewed  Changes as in plan section above  Laboratory results:  I have personally reviewed all pertinent laboratory/tests results  No results found for this or any previous visit (from the past 48 hour(s))       This note has been constructed using a voice recognition system  There may be translation, syntax, or grammatical errors  If you have any questions, please contact the dictating author      JOHNATHAN Phan

## 2022-06-29 NOTE — NURSING NOTE
Portia Douglass slept all night  No behavorial issues  q7 minute checks in place  Safety measures maintained  Will continue to monitor for safety and support

## 2022-06-29 NOTE — PROGRESS NOTES
06/29/22 0700   Activity/Group Checklist   Group Community meeting   Attendance Did not attend   Attendance Duration (min) 46-60   Affect/Mood RENUKA

## 2022-06-29 NOTE — PROGRESS NOTES
06/29/22 1107   Team Meeting   Meeting Type Daily Rounds   Initial Conference Date 06/29/22   Patient/Family Present   Patient Present No   Patient's Family Present No       Daily Rounds  Shaan Kirby MD, Michell Tong Teles RN, [place], [place] CPS  Case reviewed  No changes; preparing for discharge Friday, has discharge meeting this morning with [facility] staff and mother this morning  Behaviors appropriate; medication compliant  Focused on discharge

## 2022-06-30 PROCEDURE — U0003 INFECTIOUS AGENT DETECTION BY NUCLEIC ACID (DNA OR RNA); SEVERE ACUTE RESPIRATORY SYNDROME CORONAVIRUS 2 (SARS-COV-2) (CORONAVIRUS DISEASE [COVID-19]), AMPLIFIED PROBE TECHNIQUE, MAKING USE OF HIGH THROUGHPUT TECHNOLOGIES AS DESCRIBED BY CMS-2020-01-R: HCPCS | Performed by: PHYSICIAN ASSISTANT

## 2022-06-30 PROCEDURE — 99232 SBSQ HOSP IP/OBS MODERATE 35: CPT | Performed by: PSYCHIATRY & NEUROLOGY

## 2022-06-30 PROCEDURE — U0005 INFEC AGEN DETEC AMPLI PROBE: HCPCS | Performed by: PHYSICIAN ASSISTANT

## 2022-06-30 RX ADMIN — LEVETIRACETAM 500 MG: 500 TABLET, FILM COATED ORAL at 21:07

## 2022-06-30 RX ADMIN — CLOZAPINE 200 MG: 200 TABLET ORAL at 21:07

## 2022-06-30 RX ADMIN — SENNOSIDES 8.6 MG: 8.6 TABLET, FILM COATED ORAL at 09:17

## 2022-06-30 RX ADMIN — METFORMIN HYDROCHLORIDE 850 MG: 850 TABLET, FILM COATED ORAL at 15:55

## 2022-06-30 RX ADMIN — GABAPENTIN 400 MG: 400 CAPSULE ORAL at 09:17

## 2022-06-30 RX ADMIN — GABAPENTIN 400 MG: 400 CAPSULE ORAL at 15:55

## 2022-06-30 RX ADMIN — GABAPENTIN 400 MG: 400 CAPSULE ORAL at 21:07

## 2022-06-30 RX ADMIN — NICOTINE POLACRILEX 2 MG: 2 GUM, CHEWING ORAL at 09:24

## 2022-06-30 RX ADMIN — NICOTINE POLACRILEX 2 MG: 2 GUM, CHEWING ORAL at 18:40

## 2022-06-30 RX ADMIN — CLOZAPINE 200 MG: 200 TABLET ORAL at 09:17

## 2022-06-30 RX ADMIN — METOPROLOL SUCCINATE 50 MG: 50 TABLET, EXTENDED RELEASE ORAL at 09:17

## 2022-06-30 RX ADMIN — METFORMIN HYDROCHLORIDE 850 MG: 850 TABLET, FILM COATED ORAL at 09:17

## 2022-06-30 RX ADMIN — NICOTINE POLACRILEX 2 MG: 2 GUM, CHEWING ORAL at 15:55

## 2022-06-30 RX ADMIN — LEVETIRACETAM 500 MG: 500 TABLET, FILM COATED ORAL at 09:17

## 2022-06-30 RX ADMIN — ASPIRIN 81 MG CHEWABLE TABLET 81 MG: 81 TABLET CHEWABLE at 09:17

## 2022-06-30 RX ADMIN — NICOTINE POLACRILEX 2 MG: 2 GUM, CHEWING ORAL at 13:35

## 2022-06-30 NOTE — NURSING NOTE
Pt slept all night  No c/o pain or distress  No behavioral issues  q7 minute checks in place  Safety measures maintained  Will continue to monitor for safety and support

## 2022-06-30 NOTE — PLAN OF CARE
Problem: Alteration in Thoughts and Perception  Goal: Treatment Goal: Gain control of psychotic behaviors/thinking, reduce/eliminate presenting symptoms and demonstrate improved reality functioning upon discharge  Outcome: Adequate for Discharge  Goal: Verbalize thoughts and feelings  Description: Interventions:  - Promote a nonjudgmental and trusting relationship with the patient through active listening and therapeutic communication  - Assess patient's level of functioning, behavior and potential for risk  - Engage patient in 1 on 1 interactions  - Encourage patient to express fears, feelings, frustrations, and discuss symptoms    - [place] patient to reality, help patient recognize reality-based thinking   - Administer medications as ordered and assess for potential side effects  - Provide the patient education related to the signs and symptoms of the illness and desired effects of prescribed medications  Interventions:  - Assess and re-assess patient's lethality and potential for self-injury  - Engage patient in 1:1 interactions, daily, for a minimum of 15 minutes  - Encourage patient to express feelings, fears, frustrations, hopes  - Establish rapport/trust with patient   Interventions:  - Assess and re-assess patient's level of risk   - Engage patient in 1:1 interactions, daily, for a minimum of 15 minutes   - Encourage patient to express feelings, fears, frustrations, hopes   Interventions:  - Assess and re-assess patient's level of risk, every waking shift  - Engage patient in 1:1 interactions, daily, for a minimum of 15 minutes   - Allow patient to express feelings and frustrations in a safe and non-threatening manner   - Establish rapport/trust with patient   Outcome: Adequate for Discharge  Goal: Refrain from acting on delusional thinking/internal stimuli  Description: Interventions:  - Monitor patient closely, per order   - Utilize least restrictive measures   - Set reasonable limits, give positive feedback for acceptable   - Administer medications as ordered and monitor of potential side effects  Outcome: Completed  Goal: Agree to be compliant with medication regime, as prescribed and report medication side effects  Description: Interventions:  - Offer appropriate PRN medication and supervise ingestion; conduct AIMS, as needed   Outcome: Adequate for Discharge  Goal: Recognize dysfunctional thoughts, communicate reality-based thoughts at the time of discharge  Description: Interventions:  - Provide medication and psycho-education to assist patient in compliance and developing insight into his/her illness   Outcome: Adequate for Discharge     Problem: Ineffective Coping  Goal: Identifies ineffective coping skills  Outcome: Adequate for Discharge  Goal: Identifies healthy coping skills  Outcome: Adequate for Discharge  Goal: Demonstrates healthy coping skills  Outcome: Adequate for Discharge  Goal: Participates in unit activities  Description: Interventions:  - Provide therapeutic environment   - Provide required programming   - Redirect inappropriate behaviors   Outcome: Completed  Goal: Patient/Family participate in treatment and DC plans  Description: Interventions:  - Provide therapeutic environment  Outcome: Completed  Goal: Patient/Family verbalizes awareness of resources  Outcome: Completed     Problem: Risk for Self Injury/Neglect  Goal: Verbalize thoughts and feelings  Description: Interventions:  - Promote a nonjudgmental and trusting relationship with the patient through active listening and therapeutic communication  - Assess patient's level of functioning, behavior and potential for risk  - Engage patient in 1 on 1 interactions  - Encourage patient to express fears, feelings, frustrations, and discuss symptoms    - [place] patient to reality, help patient recognize reality-based thinking   - Administer medications as ordered and assess for potential side effects  - Provide the patient education related to the signs and symptoms of the illness and desired effects of prescribed medications  Interventions:  - Assess and re-assess patient's lethality and potential for self-injury  - Engage patient in 1:1 interactions, daily, for a minimum of 15 minutes  - Encourage patient to express feelings, fears, frustrations, hopes  - Establish rapport/trust with patient   Interventions:  - Assess and re-assess patient's level of risk   - Engage patient in 1:1 interactions, daily, for a minimum of 15 minutes   - Encourage patient to express feelings, fears, frustrations, hopes   Interventions:  - Assess and re-assess patient's level of risk, every waking shift  - Engage patient in 1:1 interactions, daily, for a minimum of 15 minutes   - Allow patient to express feelings and frustrations in a safe and non-threatening manner   - Establish rapport/trust with patient   Outcome: Adequate for Discharge  Goal: Treatment Goal: Remain safe during length of stay, learn and adopt new coping skills, and be free of self-injurious ideation, impulses and acts at the time of discharge  Outcome: Completed  Goal: Recognize maladaptive responses and adopt new coping mechanisms  Outcome: Completed     Problem: Depression  Goal: Verbalize thoughts and feelings  Description: Interventions:  - Promote a nonjudgmental and trusting relationship with the patient through active listening and therapeutic communication  - Assess patient's level of functioning, behavior and potential for risk  - Engage patient in 1 on 1 interactions  - Encourage patient to express fears, feelings, frustrations, and discuss symptoms    - [place] patient to reality, help patient recognize reality-based thinking   - Administer medications as ordered and assess for potential side effects  - Provide the patient education related to the signs and symptoms of the illness and desired effects of prescribed medications  Interventions:  - Assess and re-assess patient's lethality and potential for self-injury  - Engage patient in 1:1 interactions, daily, for a minimum of 15 minutes  - Encourage patient to express feelings, fears, frustrations, hopes  - Establish rapport/trust with patient   Interventions:  - Assess and re-assess patient's level of risk   - Engage patient in 1:1 interactions, daily, for a minimum of 15 minutes   - Encourage patient to express feelings, fears, frustrations, hopes   Interventions:  - Assess and re-assess patient's level of risk, every waking shift  - Engage patient in 1:1 interactions, daily, for a minimum of 15 minutes   - Allow patient to express feelings and frustrations in a safe and non-threatening manner   - Establish rapport/trust with patient   Outcome: Adequate for Discharge  Goal: Treatment Goal: Demonstrate behavioral control of depressive symptoms, verbalize feelings of improved mood/affect, and adopt new coping skills prior to discharge  Outcome: Completed  Goal: Refrain from isolation  Description: Interventions:  - Develop a trusting relationship   - Encourage socialization   Outcome: Completed  Goal: Refrain from self-neglect  Outcome: Completed     Problem: Anxiety  Goal: Anxiety is at manageable level  Description: Interventions:  - Assess and monitor patient's anxiety level  - Monitor for signs and symptoms (heart palpitations, chest pain, shortness of breath, headaches, nausea, feeling jumpy, restlessness, irritable, apprehensive)  - Collaborate with interdisciplinary team and initiate plan and interventions as ordered    - [place] patient to unit/surroundings  - Explain treatment plan  - Encourage participation in care  - Encourage verbalization of concerns/fears  - Identify coping mechanisms  - Assist in developing anxiety-reducing skills  - Administer/offer alternative therapies  - Limit or eliminate stimulants  Outcome: Adequate for Discharge     Problem: Risk for Violence/Aggression Toward Others  Goal: Verbalize thoughts and feelings  Description: Interventions:  - Promote a nonjudgmental and trusting relationship with the patient through active listening and therapeutic communication  - Assess patient's level of functioning, behavior and potential for risk  - Engage patient in 1 on 1 interactions  - Encourage patient to express fears, feelings, frustrations, and discuss symptoms    - [place] patient to reality, help patient recognize reality-based thinking   - Administer medications as ordered and assess for potential side effects  - Provide the patient education related to the signs and symptoms of the illness and desired effects of prescribed medications  Interventions:  - Assess and re-assess patient's lethality and potential for self-injury  - Engage patient in 1:1 interactions, daily, for a minimum of 15 minutes  - Encourage patient to express feelings, fears, frustrations, hopes  - Establish rapport/trust with patient   Interventions:  - Assess and re-assess patient's level of risk   - Engage patient in 1:1 interactions, daily, for a minimum of 15 minutes   - Encourage patient to express feelings, fears, frustrations, hopes   Interventions:  - Assess and re-assess patient's level of risk, every waking shift  - Engage patient in 1:1 interactions, daily, for a minimum of 15 minutes   - Allow patient to express feelings and frustrations in a safe and non-threatening manner   - Establish rapport/trust with patient   Outcome: Adequate for Discharge  Goal: Treatment Goal: Refrain from acts of violence/aggression during length of stay, and demonstrate improved impulse control at the time of discharge  Outcome: Completed  Goal: Refrain from harming others  Outcome: Completed  Goal: Refrain from destructive acts on the environment or property  Outcome: Completed  Goal: Control angry outbursts  Description: Interventions:  - Monitor patient closely, per order  - Ensure early verbal de-escalation  - Monitor prn medication needs  - Set reasonable/therapeutic limits, outline behavioral expectations, and consequences   - Provide a non-threatening milieu, utilizing the least restrictive interventions   Outcome: Completed  Goal: Identify appropriate positive anger management techniques  Description: Interventions:  - Offer anger management and coping skills groups   - Staff will provide positive feedback for appropriate anger control  Outcome: Completed     Problem: DISCHARGE PLANNING - CARE MANAGEMENT  Goal: Discharge to post-acute care or home with appropriate resources  Description: INTERVENTIONS:  - Conduct assessment to determine patient/family and health care team treatment goals, and need for post-acute services based on payer coverage, community resources, and patient preferences, and barriers to discharge  - Address psychosocial, clinical, and financial barriers to discharge as identified in assessment in conjunction with the patient/family and health care team  - Arrange appropriate level of post-acute services according to patients   needs and preference and payer coverage in collaboration with the physician and health care team  - Communicate with and update the patient/family, physician, and health care team regarding progress on the discharge plan  - Arrange appropriate transportation to post-acute venues  Outcome: Completed     Problem: SUBSTANCE USE/ABUSE  Goal: By discharge, will develop insight into their chemical dependency and sustain motivation to continue in recovery  Description: INTERVENTIONS:  - Attends all daily group sessions and scheduled AA groups  - Actively practices coping skills through participation in the therapeutic community and adherence to program rules  - Reviews and completes assignments from individual treatment plan  - Assist patient development of understanding of their personal cycle of addiction and relapse triggers  Outcome: Adequate for Discharge  Goal: By discharge, patient will have ongoing treatment plan addressing chemical dependency  Description: INTERVENTIONS:  - Assist patient with resources and/or appointments for ongoing recovery based living  Outcome: Adequate for Discharge

## 2022-06-30 NOTE — SOCIAL WORK
pc to pt mother to update her and discuss DC tomorrow  She is aware pt has pending covid test  She reported she would have him return home even if he was positive and that he's already had covid a few times  She also supplied SW with the follow up appointments she made for medical / cardiology and discussed aftercare plans including plan for him to return to the new vitae partial  Discussed medications, which are ready for  at [address] [place] [address] pharmacy and the co-pay  She plans on picking them up tomorrow prior to getting patient  DC IMM reviewed and signed with pt today  Pt reported he feels ready for discharge and is 'just waiting ' currently in his room resting, denies s/s

## 2022-06-30 NOTE — PROGRESS NOTES
Progress Note - Behavioral Health   Beth Raya 24 y o  male MRN: [id number]  Unit/Bed#: [facility] 112-01 Encounter: [id number]    Assessment/Plan   Principal Problem:    Schizophrenia [facility])  Active Problems:    Medical clearance for psychiatric admission    Seizure disorder [facility])    Traumatic brain injury ([address] )    Brain lesion    History of cardiac radiofrequency ablation    History of fall    PTSD (post-traumatic stress disorder)    ROS: Patient reports no complaints this morning  He reports that his leg is feelinf better and he is ambulating without a cane most of the time  No seizures  No hallucinations  No cough or nasal congestion  No GI upset  Diagnosis: Schizophrenia  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  Recommended Treatment:   Continue current treatment regimen:    -Clozapine   -Gabapentin   -Keppra   Encourage patient to continue PT exercises and continue ambulation  Planned discharge for Friday, July 1st    Continue with group therapy, milieu therapy and occupational therapy  Continue frequent safety checks and vitals per unit protocol  Case discussed with treatment team   Continue with [facility] medical management as indicated  Continue coordinating with case management regarding disposition  Risks, benefits and possible side effects of Medications: Risks, benefits, and possible side effects of medications have previously been explained  No new medications at this time  ------------------------------------------------------------    Subjective: Per nursing report, Nohemy Vadim has been cooperative on the unit and compliant with medications  Patient is fixated on his discharge tomrrow, July 1st  He attended 4 out of 7 groups yesterday as he was advised by Dr Katharine Ureña to continue doing so  He had no complaints about attending groups yesterday and stayed the entire time  Today, Nohemy Vadim is consenting for safety on the unit  He was laying in bed and was agreeable to talk this morning    He reports feeling "alright " Piotr Bartlett notes having  slept well  Piotr Bartlett states having a fine appetite  He states that he does not enjoy the food here and that he also wants to watch his weight  He did not eat breakfast or lunch yesterday but he did eat dinner  He reported that he ate breakfast this morning although he stated that the food is awful  He stated that he is awaiting discharge tomorrow, July 1st and continues to remain fixated on it  Nursing reports that he displays some anxiety about going home but he did not express his concerns during his interaction with me  He stated that he wants to make sure he stays well so he does not have to return back to the hospital  He attended 4 out of 7 groups yesterday as he was advised to do so by Dr Benjy King  Due to COVID-19 restrictions, there will be no groups today, so he plans to sleep today  He was hearing voices and feeling paranoid and delusional about his stepfather when he was at home for which he was admitted to the hospital for medication stabilization  He was originally admitted to [facility] for agitation, homicidal ideation, and his mother felt "his medications were off"  He no longer reports having any of these symptoms  Piotr Bartlett has been  taking the medications as prescribed and reporting no side effects  He denies any episodes of acid reflux  He states that his leg feels better today and that he has been ambulating without a cane most of the time  He reports that he will continue with his PT exercises  No significant events overnight  Not self-injurious, aggressive, or destructive on the unit  Psychiatric Review of Systems:  Behavior over the last 24 hours: unchanged  Sleep: normal  Appetite: decreased from baseline  Medication side effects: none verbalized  Medication compliance: Good   Group attendance: good   Significant events: none     PRNs overnight: none    VS: Reviewed, within normal limits    Progress Toward Goals: Minimal improvement  Patient mentioned his discharge on tomorrow, July 1st  He attended 4 out of 7 groups yesterday after he was advised to do so by Dr Josie Guerrero  Patient has limited insight still about his hospital stay  He has been taking his medications and reports that he feels fine on them  Vital signs in last 24 hours:  Temp:  [97 5 °F (36 4 °C)-97 7 °F (36 5 °C)] 97 5 °F (36 4 °C)  HR:  [] 73  Resp:  [18] 18  BP: (112-135)/(71-75) 135/75    Mental Status Exam:  Appearance:  alert, minimal eye contact, appears stated age, casually dressed and appropriate grooming and hygiene   Behavior:  calm, limited cooperativity and guarded   Motor: no abnormal movements and and walking with a slight limp using a cane to ambulate only sometimes   Speech:  spontaneous, slow, normal volume, scant and coherent   Mood:  euthymic   Affect:  mood-congruent, constricted and euthymic   Thought Process:  organized, goal directed   Thought Content: paranoid ideation, intrusice thoughts, ruminating thoughts, ideas of reference  No current s/h thoughts, intent, or plans  No distorted body perceptions or obsessions/compulsions     Perceptual disturbances: denies current hallucinations and does not appear to be responding to internal stimuli at this time   Risk Potential: No active or passive suicidal or homicidal ideation was verbalized during interview   Cognition: oriented to self and situation and appears to be of average intelligence   Insight:  Limited   Judgment: Limited     Current Medications:  Current Facility-Administered Medications   Medication Dose Route Frequency Provider Last Rate    acetaminophen  650 mg Oral Q6H PRN Vitor Medicus, DO      acetaminophen  650 mg Oral Q4H PRN Vitor Medicus, DO      acetaminophen  975 mg Oral Q6H PRN Vitor Medicus, DO      aluminum-magnesium hydroxide-simethicone  30 mL Oral Q4H PRN Vitor Medicus, DO      ammonium lactate   Topical BID PRN Leyla Buck MD      artificial tear  1 application Both Eyes J1M PRN Christell Loll, DO      aspirin  81 mg Oral Daily [facility] He[facility]thell Loll, DO      benztropine  1 mg Intramuscular Q4H PRN Max 6/day Christell Loll, DO      benztropine  1 mg Oral Q4H PRN Max 6/day Christell Loll, DO      cloZAPine  200 mg Oral BID [facility] Healthell Kallill, DO      hydrOXYzine HCL  50 mg Oral Q6H PRN Max 4/day Christell Loll, DO      Or    diphenhydrAMINE  50 mg Intramuscular Q6H PRN Christell Loll, DO      gabapentin  400 mg Oral TID [facility] Healthell Kallill, DO      hydrOXYzine HCL  100 mg Oral Q6H PRN Max 4/day Trinity Healthell Loll, DO      Or    LORazepam  2 mg Intramuscular Q6H PRN Trinity Healthell Loll, DO      hydrOXYzine HCL  25 mg Oral Q6H PRN Max 4/day Trinity Healthell Loll, DO      levETIRAcetam  500 mg Oral Q12H [address] Kashif A Prayson, DO      melatonin  9 mg Oral HS PRN [facility] Nugent, DO      metFORMIN  850 mg Oral BID With Meals Asia Schultz MD      metoprolol succinate  50 mg Oral Daily Trinity Healthell Loll, DO      nicotine polacrilex  2 mg Oral Q2H PRN Trinity Healthell Loll, DO      OLANZapine  10 mg Oral Q3H PRN Max 3/day Christell Loll, DO      Or    OLANZapine  10 mg Intramuscular Q3H PRN Max 3/day Christell Loll, DO      OLANZapine  5 mg Oral Q3H PRN Max 6/day Kashif A Prayson, DO      Or    OLANZapine  5 mg Intramuscular Q3H PRN Max 6/day Kashif A Prayson, DO      OLANZapine  2 5 mg Oral Q3H PRN Max 8/day Kashif A Prayson, DO      polyethylene glycol  17 g Oral Daily PRN Trinity Healthell Loll, DO      propranolol  10 mg Oral Q8H PRN Trinity Healthell Loll, DO      senna  1 tablet Oral Daily Nena Posada MD      senna-docusate sodium  1 tablet Oral Daily PRN Asia Schultz MD      traZODone  50 mg Oral HS PRN Trinity Healthell Kallill, DO         Behavioral Health Medications: all current active meds have been reviewed  Changes as in plan section above  Laboratory results:  I have personally reviewed all pertinent laboratory/tests results    No results found for this or any previous visit (from the past 50 hour(s))  This note has been constructed using a voice recognition system  There may be translation, syntax, or grammatical errors  If you have any questions, please contact the dictating author      JOHNATHAN Hedrick

## 2022-06-30 NOTE — NURSING NOTE
Germaine Sow asked several staff members "how many groups did I go to today?" We told him he didn't go to any yesterday and he needed to stop asking staff how many he went to today  He was being discharged on Friday regardless  He said "well the doctor said if I didn't go to groups that could stop me from going home" I said Germaine Sow, you didn't worry about that yesterday, now stop worrying, you are going home  He asks for Nicotine gum at least thirty minutes before he is due to receive it then comes back and it is still fifteen minutes early  At [facility]'s he asked for "Tylenol and Nicotine Gum" then within ten seconds said "no, forget it" He got his ticket for his snack at  then returned it saying "I don't want anything" Germaine Sow really is a sweet kid who has very poor retention, poor insight and yet he is worried about his mothers relationship with his step father who he says "cheats on her" He feels protective of his mom and writer warned him that he cannot be her protector This will lead to another volatile situation and he was rehospitalization shortly after discharge last time

## 2022-06-30 NOTE — PROGRESS NOTES
06/30/22 0700   Activity/Group Checklist   Group Community meeting   Attendance Did not attend   Attendance Duration (min) 16-30   Affect/Mood [place]EN[place]A

## 2022-06-30 NOTE — PROGRESS NOTES
06/30/22 1338   Team Meeting   Meeting Type Daily Rounds   Initial Conference Date 06/30/22   Patient/Family Present   Patient Present No   Patient's Family Present No       Daily Rounds  Kelly Leger MD, Michell Faust Teles RN, [facility]  Case reviewed  reported some anxiety about leaving tomorrow; otherwise denies symptoms, is focused on discharge  Irritable edge at times but at baseline  Mom picking up around 1030/11AM tomorrow

## 2022-07-01 VITALS
TEMPERATURE: 97.3 F | BODY MASS INDEX: 35.7 KG/M2 | WEIGHT: 255 LBS | HEIGHT: 71 IN | TEMPERATURE: 97.3 F | SYSTOLIC BLOOD PRESSURE: 125 MMHG | OXYGEN SATURATION: 95 % | BODY MASS INDEX: 35.7 KG/M2 | SYSTOLIC BLOOD PRESSURE: 125 MMHG | OXYGEN SATURATION: 95 % | HEIGHT: 71 IN | HEART RATE: 98 BPM | WEIGHT: 255 LBS | DIASTOLIC BLOOD PRESSURE: 75 MMHG | RESPIRATION RATE: 18 BRPM | HEART RATE: 98 BPM | DIASTOLIC BLOOD PRESSURE: 75 MMHG | RESPIRATION RATE: 18 BRPM

## 2022-07-01 LAB
BASOPHILS # BLD AUTO: 0.02 THOUSANDS/ΜL (ref 0–0.1)
BASOPHILS # BLD AUTO: 0.02 THOUSANDS/ΜL (ref 0–0.1)
BASOPHILS NFR BLD AUTO: 0 % (ref 0–1)
BASOPHILS NFR BLD AUTO: 0 % (ref 0–1)
EOSINOPHIL # BLD AUTO: 0.11 THOUSAND/ΜL (ref 0–0.61)
EOSINOPHIL # BLD AUTO: 0.11 THOUSAND/ΜL (ref 0–0.61)
EOSINOPHIL NFR BLD AUTO: 1 % (ref 0–6)
EOSINOPHIL NFR BLD AUTO: 1 % (ref 0–6)
ERYTHROCYTE [DISTWIDTH] IN BLOOD BY AUTOMATED COUNT: 11.9 % (ref 11.6–15.1)
ERYTHROCYTE [DISTWIDTH] IN BLOOD BY AUTOMATED COUNT: 11.9 % (ref 11.6–15.1)
HCT VFR BLD AUTO: 48.9 % (ref 36.5–49.3)
HCT VFR BLD AUTO: 48.9 % (ref 36.5–49.3)
HGB BLD-MCNC: 16.5 G/DL (ref 12–17)
HGB BLD-MCNC: 16.5 G/DL (ref 12–17)
IMM GRANULOCYTES # BLD AUTO: 0.01 THOUSAND/UL (ref 0–0.2)
IMM GRANULOCYTES # BLD AUTO: 0.01 THOUSAND/UL (ref 0–0.2)
IMM GRANULOCYTES NFR BLD AUTO: 0 % (ref 0–2)
IMM GRANULOCYTES NFR BLD AUTO: 0 % (ref 0–2)
LYMPHOCYTES # BLD AUTO: 1.84 THOUSANDS/ΜL (ref 0.6–4.47)
LYMPHOCYTES # BLD AUTO: 1.84 THOUSANDS/ΜL (ref 0.6–4.47)
LYMPHOCYTES NFR BLD AUTO: 24 % (ref 14–44)
LYMPHOCYTES NFR BLD AUTO: 24 % (ref 14–44)
MCH RBC QN AUTO: 29.4 PG (ref 26.8–34.3)
MCH RBC QN AUTO: 29.4 PG (ref 26.8–34.3)
MCHC RBC AUTO-ENTMCNC: 33.7 G/DL (ref 31.4–37.4)
MCHC RBC AUTO-ENTMCNC: 33.7 G/DL (ref 31.4–37.4)
MCV RBC AUTO: 87 FL (ref 82–98)
MCV RBC AUTO: 87 FL (ref 82–98)
MONOCYTES # BLD AUTO: 0.55 THOUSAND/ΜL (ref 0.17–1.22)
MONOCYTES # BLD AUTO: 0.55 THOUSAND/ΜL (ref 0.17–1.22)
MONOCYTES NFR BLD AUTO: 7 % (ref 4–12)
MONOCYTES NFR BLD AUTO: 7 % (ref 4–12)
NEUTROPHILS # BLD AUTO: 5.19 THOUSANDS/ΜL (ref 1.85–7.62)
NEUTROPHILS # BLD AUTO: 5.19 THOUSANDS/ΜL (ref 1.85–7.62)
NEUTS SEG NFR BLD AUTO: 68 % (ref 43–75)
NEUTS SEG NFR BLD AUTO: 68 % (ref 43–75)
NRBC BLD AUTO-RTO: 0 /100 WBCS
NRBC BLD AUTO-RTO: 0 /100 WBCS
PLATELET # BLD AUTO: 385 THOUSANDS/UL (ref 149–390)
PLATELET # BLD AUTO: 385 THOUSANDS/UL (ref 149–390)
PMV BLD AUTO: 8.8 FL (ref 8.9–12.7)
PMV BLD AUTO: 8.8 FL (ref 8.9–12.7)
RBC # BLD AUTO: 5.62 MILLION/UL (ref 3.88–5.62)
RBC # BLD AUTO: 5.62 MILLION/UL (ref 3.88–5.62)
SARS-COV-2 RNA RESP QL NAA+PROBE: NEGATIVE
SARS-COV-2 RNA RESP QL NAA+PROBE: NEGATIVE
WBC # BLD AUTO: 7.72 THOUSAND/UL (ref 4.31–10.16)
WBC # BLD AUTO: 7.72 THOUSAND/UL (ref 4.31–10.16)

## 2022-07-01 PROCEDURE — 85025 COMPLETE CBC W/AUTO DIFF WBC: CPT | Performed by: PSYCHIATRY & NEUROLOGY

## 2022-07-01 PROCEDURE — 99238 HOSP IP/OBS DSCHRG MGMT 30/<: CPT | Performed by: PSYCHIATRY & NEUROLOGY

## 2022-07-01 RX ADMIN — CLOZAPINE 200 MG: 200 TABLET ORAL at 08:38

## 2022-07-01 RX ADMIN — ASPIRIN 81 MG CHEWABLE TABLET 81 MG: 81 TABLET CHEWABLE at 08:38

## 2022-07-01 RX ADMIN — SENNOSIDES 8.6 MG: 8.6 TABLET, FILM COATED ORAL at 08:38

## 2022-07-01 RX ADMIN — GABAPENTIN 400 MG: 400 CAPSULE ORAL at 08:38

## 2022-07-01 RX ADMIN — LEVETIRACETAM 500 MG: 500 TABLET, FILM COATED ORAL at 08:38

## 2022-07-01 RX ADMIN — METOPROLOL SUCCINATE 50 MG: 50 TABLET, EXTENDED RELEASE ORAL at 08:38

## 2022-07-01 RX ADMIN — NICOTINE POLACRILEX 2 MG: 2 GUM, CHEWING ORAL at 08:38

## 2022-07-01 RX ADMIN — METFORMIN HYDROCHLORIDE 850 MG: 850 TABLET, FILM COATED ORAL at 08:38

## 2022-07-01 NOTE — NURSING NOTE
Discharge Note Patient was discharged 1100 Patient left with all his belongings and all necessary papers  He also left with all his necessary medication   Patient excited to leave

## 2022-07-01 NOTE — PLAN OF CARE
Problem: Alteration in Thoughts and Perception  Goal: Treatment Goal: Gain control of psychotic behaviors/thinking, reduce/eliminate presenting symptoms and demonstrate improved reality functioning upon discharge  Outcome: Adequate for Discharge  Goal: Verbalize thoughts and feelings  Description: Interventions:  - Promote a nonjudgmental and trusting relationship with the patient through active listening and therapeutic communication  - Assess patient's level of functioning, behavior and potential for risk  - Engage patient in 1 on 1 interactions  - Encourage patient to express fears, feelings, frustrations, and discuss symptoms    - Melvin patient to reality, help patient recognize reality-based thinking   - Administer medications as ordered and assess for potential side effects  - Provide the patient education related to the signs and symptoms of the illness and desired effects of prescribed medications  Interventions:  - Assess and re-assess patient's lethality and potential for self-injury  - Engage patient in 1:1 interactions, daily, for a minimum of 15 minutes  - Encourage patient to express feelings, fears, frustrations, hopes  - Establish rapport/trust with patient   Interventions:  - Assess and re-assess patient's level of risk   - Engage patient in 1:1 interactions, daily, for a minimum of 15 minutes   - Encourage patient to express feelings, fears, frustrations, hopes   Interventions:  - Assess and re-assess patient's level of risk, every waking shift  - Engage patient in 1:1 interactions, daily, for a minimum of 15 minutes   - Allow patient to express feelings and frustrations in a safe and non-threatening manner   - Establish rapport/trust with patient   Outcome: Adequate for Discharge  Goal: Agree to be compliant with medication regime, as prescribed and report medication side effects  Description: Interventions:  - Offer appropriate PRN medication and supervise ingestion; conduct AIMS, as needed Outcome: Adequate for Discharge  Goal: Recognize dysfunctional thoughts, communicate reality-based thoughts at the time of discharge  Description: Interventions:  - Provide medication and psycho-education to assist patient in compliance and developing insight into his/her illness   Outcome: Adequate for Discharge

## 2022-07-01 NOTE — BH TRANSITION RECORD
Contact Information: If you have any questions, concerns, pended studies, tests and/or procedures, or emergencies regarding your inpatient behavioral health visit  Please contact [facility] extended acute care unit [phone] and ask to speak to a , nurse or physician  A contact is available 24 hours/ 7 days a week at this number  Summary of Procedures Performed During your Stay:  Below is a list of major procedures performed during your hospital stay and a summary of results:  - Cardiac Procedures/Studies: EKG  Pending Studies (From admission, onward)     Start     Ordered           If studies are pending at discharge, follow up with your PCP and/or referring provider

## 2022-07-01 NOTE — NURSING NOTE
Patient has been compliant with medication but not always with meals  Patient stated he did not like the food here  Celeste Ramos

## 2022-07-01 NOTE — NURSING NOTE
Pt was pleasant and cooperative throughout the day  Pt complied with current unit rules of quarantine  Pt visible at times walking around the unit when acceptable  Covid test taken via nasal swab and sent to lab @1530, awaiting results  Pt stated he is happy about discharge tomorrow and looking forward to seeing family again  Pt compliant with all medications, demonstrated good appetite  Will continue to monitor for safety and support

## 2022-07-01 NOTE — SOCIAL WORK
VIRGILIO met with pt prior to discharge to discuss aftercare summary  Pt asked again about the medications and SW provided update again that momentarily SW will be assisting his mother to go to the OP pharmacy to  the meds  Pt had no other questions at this time and also denied symptoms  He continues to carry / use cane when he walks  He also has been mask compliant and tested negative for covid within the past 24 hours which pt mother was update on as well  SW accompanied pt mother to the pharmacy, where she picked up a month supply of medication (except Clozapine which was week supply)  Patient then accompanied by staff off unit with belongings and left at approximately 1115AM with no incident  AVS sent to [facility] contacts and aftercare providers

## 2022-07-01 NOTE — PROGRESS NOTES
07/01/22 [address] [facility]/ Substance Use - for patients on the behavioral health units only   Patient was referred to an addiction treatment center Yes  ([facility])     Patient will receive substance abuse OP treatment at [facility] OP and partial program   SW previously discussed with patient AUDIT score of 4  Risks discussed included: medication noncompliance, interpersonal conflict, exasperation of mental health symptoms, increase in stress / anxiety, peer pressure  Recommendations discussed: follow up with [facility] OP, address substance abuse in individual and group therapies  Patient's response: receptive, has support and accountability from family to follow through with aftercare plans

## 2022-07-01 NOTE — NURSING NOTE
Pt slept most of the night  No behavioral issues noted  Remains on q7 minute checks  safety measures maintained  Will continue to monitor
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
## 2022-12-22 ENCOUNTER — APPOINTMENT (EMERGENCY)
Dept: RADIOLOGY | Facility: HOSPITAL | Age: 27
End: 2022-12-22

## 2022-12-22 ENCOUNTER — HOSPITAL ENCOUNTER (EMERGENCY)
Facility: HOSPITAL | Age: 27
Discharge: HOME | End: 2022-12-22
Attending: EMERGENCY MEDICINE

## 2022-12-22 VITALS
HEART RATE: 87 BPM | DIASTOLIC BLOOD PRESSURE: 74 MMHG | SYSTOLIC BLOOD PRESSURE: 103 MMHG | WEIGHT: 230 LBS | OXYGEN SATURATION: 98 % | HEIGHT: 72 IN | BODY MASS INDEX: 31.15 KG/M2 | TEMPERATURE: 98.2 F | RESPIRATION RATE: 18 BRPM

## 2022-12-22 DIAGNOSIS — I47.1 SVT (SUPRAVENTRICULAR TACHYCARDIA) (HCC): Primary | ICD-10-CM

## 2022-12-22 LAB
ALBUMIN SERPL BCP-MCNC: 4.4 G/DL (ref 3.5–5)
ALP SERPL-CCNC: 60 U/L (ref 46–116)
ALT SERPL W P-5'-P-CCNC: 13 U/L (ref 12–78)
ANION GAP SERPL CALCULATED.3IONS-SCNC: 11 MMOL/L (ref 4–13)
AST SERPL W P-5'-P-CCNC: 11 U/L (ref 5–45)
ATRIAL RATE: 111 BPM
ATRIAL RATE: 74 BPM
BASOPHILS # BLD AUTO: 0.06 THOUSANDS/ÂΜL (ref 0–0.1)
BASOPHILS NFR BLD AUTO: 1 % (ref 0–1)
BILIRUB SERPL-MCNC: 0.4 MG/DL (ref 0.2–1)
BUN SERPL-MCNC: 9 MG/DL (ref 5–25)
CALCIUM SERPL-MCNC: 10.6 MG/DL (ref 8.3–10.1)
CARDIAC TROPONIN I PNL SERPL HS: <2 NG/L
CHLORIDE SERPL-SCNC: 106 MMOL/L (ref 96–108)
CO2 SERPL-SCNC: 22 MMOL/L (ref 21–32)
CREAT SERPL-MCNC: 0.88 MG/DL (ref 0.6–1.3)
EOSINOPHIL # BLD AUTO: 0.21 THOUSAND/ÂΜL (ref 0–0.61)
EOSINOPHIL NFR BLD AUTO: 2 % (ref 0–6)
ERYTHROCYTE [DISTWIDTH] IN BLOOD BY AUTOMATED COUNT: 12 % (ref 11.6–15.1)
GFR SERPL CREATININE-BSD FRML MDRD: 119 ML/MIN/1.73SQ M
GLUCOSE SERPL-MCNC: 101 MG/DL (ref 65–140)
HCT VFR BLD AUTO: 47.4 % (ref 36.5–49.3)
HGB BLD-MCNC: 16.2 G/DL (ref 12–17)
IMM GRANULOCYTES # BLD AUTO: 0.03 THOUSAND/UL (ref 0–0.2)
IMM GRANULOCYTES NFR BLD AUTO: 0 % (ref 0–2)
LYMPHOCYTES # BLD AUTO: 2.9 THOUSANDS/ÂΜL (ref 0.6–4.47)
LYMPHOCYTES NFR BLD AUTO: 25 % (ref 14–44)
MCH RBC QN AUTO: 30.7 PG (ref 26.8–34.3)
MCHC RBC AUTO-ENTMCNC: 34.2 G/DL (ref 31.4–37.4)
MCV RBC AUTO: 90 FL (ref 82–98)
MONOCYTES # BLD AUTO: 0.89 THOUSAND/ÂΜL (ref 0.17–1.22)
MONOCYTES NFR BLD AUTO: 8 % (ref 4–12)
NEUTROPHILS # BLD AUTO: 7.46 THOUSANDS/ÂΜL (ref 1.85–7.62)
NEUTS SEG NFR BLD AUTO: 64 % (ref 43–75)
NRBC BLD AUTO-RTO: 0 /100 WBCS
P AXIS: 56 DEGREES
P AXIS: 76 DEGREES
PLATELET # BLD AUTO: 356 THOUSANDS/UL (ref 149–390)
PMV BLD AUTO: 9.2 FL (ref 8.9–12.7)
POTASSIUM SERPL-SCNC: 4 MMOL/L (ref 3.5–5.3)
PR INTERVAL: 142 MS
PR INTERVAL: 152 MS
PROT SERPL-MCNC: 7.9 G/DL (ref 6.4–8.4)
QRS AXIS: 36 DEGREES
QRS AXIS: 66 DEGREES
QRSD INTERVAL: 82 MS
QRSD INTERVAL: 88 MS
QT INTERVAL: 316 MS
QT INTERVAL: 346 MS
QTC INTERVAL: 384 MS
QTC INTERVAL: 429 MS
RBC # BLD AUTO: 5.28 MILLION/UL (ref 3.88–5.62)
SODIUM SERPL-SCNC: 139 MMOL/L (ref 135–147)
T WAVE AXIS: 22 DEGREES
T WAVE AXIS: 67 DEGREES
VENTRICULAR RATE: 111 BPM
VENTRICULAR RATE: 74 BPM
WBC # BLD AUTO: 11.55 THOUSAND/UL (ref 4.31–10.16)

## 2022-12-22 RX ORDER — ADENOSINE 3 MG/ML
6 INJECTION INTRAVENOUS ONCE
Status: COMPLETED | OUTPATIENT
Start: 2022-12-22 | End: 2022-12-22

## 2022-12-22 RX ORDER — METOPROLOL SUCCINATE 25 MG/1
100 TABLET, EXTENDED RELEASE ORAL DAILY
Qty: 20 TABLET | Refills: 0 | Status: SHIPPED | OUTPATIENT
Start: 2022-12-22

## 2022-12-22 RX ADMIN — ADENOSINE 6 MG: 3 INJECTION, SOLUTION INTRAVENOUS at 12:30

## 2022-12-22 NOTE — Clinical Note
Jose Carlos Plummer was seen and treated in our emergency department on 12/22/2022  Diagnosis:     Nathan    He may return on this date:     Limit use of caffeine, nicotine, or other stimulants     If you have any questions or concerns, please don't hesitate to call        Marjorie Chris PA-C    ______________________________           _______________          _______________  Hospital Representative                              Date                                Time

## 2022-12-22 NOTE — ED PROVIDER NOTES
History  Chief Complaint   Patient presents with   • Palpitations     Patient reports that he recently had a cardiac ablation for and "arrhythmia " Patient reports that heart began racing in 200's  States that he felt his heart beating quickly and became anxious     23 y/o M w/ PMHX ADHD, Hx Psychiatric d/o w/ Psych polypharmacy and Hx SVT w/ ablation in May 2022 for wide complex SVT w/ aberrancy  Follows w/ [facility] Dr Iza Zheng and Luisa Foley and had EP mapping and ablation may 2022 per Epic notation  He also prior did have SVT episodes prior to ablation w/ WC Tachy treated w/ adenosine 6 then 12 mg for cessation of arrhythmias  Today patient reports he had sudden onset SOB / hot cold flashes at approx 1015 AM feeling SOB and presented to the ED in concern for recurrence of SVT      Prior EP notes: from Epic Documentation    EP SVT Ablation: 5/20/22  Presenting rhythm was sinus tachycardia  Easily inducible wide-complex tachycardia with a typical left bundle branch block morphology  VA time was long & there was [place] linking  There was manifest pre-excitation with a typical left bundle branch block morphology that appeared to be decremental  During tachycardia there was retrograde His activation & 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 Taking?    Omega-3 Fatty Acids (FISH OIL) 1,000 mg   No No   Sig: Take 1 capsule (1,000 mg total) by mouth daily for 30 days   acetaminophen (TYLENOL) 325 mg tablet   No No   Sig: Take 2 tablets (650 mg total) by mouth every 6 (six) hours as needed for mild pain   ammonium lactate (LAC-HYDRIN) 12 % lotion   No No   Sig: Apply topically 2 (two) times a day   ammonium lactate (LAC-HYDRIN) 12 % lotion   No No   Sig: Apply topically 2 (two) times a day as needed for dry skin   artificial tear (LUBRIFRESH P M ) 83-15 % ophthalmic ointment   No No   Sig: Administer 1 application to both eyes every 3 (three) hours as needed (dry eyes)   aspirin 81 mg chewable tablet   No No   Sig: Chew 1 tablet (81 mg total) daily   cloNIDine (CATAPRES) 0 1 mg tablet   No No   Sig: Take 1 tablet (0 1 mg total) by mouth 2 (two) times a day for 30 days   clozapine (CLOZARIL) 200 MG tablet   No No   Sig: Take 1 tablet (200 mg total) by mouth 2 (two) times a day   gabapentin (NEURONTIN) 400 mg capsule   No No   Sig: Take 1 capsule (400 mg total) by mouth 3 (three) times a day   hydrOXYzine HCL (ATARAX) 50 mg tablet   No No   Sig: Take 1 tablet (50 mg total) by mouth every 6 (six) hours as needed for itching (moderate anxiety)   levETIRAcetam (KEPPRA) 500 mg tablet   No No   Sig: Take 1 tablet (500 mg total) by mouth every 12 (twelve) hours   melatonin 3 mg   No No   Sig: Take 1 tablet (3 mg total) by mouth daily at bedtime as needed (Insomnia)   metFORMIN (GLUCOPHAGE) 850 mg tablet   No No   Sig: Take 1 tablet (850 mg total) by mouth 2 (two) times a day with meals   metoprolol succinate (TOPROL-XL) 50 mg 24 hr tablet   No No   Sig: Take 1 tablet (50 mg total) by mouth daily   nicotine (NICODERM CQ) 14 mg/24hr TD 24 hr patch   No No   Sig: Place 1 patch on the skin daily   paliperidone palmitate ER (INVEGA SUSTENNA) 156 mg/mL IM injection   No No   Sig: Inject 1 mL (156 mg total) into a muscle every 30 (thirty) days for 30 days   polyethylene glycol (MIRALAX) 17 g packet   No No   Sig: Take 17 g by mouth daily as needed (constipation refractory to Senokot-S)   propranolol (INDERAL) 10 mg tablet   No No   Sig: Take 1 tablet (10 mg total) by mouth every 8 (eight) hours as needed (akathisia/restlessness)   risperiDONE (RisperDAL) 3 mg tablet   No No   Sig: Take 1 tablet (3 mg total) by mouth daily at bedtime for 30 days   senna (SENOKOT) 8 6 MG tablet   No No   Sig: Take 1 tablet (8 6 mg total) by mouth daily   senna-docusate sodium (SENOKOT S) 8 6-50 mg per tablet   No No   Sig: Take 1 tablet by mouth daily as needed (prn constipation not relieved with Miralax)   sodium chloride (OCEAN) 0 65 % nasal spray   No No   Si spray into each nostril every 2 (two) hours as needed for congestion for up to 30 days   topiramate (TOPAMAX) 25 mg tablet   No No   Sig: Take 1 tablet (25 mg total) by mouth daily at bedtime for 30 days   traZODone (DESYREL) 50 mg tablet   No No   Sig: Take 1 tablet (50 mg total) by mouth daily at bedtime as needed for sleep   valproic acid (DEPAKENE) 250 MG/5ML soln   No No   Sig: Take 35 mL (1,750 mg total) by mouth daily at bedtime for 30 days      Facility-Administered Medications: None       Past Medical History:   Diagnosis Date   • ADHD (attention deficit hyperactivity disorder)    • Arrhythmia    • Depression    • Hallucination    • Head injury     while incarcerated for burglary 4 years in juvenile prison   • Head injury    • Psychiatric illness    • Schizophrenia ([facility] [address] )    • Seizures ([facility] [address] )        Past Surgical History:   Procedure Laterality Date   • CARDIAC ELECTROPHYSIOLOGY MAPPING AND ABLATION         Family History   Problem Relation Age of Onset   • Drug abuse Sister    • Psychiatric Illness Sister      I have reviewed and agree with the history as documented      E-Cigarette/Vaping   • E-Cigarette Use Current Every Day User      E-Cigarette/Vaping Substances   • Nicotine No    • THC No    • CBD No    • Flavoring No    • Other No      Social History     Tobacco Use   • Smoking status: Every Day     Packs/day: 1 50     Years: 5 00     Pack years: 7 50     Types: Cigarettes     Start date: 2016     Last attempt to quit: 2022     Years since quittin 9   • Smokeless tobacco: Never   Vaping Use   • Vaping Use: Every day   Substance Use Topics   • Alcohol use: Not Currently   • Drug use: Not Currently     Types: Amphetamines, Marijuana, Other       Review of Systems   Constitutional: Negative for chills and fever  HENT: Negative for ear pain and sore throat  Eyes: Negative for pain and visual disturbance  Respiratory: Positive for shortness of breath  Negative for cough  Cardiovascular: Positive for palpitations  Negative for chest pain  Gastrointestinal: Negative for abdominal pain and vomiting  Endocrine: Positive for cold intolerance and heat intolerance  Genitourinary: Negative for dysuria and hematuria  Musculoskeletal: Negative for arthralgias and back pain  Skin: Negative for color change and rash  Neurological: Negative for seizures and syncope  All other systems reviewed and are negative  Physical Exam  Physical Exam  Vitals and nursing note reviewed  Constitutional:       General: He is not in acute distress  Appearance: He is well-developed  HENT:      Head: Normocephalic and atraumatic  Eyes:      Conjunctiva/sclera: Conjunctivae normal    Cardiovascular:      Rate and Rhythm: Regular rhythm  Tachycardia present  Pulses: Normal pulses  Heart sounds: No murmur heard  Pulmonary:      Effort: Pulmonary effort is normal  No respiratory distress  Breath sounds: Normal breath sounds  Abdominal:      Palpations: Abdomen is soft  Tenderness: There is no abdominal tenderness  Musculoskeletal:         General: No swelling  Cervical back: Neck supple  Skin:     General: Skin is warm and dry  Capillary Refill: Capillary refill takes less than 2 seconds  Neurological:      Mental Status: He is alert     Psychiatric:         Mood and Affect: Mood normal          Vital Signs  ED Triage Vitals   Temperature Pulse Respirations Blood Pressure SpO2   12/22/22 1203 12/22/22 1203 12/22/22 1203 12/22/22 1203 12/22/22 1203   (!) 97 °F (36 1 °C) (!) 191 18 99/65 96 %      Temp Source Heart Rate Source Patient Position - Orthostatic VS BP Location FiO2 (%)   12/22/22 1359 12/22/22 1309 12/22/22 1237 12/22/22 1237 --   Temporal Monitor Lying Left arm       Pain Score       12/22/22 1203       No Pain           Vitals:    12/22/22 1203 12/22/22 1237 12/22/22 1309 12/22/22 1359   BP: 99/65 101/58 106/70 103/74   Pulse: (!) 191 90 90 87   Patient Position - Orthostatic VS:  Lying Lying Lying         Visual Acuity      ED Medications  Medications   adenosine (ADENOCARD) injection 6 mg (6 mg Intravenous Given 12/22/22 1230)       Diagnostic Studies  Results Reviewed     Procedure Component Value Units Date/Time    HS Troponin 0hr (reflex protocol) [[id number]]  (Normal) Collected: 12/22/22 1223    Lab Status: Final result Specimen: Blood from Arm, Right Updated: 12/22/22 1319     hs TnI 0hr <2 ng/L     Comprehensive metabolic panel [[id number]]  (Abnormal) Collected: 12/22/22 1223    Lab Status: Final result Specimen: Blood from Arm, Right Updated: 12/22/22 1314     Sodium 139 mmol/L      Potassium 4 0 mmol/L      Chloride 106 mmol/L      CO2 22 mmol/L      ANION GAP 11 mmol/L      BUN 9 mg/dL      Creatinine 0 88 mg/dL      Glucose 101 mg/dL      Calcium 10 6 mg/dL      AST 11 U/L      ALT 13 U/L      Alkaline Phosphatase 60 U/L      Total Protein 7 9 g/dL      Albumin 4 4 g/dL      Total Bilirubin 0 40 mg/dL      eGFR 119 ml/min/1 73sq m     Narrative:      [facility] guidelines for Chronic Kidney Disease (CKD):   •  Stage 1 with normal or high GFR (GFR > 90 mL/min/1 73 square meters)  •  Stage 2 Mild CKD (GFR = 60-89 mL/min/1 73 square meters)  •  Stage 3A Moderate CKD (GFR = 45-59 mL/min/1 73 square meters)  •  Stage 3B Moderate CKD (GFR = 30-44 mL/min/1 73 square meters)  •  Stage 4 Severe CKD (GFR = 15-29 mL/min/1 73 square meters)  •  Stage 5 End Stage CKD (GFR <15 mL/min/1 73 square meters)  Note: GFR calculation is accurate only with a steady state creatinine    CBC and differential [[id number]]  (Abnormal) Collected: 12/22/22 1223    Lab Status: Final result Specimen: Blood from Arm, Right Updated: 12/22/22 1236     WBC 11 55 Thousand/uL      RBC 5 28 Million/uL      Hemoglobin 16 2 g/dL      Hematocrit 47 4 %      MCV 90 fL      MCH 30 7 pg      MCHC 34 2 g/dL      RDW 12 0 %      MPV 9 2 fL      Platelets 424 Thousands/uL      nRBC 0 /100 WBCs      Neutrophils Relative 64 %      Immat GRANS % 0 %      Lymphocytes Relative 25 %      Monocytes Relative 8 %      Eosinophils Relative 2 %      Basophils Relative 1 %      Neutrophils Absolute 7 46 Thousands/µL      Immature Grans Absolute 0 03 Thousand/uL      Lymphocytes Absolute 2 90 Thousands/µL      Monocytes Absolute 0 89 Thousand/µL      Eosinophils Absolute 0 21 Thousand/µL      Basophils Absolute 0 06 Thousands/µL                  XR chest portable   Final Result by Raleigh Reddy MD (12/22 1527)      No acute cardiopulmonary disease                    Workstation performed: [id number]                    Procedures  Chemical Cardioversion with Adenosine    Date/Time: 12/22/2022 12:24 PM  Performed by: Marjorie Chris PA-C  Authorized by: Marjorie Chris PA-C     Verbal consent obtained?: Yes    Consent given by:  Patient  Patient states understanding of procedure being performed: Yes    Patient's understanding of procedure matches consent: Yes    Patient identity confirmed:  Verbally with patient  Patient sedated: No    Cardioversion basis:  Elective  Pre-procedure rhythm:  Supraventricular tachycardia  Position: Patient was placed in a supine position    Chest area exposed: Chest area was exposed    Electrodes:  Pads  Electrodes placed:  Anterior-lateral  Number of attempts:  1  Attempt 1:     Attempt 1 outcome:  Conversion to normal sinus rhythm  Post-procedure rhythm:  Normal sinus rhythm   Patient converted s/p Adenosine 6 mg   CriticalCare Time  Performed by: Marjorie Chris PA-C  Authorized by: Yasir Calderón Triny Melendez PA-C     Critical care provider statement:     Critical care time (minutes):  30    Critical care start time:  12/22/2022 12:18 PM    Critical care end time:  12/22/2022 12:50 PM    Critical care was necessary to treat or prevent imminent or life-threatening deterioration of the following conditions:  Cardiac failure    Critical care was time spent personally by me on the following activities:  Re-evaluation of patient's condition, ordering and performing treatments and interventions, ordering and review of laboratory studies, ordering and review of radiographic studies, review of old charts, evaluation of patient's response to treatment, examination of patient, discussions with consultants, discussions with primary provider, development of treatment plan with patient or surrogate and obtaining history from patient or surrogate  Comments:      Chemical cardioversion w/ adenosine  ECG 12 Lead Documentation Only    Date/Time: 12/22/2022 12:31 PM  Performed by: Cara Zarate PA-C  Authorized by: Cara Zarate PA-C     Indications / Diagnosis:  Tachycardia  ECG reviewed by me, the ED Provider: yes    Patient location:  ED  Previous ECG:     Previous ECG:  Compared to current    Similarity:  Changes noted    Comparison to cardiac monitor: Yes    Interpretation:     Interpretation: normal    Rate:     ECG rate:  111    ECG rate assessment: tachycardic    Rhythm:     Rhythm: sinus rhythm    Ectopy:     Ectopy: none    QRS:     QRS axis:  Normal  Conduction:     Conduction: normal    ST segments:     ST segments:  Normal  T waves:     T waves: normal    Comments:      NO STEMI  ECG 12 Lead Documentation Only    Date/Time: 12/22/2022 12:31 PM  Performed by: Cara Zarate PA-C  Authorized by: Cara Zarate PA-C     Indications / Diagnosis:  TACHYCARDIA  ECG reviewed by me, the ED Provider: yes    Patient location:  ED  Previous ECG:     Previous ECG:  Unavailable    Comparison to cardiac monitor: Yes    Interpretation:     Interpretation: normal    Rate:     ECG rate:  194    ECG rate assessment: tachycardic    Rhythm:     Rhythm: sinus rhythm and SVT    Ectopy:     Ectopy: none    QRS:     QRS axis:  Normal  Conduction:     Conduction: normal    ST segments:     ST segments:  Normal  T waves:     T waves: normal    Comments:      SVT w/ aberrancy             ED Course  ED Course as of 12/23/22 1900   Thu Dec 22, 2022   1339 Paged and d/w Dr  Cardiology team    PT remains stable w/o recurrence of SVT  Patient wishes to go home but mother wants patient transferred to [facility]  Calling most recent cardiologist Dr Mara Lacey for advise for possible outpatient treatment  SBIRT 20yo+    Flowsheet Row Most Recent Value   SBIRT (25 yo +)    In order to provide better care to our patients, we are screening all of our patients for alcohol and drug use  Would it be okay to ask you these screening questions? No Filed at: 12/22/2022 1235                    MDM  Number of Diagnoses or Management Options  SVT (supraventricular tachycardia) ([facility] [address] ): established and improving  Diagnosis management comments: Prior established SVT w/ aberrancy and prior EP ablation 5/2022  Case discussed w/ Dr Paris of [facility] cardiology whom states decreased Toprol XL approx 1 week ago to 50 mg / day  Discussed and reviewed labs w/ NL trops and improved HR to 113s w/o s/s of additional abnormalities  In setting of SVT w/ onset since 1015 and negative trops would be ok to D/C to OP setting  Plan to increase back to 100 mg / day and referral back to cardiology for ongoing evaluatoin and likelihood for repeat EP study and f/u  Possible need for Flecanide to be determined upcoming in OP setting v  Repeat ablation         Amount and/or Complexity of Data Reviewed  Clinical lab tests: ordered and reviewed  Tests in the radiology section of CPT®: ordered and reviewed  Tests in the medicine section of CPT®: ordered and reviewed  Discussion of test results with the performing providers: (Case discussed and reviewed w/ Dr Cayla Molina who was present at bedside for cardioversion w/ adenosine)  Decide to obtain previous medical records or to obtain history from someone other than the patient: yes  Obtain history from someone other than the patient: yes (Obtained from [address] [place] [address] Rd records)  Review and summarize past medical records: yes  Discuss the patient with other providers: (Discussed w/ patient's cardiologist Dr Reza purvis of [facility])  Independent visualization of images, tracings, or specimens: yes        Disposition  Final diagnoses:   SVT (supraventricular tachycardia) ([facility] [address] ) - with Aberrancy     Time reflects when diagnosis was documented in both MDM as applicable and the Disposition within this note     Time User Action Codes Description Comment    12/22/2022  1:50 PM Darien Nuno Add [I47 1] SVT (supraventricular tachycardia) ([facility] [address] )     12/22/2022  1:50 PM Darien Nuno Modify [I47 1] SVT (supraventricular tachycardia) [facility]) with [address]      ED Disposition     ED Disposition   Discharge    Condition   Stable    Date/Time   Thu Dec 22, 2022  1:50 PM    Comment   Jessica Dominique discharge to home/self care  Follow-up Information     Follow up With Specialties Details Why Contact Info Additional Information    Guillaume Santamaria MD Cardiology, Internal Medicine Schedule an appointment as soon as possible for a visit today  [address]  [phone]        [address] Emergency Department Emergency Medicine Go to  If symptoms worsen   [address] [place],[address] [address]  [address] [facility] Emergency Department, [address] [place] [address] [address]          Discharge Medication List as of 12/22/2022  1:55 PM      CONTINUE these medications which have CHANGED    Details   metoprolol succinate (TOPROL-XL) 25 mg 24 hr tablet Take 4 tablets (100 mg total) by mouth daily, Starting Thu 12/22/2022, Normal         CONTINUE these medications which have NOT CHANGED    Details   acetaminophen (TYLENOL) 325 mg tablet Take 2 tablets (650 mg total) by mouth every 6 (six) hours as needed for mild pain, Starting Fri 6/24/2022, Normal      !! ammonium lactate (LAC-HYDRIN) 12 % lotion Apply topically 2 (two) times a day, Starting Mon 6/10/2019, Print      !! ammonium lactate (LAC-HYDRIN) 12 % lotion Apply topically 2 (two) times a day as needed for dry skin, Starting Fri 6/24/2022, Normal      artificial tear (LUBRIFRESH P M ) 83-15 % ophthalmic ointment Administer 1 application to both eyes every 3 (three) hours as needed (dry eyes), Starting Fri 6/24/2022, Normal      aspirin 81 mg chewable tablet Chew 1 tablet (81 mg total) daily, Starting Sat 6/25/2022, Normal      cloNIDine (CATAPRES) 0 1 mg tablet Take 1 tablet (0 1 mg total) by mouth 2 (two) times a day for 30 days, Starting Mon 6/10/2019, Until Wed 7/10/2019, Print      clozapine (CLOZARIL) 200 MG tablet Take 1 tablet (200 mg total) by mouth 2 (two) times a day, Starting Fri 6/24/2022, Normal      gabapentin (NEURONTIN) 400 mg capsule Take 1 capsule (400 mg total) by mouth 3 (three) times a day, Starting Fri 6/24/2022, Normal      hydrOXYzine HCL (ATARAX) 50 mg tablet Take 1 tablet (50 mg total) by mouth every 6 (six) hours as needed for itching (moderate anxiety), Starting Fri 6/24/2022, Normal      levETIRAcetam (KEPPRA) 500 mg tablet Take 1 tablet (500 mg total) by mouth every 12 (twelve) hours, Starting Fri 6/24/2022, Normal      melatonin 3 mg Take 1 tablet (3 mg total) by mouth daily at bedtime as needed (Insomnia), Starting Fri 6/24/2022, Normal      metFORMIN (GLUCOPHAGE) 850 mg tablet Take 1 tablet (850 mg total) by mouth 2 (two) times a day with meals, Starting Fri 6/24/2022, Normal      nicotine (NICODERM CQ) 14 mg/24hr TD 24 hr patch Place 1 patch on the skin daily, Starting Mon 6/10/2019, Print      Omega-3 Fatty Acids (FISH OIL) 1,000 mg Take 1 capsule (1,000 mg total) by mouth daily for 30 days, Starting Mon 6/10/2019, Until Wed 7/10/2019, Print      paliperidone palmitate ER (INVEGA SUSTENNA) 156 mg/mL IM injection Inject 1 mL (156 mg total) into a muscle every 30 (thirty) days for 30 days, Starting Mon 6/24/2019, Until Wed 7/24/2019, Print      polyethylene glycol (MIRALAX) 17 g packet Take 17 g by mouth daily as needed (constipation refractory to Senokot-S), Starting Fri 6/24/2022, Normal      propranolol (INDERAL) 10 mg tablet Take 1 tablet (10 mg total) by mouth every 8 (eight) hours as needed (akathisia/restlessness), Starting Fri 6/24/2022, Normal      risperiDONE (RisperDAL) 3 mg tablet Take 1 tablet (3 mg total) by mouth daily at bedtime for 30 days, Starting Mon 6/10/2019, Until Wed 7/10/2019, Print      senna (SENOKOT) 8 6 MG tablet Take 1 tablet (8 6 mg total) by mouth daily, Starting Fri 6/24/2022, Normal      senna-docusate sodium (SENOKOT S) 8 6-50 mg per tablet Take 1 tablet by mouth daily as needed (prn constipation not relieved with Miralax), Starting Fri 6/24/2022, Normal      sodium chloride (OCEAN) 0 65 % nasal spray 1 spray into each nostril every 2 (two) hours as needed for congestion for up to 30 days, Starting Mon 6/10/2019, Until Wed 7/10/2019, Print      topiramate (TOPAMAX) 25 mg tablet Take 1 tablet (25 mg total) by mouth daily at bedtime for 30 days, Starting Mon 6/10/2019, Until Wed 7/10/2019, Print      traZODone (DESYREL) 50 mg tablet Take 1 tablet (50 mg total) by mouth daily at bedtime as needed for sleep, Starting Fri 6/24/2022, Normal      valproic acid (DEPAKENE) 250 MG/5ML soln Take 35 mL (1,750 mg total) by mouth daily at bedtime for 30 days, Starting Mon 6/10/2019, Until Wed 7/10/2019, Print       !! - Potential duplicate medications found  Please discuss with provider                PDMP Review     None          ED Provider  Electronically Signed by           Christelle Gonzales PA-C  12/23/22 9362

## 2022-12-23 LAB
ATRIAL RATE: 194 BPM
ATRIAL RATE: 196 BPM
P AXIS: -11 DEGREES
P AXIS: -9 DEGREES
PR INTERVAL: 104 MS
PR INTERVAL: 96 MS
QRS AXIS: -27 DEGREES
QRS AXIS: 3 DEGREES
QRSD INTERVAL: 120 MS
QRSD INTERVAL: 122 MS
QT INTERVAL: 258 MS
QT INTERVAL: 258 MS
QTC INTERVAL: 463 MS
QTC INTERVAL: 466 MS
T WAVE AXIS: 140 DEGREES
T WAVE AXIS: 150 DEGREES
VENTRICULAR RATE: 194 BPM
VENTRICULAR RATE: 196 BPM

## 2023-02-26 NOTE — PROGRESS NOTES
06/20/22 0700   Activity/Group Checklist   Group Community meeting   Attendance Did not attend   Attendance Duration (min) 46-60   Affect/Mood RENUKA Parent

## 2023-03-03 NOTE — NURSING NOTE
Pt is medication and meal compliant with lunch, but refused breakfast  Pt has been fixated on discharge most of shift stating " im fine, im ready to go, I told my mom and I feel good"  Pt making frequent requests at nurses station and often using patient phone needing redirection to share time on phone with peers  Pt social with select peers or rests in bed  Pt has irritable edge, but no outbursts or issues  Pt utilizes prn nicorette gum through out shift  Will continue to monitor  Detail Level: Simple

## 2024-04-05 NOTE — NURSING NOTE
NOTED.   Received pt in bed at change of shift with eyes closed; chest movement noted  Continues the same thus this far as per q 7 min room checks  Will continue to monitor

## 2024-08-09 NOTE — TREATMENT TEAM
06/01/22 1300   Activity/Group Checklist   Group Nursing Education   Attendance Attended   Attendance Duration (min) 31-45   Interactions Interacted appropriately   Affect/Mood Appropriate   Goals Achieved Able to listen to others; Able to engage in interactions
06/10/22 1600   Activity/Group Checklist   Group   (Coping skills)   Attendance Attended   Attendance Duration (min) 31-45   Interactions Interacted appropriately   Affect/Mood Appropriate   Goals Achieved Able to engage in interactions
06/16/22 1300   Activity/Group Checklist   Group Nursing Education   Attendance Attended   Attendance Duration (min) 31-45   Interactions Interacted appropriately   Affect/Mood Appropriate   Goals Achieved Able to engage in interactions
06/23/22 1000   Activity/Group Checklist   Group Nursing Education   Attendance Attended   Attendance Duration (min) 31-45   Interactions Interacted appropriately   Affect/Mood Appropriate   Goals Achieved Able to listen to others; Able to engage in interactions       We talked about Core Beliefs and coping skills for anxiety specifically 
06/27/22 1300   Activity/Group Checklist   Group Nursing Education   Attendance Did not attend   Attendance Duration (min) 31-45
Skin normal color for race, warm, dry and intact. No evidence of rash.